# Patient Record
Sex: FEMALE | Race: BLACK OR AFRICAN AMERICAN | NOT HISPANIC OR LATINO | Employment: UNEMPLOYED | URBAN - METROPOLITAN AREA
[De-identification: names, ages, dates, MRNs, and addresses within clinical notes are randomized per-mention and may not be internally consistent; named-entity substitution may affect disease eponyms.]

---

## 2018-07-05 LAB
HPV16+18+H RISK 12 DNA CVX-IMP: POSITIVE
PAP RECOMMENDATION EXT: ABNORMAL
PAP SMEAR: ABNORMAL

## 2022-09-27 ENCOUNTER — OFFICE VISIT (OUTPATIENT)
Dept: FAMILY MEDICINE | Facility: CLINIC | Age: 37
End: 2022-09-27
Payer: MEDICAID

## 2022-09-27 VITALS
TEMPERATURE: 98 F | HEART RATE: 84 BPM | DIASTOLIC BLOOD PRESSURE: 90 MMHG | BODY MASS INDEX: 20.58 KG/M2 | RESPIRATION RATE: 18 BRPM | SYSTOLIC BLOOD PRESSURE: 127 MMHG | OXYGEN SATURATION: 97 % | WEIGHT: 109 LBS | HEIGHT: 61 IN

## 2022-09-27 DIAGNOSIS — R82.90 ABNORMAL URINE FINDING: ICD-10-CM

## 2022-09-27 DIAGNOSIS — N30.01 ACUTE CYSTITIS WITH HEMATURIA: Primary | ICD-10-CM

## 2022-09-27 DIAGNOSIS — D64.9 ANEMIA, UNSPECIFIED TYPE: ICD-10-CM

## 2022-09-27 DIAGNOSIS — K50.118 CROHN'S DISEASE OF COLON WITH OTHER COMPLICATION: ICD-10-CM

## 2022-09-27 PROBLEM — K50.10 CROHN'S DISEASE OF COLON: Status: ACTIVE | Noted: 2022-09-27

## 2022-09-27 LAB
ALBUMIN SERPL-MCNC: 4.5 GM/DL (ref 3.5–5)
ALBUMIN/GLOB SERPL: 1 RATIO (ref 1.1–2)
ALP SERPL-CCNC: 83 UNIT/L (ref 40–150)
ALT SERPL-CCNC: 11 UNIT/L (ref 0–55)
AST SERPL-CCNC: 16 UNIT/L (ref 5–34)
BASOPHILS # BLD AUTO: 0.04 X10(3)/MCL (ref 0–0.2)
BASOPHILS NFR BLD AUTO: 0.6 %
BILIRUB UR QL STRIP: NEGATIVE
BILIRUBIN DIRECT+TOT PNL SERPL-MCNC: 0.4 MG/DL
BUN SERPL-MCNC: 7.2 MG/DL (ref 7–18.7)
CALCIUM SERPL-MCNC: 10 MG/DL (ref 8.4–10.2)
CHLORIDE SERPL-SCNC: 105 MMOL/L (ref 98–107)
CO2 SERPL-SCNC: 24 MMOL/L (ref 22–29)
CREAT SERPL-MCNC: 0.66 MG/DL (ref 0.55–1.02)
EOSINOPHIL # BLD AUTO: 0.14 X10(3)/MCL (ref 0–0.9)
EOSINOPHIL NFR BLD AUTO: 2.1 %
ERYTHROCYTE [DISTWIDTH] IN BLOOD BY AUTOMATED COUNT: 17 % (ref 11.5–17)
FERRITIN SERPL-MCNC: 21.65 NG/ML (ref 4.63–204)
FOLATE SERPL-MCNC: 10 NG/ML (ref 7–31.4)
GFR SERPLBLD CREATININE-BSD FMLA CKD-EPI: >60 MLS/MIN/1.73/M2
GLOBULIN SER-MCNC: 4.4 GM/DL (ref 2.4–3.5)
GLUCOSE SERPL-MCNC: 77 MG/DL (ref 74–100)
GLUCOSE UR QL STRIP: NEGATIVE
HCT VFR BLD AUTO: 43.7 % (ref 37–47)
HGB BLD-MCNC: 13.5 GM/DL (ref 12–16)
IMM GRANULOCYTES # BLD AUTO: 0.01 X10(3)/MCL (ref 0–0.04)
IMM GRANULOCYTES NFR BLD AUTO: 0.1 %
IRON SATN MFR SERPL: 16 % (ref 20–50)
IRON SERPL-MCNC: 48 UG/DL (ref 50–170)
KETONES UR QL STRIP: NEGATIVE
LEUKOCYTE ESTERASE UR QL STRIP: POSITIVE
LYMPHOCYTES # BLD AUTO: 2.36 X10(3)/MCL (ref 0.6–4.6)
LYMPHOCYTES NFR BLD AUTO: 34.7 %
MCH RBC QN AUTO: 27.4 PG (ref 27–31)
MCHC RBC AUTO-ENTMCNC: 30.9 MG/DL (ref 33–36)
MCV RBC AUTO: 88.6 FL (ref 80–94)
MONOCYTES # BLD AUTO: 0.37 X10(3)/MCL (ref 0.1–1.3)
MONOCYTES NFR BLD AUTO: 5.4 %
NEUTROPHILS # BLD AUTO: 3.9 X10(3)/MCL (ref 2.1–9.2)
NEUTROPHILS NFR BLD AUTO: 57.1 %
NRBC BLD AUTO-RTO: 0 %
PH, POC UA: 5.5
PLATELET # BLD AUTO: 449 X10(3)/MCL (ref 130–400)
PMV BLD AUTO: 9.6 FL (ref 7.4–10.4)
POC BLOOD, URINE: POSITIVE
POC NITRATES, URINE: POSITIVE
POTASSIUM SERPL-SCNC: 3.8 MMOL/L (ref 3.5–5.1)
PROT SERPL-MCNC: 8.9 GM/DL (ref 6.4–8.3)
PROT UR QL STRIP: NEGATIVE
RBC # BLD AUTO: 4.93 X10(6)/MCL (ref 4.2–5.4)
RET# (OHS): 0.05 (ref 0.02–0.08)
RETICULOCYTE COUNT AUTOMATED (OLG): 0.99 % (ref 1.1–2.1)
SODIUM SERPL-SCNC: 140 MMOL/L (ref 136–145)
SP GR UR STRIP: 1.02 (ref 1–1.03)
TIBC SERPL-MCNC: 251 UG/DL (ref 70–310)
TIBC SERPL-MCNC: 299 UG/DL (ref 250–450)
TRANSFERRIN SERPL-MCNC: 268 MG/DL (ref 180–382)
TSH SERPL-ACNC: 0.96 UIU/ML (ref 0.35–4.94)
UROBILINOGEN UR STRIP-ACNC: 0.2 (ref 0.1–1.1)
VIT B12 SERPL-MCNC: 516 PG/ML (ref 213–816)
WBC # SPEC AUTO: 6.8 X10(3)/MCL (ref 4.5–11.5)

## 2022-09-27 PROCEDURE — 1160F RVW MEDS BY RX/DR IN RCRD: CPT | Mod: CPTII,,, | Performed by: NURSE PRACTITIONER

## 2022-09-27 PROCEDURE — 3008F BODY MASS INDEX DOCD: CPT | Mod: CPTII,,, | Performed by: NURSE PRACTITIONER

## 2022-09-27 PROCEDURE — 82746 ASSAY OF FOLIC ACID SERUM: CPT | Performed by: NURSE PRACTITIONER

## 2022-09-27 PROCEDURE — 3008F PR BODY MASS INDEX (BMI) DOCUMENTED: ICD-10-PCS | Mod: CPTII,,, | Performed by: NURSE PRACTITIONER

## 2022-09-27 PROCEDURE — 3074F SYST BP LT 130 MM HG: CPT | Mod: CPTII,,, | Performed by: NURSE PRACTITIONER

## 2022-09-27 PROCEDURE — 82728 ASSAY OF FERRITIN: CPT | Performed by: NURSE PRACTITIONER

## 2022-09-27 PROCEDURE — 83540 ASSAY OF IRON: CPT | Performed by: NURSE PRACTITIONER

## 2022-09-27 PROCEDURE — 99214 OFFICE O/P EST MOD 30 MIN: CPT | Mod: 25,S$PBB,, | Performed by: NURSE PRACTITIONER

## 2022-09-27 PROCEDURE — 87077 CULTURE AEROBIC IDENTIFY: CPT | Performed by: NURSE PRACTITIONER

## 2022-09-27 PROCEDURE — 80053 COMPREHEN METABOLIC PANEL: CPT | Performed by: NURSE PRACTITIONER

## 2022-09-27 PROCEDURE — 36415 COLL VENOUS BLD VENIPUNCTURE: CPT | Performed by: NURSE PRACTITIONER

## 2022-09-27 PROCEDURE — 82607 VITAMIN B-12: CPT | Performed by: NURSE PRACTITIONER

## 2022-09-27 PROCEDURE — 1160F PR REVIEW ALL MEDS BY PRESCRIBER/CLIN PHARMACIST DOCUMENTED: ICD-10-PCS | Mod: CPTII,,, | Performed by: NURSE PRACTITIONER

## 2022-09-27 PROCEDURE — 85045 AUTOMATED RETICULOCYTE COUNT: CPT | Performed by: NURSE PRACTITIONER

## 2022-09-27 PROCEDURE — 1159F MED LIST DOCD IN RCRD: CPT | Mod: CPTII,,, | Performed by: NURSE PRACTITIONER

## 2022-09-27 PROCEDURE — 3080F PR MOST RECENT DIASTOLIC BLOOD PRESSURE >= 90 MM HG: ICD-10-PCS | Mod: CPTII,,, | Performed by: NURSE PRACTITIONER

## 2022-09-27 PROCEDURE — 1159F PR MEDICATION LIST DOCUMENTED IN MEDICAL RECORD: ICD-10-PCS | Mod: CPTII,,, | Performed by: NURSE PRACTITIONER

## 2022-09-27 PROCEDURE — 81003 URINALYSIS AUTO W/O SCOPE: CPT | Mod: PBBFAC | Performed by: NURSE PRACTITIONER

## 2022-09-27 PROCEDURE — 85025 COMPLETE CBC W/AUTO DIFF WBC: CPT | Performed by: NURSE PRACTITIONER

## 2022-09-27 PROCEDURE — 3074F PR MOST RECENT SYSTOLIC BLOOD PRESSURE < 130 MM HG: ICD-10-PCS | Mod: CPTII,,, | Performed by: NURSE PRACTITIONER

## 2022-09-27 PROCEDURE — 3080F DIAST BP >= 90 MM HG: CPT | Mod: CPTII,,, | Performed by: NURSE PRACTITIONER

## 2022-09-27 PROCEDURE — 99215 OFFICE O/P EST HI 40 MIN: CPT | Mod: PBBFAC | Performed by: NURSE PRACTITIONER

## 2022-09-27 PROCEDURE — 99214 PR OFFICE/OUTPT VISIT, EST, LEVL IV, 30-39 MIN: ICD-10-PCS | Mod: 25,S$PBB,, | Performed by: NURSE PRACTITIONER

## 2022-09-27 PROCEDURE — 84443 ASSAY THYROID STIM HORMONE: CPT | Performed by: NURSE PRACTITIONER

## 2022-09-27 RX ORDER — FERROUS SULFATE TAB 325 MG (65 MG ELEMENTAL FE) 325 (65 FE) MG
1 TAB ORAL 2 TIMES DAILY
COMMUNITY
Start: 2022-08-15 | End: 2022-09-27 | Stop reason: SDUPTHER

## 2022-09-27 RX ORDER — FOLIC ACID 1 MG/1
1000 TABLET ORAL DAILY
COMMUNITY
Start: 2022-08-15 | End: 2022-09-27 | Stop reason: SDUPTHER

## 2022-09-27 RX ORDER — PREDNISONE 20 MG/1
2.5 TABLET ORAL DAILY
COMMUNITY
Start: 2022-08-15 | End: 2023-04-05

## 2022-09-27 RX ORDER — FERROUS SULFATE TAB 325 MG (65 MG ELEMENTAL FE) 325 (65 FE) MG
325 TAB ORAL 2 TIMES DAILY
Qty: 180 TABLET | Refills: 3 | Status: SHIPPED | OUTPATIENT
Start: 2022-09-27

## 2022-09-27 RX ORDER — PANTOPRAZOLE SODIUM 40 MG/1
40 TABLET, DELAYED RELEASE ORAL DAILY
COMMUNITY
Start: 2022-08-15 | End: 2022-09-27 | Stop reason: SDUPTHER

## 2022-09-27 RX ORDER — PANTOPRAZOLE SODIUM 40 MG/1
40 TABLET, DELAYED RELEASE ORAL DAILY
Qty: 30 TABLET | Refills: 3 | Status: SHIPPED | OUTPATIENT
Start: 2022-09-27 | End: 2023-07-13

## 2022-09-27 RX ORDER — FOLIC ACID 1 MG/1
1000 TABLET ORAL DAILY
Qty: 90 TABLET | Refills: 2 | Status: SHIPPED | OUTPATIENT
Start: 2022-09-27

## 2022-09-27 RX ORDER — CHOLECALCIFEROL (VITAMIN D3) 25 MCG
1 TABLET,CHEWABLE ORAL EVERY MORNING
Qty: 30 LOZENGE | Refills: 6 | Status: SHIPPED | OUTPATIENT
Start: 2022-09-27

## 2022-09-27 NOTE — ASSESSMENT & PLAN NOTE
POSITIVE FOR BLOOD IN THE URINE, POSITIVE FOR NITRITE, SMALL LEUKOCYTE.  The culture initiated.  Awaiting test results, patient denies any urinary symptoms at this time will treat depending on test results.  Stay hydrated with fluids specially water.

## 2022-09-27 NOTE — ASSESSMENT & PLAN NOTE
Rx folic acid 1 mg p.o. once daily refilled.  Rx Ferosul 325 mg p.o. b.i.d. daily refilled.  Rx Protonix 40 mg p.o. once daily refilled.  Rx B12 1000 mcg sublingual p.o. once daily refilled.  Blood work iron and TIBC, reticulocytes, vitamin B12, TSH, folate, ferritin, CMP, CBC, occult blood fecal immunoassay, ALL PENDING.  Will notify of test results when they become available.  Return to clinic as discussed or sooner if needed.  Referral to Hematology and Oncology initiated.  Referral to gastroenterology initiated.

## 2022-09-27 NOTE — PROGRESS NOTES
Patient Name: Elaina Pierce   : 1985  MRN: [id number]     SUBJECTIVE:  Elaina Pierce is a 37 y.o. female here for Establish Care  .    37-year-old female presents to the clinic to get established with PCP.  Patient has history of iron deficiency anemia, Crohn's disease.  Patient was seen last at our [facility] as an inpatient with blood transfusion but end up leaving AMA or personal reasons.  She is currently awaiting a bone marrow test results that was initiated during her admission had [facility]. Patient denies any complaints at this time.      Patient requesting medication refills on her B12, folic acid, iron supplementation, and Protonix Protonix as well to get establish with Gastroenterology regarding Crohn's and Hematology regarding iron deficiency and blood loss.    Reading from her old records shows patient had multiple blood transfusions in the past where she receives the transfusions and then she leaves AMA.  History of Crohn's without follow ups or getting established with gastroenterology.  History of heavy cycles but today she state her menstrual cycles  are regular and they last between 4-5 days.    Blood work initiated, Blood work iron and TIBC, reticulocytes, vitamin B12, TSH, folate, ferritin, CMP, CBC, occult blood fecal immunoassay, pending test results.    Urine culture initiated due to positive blood in the urine, positive nitrite, small leukocyte.  Denies any urinary symptoms.    Patient denies chest pain, shortness of breath, dyspnea on exertion, palpitations, peripheral edema, abdominal pain, nausea, vomiting, diarrhea, constipation, fatigue, fever, chills, dysuria,  hematuria, melena, or hematochezia.    @11:10 2022 Augmenten 875 bid x 7 days for UTI / Ecoli > 100,000 colonies.       Visit on 08/10/2022 [facility]  37 years old female with past medical history of Crohn's disease, chronic anemia was admitted with a chief complaint of generalized weakness and her hemoglobin  "noted to be around 1.6 on admission. She received 4 units of PRBC and was initially admitted to the ICU and later downgraded to the floors. Gastroenterology evaluated as well as a hematology was consulted. Neurology advised for outpatient follow-up as there is no acute GI bleed noted. Was noted to have significant thrombocytopenia with the B12 and folic acid deficiency. B12 and folic acid were replaced however there is persistent dropping of platelets despite  transfusion. Patient underwent bone marrow biopsy and aspiration during the hospital stay. However on 8/14 patient decided leaving AMA. She was counseled multiple times regarding life-threatening nature of current situation and despite she decided to leave AMA. However provided her with the scripts folic acid and B12, iron.        ALLERGIES: Review of patient's allergies indicates:  No Known Allergies      ROS:  Review of Systems   All other systems reviewed and are negative.      OBJECTIVE:  Vital signs  Vitals:    09/27/22 0856   BP: (!) 127/90   Pulse: 84   Resp: 18   Temp: 98.3 °F (36.8 °C)   TempSrc: Oral   SpO2: 97%   Weight: 49.4 kg (109 lb)   Height: 5' 1" (1.549 m)      Body mass index is 20.6 kg/m².    PHYSICAL EXAM:   Physical Exam  Vitals and nursing note reviewed.   Constitutional:       General: She is not in acute distress.     Appearance: Normal appearance. She is not ill-appearing, toxic-appearing or diaphoretic.   HENT:      Head: Normocephalic and atraumatic.   Eyes:      Extraocular Movements: Extraocular movements intact.      Pupils: Pupils are equal, round, and reactive to light.   Cardiovascular:      Rate and Rhythm: Normal rate and regular rhythm.      Pulses: Normal pulses.      Heart sounds: Normal heart sounds. No murmur heard.  Pulmonary:      Effort: Pulmonary effort is normal.      Breath sounds: Normal breath sounds. No wheezing or rhonchi.   Abdominal:      Palpations: Abdomen is soft.   Musculoskeletal:         " General: Normal range of motion.      Cervical back: Normal range of motion and neck supple.   Neurological:      General: No focal deficit present.      Mental Status: She is alert and oriented to person, place, and time. Mental status is at baseline.   Psychiatric:         Mood and Affect: Mood normal.         Behavior: Behavior normal.         Thought Content: Thought content normal.         Judgment: Judgment normal.        ASSESSMENT/PLAN:  1. Anemia, unspecified type  Assessment & Plan:  Rx folic acid 1 mg p.o. once daily refilled.  Rx Ferosul 325 mg p.o. b.i.d. daily refilled.  Rx Protonix 40 mg p.o. once daily refilled.  Rx B12 1000 mcg sublingual p.o. once daily refilled.  Blood work iron and TIBC, reticulocytes, vitamin B12, TSH, folate, ferritin, CMP, CBC, occult blood fecal immunoassay, ALL PENDING.  Will notify of test results when they become available.  Return to clinic as discussed or sooner if needed.  Referral to Hematology and Oncology initiated.  Referral to gastroenterology initiated.    Orders:  -     Comprehensive Metabolic Panel  -     Vitamin B12  -     Folate  -     Ferritin  -     Iron and TIBC  -     CBC Auto Differential  -     Reticulocytes  -     TSH  -     POCT Urinalysis, Dipstick, Automated, W/O Scope  -     TSH  -     Reticulocytes  -     CBC Auto Differential  -     Iron and TIBC  -     Ferritin  -     Folate  -     Vitamin B12  -     Comprehensive Metabolic Panel  -     pantoprazole (PROTONIX) 40 MG tablet  -     folic acid (FOLVITE) 1 MG tablet  -     FEROSUL 325 mg (65 mg iron) Tab tablet  -     OCCULT BLOOD FECAL IMMUNOASSAY  -     OCCULT BLOOD FECAL IMMUNOASSAY  -     cyanocobalamin, vitamin B-12, 1,000 mcg Lozg  -     Ambulatory referral/consult to Hematology / Oncology    2. Crohn's disease of colon with other complication  Assessment & Plan:  Referral to gastroenterology initiated.  FIT test initiated.  MEDICATIONS REFILLED.    Orders:  -     Comprehensive Metabolic  Panel  -     Vitamin B12  -     Folate  -     Ferritin  -     Iron and TIBC  -     CBC Auto Differential  -     Reticulocytes  -     TSH  -     POCT Urinalysis, Dipstick, Automated, W/O Scope  -     TSH  -     Reticulocytes  -     CBC Auto Differential  -     Iron and TIBC  -     Ferritin  -     Folate  -     Vitamin B12  -     Comprehensive Metabolic Panel  -     pantoprazole (PROTONIX) 40 MG tablet  -     folic acid (FOLVITE) 1 MG tablet  -     FEROSUL 325 mg (65 mg iron) Tab tablet  -     OCCULT BLOOD FECAL IMMUNOASSAY  -     OCCULT BLOOD FECAL IMMUNOASSAY  -     cyanocobalamin, vitamin B-12, 1,000 mcg Lozg  -     Ambulatory referral/consult to Gastroenterology    3. Abnormal urine finding  Assessment & Plan:  POSITIVE FOR BLOOD IN THE URINE, POSITIVE FOR NITRITE, SMALL LEUKOCYTE.  The culture initiated.  Awaiting test results, patient denies any urinary symptoms at this time will treat depending on test results.  Stay hydrated with fluids specially water.    Orders:  -     Urine culture         RESULTS:  Recent Results (from the past 1008 hour(s))   TSH    Collection Time: 09/27/22 10:01 AM   Result Value Ref Range    Thyroid Stimulating Hormone 0.9642 0.3500 - 4.9400 uIU/mL   Reticulocytes    Collection Time: 09/27/22 10:01 AM   Result Value Ref Range    Retic Cnt Auto 0.99 (L) 1.1 - 2.1 %    RET# 0.0488 0.016 - 0.078   CBC with Differential    Collection Time: 09/27/22 10:01 AM   Result Value Ref Range    WBC 6.8 4.5 - 11.5 x10(3)/mcL    RBC 4.93 4.20 - 5.40 x10(6)/mcL    Hgb 13.5 12.0 - 16.0 gm/dL    Hct 43.7 37.0 - 47.0 %    MCV 88.6 80.0 - 94.0 fL    MCH 27.4 27.0 - 31.0 pg    MCHC 30.9 (L) 33.0 - 36.0 mg/dL    RDW 17.0 11.5 - 17.0 %    Platelet 449 (H) 130 - 400 x10(3)/mcL    MPV 9.6 7.4 - 10.4 fL    Neut % 57.1 %    Lymph % 34.7 %    Mono % 5.4 %    Eos % 2.1 %    Basophil % 0.6 %    Lymph # 2.36 0.6 - 4.6 x10(3)/mcL    Neut # 3.9 2.1 - 9.2 x10(3)/mcL    Mono # 0.37 0.1 - 1.3 x10(3)/mcL    Eos # 0.14 0 -  0.9 x10(3)/mcL    Baso # 0.04 0 - 0.2 x10(3)/mcL    IG# 0.01 0 - 0.04 x10(3)/mcL    IG% 0.1 %    NRBC% 0.0 %   Iron and TIBC    Collection Time: 09/27/22 10:01 AM   Result Value Ref Range    Iron Binding Capacity Unsaturated 251 70 - 310 ug/dL    Iron Level 48 (L) 50 - 170 ug/dL    Transferrin 268 180 - 382 mg/dL    Iron Binding Capacity Total 299 250 - 450 ug/dL    Iron Saturation 16 (L) 20 - 50 %   Ferritin    Collection Time: 09/27/22 10:01 AM   Result Value Ref Range    Ferritin Level 21.65 4.63 - 204.00 ng/mL   Folate    Collection Time: 09/27/22 10:01 AM   Result Value Ref Range    Folate Level 10.0 7.0 - 31.4 ng/mL   Vitamin B12    Collection Time: 09/27/22 10:01 AM   Result Value Ref Range    Vitamin B12 Level 516 213 - 816 pg/mL   Comprehensive Metabolic Panel    Collection Time: 09/27/22 10:01 AM   Result Value Ref Range    Sodium Level 140 136 - 145 mmol/L    Potassium Level 3.8 3.5 - 5.1 mmol/L    Chloride 105 98 - 107 mmol/L    Carbon Dioxide 24 22 - 29 mmol/L    Glucose Level 77 74 - 100 mg/dL    Blood Urea Nitrogen 7.2 7.0 - 18.7 mg/dL    Creatinine 0.66 0.55 - 1.02 mg/dL    Calcium Level Total 10.0 8.4 - 10.2 mg/dL    Protein Total 8.9 (H) 6.4 - 8.3 gm/dL    Albumin Level 4.5 3.5 - 5.0 gm/dL    Globulin 4.4 (H) 2.4 - 3.5 gm/dL    Albumin/Globulin Ratio 1.0 (L) 1.1 - 2.0 ratio    Bilirubin Total 0.4 <=1.5 mg/dL    Alkaline Phosphatase 83 40 - 150 unit/L    Alanine Aminotransferase 11 0 - 55 unit/L    Aspartate Aminotransferase 16 5 - 34 unit/L    eGFR >60 mls/min/1.73/m2   POCT Urinalysis, Dipstick, Automated, W/O Scope    Collection Time: 09/27/22 11:00 AM   Result Value Ref Range    POC Blood, Urine Positive (A) Negative    POC Bilirubin, Urine Negative Negative    POC Urobilinogen, Urine 0.2 0.1 - 1.1    POC Ketones, Urine Negative Negative    POC Protein, Urine Negative Negative    POC Nitrates, Urine Positive (A) Negative    POC Glucose, Urine Negative Negative    pH, UA 5.5     POC Specific  Gravity, Urine 1.020 1.003 - 1.029    POC Leukocytes, Urine Positive (A) Negative         Follow Up:  Follow up in about 3 months (around 12/27/2022).      Previous medical history/lab work/radiology reviewed and considered during medical management decisions.   Medication list reviewed and medication reconciliation performed.  Patient was provided  and care about his/her current diagnosis (es) and medications including risk/benefit and side effects/adverse events, over the counter medication uses/doses, home self-care and contact precautions,  and red flags and indications for when to seek immediate medical attention.   Patient was advised to continue compliance with current medication list and medical recommendations.  Patient dvised continued compliance with recommended eating habits/ diets for medical conditions and exercise 150 minutes/ week (if possible) for medical condition (s).  Educational handouts and instructions on selected disease management in AVS (After Visit Summary).    All of the patient's questions were answered to patient's satisfaction.   The patient was receptive, expressed verbal understanding and agreement the above plan.       This note was created with the assistance of a voice recognition software or phone dictation. There may be transcription errors as a result of using this technology however minimal. Effort has been made to assure accuracy of transcription but any obvious errors or omissions should be clarified with the author of the document

## 2022-09-29 LAB — BACTERIA UR CULT: ABNORMAL

## 2022-09-29 RX ORDER — AMOXICILLIN AND CLAVULANATE POTASSIUM 875; 125 MG/1; MG/1
1 TABLET, FILM COATED ORAL EVERY 12 HOURS
Qty: 14 TABLET | Refills: 0 | Status: SHIPPED | OUTPATIENT
Start: 2022-09-29 | End: 2022-10-06

## 2022-09-29 NOTE — PROGRESS NOTES
PLEASE CALL PATIENTS WITH RESULTS, urinalysis showed a bladder infection.  Rx Augmentin 875 b.i.d. p.o. daily for 7 days.  Stay hydrated with fluids.  [place] and instead off bathing.  Wipe from front to back.  Make sure to  urinate after having sex.  Return to clinic as needed

## 2022-09-30 ENCOUNTER — TELEPHONE (OUTPATIENT)
Dept: FAMILY MEDICINE | Facility: CLINIC | Age: 37
End: 2022-09-30
Payer: MEDICAID

## 2022-09-30 NOTE — TELEPHONE ENCOUNTER
----- Message from ANNA Alcala sent at 9/29/2022 11:13 AM CDT -----  PLEASE CALL PATIENTS WITH RESULTS, urinalysis showed a bladder infection.  Rx Augmentin 875 b.i.d. p.o. daily for 7 days.  Stay hydrated with fluids.  [place] and instead off bathing.  Wipe from front to back.  Make sure to  urinate after having sex.  Return to clinic as needed

## 2022-11-27 PROBLEM — E53.8 B12 DEFICIENCY: Status: ACTIVE | Noted: 2022-11-27

## 2022-11-27 PROBLEM — E53.8 FOLATE DEFICIENCY: Status: ACTIVE | Noted: 2022-11-27

## 2022-11-27 PROBLEM — E61.1 IRON DEFICIENCY: Status: ACTIVE | Noted: 2022-11-27

## 2022-11-27 PROBLEM — R89.8 ABNORMAL BONE MARROW EXAMINATION: Status: ACTIVE | Noted: 2022-11-27

## 2022-11-27 PROBLEM — D64.9 SEVERE ANEMIA: Status: ACTIVE | Noted: 2022-11-27

## 2023-01-07 PROBLEM — R19.5 OCCULT BLOOD POSITIVE STOOL: Status: ACTIVE | Noted: 2023-01-07

## 2023-01-07 PROBLEM — D50.9 IRON DEFICIENCY ANEMIA: Status: ACTIVE | Noted: 2023-01-07

## 2023-02-18 PROBLEM — Z92.89 HISTORY OF BLOOD TRANSFUSION: Status: ACTIVE | Noted: 2023-02-18

## 2023-04-05 ENCOUNTER — HOSPITAL ENCOUNTER (OUTPATIENT)
Dept: RADIOLOGY | Facility: HOSPITAL | Age: 38
Discharge: HOME | End: 2023-04-05
Attending: NURSE PRACTITIONER
Payer: MEDICAID

## 2023-04-05 ENCOUNTER — OFFICE VISIT (OUTPATIENT)
Dept: FAMILY MEDICINE | Facility: CLINIC | Age: 38
End: 2023-04-05
Payer: MEDICAID

## 2023-04-05 VITALS
BODY MASS INDEX: 21.52 KG/M2 | HEART RATE: 99 BPM | OXYGEN SATURATION: 98 % | WEIGHT: 114 LBS | TEMPERATURE: 98 F | DIASTOLIC BLOOD PRESSURE: 87 MMHG | SYSTOLIC BLOOD PRESSURE: 121 MMHG | HEIGHT: 61 IN | RESPIRATION RATE: 16 BRPM

## 2023-04-05 DIAGNOSIS — K50.118 CROHN'S DISEASE OF COLON WITH OTHER COMPLICATION: ICD-10-CM

## 2023-04-05 DIAGNOSIS — R10.30 LOWER ABDOMINAL PAIN: ICD-10-CM

## 2023-04-05 DIAGNOSIS — K50.118 CROHN'S DISEASE OF COLON WITH OTHER COMPLICATION: Primary | ICD-10-CM

## 2023-04-05 PROCEDURE — 99215 OFFICE O/P EST HI 40 MIN: CPT | Mod: PBBFAC | Performed by: NURSE PRACTITIONER

## 2023-04-05 PROCEDURE — 1159F PR MEDICATION LIST DOCUMENTED IN MEDICAL RECORD: ICD-10-PCS | Mod: CPTII,,, | Performed by: NURSE PRACTITIONER

## 2023-04-05 PROCEDURE — 3008F BODY MASS INDEX DOCD: CPT | Mod: CPTII,,, | Performed by: NURSE PRACTITIONER

## 2023-04-05 PROCEDURE — 3079F DIAST BP 80-89 MM HG: CPT | Mod: CPTII,,, | Performed by: NURSE PRACTITIONER

## 2023-04-05 PROCEDURE — 99214 PR OFFICE/OUTPT VISIT, EST, LEVL IV, 30-39 MIN: ICD-10-PCS | Mod: S$PBB,,, | Performed by: NURSE PRACTITIONER

## 2023-04-05 PROCEDURE — 3074F SYST BP LT 130 MM HG: CPT | Mod: CPTII,,, | Performed by: NURSE PRACTITIONER

## 2023-04-05 PROCEDURE — 1159F MED LIST DOCD IN RCRD: CPT | Mod: CPTII,,, | Performed by: NURSE PRACTITIONER

## 2023-04-05 PROCEDURE — 3008F PR BODY MASS INDEX (BMI) DOCUMENTED: ICD-10-PCS | Mod: CPTII,,, | Performed by: NURSE PRACTITIONER

## 2023-04-05 PROCEDURE — 74019 RADEX ABDOMEN 2 VIEWS: CPT | Mod: TC

## 2023-04-05 PROCEDURE — 3079F PR MOST RECENT DIASTOLIC BLOOD PRESSURE 80-89 MM HG: ICD-10-PCS | Mod: CPTII,,, | Performed by: NURSE PRACTITIONER

## 2023-04-05 PROCEDURE — 99214 OFFICE O/P EST MOD 30 MIN: CPT | Mod: S$PBB,,, | Performed by: NURSE PRACTITIONER

## 2023-04-05 PROCEDURE — 1160F RVW MEDS BY RX/DR IN RCRD: CPT | Mod: CPTII,,, | Performed by: NURSE PRACTITIONER

## 2023-04-05 PROCEDURE — 3074F PR MOST RECENT SYSTOLIC BLOOD PRESSURE < 130 MM HG: ICD-10-PCS | Mod: CPTII,,, | Performed by: NURSE PRACTITIONER

## 2023-04-05 PROCEDURE — 1160F PR REVIEW ALL MEDS BY PRESCRIBER/CLIN PHARMACIST DOCUMENTED: ICD-10-PCS | Mod: CPTII,,, | Performed by: NURSE PRACTITIONER

## 2023-04-05 RX ORDER — PREDNISONE 5 MG/1
5 TABLET ORAL DAILY
Qty: 7 TABLET | Refills: 0 | Status: SHIPPED | OUTPATIENT
Start: 2023-04-05 | End: 2023-04-12

## 2023-04-05 NOTE — PROGRESS NOTES
Patient Name: Elaina Pierce   : 1985  MRN: [id number]     SUBJECTIVE DATA:    CHIEF COMPLAINT:   Elaina Pierce is a 37 y.o. female who presents to clinic today with Follow-up (C/O abdominal pain constant for 2 wks. Hx of Crohn's)        HPI: 37-year-old female presents to the clinic to discuss Crohn's flare up.  Patient has one of her kids with her.    Crohn's:  Patient state she has been dealing with Crohn's since age of 17 and followed with Dr. Firooz Jalili.  Patient used to remember she is to be on prednisone of unknown and anti acids.  Patient state after region 20 years of age she has not followed up with any gastroenterology.  Today patient complain of lower abdominal pain off and on, and now is constant usually during a bowel movement.  She denies seeing any blood in the stool or when wiping.  Patient state usually she go to the bathroom maybe once or twice a week.  Patient denies any nausea vomiting or diarrhea although at times she will have diarrhea during her flare up.  Now the pain is constant.  Patient state usually when they prescribed her prednisone and it helps.  Instructed patient will refer her to Gastroenterology, phone number provided to follow-up on her appointment next week, discussed Crohn's diet, continue omeprazole once daily, Rx prednisone 5 mg p.o. once daily for 7 days, x-ray of abdomen initiated will notify patient of test results when they become available.  Instructed patient when symptoms worsens needs to call 911 and go to nearest emergency department as well notify the clinic.    Care gap:  Patient to return in July after her birthday for yearly wellness, cervical cancer screening will be completed during the visit, fasting blood work will be drawn, instructed patient to return to clinic sooner if needed.    Patient is aware she is having appointment tomorrow with Hematology/Oncology Clinic.    Lmp 2023 4 days. Heavy day two.    Patient denies chest pain,  "shortness of breath, dyspnea on exertion, palpitations, peripheral edema, abdominal pain, nausea, vomiting, diarrhea, constipation, fatigue, fever, chills, dysuria,  hematuria, melena, or hematochezia.   HPI      ALLERGIES: Review of patient's allergies indicates:  No Known Allergies      ROS:  Review of Systems   Gastrointestinal:  Positive for abdominal pain.   All other systems reviewed and are negative.      OBJECTIVE DATA:  Vital signs  Vitals:    04/05/23 1230   BP: 121/87   Pulse: 99   Resp: 16   Temp: 98 °F (36.7 °C)   SpO2: 98%   Weight: 51.7 kg (114 lb)   Height: 5' 1" (1.549 m)      Body mass index is 21.54 kg/m².    PHYSICAL EXAM:   Physical Exam  Vitals and nursing note reviewed.   Constitutional:       General: She is awake. She is not in acute distress.     Appearance: Normal appearance. She is well-developed, well-groomed and overweight. She is not ill-appearing, toxic-appearing or diaphoretic.   HENT:      Head: Normocephalic and atraumatic.      Right Ear: Hearing, tympanic membrane, ear canal and external ear normal.      Left Ear: Hearing, tympanic membrane, ear canal and external ear normal.      Nose: Nose normal.      Right Nostril: No epistaxis.      Left Nostril: No epistaxis.      Right Turbinates: Not swollen.      Left Turbinates: Not swollen.      Mouth/Throat:      Lips: Pink.      Mouth: Mucous membranes are moist.      Pharynx: Oropharynx is clear. Uvula midline.   Eyes:      General: Lids are normal. Gaze aligned appropriately.      Extraocular Movements: Extraocular movements intact.      Conjunctiva/sclera: Conjunctivae normal.      Pupils: Pupils are equal, round, and reactive to light.   Neck:      Trachea: Trachea and phonation normal.   Cardiovascular:      Rate and Rhythm: Normal rate and regular rhythm.      Pulses: Normal pulses.           Radial pulses are 2+ on the right side and 2+ on the left side.        Dorsalis pedis pulses are 2+ on the right side and 2+ on the left " side.        Posterior tibial pulses are 2+ on the right side and 2+ on the left side.      Heart sounds: Normal heart sounds. No murmur heard.  Pulmonary:      Effort: Pulmonary effort is normal.      Breath sounds: Normal breath sounds and air entry. No wheezing or rhonchi.   Abdominal:      General: Bowel sounds are normal. There is no distension.      Palpations: Abdomen is soft. There is no mass.      Tenderness: There is abdominal tenderness. There is no right CVA tenderness, left CVA tenderness, guarding or rebound. Negative signs include obturator sign.          Comments: Discomfort with palpation across lower abdomen.    Musculoskeletal:         General: Normal range of motion.      Cervical back: Normal range of motion and neck supple. No rigidity or tenderness.      Right lower leg: No edema.      Left lower leg: No edema.   Lymphadenopathy:      Cervical: No cervical adenopathy.   Skin:     General: Skin is warm and dry.      Capillary Refill: Capillary refill takes less than 2 seconds.   Neurological:      General: No focal deficit present.      Mental Status: She is alert and oriented to person, place, and time. Mental status is at baseline.      GCS: GCS eye subscore is 4. GCS verbal subscore is 5. GCS motor subscore is 6.   Psychiatric:         Attention and Perception: Attention and perception normal.         Mood and Affect: Mood and affect normal.         Speech: Speech normal.         Behavior: Behavior normal. Behavior is cooperative.         Thought Content: Thought content normal.         Cognition and Memory: Cognition and memory normal.         Judgment: Judgment normal.        ASSESSMENT/PLAN:  1. Crohn's disease of colon with other complication  Assessment & Plan:  instructed patient will refer her to Gastroenterology, phone number provided to follow-up on her appointment next week, discussed Crohn's diet, continue omeprazole once daily, Rx prednisone 5 mg p.o. once daily for 7 days, x-ray  of abdomen initiated will notify patient of test results when they become available.  Instructed patient when symptoms worsens needs to call 911 and go to nearest emergency department as well notify the clinic.    Orders:  -     Ambulatory referral/consult to Gastroenterology; Future; Expected date: 04/12/2023  -     X-Ray Abdomen Flat And Erect; Future; Expected date: 04/05/2023  -     predniSONE (DELTASONE) 5 MG tablet; Take 1 tablet (5 mg total) by mouth once daily. for 7 days  Dispense: 7 tablet; Refill: 0    2. Lower abdominal pain  -     X-Ray Abdomen Flat And Erect; Future; Expected date: 04/05/2023  -     predniSONE (DELTASONE) 5 MG tablet; Take 1 tablet (5 mg total) by mouth once daily. for 7 days  Dispense: 7 tablet; Refill: 0           RESULTS:  No results found for this or any previous visit (from the past 1008 hour(s)).      Follow Up:  Follow up in about 4 months (around 7/27/2023).      Previous medical history/lab work/radiology reviewed and considered during medical management decisions.   Medication list reviewed and medication reconciliation performed.  Patient was provided  and care about his/her current diagnosis (es) and medications including risk/benefit and side effects/adverse events, over the counter medication uses/doses, home self-care and contact precautions,  and red flags and indications for when to seek immediate medical attention.   Patient was advised to continue compliance with current medication list and medical recommendations.  Patient dvised continued compliance with recommended eating habits/ diets for medical conditions and exercise 150 minutes/ week (if possible) for medical condition (s).  Educational handouts and instructions on selected disease management in AVS (After Visit Summary).    All of the patient's questions were answered to patient's satisfaction.   The patient was receptive, expressed verbal understanding and agreement the above plan.         This note  was created with the assistance of a voice recognition software or phone dictation. There may be transcription errors as a result of using this technology however minimal. Effort has been made to assure accuracy of transcription but any obvious errors or omissions should be clarified with the author of the document

## 2023-04-05 NOTE — PROGRESS NOTES
Please notify patient regarding her x-rays results, no sign of constipation, gas was noted, may take over-the-counter Gas-X for discomfort.

## 2023-04-05 NOTE — ASSESSMENT & PLAN NOTE
instructed patient will refer her to Gastroenterology, phone number provided to follow-up on her appointment next week, discussed Crohn's diet, continue omeprazole once daily, Rx prednisone 5 mg p.o. once daily for 7 days, x-ray of abdomen initiated will notify patient of test results when they become available.  Instructed patient when symptoms worsens needs to call 911 and go to nearest emergency department as well notify the clinic.

## 2023-04-06 ENCOUNTER — OFFICE VISIT (OUTPATIENT)
Dept: HEMATOLOGY/ONCOLOGY | Facility: CLINIC | Age: 38
End: 2023-04-06
Attending: INTERNAL MEDICINE
Payer: MEDICAID

## 2023-04-06 ENCOUNTER — TELEPHONE (OUTPATIENT)
Dept: FAMILY MEDICINE | Facility: CLINIC | Age: 38
End: 2023-04-06
Payer: MEDICAID

## 2023-04-06 VITALS
RESPIRATION RATE: 20 BRPM | HEIGHT: 62 IN | WEIGHT: 114 LBS | OXYGEN SATURATION: 97 % | BODY MASS INDEX: 20.98 KG/M2 | SYSTOLIC BLOOD PRESSURE: 132 MMHG | DIASTOLIC BLOOD PRESSURE: 93 MMHG | TEMPERATURE: 98 F | HEART RATE: 93 BPM

## 2023-04-06 DIAGNOSIS — E53.8 B12 DEFICIENCY: ICD-10-CM

## 2023-04-06 DIAGNOSIS — K50.118 CROHN'S DISEASE OF COLON WITH OTHER COMPLICATION: Primary | ICD-10-CM

## 2023-04-06 DIAGNOSIS — E53.8 FOLATE DEFICIENCY: ICD-10-CM

## 2023-04-06 DIAGNOSIS — D64.9 SEVERE ANEMIA: Primary | ICD-10-CM

## 2023-04-06 DIAGNOSIS — R82.90 ABNORMAL URINE FINDING: Primary | ICD-10-CM

## 2023-04-06 DIAGNOSIS — E61.1 IRON DEFICIENCY: ICD-10-CM

## 2023-04-06 DIAGNOSIS — D50.9 IRON DEFICIENCY ANEMIA, UNSPECIFIED IRON DEFICIENCY ANEMIA TYPE: ICD-10-CM

## 2023-04-06 DIAGNOSIS — R89.8 ABNORMAL BONE MARROW EXAMINATION: ICD-10-CM

## 2023-04-06 DIAGNOSIS — Z92.89 HISTORY OF BLOOD TRANSFUSION: ICD-10-CM

## 2023-04-06 DIAGNOSIS — K50.118 CROHN'S DISEASE OF COLON WITH OTHER COMPLICATION: ICD-10-CM

## 2023-04-06 LAB
ALBUMIN SERPL-MCNC: 4.2 G/DL (ref 3.5–5)
ALBUMIN/GLOB SERPL: 1.1 RATIO (ref 1.1–2)
ALP SERPL-CCNC: 66 UNIT/L (ref 40–150)
ALT SERPL-CCNC: 7 UNIT/L (ref 0–55)
ANISOCYTOSIS BLD QL SMEAR: SLIGHT
AST SERPL-CCNC: 13 UNIT/L (ref 5–34)
BASOPHILS # BLD AUTO: 0.07 X10(3)/MCL (ref 0–0.2)
BASOPHILS NFR BLD AUTO: 1 %
BILIRUBIN DIRECT+TOT PNL SERPL-MCNC: 0.2 MG/DL
BUN SERPL-MCNC: 6.7 MG/DL (ref 7–18.7)
CALCIUM SERPL-MCNC: 9.3 MG/DL (ref 8.4–10.2)
CHLORIDE SERPL-SCNC: 108 MMOL/L (ref 98–107)
CO2 SERPL-SCNC: 20 MMOL/L (ref 22–29)
CREAT SERPL-MCNC: 0.71 MG/DL (ref 0.55–1.02)
EOSINOPHIL # BLD AUTO: 0.19 X10(3)/MCL (ref 0–0.9)
EOSINOPHIL NFR BLD AUTO: 2.8 %
ERYTHROCYTE [DISTWIDTH] IN BLOOD BY AUTOMATED COUNT: 23 % (ref 11.5–17)
FERRITIN SERPL-MCNC: 26.02 NG/ML (ref 4.63–204)
FOLATE SERPL-MCNC: 11.8 NG/ML (ref 7–31.4)
GFR SERPLBLD CREATININE-BSD FMLA CKD-EPI: >60 MLS/MIN/1.73/M2
GLOBULIN SER-MCNC: 3.9 GM/DL (ref 2.4–3.5)
GLUCOSE SERPL-MCNC: 93 MG/DL (ref 74–100)
HAPTOGLOB SERPL-MCNC: 118 MG/DL (ref 35–250)
HCT VFR BLD AUTO: 31.2 % (ref 37–47)
HGB BLD-MCNC: 9.1 G/DL (ref 12–16)
IGA SERPL-MCNC: 236 MG/DL (ref 65–421)
IGG SERPL-MCNC: 1313 MG/DL (ref 522–1631)
IGM SERPL-MCNC: 117 MG/DL (ref 33–293)
IMM GRANULOCYTES # BLD AUTO: 0.11 X10(3)/MCL (ref 0–0.04)
IMM GRANULOCYTES NFR BLD AUTO: 1.6 %
IRON SATN MFR SERPL: 8 % (ref 20–50)
IRON SERPL-MCNC: 22 UG/DL (ref 50–170)
LDH SERPL-CCNC: 154 U/L (ref 125–220)
LYMPHOCYTES # BLD AUTO: 2.09 X10(3)/MCL (ref 0.6–4.6)
LYMPHOCYTES NFR BLD AUTO: 30.4 %
MCH RBC QN AUTO: 23.6 PG (ref 27–31)
MCHC RBC AUTO-ENTMCNC: 29.2 G/DL (ref 33–36)
MCV RBC AUTO: 81 FL (ref 80–94)
MONOCYTES # BLD AUTO: 0.52 X10(3)/MCL (ref 0.1–1.3)
MONOCYTES NFR BLD AUTO: 7.6 %
NEUTROPHILS # BLD AUTO: 3.89 X10(3)/MCL (ref 2.1–9.2)
NEUTROPHILS NFR BLD AUTO: 56.6 %
NRBC BLD AUTO-RTO: 0 %
PLATELET # BLD AUTO: 212 X10(3)/MCL (ref 130–400)
PLATELET # BLD EST: ADEQUATE 10*3/UL
PLATELETS.RETICULATED NFR BLD AUTO: 1.3 % (ref 0.9–11.2)
PMV BLD AUTO: ABNORMAL FL
POTASSIUM SERPL-SCNC: 3.3 MMOL/L (ref 3.5–5.1)
PROT SERPL-MCNC: 8.1 GM/DL (ref 6.4–8.3)
RBC # BLD AUTO: 3.85 X10(6)/MCL (ref 4.2–5.4)
RET# (OHS): 0.12 (ref 0.02–0.08)
RETICULOCYTE COUNT AUTOMATED (OLG): 3.21 % (ref 1.1–2.1)
SODIUM SERPL-SCNC: 136 MMOL/L (ref 136–145)
TIBC SERPL-MCNC: 258 UG/DL (ref 70–310)
TIBC SERPL-MCNC: 280 UG/DL (ref 250–450)
TRANSFERRIN SERPL-MCNC: 257 MG/DL (ref 180–382)
VIT B12 SERPL-MCNC: 433 PG/ML (ref 213–816)
WBC # SPEC AUTO: 6.9 X10(3)/MCL (ref 4.5–11.5)

## 2023-04-06 PROCEDURE — 3008F PR BODY MASS INDEX (BMI) DOCUMENTED: ICD-10-PCS | Mod: CPTII,,, | Performed by: INTERNAL MEDICINE

## 2023-04-06 PROCEDURE — 1160F RVW MEDS BY RX/DR IN RCRD: CPT | Mod: CPTII,,, | Performed by: INTERNAL MEDICINE

## 2023-04-06 PROCEDURE — 3075F SYST BP GE 130 - 139MM HG: CPT | Mod: CPTII,,, | Performed by: INTERNAL MEDICINE

## 2023-04-06 PROCEDURE — 36415 COLL VENOUS BLD VENIPUNCTURE: CPT | Performed by: INTERNAL MEDICINE

## 2023-04-06 PROCEDURE — 80053 COMPREHEN METABOLIC PANEL: CPT | Performed by: INTERNAL MEDICINE

## 2023-04-06 PROCEDURE — 84165 PROTEIN E-PHORESIS SERUM: CPT | Performed by: INTERNAL MEDICINE

## 2023-04-06 PROCEDURE — 85025 COMPLETE CBC W/AUTO DIFF WBC: CPT | Performed by: INTERNAL MEDICINE

## 2023-04-06 PROCEDURE — 3080F PR MOST RECENT DIASTOLIC BLOOD PRESSURE >= 90 MM HG: ICD-10-PCS | Mod: CPTII,,, | Performed by: INTERNAL MEDICINE

## 2023-04-06 PROCEDURE — 82784 ASSAY IGA/IGD/IGG/IGM EACH: CPT | Performed by: INTERNAL MEDICINE

## 2023-04-06 PROCEDURE — 82746 ASSAY OF FOLIC ACID SERUM: CPT | Performed by: INTERNAL MEDICINE

## 2023-04-06 PROCEDURE — 99214 OFFICE O/P EST MOD 30 MIN: CPT | Mod: PBBFAC | Performed by: INTERNAL MEDICINE

## 2023-04-06 PROCEDURE — 86334 IMMUNOFIX E-PHORESIS SERUM: CPT | Performed by: INTERNAL MEDICINE

## 2023-04-06 PROCEDURE — 99205 PR OFFICE/OUTPT VISIT, NEW, LEVL V, 60-74 MIN: ICD-10-PCS | Mod: S$PBB,,, | Performed by: INTERNAL MEDICINE

## 2023-04-06 PROCEDURE — 3080F DIAST BP >= 90 MM HG: CPT | Mod: CPTII,,, | Performed by: INTERNAL MEDICINE

## 2023-04-06 PROCEDURE — 83521 IG LIGHT CHAINS FREE EACH: CPT | Mod: 90 | Performed by: INTERNAL MEDICINE

## 2023-04-06 PROCEDURE — 3075F PR MOST RECENT SYSTOLIC BLOOD PRESS GE 130-139MM HG: ICD-10-PCS | Mod: CPTII,,, | Performed by: INTERNAL MEDICINE

## 2023-04-06 PROCEDURE — 83615 LACTATE (LD) (LDH) ENZYME: CPT | Performed by: INTERNAL MEDICINE

## 2023-04-06 PROCEDURE — 99205 OFFICE O/P NEW HI 60 MIN: CPT | Mod: S$PBB,,, | Performed by: INTERNAL MEDICINE

## 2023-04-06 PROCEDURE — 82607 VITAMIN B-12: CPT | Performed by: INTERNAL MEDICINE

## 2023-04-06 PROCEDURE — 82728 ASSAY OF FERRITIN: CPT | Performed by: INTERNAL MEDICINE

## 2023-04-06 PROCEDURE — 83550 IRON BINDING TEST: CPT | Performed by: INTERNAL MEDICINE

## 2023-04-06 PROCEDURE — 83010 ASSAY OF HAPTOGLOBIN QUANT: CPT | Performed by: INTERNAL MEDICINE

## 2023-04-06 PROCEDURE — 3008F BODY MASS INDEX DOCD: CPT | Mod: CPTII,,, | Performed by: INTERNAL MEDICINE

## 2023-04-06 PROCEDURE — 1159F PR MEDICATION LIST DOCUMENTED IN MEDICAL RECORD: ICD-10-PCS | Mod: CPTII,,, | Performed by: INTERNAL MEDICINE

## 2023-04-06 PROCEDURE — 1160F PR REVIEW ALL MEDS BY PRESCRIBER/CLIN PHARMACIST DOCUMENTED: ICD-10-PCS | Mod: CPTII,,, | Performed by: INTERNAL MEDICINE

## 2023-04-06 PROCEDURE — 1159F MED LIST DOCD IN RCRD: CPT | Mod: CPTII,,, | Performed by: INTERNAL MEDICINE

## 2023-04-06 PROCEDURE — 85045 AUTOMATED RETICULOCYTE COUNT: CPT | Performed by: INTERNAL MEDICINE

## 2023-04-06 PROCEDURE — 86340 INTRINSIC FACTOR ANTIBODY: CPT | Mod: 90 | Performed by: INTERNAL MEDICINE

## 2023-04-06 RX ORDER — SULFAMETHOXAZOLE AND TRIMETHOPRIM 800; 160 MG/1; MG/1
1 TABLET ORAL EVERY 12 HOURS
COMMUNITY
Start: 2022-11-26 | End: 2023-07-13

## 2023-04-06 RX ORDER — POTASSIUM CHLORIDE 20 MEQ/1
20 TABLET, EXTENDED RELEASE ORAL DAILY
Qty: 14 TABLET | Refills: 0 | Status: SHIPPED | OUTPATIENT
Start: 2023-04-06 | End: 2023-07-13

## 2023-04-06 NOTE — PROGRESS NOTES
History:  Past Medical History:   Diagnosis Date    Anemia     Crohn disease    Past medical history:  Crohn's disease, diagnosed when she was 15 years old; diagnosed at [facility]; presenting complaint was severe bleeding; received close to 50 packed RBC transfusions; after she is turned 20, she could not get established with a GI.  Symptoms have been more or less under control spontaneously.  B12 deficiency.  Iron-deficiency.  Folic acid deficiency.  Social history:  .  Lives in [place], [place].  Has 3 children.  Does not work.  No history of tobacco, alcohol, or illicit drug abuse.    Family history:  Negative for malignancy or blood dyscrasias.    Health maintenance:  Primary care provider at [facility].  She is waiting to get established with a GI for ongoing surveillance/management of Crohn's disease.  Past Surgical History:   Procedure Laterality Date     SECTION      TONSILLECTOMY        Social History     Socioeconomic History    Marital status: Other   Tobacco Use    Smoking status: Never    Smokeless tobacco: Never   Substance and Sexual Activity    Alcohol use: Yes    Drug use: Not Currently    Sexual activity: Not Currently      Family History   Problem Relation Age of Onset    Hypertension Mother     Kidney disease Maternal Grandmother         Reason for Follow-up:  -chronic anemia, S/P multiple blood transfusions over several years  -vitamin B12 deficiency  -folic acid deficiency   -iron-deficiency  -Abnormal bone marrow biopsy, red cell aplasia, pancytopenia  -stool occult blood positive    History of Present Illness:   No chief complaint on file.        Oncologic/Hematologic History:  Oncology History    No history exists.   37-year-old lady, referred from [facility], with anemia.      Investigations/clinical events reviewed:     Hemoglobin: 13.5 (2022); 5.4 (2022); 6.7 (2020); 4.3 (2020)   MCV normal  (S/P PRBC x2 units 2020, for hemoglobin 4.3)      12/27/2020: Serum iron 12, low.  TIBC normal.  Ferritin not done.  Transferrin saturation 3%.     09/27/2022: Serum iron 48, low.  TIBC normal.  Ferritin 21.65.  Transferrin saturation 16%, low.  B12 level 516.  Folate normal.     B12 level:  516, normal (09/27/2022); 287, borderline (12/27/2020)     Admitted [facility] 08/10/2022-08/14/2022:  -profound anemia, symptomatic; pancytopenia; hepatomegaly; history of Crohn's disease and multiple transfusions  -on admission, hemoglobin 1.7, hematocrit 7.0, MCV 72, platelets 57 K, occult blood +; B12 level 201; ferritin 27.5; folate 6.4; started on intravenous folic acid and intramuscular B12; patient experiencing heavy menstrual cycles; platelets dropped to 13 K; WBC 4.3 K, normal; neutrophil count 2800 mm3; peripheral blood smear without dysplasia or blasts; low LDH; low retic count; no significant schistocytes; no evidence of hemolysis; after 1 unit of platelets on 08/13/2022, platelet count improved but subsequently dropped to 13,000 mm3; no bleeding except for menorrhagia; as therapeutic trial, prednisone was started  -parvovirus B19 PCR negative; parvovirus B19 IgG +; parvovirus B19 IgM negative; plasma zinc level normal, 56 (reference Range:   mcg/dL); hepatitis-B surface antigen negative; hepatitis-B surface antibody negative; hepatitis B core antibody negative; hepatitis-C antibody negative; hepatitis-B virus PCR quantitative negative (hepatitis-B surface antigen + on 08/11/2022 but negative on 08/12/2022)  -history of Crohn's disease and chronic anemia; admitted with generalized weakness, hemoglobin 1.6, received PRBC x4 units, initially admitted to ICU; no acute GI bleed; significant thrombocytopenia; B12 and folic acid deficiency; platelets kept dropping despite replacement of B12 and folic acid and despite transfusion; underwent bone marrow aspiration and core biopsy 08/12/2022; on 08/14/2022, she left AMA; CT scan of A/P with  IV contrast showed hepatomegaly  -08/12/2022: Bone marrow aspiration and core biopsy ([facility]):  PERIPHERAL SMEAR REVIEW, BONE MARROW ASPIRATION, BONE MARROW BIOPSY, AND   FLOW CYTOMETRIC EVALUATION AND CYTOGENETICS:   1.  ACUTE RED CELL APLASIA.   2.  RESULTING SEVERE ANEMIA.   3.  REACTIVE APPEARING NEUTROPHILIC SERIES CELLS PRESENT WITH INCREASED   NUMBER OF          MYELOBLASTS (6.4%) PRESENT.   4.  THROMBOCYTOPENIA.   5.  DECREASED NUMBERS OF MEGAKARYOCYTES PRESENT.   6.  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   7.  IRON 2+ OF 6.   8.  AWAIT CYTOGENETIC AND MOLECULAR EVALUATION FOR FINAL DIAGNOSIS.   9. FISH analysis AML standard: Normal  (The marrow reveals a markedly abnormal   marrow with reactive appearing changes in the myeloid series with a   shift to the left to include 6.4% myeloblasts.  However, maturation of   segmented neutrophils does occur even though there is a significant   monocyte bulge.  This is not morphologically felt to be acute leukemia   but await cytogenetics and moleuclar studies for final diagnosis.     Cytogenetics and molecular studies for AML have been requested and will   be reported at a later time.  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  There are clusters of immature erythroid   elements seen and there is perhaps an attempt at re-population.  These   are very noticeable in both aspirate clot section and the biopsy but   best seen in the biopsy.  Therefore, this is felt to be rather an acute   insult on this individual which has affected predominantly the erythroid   elements but megakaryocytes are also decreased in number and myeloid   cells are markedly reactive in nature.  There are numerous causes for   acute red cell aplasia though Parvovirus B19 is certainly included in the   differential diagnosis)     04/06/2023:  Pleasant, healthy-appearing young lady who presents for initial  hematology consultation.  In no acute discomfort.  Crohn's disease, diagnosed when she was 15 years old; diagnosed at [facility]; presenting complaint was severe bleeding; received close to 50 packed RBC transfusions; after she is turned 20, she could not get established with a GI.  Symptoms have been more or less under control spontaneously.  B12 deficiency.  Iron-deficiency.  Folic acid deficiency.  For last several years, symptoms of Crohn's disease has been stable.  For last couple of weeks, occasional crampy pains.  Couple of loose bowel movements twice a week.  No blood in stool.  No fevers, chills, weakness, or fatigue.  Diagnosed with iron, B12, and folic acid deficiency during hospitalization at [facility], in August 2022, when she was hospitalized with severe symptomatic anemia, hemoglobin 1.7, platelets 57 K, B12 level 201, ferritin 27.5, and folic acid level 6.4.  On bone marrow biopsy, there was suspicion of acute red cell aplasia, 6.4% myeloblasts, etc..    Denies recurrent fevers, chills, drenching night sweats, anorexia, unintentional weight loss, chest pain, cough, dyspnea, dizziness, abdominal pain, etc..  ECOG 0.       Interval History:  [No matching plan found]   [No matching plan found]       Medications:  Current Outpatient Medications on File Prior to Visit   Medication Sig Dispense Refill    cyanocobalamin, vitamin B-12, 1,000 mcg Lozg Place 1 tablet under the tongue every morning. 30 lozenge 6    FEROSUL 325 mg (65 mg iron) Tab tablet Take 1 tablet (325 mg total) by mouth 2 (two) times daily. 180 tablet 3    folic acid (FOLVITE) 1 MG tablet Take 1 tablet (1,000 mcg total) by mouth once daily. 90 tablet 2    pantoprazole (PROTONIX) 40 MG tablet Take 1 tablet (40 mg total) by mouth once daily. 30 tablet 3    predniSONE (DELTASONE) 5 MG tablet Take 1 tablet (5 mg total) by mouth once daily. for 7 days 7 tablet 0     No current facility-administered medications on file prior to  visit.       Review of Systems:   All systems reviewed and found to be negative except for the symptoms detailed above    Physical Examination:   VITAL SIGNS: There were no vitals filed for this visit.  GENERAL:  In no apparent distress.    HEAD:  No signs of head trauma.  EYES:  Pupils are equal.  Extraocular motions intact.    EARS:  Hearing grossly intact.  MOUTH:  Oropharynx is normal.   NECK:  No adenopathy, no JVD.     CHEST:  Chest with clear breath sounds bilaterally.  No wheezes, rales, rhonchi.    CARDIAC:  Regular rate and rhythm.  S1 and S2, without murmurs, gallops, rubs.  VASCULAR:  No Edema.  Peripheral pulses normal and equal in all extremities.  ABDOMEN:  Soft, without detectable tenderness.  No sign of distention.  No   rebound or guarding, and no masses palpated.   Bowel Sounds normal.  MUSCULOSKELETAL:  Good range of motion of all major joints. Extremities without clubbing, cyanosis or edema.    NEUROLOGIC EXAM:  Alert and oriented x 3.  No focal sensory or strength deficits.   Speech normal.  Follows commands.  PSYCHIATRIC:  Mood normal.    No results for input(s): CBC in the last 72 hours.   No results for input(s): CMP in the last 72 hours.     Assessment:  Problem List Items Addressed This Visit          Oncology    Severe anemia - Primary    Iron deficiency    History of blood transfusion       Endocrine    B12 deficiency    Folate deficiency       Other    Abnormal bone marrow examination     Chronic anemia:  #Anemic for several years  #S/P multiple blood transfusions over the years   #Hemoglobin: 13.5 (09/27/2022); 5.4 (08/11/2022); 6.7 (12/28/2020); 4.3 (12/27/2020)   MCV normal     -[facility]; severe, symptomatic anemia, hemoglobin 1.7, hematocrit 7.0, MCV 72, platelets 57 K, B12 level 201, ferritin 27.5, folate 6.4; treated with intravenous folic acid and intramuscular B12; platelets dropped to 13 K; WBC, differential normal; no hemolysis; no  dysplasia or blasts on peripheral blood smear; transfused; empirically, thrombocytopenia treated with prednisone; parvovirus B19 PCR and serology negative but IgG +; hepatitis-B surface antigen + on 1 occasion, negative or another occasion; hepatitis-B surface antibody negative; hepatitis-B core antibody negative; hepatitis-C antibody negative; transfuse with PRBC x4 units; no acute GI bleeding; CT A/P showed hepatomegaly; bone marrow biopsy report see below  -08/12/2022: Bone marrow biopsy ([facility]) (hemoglobin 1.7, hematocrit 7.0, MCV 72, platelets 57 K; WBC, differential normal):  1.  ACUTE RED CELL APLASIA.   2.  RESULTING SEVERE ANEMIA.   3.  REACTIVE APPEARING NEUTROPHILIC SERIES CELLS PRESENT WITH INCREASED   NUMBER OF          MYELOBLASTS (6.4%) PRESENT.   4.  THROMBOCYTOPENIA.   5.  DECREASED NUMBERS OF MEGAKARYOCYTES PRESENT.   6.  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   7.  IRON 2+ OF 6.   8.  FISH analysis AML standard: Normal  (Bone marrow biopsy revealed a markedly abnormal   marrow with reactive appearing changes in the myeloid series with a   shift to the left to include 6.4% myeloblasts.  However, maturation of   segmented neutrophils does occur even though there is a significant   monocyte bulge.  This is not morphologically felt to be acute leukemia   but await cytogenetics and moleuclar studies for final diagnosis.     Cytogenetics and molecular studies for AML have been requested and will   be reported at a later time.  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  There are clusters of immature erythroid   elements seen and there is perhaps an attempt at re-population.  These   are very noticeable in both aspirate clot section and the biopsy but   best seen in the biopsy.  Therefore, this is felt to be rather an acute   insult on this individual which has affected predominantly the erythroid   elements but  megakaryocytes are also decreased in number and myeloid   cells are markedly reactive in nature.  There are numerous causes for   acute red cell aplasia though virus of B17 is certainly included in the   differential diagnosis)       Vitamin B12 deficiency:  -B12 level 287, borderline, on 12/27/2020.    -B12 level 201.  Diagnosed when admitted to [facility] 08/2022, with severe anemia (see above);   -B12 level normal, 516, on 09/27/2022  -B12 deficiency can be due to terminal ileum involvement with Crohn's disease  -ever since 08/2022, has been taking 1 tablet of vitamin B12 daily       Folic acid deficiency:   -folate 6.4, diagnosed when admitted to [facility] 08/2022 with severe anemia (see above)  -ever since 08/2022, has been taking folic acid tablet daily       Iron deficiency:  -12/27/2020: Serum iron 12, low.  TIBC normal.  Ferritin not done.  Transferrin saturation 3%  -diagnosed when admitted to [facility] 08/10/2022 with severe anemia (see above)  -09/27/2022: Serum iron 48, low.  TIBC normal.  Ferritin 21.65.  Transferrin saturation 16%, low.  -ever since 08/2022, has been taking 2 iron tablets daily    History of Crohn's disease:   -Diagnosed when she was 15.  Presented with severe GI bleeding.  Says that she received a total of 50 packed RBC transfusions.  After she is turned 20, she could not find another GI to follow-up with.  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  She also history of Crohn's disease.     She is trying to get established with GI at [facility], for ongoing surveillance of Crohn's disease which, at least as of 04/06/2023, is pretty much under control spontaneously.    Follow-up visit with me in 3 weeks.     Above discussed with the patient.  All questions answered.    She understands and agrees with this plan, and was appreciative.          Follow-up:  No follow-ups on file.    Answers submitted by the patient for this visit:  Review of Systems Questionnaire (Submitted on 4/3/2023)  appetite change : No  unexpected weight change: No  mouth sores: No  visual disturbance: No  cough: No  shortness of breath: No  chest pain: No  abdominal pain: Yes  diarrhea: No  frequency: No  back pain: Yes  rash: No  headaches: Yes  adenopathy: No

## 2023-04-06 NOTE — PROGRESS NOTES
Potassium 3.3, low.      Check magnesium level.    Start potassium chloride 20 mEq p.o. q.day x2 weeks; no refills   In 2 weeks, recheck CMP and magnesium level.      Rest of abnormal labs, to be addressed upon follow-up visit.

## 2023-04-06 NOTE — Clinical Note
Orders for today:   Check CBC, CMP, retic count, serum iron, TIBC, ferritin, B12 level, intrinsic factor antibody, folic acid level, LDH, haptoglobin, SPEP, EAN   Bone marrow aspiration and core biopsy to follow-up on abnormal bone marrow exam 08/12/2022 at [facility]  Refer to GI for evaluation of iron-deficiency anemia and stool occult blood pos in August 2022  Follow-up visit with me in 3 weeks.

## 2023-04-06 NOTE — TELEPHONE ENCOUNTER
----- Message from ANNA Alcala sent at 4/5/2023  3:18 PM CDT -----  Please notify patient regarding her x-rays results, no sign of constipation, gas was noted, may take over-the-counter Gas-X for discomfort.

## 2023-04-06 NOTE — TELEPHONE ENCOUNTER
Call pt and advised of below:      MD SHEREE Conklin Staff  Cc: Aparna Tavares LPN; Mahnaz Stearns MA  Potassium 3.3, low.       Check magnesium level.     Start potassium chloride 20 mEq p.o. q.day x2 weeks; no refills   In 2 weeks, recheck CMP and magnesium level.       Rest of abnormal labs, to be addressed upon follow-up visit.

## 2023-04-06 NOTE — PROGRESS NOTES
History:  Past Medical History:   Diagnosis Date    Anemia     Crohn disease        Past Surgical History:   Procedure Laterality Date     SECTION      TONSILLECTOMY        Social History     Socioeconomic History    Marital status: Other   Tobacco Use    Smoking status: Never    Smokeless tobacco: Never   Substance and Sexual Activity    Alcohol use: Yes    Drug use: Not Currently    Sexual activity: Not Currently      Family History   Problem Relation Age of Onset    Hypertension Mother     Kidney disease Maternal Grandmother         Reason for Follow-up:  -chronic anemia, S/P multiple blood transfusions over several years  -vitamin B12 deficiency  -folic acid deficiency   -iron-deficiency  -Abnormal bone marrow biopsy, red cell aplasia, pancytopenia  -stool occult blood positive        History of Present Illness:   No chief complaint on file.        Oncologic/Hematologic History:  Oncology History    No history exists.   37-year-old lady, referred from [facility], with anemia.      Investigations/clinical events reviewed:     Hemoglobin: 13.5 (2022); 5.4 (2022); 6.7 (2020); 4.3 (2020)   MCV normal  (S/P PRBC x2 units 2020, for hemoglobin 4.3)     2020: Serum iron 12, low.  TIBC normal.  Ferritin not done.  Transferrin saturation 3%.     2022: Serum iron 48, low.  TIBC normal.  Ferritin 21.65.  Transferrin saturation 16%, low.  B12 level 516.  Folate normal.     B12 level:  516, normal (2022); 287, borderline (2020)     Admitted [facility] 08/10/2022-2022:  -profound anemia, symptomatic; pancytopenia; hepatomegaly; history of Crohn's disease and multiple transfusions  -on admission, hemoglobin 1.7, hematocrit 7.0, MCV 72, platelets 57 K, occult blood +; B12 level 201; ferritin 27.5; folate 6.4; started on intravenous folic acid and intramuscular B12; patient experiencing heavy menstrual cycles; platelets dropped  to 13 K; WBC 4.3 K, normal; neutrophil count 2800 mm3; peripheral blood smear without dysplasia or blasts; low LDH; low retic count; no significant schistocytes; no evidence of hemolysis; after 1 unit of platelets on 08/13/2022, platelet count improved but subsequently dropped to 13,000 mm3; no bleeding except for menorrhagia; as therapeutic trial, prednisone was started  -parvovirus B19 PCR negative; parvovirus B19 IgG +; parvovirus B19 IgM negative; plasma zinc level normal, 56 (reference Range:   mcg/dL); hepatitis-B surface antigen negative; hepatitis-B surface antibody negative; hepatitis B core antibody negative; hepatitis-C antibody negative; hepatitis-B virus PCR quantitative negative (hepatitis-B surface antigen + on 08/11/2022 but negative on 08/12/2022)  -history of Crohn's disease and chronic anemia; admitted with generalized weakness, hemoglobin 1.6, received PRBC x4 units, initially admitted to ICU; no acute GI bleed; significant thrombocytopenia; B12 and folic acid deficiency; platelets kept dropping despite replacement of B12 and folic acid and despite transfusion; underwent bone marrow aspiration and core biopsy 08/12/2022; on 08/14/2022, she left AMA; CT scan of A/P with IV contrast showed hepatomegaly  -08/12/2022: Bone marrow aspiration and core biopsy ([facility]):  PERIPHERAL SMEAR REVIEW, BONE MARROW ASPIRATION, BONE MARROW BIOPSY, AND   FLOW CYTOMETRIC EVALUATION AND CYTOGENETICS:   1.  ACUTE RED CELL APLASIA.   2.  RESULTING SEVERE ANEMIA.   3.  REACTIVE APPEARING NEUTROPHILIC SERIES CELLS PRESENT WITH INCREASED   NUMBER OF          MYELOBLASTS (6.4%) PRESENT.   4.  THROMBOCYTOPENIA.   5.  DECREASED NUMBERS OF MEGAKARYOCYTES PRESENT.   6.  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   7.  IRON 2+ OF 6.   8.  AWAIT CYTOGENETIC AND MOLECULAR EVALUATION FOR FINAL DIAGNOSIS.   9. FISH analysis AML standard: Normal  (The marrow reveals a markedly  abnormal   marrow with reactive appearing changes in the myeloid series with a   shift to the left to include 6.4% myeloblasts.  However, maturation of   segmented neutrophils does occur even though there is a significant   monocyte bulge.  This is not morphologically felt to be acute leukemia   but await cytogenetics and moleuclar studies for final diagnosis.     Cytogenetics and molecular studies for AML have been requested and will   be reported at a later time.  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  There are clusters of immature erythroid   elements seen and there is perhaps an attempt at re-population.  These   are very noticeable in both aspirate clot section and the biopsy but   best seen in the biopsy.  Therefore, this is felt to be rather an acute   insult on this individual which has affected predominantly the erythroid   elements but megakaryocytes are also decreased in number and myeloid   cells are markedly reactive in nature.  There are numerous causes for   acute red cell aplasia though Parvovirus B19 is certainly included in the   differential diagnosis)     Interval History:  [No matching plan found]   [No matching plan found]       Medications:  Current Outpatient Medications on File Prior to Visit   Medication Sig Dispense Refill    cyanocobalamin, vitamin B-12, 1,000 mcg Lozg Place 1 tablet under the tongue every morning. 30 lozenge 6    FEROSUL 325 mg (65 mg iron) Tab tablet Take 1 tablet (325 mg total) by mouth 2 (two) times daily. 180 tablet 3    folic acid (FOLVITE) 1 MG tablet Take 1 tablet (1,000 mcg total) by mouth once daily. 90 tablet 2    pantoprazole (PROTONIX) 40 MG tablet Take 1 tablet (40 mg total) by mouth once daily. 30 tablet 3    predniSONE (DELTASONE) 5 MG tablet Take 1 tablet (5 mg total) by mouth once daily. for 7 days 7 tablet 0     No current facility-administered medications on file prior to visit.       Review of Systems:   All  systems reviewed and found to be negative except for the symptoms detailed above    Physical Examination:   VITAL SIGNS: There were no vitals filed for this visit.  GENERAL:  In no apparent distress.    HEAD:  No signs of head trauma.  EYES:  Pupils are equal.  Extraocular motions intact.    EARS:  Hearing grossly intact.  MOUTH:  Oropharynx is normal.   NECK:  No adenopathy, no JVD.     CHEST:  Chest with clear breath sounds bilaterally.  No wheezes, rales, rhonchi.    CARDIAC:  Regular rate and rhythm.  S1 and S2, without murmurs, gallops, rubs.  VASCULAR:  No Edema.  Peripheral pulses normal and equal in all extremities.  ABDOMEN:  Soft, without detectable tenderness.  No sign of distention.  No   rebound or guarding, and no masses palpated.   Bowel Sounds normal.  MUSCULOSKELETAL:  Good range of motion of all major joints. Extremities without clubbing, cyanosis or edema.    NEUROLOGIC EXAM:  Alert and oriented x 3.  No focal sensory or strength deficits.   Speech normal.  Follows commands.  PSYCHIATRIC:  Mood normal.    No results for input(s): CBC in the last 72 hours.   No results for input(s): CMP in the last 72 hours.     Assessment:  Problem List Items Addressed This Visit          Oncology    Severe anemia - Primary    Iron deficiency    History of blood transfusion       Endocrine    B12 deficiency    Folate deficiency       Other    Abnormal bone marrow examination     Chronic anemia:  #Anemic for several years  #S/P multiple blood transfusions over the years   #Hemoglobin: 13.5 (09/27/2022); 5.4 (08/11/2022); 6.7 (12/28/2020); 4.3 (12/27/2020)   MCV normal     -[facility] 08/2022; severe, symptomatic anemia, hemoglobin 1.7, hematocrit 7.0, MCV 72, platelets 57 K, B12 level 201, ferritin 27.5, folate 6.4; treated with intravenous folic acid and intramuscular B12; platelets dropped to 13 K; WBC, differential normal; no hemolysis; no dysplasia or blasts on peripheral  blood smear; transfused; empirically, thrombocytopenia treated with prednisone; parvovirus B19 PCR and serology negative but IgG +; hepatitis-B surface antigen + on 1 occasion, negative or another occasion; hepatitis-B surface antibody negative; hepatitis-B core antibody negative; hepatitis-C antibody negative; transfuse with PRBC x4 units; no acute GI bleeding; CT A/P showed hepatomegaly; bone marrow biopsy report see below  -08/12/2022: Bone marrow biopsy ([facility]) (hemoglobin 1.7, hematocrit 7.0, MCV 72, platelets 57 K; WBC, differential normal):  1.  ACUTE RED CELL APLASIA.   2.  RESULTING SEVERE ANEMIA.   3.  REACTIVE APPEARING NEUTROPHILIC SERIES CELLS PRESENT WITH INCREASED   NUMBER OF          MYELOBLASTS (6.4%) PRESENT.   4.  THROMBOCYTOPENIA.   5.  DECREASED NUMBERS OF MEGAKARYOCYTES PRESENT.   6.  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   7.  IRON 2+ OF 6.   8.  FISH analysis AML standard: Normal  (Bone marrow biopsy revealed a markedly abnormal   marrow with reactive appearing changes in the myeloid series with a   shift to the left to include 6.4% myeloblasts.  However, maturation of   segmented neutrophils does occur even though there is a significant   monocyte bulge.  This is not morphologically felt to be acute leukemia   but await cytogenetics and moleuclar studies for final diagnosis.     Cytogenetics and molecular studies for AML have been requested and will   be reported at a later time.  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  There are clusters of immature erythroid   elements seen and there is perhaps an attempt at re-population.  These   are very noticeable in both aspirate clot section and the biopsy but   best seen in the biopsy.  Therefore, this is felt to be rather an acute   insult on this individual which has affected predominantly the erythroid   elements but megakaryocytes are also decreased in  number and myeloid   cells are markedly reactive in nature.  There are numerous causes for   acute red cell aplasia though virus of B17 is certainly included in the   differential diagnosis)       Vitamin B12 deficiency:  -B12 level 287, borderline, on 12/27/2020.    -B12 level 201.  Diagnosed when admitted to [facility] 08/2022, with severe anemia (see above);   -B12 level normal, 516, on 09/27/2022  -B12 deficiency can be due to terminal ileum involvement with Crohn's disease       Folic acid deficiency:   -folate 6.4, diagnosed when admitted to [facility] 08/2022 with severe anemia (see above)       Iron deficiency:  -12/27/2020: Serum iron 12, low.  TIBC normal.  Ferritin not done.  Transferrin saturation 3%  -diagnosed when admitted to [facility] 08/10/2022 with severe anemia (see above)  -09/27/2022: Serum iron 48, low.  TIBC normal.  Ferritin 21.65.  Transferrin saturation 16%, low.        Plan:  Check CBC, CMP, retic count, serum iron, TIBC, ferritin, B12 level, intrinsic factor antibody, folate level, LDH, haptoglobin, SPEP, EAN     Repeat bone marrow aspiration and core biopsy (bone marrow examination on 08/12/2022 showed acute red cell aplasia resulting in severe anemia, 6.4% myeloblasts apart from reactive neutrophilic series cells, thrombocytopenia, decreased megakaryocytes, markedly hypoplastic bone marrow, predominantly myeloid in nature, and AML FISH analysis negative)     When admitted to [facility] with severe anemia in August 2022, stool for occult blood was +   Will refer to GI for endoscopic evaluation of GI tract.  She also history of Crohn's disease.     In the meantime, continue B12 and folic acid supplementation     Above discussed with the patient.  All questions answered.    She understands and agrees with this plan.                Follow-up:  No follow-ups on file.    Answers submitted by the  patient for this visit:  Review of Systems Questionnaire (Submitted on 4/3/2023)  appetite change : No  unexpected weight change: No  mouth sores: No  visual disturbance: No  cough: No  shortness of breath: No  chest pain: No  abdominal pain: Yes  diarrhea: No  frequency: No  back pain: Yes  rash: No  headaches: Yes  adenopathy: No

## 2023-04-07 LAB
KAPPA LC FREE SER-MCNC: 3.09 MG/DL (ref 0.33–1.94)
KAPPA LC FREE/LAMBDA FREE SER: 1.78 {RATIO} (ref 0.26–1.65)
LAMBDA LC FREE SERPL-MCNC: 1.74 MG/DL (ref 0.57–2.63)

## 2023-04-08 LAB — IF BLOCK AB SER QL RIA: NEGATIVE

## 2023-04-10 LAB
ALBUMIN % SPEP (OHS): 50.54
ALBUMIN SERPL-MCNC: 3.8 G/DL (ref 3.5–5)
ALBUMIN/GLOB SERPL: 1 RATIO (ref 1.1–2)
ALPHA 1 GLOB (OHS): 0.32 GM/DL
ALPHA 1 GLOB% (OHS): 4.26
ALPHA 2 GLOB % (OHS): 10.96
ALPHA 2 GLOB (OHS): 0.83 GM/DL
BETA GLOB (OHS): 1.07 GM/DL
BETA GLOB% (OHS): 14.1
GAMMA GLOBULIN % (OHS): 20.14
GAMMA GLOBULIN (OHS): 1.53 GM/DL
GLOBULIN SER-MCNC: 3.8 GM/DL (ref 2.4–3.5)
M SPIKE % (OHS): ABNORMAL
M SPIKE (OHS): ABNORMAL
PATH REV: NORMAL
PROT SERPL-MCNC: 7.6 GM/DL (ref 6.4–8.3)

## 2023-04-11 ENCOUNTER — TELEPHONE (OUTPATIENT)
Dept: FAMILY MEDICINE | Facility: CLINIC | Age: 38
End: 2023-04-11
Payer: MEDICAID

## 2023-04-12 ENCOUNTER — PATIENT OUTREACH (OUTPATIENT)
Dept: ADMINISTRATIVE | Facility: HOSPITAL | Age: 38
End: 2023-04-12
Payer: MEDICAID

## 2023-04-21 ENCOUNTER — CLINICAL SUPPORT (OUTPATIENT)
Dept: HEMATOLOGY/ONCOLOGY | Facility: CLINIC | Age: 38
End: 2023-04-21
Payer: MEDICAID

## 2023-04-21 DIAGNOSIS — E61.1 IRON DEFICIENCY: Primary | ICD-10-CM

## 2023-04-21 DIAGNOSIS — E61.1 IRON DEFICIENCY: ICD-10-CM

## 2023-04-21 DIAGNOSIS — R82.90 ABNORMAL URINE FINDING: ICD-10-CM

## 2023-04-21 DIAGNOSIS — E53.8 B12 DEFICIENCY: ICD-10-CM

## 2023-04-21 DIAGNOSIS — K50.118 CROHN'S DISEASE OF COLON WITH OTHER COMPLICATION: ICD-10-CM

## 2023-04-21 DIAGNOSIS — D50.9 IRON DEFICIENCY ANEMIA, UNSPECIFIED IRON DEFICIENCY ANEMIA TYPE: ICD-10-CM

## 2023-04-21 LAB
BASOPHILS # BLD AUTO: 0.06 X10(3)/MCL (ref 0–0.2)
BASOPHILS NFR BLD AUTO: 0.9 %
EOSINOPHIL # BLD AUTO: 0.15 X10(3)/MCL (ref 0–0.9)
EOSINOPHIL NFR BLD AUTO: 2.3 %
ERYTHROCYTE [DISTWIDTH] IN BLOOD BY AUTOMATED COUNT: 20 % (ref 11.5–17)
HCT VFR BLD AUTO: 31.7 % (ref 37–47)
HGB BLD-MCNC: 8.8 G/DL (ref 12–16)
IMM GRANULOCYTES # BLD AUTO: 0.02 X10(3)/MCL (ref 0–0.04)
IMM GRANULOCYTES NFR BLD AUTO: 0.3 %
LYMPHOCYTES # BLD AUTO: 1.69 X10(3)/MCL (ref 0.6–4.6)
LYMPHOCYTES NFR BLD AUTO: 26.1 %
MAGNESIUM SERPL-MCNC: 1.8 MG/DL (ref 1.6–2.6)
MCH RBC QN AUTO: 22.8 PG (ref 27–31)
MCHC RBC AUTO-ENTMCNC: 27.8 G/DL (ref 33–36)
MCV RBC AUTO: 82.1 FL (ref 80–94)
MONOCYTES # BLD AUTO: 0.63 X10(3)/MCL (ref 0.1–1.3)
MONOCYTES NFR BLD AUTO: 9.7 %
NEUTROPHILS # BLD AUTO: 3.93 X10(3)/MCL (ref 2.1–9.2)
NEUTROPHILS NFR BLD AUTO: 60.7 %
NRBC BLD AUTO-RTO: 0 %
PLATELET # BLD AUTO: 556 X10(3)/MCL (ref 130–400)
PMV BLD AUTO: 11.6 FL (ref 7.4–10.4)
RBC # BLD AUTO: 3.86 X10(6)/MCL (ref 4.2–5.4)
WBC # SPEC AUTO: 6.5 X10(3)/MCL (ref 4.5–11.5)

## 2023-04-21 PROCEDURE — 36415 COLL VENOUS BLD VENIPUNCTURE: CPT

## 2023-04-21 PROCEDURE — 85025 COMPLETE CBC W/AUTO DIFF WBC: CPT

## 2023-04-21 PROCEDURE — 83735 ASSAY OF MAGNESIUM: CPT

## 2023-05-04 ENCOUNTER — OFFICE VISIT (OUTPATIENT)
Dept: HEMATOLOGY/ONCOLOGY | Facility: CLINIC | Age: 38
End: 2023-05-04
Attending: INTERNAL MEDICINE
Payer: MEDICAID

## 2023-05-04 VITALS
RESPIRATION RATE: 20 BRPM | BODY MASS INDEX: 21.53 KG/M2 | SYSTOLIC BLOOD PRESSURE: 117 MMHG | WEIGHT: 117 LBS | DIASTOLIC BLOOD PRESSURE: 79 MMHG | TEMPERATURE: 99 F | HEART RATE: 92 BPM | OXYGEN SATURATION: 98 % | HEIGHT: 62 IN

## 2023-05-04 DIAGNOSIS — D50.9 IRON DEFICIENCY ANEMIA, UNSPECIFIED IRON DEFICIENCY ANEMIA TYPE: Primary | ICD-10-CM

## 2023-05-04 DIAGNOSIS — R82.90 ABNORMAL URINE FINDING: Primary | ICD-10-CM

## 2023-05-04 DIAGNOSIS — E53.8 B12 DEFICIENCY: ICD-10-CM

## 2023-05-04 DIAGNOSIS — D69.6 THROMBOCYTOPENIA: ICD-10-CM

## 2023-05-04 DIAGNOSIS — E61.1 IRON DEFICIENCY: ICD-10-CM

## 2023-05-04 DIAGNOSIS — R89.8 ABNORMAL BONE MARROW EXAMINATION: ICD-10-CM

## 2023-05-04 DIAGNOSIS — D47.2 MGUS (MONOCLONAL GAMMOPATHY OF UNKNOWN SIGNIFICANCE): ICD-10-CM

## 2023-05-04 DIAGNOSIS — D50.9 IRON DEFICIENCY ANEMIA, UNSPECIFIED IRON DEFICIENCY ANEMIA TYPE: ICD-10-CM

## 2023-05-04 DIAGNOSIS — R19.5 OCCULT BLOOD POSITIVE STOOL: ICD-10-CM

## 2023-05-04 DIAGNOSIS — Z92.89 HISTORY OF BLOOD TRANSFUSION: ICD-10-CM

## 2023-05-04 LAB
ALBUMIN SERPL-MCNC: 4.1 G/DL (ref 3.5–5)
ALBUMIN/GLOB SERPL: 1.1 RATIO (ref 1.1–2)
ALP SERPL-CCNC: 79 UNIT/L (ref 40–150)
ALT SERPL-CCNC: 9 UNIT/L (ref 0–55)
ANISOCYTOSIS BLD QL SMEAR: ABNORMAL
AST SERPL-CCNC: 15 UNIT/L (ref 5–34)
BASOPHILS # BLD AUTO: 0.05 X10(3)/MCL
BASOPHILS NFR BLD AUTO: 0.8 %
BILIRUBIN DIRECT+TOT PNL SERPL-MCNC: 0.2 MG/DL
BUN SERPL-MCNC: 7.5 MG/DL (ref 7–18.7)
CALCIUM SERPL-MCNC: 9.2 MG/DL (ref 8.4–10.2)
CHLORIDE SERPL-SCNC: 107 MMOL/L (ref 98–107)
CO2 SERPL-SCNC: 22 MMOL/L (ref 22–29)
CREAT SERPL-MCNC: 0.67 MG/DL (ref 0.55–1.02)
ELLIPTOCYTOSIS (OHS): ABNORMAL
EOSINOPHIL # BLD AUTO: 0.11 X10(3)/MCL (ref 0–0.9)
EOSINOPHIL NFR BLD AUTO: 1.7 %
ERYTHROCYTE [DISTWIDTH] IN BLOOD BY AUTOMATED COUNT: 18.7 % (ref 11.5–17)
FERRITIN SERPL-MCNC: 16.27 NG/ML (ref 4.63–204)
GFR SERPLBLD CREATININE-BSD FMLA CKD-EPI: >60 MLS/MIN/1.73/M2
GLOBULIN SER-MCNC: 3.7 GM/DL (ref 2.4–3.5)
GLUCOSE SERPL-MCNC: 93 MG/DL (ref 74–100)
HCT VFR BLD AUTO: 30.5 % (ref 37–47)
HGB BLD-MCNC: 8.7 G/DL (ref 12–16)
HYPOCHROMIA BLD QL SMEAR: ABNORMAL
IMM GRANULOCYTES # BLD AUTO: 0.02 X10(3)/MCL (ref 0–0.04)
IMM GRANULOCYTES NFR BLD AUTO: 0.3 %
IRON SATN MFR SERPL: 8 % (ref 20–50)
IRON SERPL-MCNC: 24 UG/DL (ref 50–170)
LYMPHOCYTES # BLD AUTO: 2.29 X10(3)/MCL (ref 0.6–4.6)
LYMPHOCYTES NFR BLD AUTO: 35.3 %
MCH RBC QN AUTO: 22.7 PG (ref 27–31)
MCHC RBC AUTO-ENTMCNC: 28.5 G/DL (ref 33–36)
MCV RBC AUTO: 79.6 FL (ref 80–94)
MICROCYTES BLD QL SMEAR: ABNORMAL
MONOCYTES # BLD AUTO: 0.61 X10(3)/MCL (ref 0.1–1.3)
MONOCYTES NFR BLD AUTO: 9.4 %
NEUTROPHILS # BLD AUTO: 3.4 X10(3)/MCL (ref 2.1–9.2)
NEUTROPHILS NFR BLD AUTO: 52.5 %
NRBC BLD AUTO-RTO: 0 %
PLATELET # BLD AUTO: 77 X10(3)/MCL (ref 130–400)
PLATELET # BLD EST: ABNORMAL 10*3/UL
PLATELETS.RETICULATED NFR BLD AUTO: 2.2 % (ref 0.9–11.2)
PMV BLD AUTO: ABNORMAL FL
POIKILOCYTOSIS BLD QL SMEAR: ABNORMAL
POTASSIUM SERPL-SCNC: 3.8 MMOL/L (ref 3.5–5.1)
PROT SERPL-MCNC: 7.8 GM/DL (ref 6.4–8.3)
RBC # BLD AUTO: 3.83 X10(6)/MCL (ref 4.2–5.4)
RBC MORPH BLD: ABNORMAL
SODIUM SERPL-SCNC: 137 MMOL/L (ref 136–145)
TEAR DROP CELL (OLG): ABNORMAL
TIBC SERPL-MCNC: 279 UG/DL (ref 70–310)
TIBC SERPL-MCNC: 303 UG/DL (ref 250–450)
TRANSFERRIN SERPL-MCNC: 268 MG/DL (ref 180–382)
WBC # SPEC AUTO: 6.48 X10(3)/MCL (ref 4.5–11.5)

## 2023-05-04 PROCEDURE — 80053 COMPREHEN METABOLIC PANEL: CPT | Performed by: INTERNAL MEDICINE

## 2023-05-04 PROCEDURE — 3008F BODY MASS INDEX DOCD: CPT | Mod: CPTII,,, | Performed by: INTERNAL MEDICINE

## 2023-05-04 PROCEDURE — 36415 COLL VENOUS BLD VENIPUNCTURE: CPT | Performed by: INTERNAL MEDICINE

## 2023-05-04 PROCEDURE — 3008F PR BODY MASS INDEX (BMI) DOCUMENTED: ICD-10-PCS | Mod: CPTII,,, | Performed by: INTERNAL MEDICINE

## 2023-05-04 PROCEDURE — 3074F PR MOST RECENT SYSTOLIC BLOOD PRESSURE < 130 MM HG: ICD-10-PCS | Mod: CPTII,,, | Performed by: INTERNAL MEDICINE

## 2023-05-04 PROCEDURE — 1160F RVW MEDS BY RX/DR IN RCRD: CPT | Mod: CPTII,,, | Performed by: INTERNAL MEDICINE

## 2023-05-04 PROCEDURE — 3074F SYST BP LT 130 MM HG: CPT | Mod: CPTII,,, | Performed by: INTERNAL MEDICINE

## 2023-05-04 PROCEDURE — 82728 ASSAY OF FERRITIN: CPT | Performed by: INTERNAL MEDICINE

## 2023-05-04 PROCEDURE — 3078F PR MOST RECENT DIASTOLIC BLOOD PRESSURE < 80 MM HG: ICD-10-PCS | Mod: CPTII,,, | Performed by: INTERNAL MEDICINE

## 2023-05-04 PROCEDURE — 83550 IRON BINDING TEST: CPT | Performed by: INTERNAL MEDICINE

## 2023-05-04 PROCEDURE — 3078F DIAST BP <80 MM HG: CPT | Mod: CPTII,,, | Performed by: INTERNAL MEDICINE

## 2023-05-04 PROCEDURE — 85025 COMPLETE CBC W/AUTO DIFF WBC: CPT | Performed by: INTERNAL MEDICINE

## 2023-05-04 PROCEDURE — 1160F PR REVIEW ALL MEDS BY PRESCRIBER/CLIN PHARMACIST DOCUMENTED: ICD-10-PCS | Mod: CPTII,,, | Performed by: INTERNAL MEDICINE

## 2023-05-04 PROCEDURE — 1159F PR MEDICATION LIST DOCUMENTED IN MEDICAL RECORD: ICD-10-PCS | Mod: CPTII,,, | Performed by: INTERNAL MEDICINE

## 2023-05-04 PROCEDURE — 99214 OFFICE O/P EST MOD 30 MIN: CPT | Mod: S$PBB,,, | Performed by: INTERNAL MEDICINE

## 2023-05-04 PROCEDURE — 1159F MED LIST DOCD IN RCRD: CPT | Mod: CPTII,,, | Performed by: INTERNAL MEDICINE

## 2023-05-04 PROCEDURE — 99214 PR OFFICE/OUTPT VISIT, EST, LEVL IV, 30-39 MIN: ICD-10-PCS | Mod: S$PBB,,, | Performed by: INTERNAL MEDICINE

## 2023-05-04 PROCEDURE — 99214 OFFICE O/P EST MOD 30 MIN: CPT | Mod: PBBFAC | Performed by: INTERNAL MEDICINE

## 2023-05-04 RX ORDER — HEPARIN 100 UNIT/ML
500 SYRINGE INTRAVENOUS
Status: CANCELLED | OUTPATIENT
Start: 2023-05-11

## 2023-05-04 RX ORDER — SODIUM CHLORIDE 0.9 % (FLUSH) 0.9 %
10 SYRINGE (ML) INJECTION
Status: CANCELLED | OUTPATIENT
Start: 2023-05-11

## 2023-05-04 RX ORDER — SODIUM CHLORIDE 0.9 % (FLUSH) 0.9 %
10 SYRINGE (ML) INJECTION
Status: CANCELLED | OUTPATIENT
Start: 2023-06-14

## 2023-05-04 RX ORDER — HEPARIN 100 UNIT/ML
500 SYRINGE INTRAVENOUS
Status: CANCELLED | OUTPATIENT
Start: 2023-06-14

## 2023-05-04 NOTE — Clinical Note
Orders for today:   Continue B12 1000 mcg daily Proceed with Feraheme x2  In 6 weeks, recheck CBC, serum iron, TIBC, ferritin Refer to GI for endoscopy/colonoscopy for evaluation of iron-deficiency anemia Bone marrow biopsy is pending  At this time, check 24 hour urine for total protein, UPEP, urine EAN, and urine FLC assay  Bone survey  Follow-up in 3 weeks.

## 2023-05-04 NOTE — PROGRESS NOTES
History:  Past Medical History:   Diagnosis Date    Anemia     Crohn disease    Past medical history:  Crohn's disease, diagnosed when she was 15 years old; diagnosed at [facility]; presenting complaint was severe bleeding; received close to 50 packed RBC transfusions; after she is turned 20, she could not get established with a GI.  Symptoms have been more or less under control spontaneously.  B12 deficiency.  Iron-deficiency.  Folic acid deficiency.  Social history:  .  Lives in [place], [place].  Has 3 children.  Does not work.  No history of tobacco, alcohol, or illicit drug abuse.    Family history:  Negative for malignancy or blood dyscrasias.    Health maintenance:  Primary care provider at [facility].  She is waiting to get established with a GI for ongoing surveillance/management of Crohn's disease.  Past Surgical History:   Procedure Laterality Date     SECTION      TONSILLECTOMY        Social History     Socioeconomic History    Marital status: Other   Tobacco Use    Smoking status: Never    Smokeless tobacco: Never   Substance and Sexual Activity    Alcohol use: Yes    Drug use: Not Currently    Sexual activity: Not Currently      Family History   Problem Relation Age of Onset    Hypertension Mother     Kidney disease Maternal Grandmother         Reason for Follow-up:  -chronic anemia, S/P multiple blood transfusions over several years  -vitamin B12 deficiency  -folic acid deficiency   -iron-deficiency  -kappa light chain MGUS  -Abnormal bone marrow biopsy, red cell aplasia, pancytopenia  -stool occult blood positive    History of Present Illness:   Skin Cancer        Oncologic/Hematologic History:  Oncology History    No history exists.   37-year-old lady, referred from [facility], with anemia.      Investigations/clinical events reviewed:     Hemoglobin: 13.5 (2022); 5.4 (2022); 6.7 (2020); 4.3 (2020)   MCV normal  (S/P PRBC x2 units 2020, for hemoglobin  4.3)     12/27/2020: Serum iron 12, low.  TIBC normal.  Ferritin not done.  Transferrin saturation 3%.     09/27/2022: Serum iron 48, low.  TIBC normal.  Ferritin 21.65.  Transferrin saturation 16%, low.  B12 level 516.  Folate normal.     B12 level:  516, normal (09/27/2022); 287, borderline (12/27/2020)     Admitted [facility] 08/10/2022-08/14/2022:  -profound anemia, symptomatic; pancytopenia; hepatomegaly; history of Crohn's disease and multiple transfusions  -on admission, hemoglobin 1.7, hematocrit 7.0, MCV 72, platelets 57 K, occult blood +; B12 level 201; ferritin 27.5; folate 6.4; started on intravenous folic acid and intramuscular B12; patient experiencing heavy menstrual cycles; platelets dropped to 13 K; WBC 4.3 K, normal; neutrophil count 2800 mm3; peripheral blood smear without dysplasia or blasts; low LDH; low retic count; no significant schistocytes; no evidence of hemolysis; after 1 unit of platelets on 08/13/2022, platelet count improved but subsequently dropped to 13,000 mm3; no bleeding except for menorrhagia; as therapeutic trial, prednisone was started  -parvovirus B19 PCR negative; parvovirus B19 IgG +; parvovirus B19 IgM negative; plasma zinc level normal, 56 (reference Range:   mcg/dL); hepatitis-B surface antigen negative; hepatitis-B surface antibody negative; hepatitis B core antibody negative; hepatitis-C antibody negative; hepatitis-B virus PCR quantitative negative (hepatitis-B surface antigen + on 08/11/2022 but negative on 08/12/2022)  -history of Crohn's disease and chronic anemia; admitted with generalized weakness, hemoglobin 1.6, received PRBC x4 units, initially admitted to ICU; no acute GI bleed; significant thrombocytopenia; B12 and folic acid deficiency; platelets kept dropping despite replacement of B12 and folic acid and despite transfusion; underwent bone marrow aspiration and core biopsy 08/12/2022; on 08/14/2022, she left AMA; CT scan of A/P  with IV contrast showed hepatomegaly  -08/12/2022: Bone marrow aspiration and core biopsy ([facility]):  PERIPHERAL SMEAR REVIEW, BONE MARROW ASPIRATION, BONE MARROW BIOPSY, AND   FLOW CYTOMETRIC EVALUATION AND CYTOGENETICS:   1.  ACUTE RED CELL APLASIA.   2.  RESULTING SEVERE ANEMIA.   3.  REACTIVE APPEARING NEUTROPHILIC SERIES CELLS PRESENT WITH INCREASED   NUMBER OF          MYELOBLASTS (6.4%) PRESENT.   4.  THROMBOCYTOPENIA.   5.  DECREASED NUMBERS OF MEGAKARYOCYTES PRESENT.   6.  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   7.  IRON 2+ OF 6.   8.  AWAIT CYTOGENETIC AND MOLECULAR EVALUATION FOR FINAL DIAGNOSIS.   9. FISH analysis AML standard: Normal  (The marrow reveals a markedly abnormal   marrow with reactive appearing changes in the myeloid series with a   shift to the left to include 6.4% myeloblasts.  However, maturation of   segmented neutrophils does occur even though there is a significant   monocyte bulge.  This is not morphologically felt to be acute leukemia   but await cytogenetics and moleuclar studies for final diagnosis.     Cytogenetics and molecular studies for AML have been requested and will   be reported at a later time.  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  There are clusters of immature erythroid   elements seen and there is perhaps an attempt at re-population.  These   are very noticeable in both aspirate clot section and the biopsy but   best seen in the biopsy.  Therefore, this is felt to be rather an acute   insult on this individual which has affected predominantly the erythroid   elements but megakaryocytes are also decreased in number and myeloid   cells are markedly reactive in nature.  There are numerous causes for   acute red cell aplasia though Parvovirus B19 is certainly included in the   differential diagnosis)     04/06/2023:  Pleasant, healthy-appearing young lady who presents for initial  hematology consultation.  In no acute discomfort.  Crohn's disease, diagnosed when she was 15 years old; diagnosed at [facility]; presenting complaint was severe bleeding; received close to 50 packed RBC transfusions; after she is turned 20, she could not get established with a GI.  Symptoms have been more or less under control spontaneously.  B12 deficiency.  Iron-deficiency.  Folic acid deficiency.  For last several years, symptoms of Crohn's disease has been stable.  For last couple of weeks, occasional crampy pains.  Couple of loose bowel movements twice a week.  No blood in stool.  No fevers, chills, weakness, or fatigue.  Diagnosed with iron, B12, and folic acid deficiency during hospitalization at [facility], in August 2022, when she was hospitalized with severe symptomatic anemia, hemoglobin 1.7, platelets 57 K, B12 level 201, ferritin 27.5, and folic acid level 6.4.  On bone marrow biopsy, there was suspicion of acute red cell aplasia, 6.4% myeloblasts, etc..    Denies recurrent fevers, chills, drenching night sweats, anorexia, unintentional weight loss, chest pain, cough, dyspnea, dizziness, abdominal pain, etc..  ECOG 0.       Interval History:  [No matching plan found]   [No matching plan found]     05/04/2023:   -04/06/2023:  Hemoglobin 9.1.  MCV normal.  Rest of CBC unremarkable.  Serum iron 22, low.  Transferrin saturation 8%, low.  Ferritin 26.02, low.  B12 433, normal.  Folate 11.8, normal.  Retic count 3.21%.  CMP unremarkable.  LDH normal.  IgG, IgM, IgA normal.  Kappa/lambda ratio 1.78, elevated.  Haptoglobin normal.  Intrinsic factor antibody negative.  No M protein on SPEP and EAN.  -04/21/2023:  Hemoglobin 8.8.  MCV normal.  Platelets 556 K.  Rest of CBC unremarkable.  -bone marrow biopsy scheduled for 05/09/2023  -05/04/2023:  Hemoglobin 8.7.  MCV 79.6.  Platelets 77 K. ferritin 16.27.  CMP unremarkable.  Presents for a follow-up visit.  Denies any symptoms.  No weakness or  fatigue.  No GI bleeding.  No menorrhagia.  No bleeding in any form.  Still taking iron pills and B12 pills.  Absorbing B12 well.  Bone marrow biopsy is pending.  No bone pains, fevers, chills, anorexia, or unintentional weight loss.  Apparently, scheduled to visit with SALLY Sifuentes [place], on 06/01/2023 for consultation.      Medications:  Current Outpatient Medications on File Prior to Visit   Medication Sig Dispense Refill    cyanocobalamin, vitamin B-12, 1,000 mcg Lozg Place 1 tablet under the tongue every morning. 30 lozenge 6    FEROSUL 325 mg (65 mg iron) Tab tablet Take 1 tablet (325 mg total) by mouth 2 (two) times daily. 180 tablet 3    folic acid (FOLVITE) 1 MG tablet Take 1 tablet (1,000 mcg total) by mouth once daily. 90 tablet 2    pantoprazole (PROTONIX) 40 MG tablet Take 1 tablet (40 mg total) by mouth once daily. 30 tablet 3    potassium chloride SA (K-DUR,KLOR-CON) 20 MEQ tablet Take 1 tablet (20 mEq total) by mouth once daily. 14 tablet 0    sulfamethoxazole-trimethoprim 800-160mg (BACTRIM DS) 800-160 mg Tab Take 1 tablet by mouth every 12 (twelve) hours.       No current facility-administered medications on file prior to visit.       Review of Systems:   All systems reviewed and found to be negative except for the symptoms detailed above    Physical Examination:   VITAL SIGNS:   Vitals:    05/04/23 1508   BP: 117/79   Pulse: 92   Resp: 20   Temp: 98.7 °F (37.1 °C)     GENERAL:  In no apparent distress.    HEAD:  No signs of head trauma.  EYES:  Pupils are equal.  Extraocular motions intact.    EARS:  Hearing grossly intact.  MOUTH:  Oropharynx is normal.   NECK:  No adenopathy, no JVD.     CHEST:  Chest with clear breath sounds bilaterally.  No wheezes, rales, rhonchi.    CARDIAC:  Regular rate and rhythm.  S1 and S2, without murmurs, gallops, rubs.  VASCULAR:  No Edema.  Peripheral pulses normal and equal in all extremities.  ABDOMEN:  Soft, without detectable tenderness.  No sign of  distention.  No   rebound or guarding, and no masses palpated.   Bowel Sounds normal.  MUSCULOSKELETAL:  Good range of motion of all major joints. Extremities without clubbing, cyanosis or edema.    NEUROLOGIC EXAM:  Alert and oriented x 3.  No focal sensory or strength deficits.   Speech normal.  Follows commands.  PSYCHIATRIC:  Mood normal.    No results for input(s): CBC in the last 72 hours.   No results for input(s): CMP in the last 72 hours.     Assessment:  Problem List Items Addressed This Visit          Renal/    Abnormal urine finding - Primary       Oncology    Iron deficiency    Iron deficiency anemia    History of blood transfusion    MGUS (monoclonal gammopathy of unknown significance)       Endocrine    B12 deficiency       GI    Occult blood positive stool       Other    Abnormal bone marrow examination    Relevant Orders    CBC Auto Differential (Completed)    Comprehensive Metabolic Panel (Completed)    Ferritin (Completed)    Iron and TIBC (Completed)    Blood Smear Microscopic Exam (Completed)       Chronic anemia:  #Anemic for several years  #S/P multiple blood transfusions over the years   #Hemoglobin: 13.5 (09/27/2022); 5.4 (08/11/2022); 6.7 (12/28/2020); 4.3 (12/27/2020)   MCV normal     -Admitted to [facility] 08/2022; severe, symptomatic anemia, hemoglobin 1.7, hematocrit 7.0, MCV 72, platelets 57 K, B12 level 201, ferritin 27.5, folate 6.4; treated with intravenous folic acid and intramuscular B12; platelets dropped to 13 K; WBC, differential normal; no hemolysis; no dysplasia or blasts on peripheral blood smear; transfused; empirically, thrombocytopenia treated with prednisone; parvovirus B19 PCR and serology negative but IgG +; hepatitis-B surface antigen + on 1 occasion, negative or another occasion; hepatitis-B surface antibody negative; hepatitis-B core antibody negative; hepatitis-C antibody negative; transfuse with PRBC x4 units; no acute GI bleeding; CT  A/P showed hepatomegaly; bone marrow biopsy report see below  -08/12/2022: Bone marrow biopsy ([facility]) (hemoglobin 1.7, hematocrit 7.0, MCV 72, platelets 57 K; WBC, differential normal):  1.  ACUTE RED CELL APLASIA.   2.  RESULTING SEVERE ANEMIA.   3.  REACTIVE APPEARING NEUTROPHILIC SERIES CELLS PRESENT WITH INCREASED   NUMBER OF          MYELOBLASTS (6.4%) PRESENT.   4.  THROMBOCYTOPENIA.   5.  DECREASED NUMBERS OF MEGAKARYOCYTES PRESENT.   6.  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   7.  IRON 2+ OF 6.   8.  FISH analysis AML standard: Normal  (Bone marrow biopsy revealed a markedly abnormal   marrow with reactive appearing changes in the myeloid series with a   shift to the left to include 6.4% myeloblasts.  However, maturation of   segmented neutrophils does occur even though there is a significant   monocyte bulge.  This is not morphologically felt to be acute leukemia   but await cytogenetics and moleuclar studies for final diagnosis.     Cytogenetics and molecular studies for AML have been requested and will   be reported at a later time.  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  There are clusters of immature erythroid   elements seen and there is perhaps an attempt at re-population.  These   are very noticeable in both aspirate clot section and the biopsy but   best seen in the biopsy.  Therefore, this is felt to be rather an acute   insult on this individual which has affected predominantly the erythroid   elements but megakaryocytes are also decreased in number and myeloid   cells are markedly reactive in nature.  There are numerous causes for   acute red cell aplasia though virus of B17 is certainly included in the   differential diagnosis)  -04/06/2023: Hemoglobin 9.1.  MCV normal.  Transferrin saturation 8%.  Ferritin 26.02.  B12, folate normal.  No hemolysis.  No M protein.  Kappa/lambda ratio 1.78,  elevated.  -04/21/2023: Hemoglobin 8.8.  MCV normal.  Platelets 556 K  -05/04/2023:  Hemoglobin 8.7.  MCV 79.6.  Platelets 77 K. ferritin 16.27.  CMP unremarkable.       Vitamin B12 deficiency:  -B12 level 287, borderline, on 12/27/2020.    -B12 level 201.  Diagnosed when admitted to [facility] 08/2022, with severe anemia (see above);   -B12 level normal, 516, on 09/27/2022  -B12 deficiency can be due to terminal ileum involvement with Crohn's disease  -ever since 08/2022, has been taking 1 tablet of vitamin B12 daily  -04/06/2023: Hemoglobin 9.1.  MCV normal.  Transferrin saturation 8%.  Ferritin 26.02.  B12, folate normal.  No hemolysis.  No M protein.  Kappa/lambda ratio 1.78, elevated.  -04/06/2023:  Intrinsic factor antibody negative  -04/21/2023: Hemoglobin 8.8.  MCV normal.  Platelets 556 K  (Absorbing vitamin B12 via oral route, satisfactory really)       Folic acid deficiency:   -folate 6.4, diagnosed when admitted to [facility] 08/2022 with severe anemia (see above)  -ever since 08/2022, has been taking folic acid tablet daily  -04/06/2023: Hemoglobin 9.1.  MCV normal.  Transferrin saturation 8%.  Ferritin 26.02.  B12, folate normal.  No hemolysis.  No M protein.  Kappa/lambda ratio 1.78, elevated.  -04/21/2023: Hemoglobin 8.8.  MCV normal.  Platelets 556 K  (Folic acid level has normalized as of 04/06/2023)       Iron deficiency:  -12/27/2020: Serum iron 12, low.  TIBC normal.  Ferritin not done.  Transferrin saturation 3%  -diagnosed when admitted to [facility] 08/10/2022 with severe anemia (see above)  -09/27/2022: Serum iron 48, low.  TIBC normal.  Ferritin 21.65.  Transferrin saturation 16%, low.  -ever since 08/2022, has been taking 2 iron tablets daily  -04/06/2023: Hemoglobin 9.1.  MCV normal.  Transferrin saturation 8%.  Ferritin 26.02.  B12, folate normal.  No hemolysis.  No M protein.  Kappa/lambda ratio 1.78,  elevated.  -04/21/2023: Hemoglobin 8.8.  MCV normal.  Platelets 556 K  -05/04/2023:  Hemoglobin 8.7.  MCV 79.6.  Platelets 77 K. ferritin 16.27.  CMP unremarkable.  (Has failed oral iron therapy; continues to have iron-deficiency anemia as of 04/06/2023, 04/21/2023, 05/04/2023)      New diagnosis of kappa light chain MGUS:  -04/06/2023: Hemoglobin 9.1.  MCV normal.  Transferrin saturation 8%.  Ferritin 26.02.  B12, folate normal.  No hemolysis.  No M protein.  Kappa/lambda ratio 1.78, elevated.      History of Crohn's disease:   -Diagnosed when she was 15.  Presented with severe GI bleeding.  Says that she received a total of 50 packed RBC transfusions.  After she is turned 20, she could not find another GI to follow-up with.  -04/06/2023: As of 04/06/2023, symptoms are pretty much under control spontaneously; for last couple of weeks, some crampy abdominal pains; no GI bleeding; couple of loose bowels a week; more or less, asymptomatic        Plan:  -04/06/2023: Hemoglobin 9.1.  MCV normal.  Transferrin saturation 8%.  Ferritin 26.02.  B12, folate normal.  No hemolysis.  No M protein.  Kappa/lambda ratio 1.78, elevated.  -04/21/2023: Hemoglobin 8.8.  MCV normal.  Platelets 556 K  Since 08/2022, has been taking B12 pill daily, folic acid pill daily, and 2 iron tablets daily  >>>  Absorbing vitamin B12 well via oral route; continue  Remains iron deficient.  Remains anemic.  Has failed oral iron therapy (has been taking 2 iron tablets daily since 08/2022); has failed  Proceed with parenteral iron therapy with Feraheme x2; assess response in 6 weeks, with repeat CBC, serum iron, TIBC, ferritin  When admitted to [facility] with severe anemia in August 2022, stool for occult blood was pos; have referred her to GI for endoscopic evaluation of GI tract.  She also history of Crohn's disease (says that she is scheduled to visit with Dr. Cope, SALLY, [place], on 06/01/2023)     Repeat bone marrow  aspiration and core biopsy (bone marrow examination on 08/12/2022 showed acute red cell aplasia resulting in severe anemia, 6.4% myeloblasts apart from reactive neutrophilic series cells, thrombocytopenia, decreased megakaryocytes, markedly hypoplastic bone marrow, predominantly myeloid in nature, and AML FISH analysis negative)  >>>  Bone marrow biopsy is pending    New diagnosis of kappa light chain MGUS  -04/06/2023: Hemoglobin 9.1.  MCV normal.  Transferrin saturation 8%.  Ferritin 26.02.  B12, folate normal.  No hemolysis.  No M protein.  Kappa/lambda ratio 1.78, elevated.  >>>  At this time, check 24 hour urine for total protein, UPEP, urine EAN, urine FLC assay   Skeletal survey (bone survey)     History of Crohn's disease which, at least as of 04/06/2023, is pretty much under control spontaneously.    Follow-up in 3 weeks, with labs.    Above discussed with the patient.  All questions answered.    Discussed labs and scans and gave her copies of relevant reports.  She understands and agrees with this plan, and was appreciative.          Follow-up:  No follow-ups on file.

## 2023-05-05 DIAGNOSIS — K50.118 CROHN'S DISEASE OF COLON WITH OTHER COMPLICATION: ICD-10-CM

## 2023-05-05 DIAGNOSIS — R82.90 ABNORMAL URINE FINDING: Primary | ICD-10-CM

## 2023-05-05 DIAGNOSIS — D69.6 THROMBOCYTOPENIA: ICD-10-CM

## 2023-05-05 DIAGNOSIS — R19.5 OCCULT BLOOD POSITIVE STOOL: ICD-10-CM

## 2023-05-05 DIAGNOSIS — D50.9 IRON DEFICIENCY ANEMIA, UNSPECIFIED IRON DEFICIENCY ANEMIA TYPE: ICD-10-CM

## 2023-05-05 DIAGNOSIS — D47.2 MGUS (MONOCLONAL GAMMOPATHY OF UNKNOWN SIGNIFICANCE): ICD-10-CM

## 2023-05-05 DIAGNOSIS — E53.8 B12 DEFICIENCY: ICD-10-CM

## 2023-05-05 DIAGNOSIS — E53.8 FOLATE DEFICIENCY: ICD-10-CM

## 2023-05-05 DIAGNOSIS — R89.8 ABNORMAL BONE MARROW EXAMINATION: ICD-10-CM

## 2023-05-09 ENCOUNTER — HOSPITAL ENCOUNTER (OUTPATIENT)
Facility: HOSPITAL | Age: 38
Discharge: HOME | End: 2023-05-09
Attending: PATHOLOGY | Admitting: PATHOLOGY
Payer: MEDICAID

## 2023-05-09 DIAGNOSIS — R18.8 OTHER ASCITES: ICD-10-CM

## 2023-05-09 LAB
BASOPHILS # BLD AUTO: 0.07 X10(3)/MCL
BASOPHILS NFR BLD AUTO: 1.2 %
EOSINOPHIL # BLD AUTO: 0.17 X10(3)/MCL (ref 0–0.9)
EOSINOPHIL NFR BLD AUTO: 2.9 %
ERYTHROCYTE [DISTWIDTH] IN BLOOD BY AUTOMATED COUNT: 20.4 % (ref 11.5–17)
HCT VFR BLD AUTO: 33.4 % (ref 37–47)
HGB BLD-MCNC: 9.4 G/DL (ref 12–16)
IMM GRANULOCYTES # BLD AUTO: 0.03 X10(3)/MCL (ref 0–0.04)
IMM GRANULOCYTES NFR BLD AUTO: 0.5 %
LYMPHOCYTES # BLD AUTO: 2 X10(3)/MCL (ref 0.6–4.6)
LYMPHOCYTES NFR BLD AUTO: 34.4 %
MCH RBC QN AUTO: 22.3 PG (ref 27–31)
MCHC RBC AUTO-ENTMCNC: 28.1 G/DL (ref 33–36)
MCV RBC AUTO: 79.1 FL (ref 80–94)
MONOCYTES # BLD AUTO: 0.68 X10(3)/MCL (ref 0.1–1.3)
MONOCYTES NFR BLD AUTO: 11.7 %
NEUTROPHILS # BLD AUTO: 2.87 X10(3)/MCL (ref 2.1–9.2)
NEUTROPHILS NFR BLD AUTO: 49.3 %
NRBC BLD AUTO-RTO: 0.3 %
PLATELET # BLD AUTO: 327 X10(3)/MCL (ref 130–400)
PMV BLD AUTO: 9.6 FL (ref 7.4–10.4)
RBC # BLD AUTO: 4.22 X10(6)/MCL (ref 4.2–5.4)
WBC # SPEC AUTO: 5.82 X10(3)/MCL (ref 4.5–11.5)

## 2023-05-09 PROCEDURE — 38222 DX BONE MARROW BX & ASPIR: CPT | Performed by: PATHOLOGY

## 2023-05-09 PROCEDURE — 25000003 PHARM REV CODE 250: Performed by: PATHOLOGY

## 2023-05-09 PROCEDURE — 85060 BLOOD SMEAR INTERPRETATION: CPT | Performed by: PATHOLOGY

## 2023-05-09 PROCEDURE — 88264 CHROMOSOME ANALYSIS 20-25: CPT

## 2023-05-09 PROCEDURE — 85097 BONE MARROW INTERPRETATION: CPT | Mod: TC | Performed by: PATHOLOGY

## 2023-05-09 PROCEDURE — 63600175 PHARM REV CODE 636 W HCPCS: Performed by: PATHOLOGY

## 2023-05-09 PROCEDURE — 88185 FLOWCYTOMETRY/TC ADD-ON: CPT

## 2023-05-09 PROCEDURE — 81450 HL NEO GSAP 5-50DNA/DNA&RNA: CPT | Performed by: PATHOLOGY

## 2023-05-09 PROCEDURE — 85610 PROTHROMBIN TIME: CPT | Performed by: PATHOLOGY

## 2023-05-09 PROCEDURE — 85025 COMPLETE CBC W/AUTO DIFF WBC: CPT | Performed by: PATHOLOGY

## 2023-05-09 PROCEDURE — 88184 FLOWCYTOMETRY/ TC 1 MARKER: CPT | Performed by: PATHOLOGY

## 2023-05-09 PROCEDURE — 88237 TISSUE CULTURE BONE MARROW: CPT | Performed by: PATHOLOGY

## 2023-05-09 RX ORDER — MIDAZOLAM HYDROCHLORIDE 1 MG/ML
INJECTION INTRAMUSCULAR; INTRAVENOUS
Status: DISCONTINUED | OUTPATIENT
Start: 2023-05-09 | End: 2023-05-09 | Stop reason: HOSPADM

## 2023-05-09 RX ORDER — LIDOCAINE HYDROCHLORIDE 20 MG/ML
INJECTION, SOLUTION EPIDURAL; INFILTRATION; INTRACAUDAL; PERINEURAL
Status: DISCONTINUED | OUTPATIENT
Start: 2023-05-09 | End: 2023-05-09 | Stop reason: HOSPADM

## 2023-05-09 NOTE — PROCEDURES
"Elaina Pierce is a 37 y.o. female patient.    Temp: 97.9 °F (36.6 °C) (05/09/23 1109)  Pulse: 85 (05/09/23 1109)  Resp: 18 (05/09/23 1109)  BP: (!) 128/94 (05/09/23 1109)  SpO2: 100 % (05/09/23 1109)  Weight: 52.1 kg (114 lb 14.4 oz) (05/09/23 1109)  Height: 5' 1" (154.9 cm) (05/09/23 1109)       Bone marrow    Date/Time: 5/9/2023 12:16 PM  Performed by: Gayatri Howard MD  Authorized by: Gayatri Howard MD     Consent Done?: Yes (Verbal)   Immediately prior to procedure a time out was called to verify the correct patient, procedure, equipment, support staff and site/side marked as required.   Patient was prepped and draped in the usual sterile fashion.    I was present for the entire procedure.    Position: left lateral  Anesthesia: local infiltration  Local anesthetic: lidocaine 2% without epinephrine  Aspiration?: Yes   Biopsy?: Yes    Specimen source: left posterior iliac crest  Estimated blood loss (cc):1  Patient tolerated the procedure well with no immediate complications.  Post-operative instructions were provided for the patient.  Patient was discharged and will follow up if any complications occur.     Nursing staff at bedside and monitored patient during procedure. Versed 4 mg IV administered. 10 ml of lidocaine 2% administered to OP site (left posterior iliac crest).    5/9/2023    "

## 2023-05-09 NOTE — PLAN OF CARE
Unable to void for pregnancy test. Currently on cycle and states she had a tubal ligation. Dr. Howard notified and discussed with patient. Decision to proceed without confirmation of neg pregnancy test

## 2023-05-09 NOTE — PROGRESS NOTES
Pre-operative note  Consulted for bone marrow aspiration and biopsy by Gretchen.  Indication:  Anemia  Patient chart reviewed.  Patient seen this am and no complaints, accompanied by her .    No changes to ordering provider's previous assessment.  PE: Alert and oriented.  Vitals stable.    Pre-procedure labs reviewed.    Informed consent obtained from patient.    Alternative treatment options, risks, and complications discussed with patient.    Patient voices understanding and wishes to proceed with procedure.  Discussed discharge instructions and transportation with patient and her .     Plan:  Bone marrow biopsy procedure to be performed on 5W.

## 2023-05-09 NOTE — DISCHARGE SUMMARY
[facility]  Discharge Note  Short Stay    Procedure(s) (LRB):  ASPIRATION, BONE MARROW (N/A)      OUTCOME: Patient tolerated treatment/procedure well without complication and is now ready for discharge.    DISPOSITION: Home or Self Care    FINAL DIAGNOSIS:  Anemia    FOLLOWUP: In clinic (Oncology)    DISCHARGE INSTRUCTIONS:  See discharge instruction sheet.  Return to ER if signs of infection.     Clinical Reference Documents Added to Patient Instructions         Document    BONE MARROW ASPIRATION OR BIOPSY (ENGLISH)            TIME SPENT ON DISCHARGE: 5 minutes

## 2023-05-10 LAB — HEMATOLOGIST REVIEW: NORMAL

## 2023-05-11 VITALS
HEART RATE: 75 BPM | TEMPERATURE: 98 F | WEIGHT: 114.88 LBS | HEIGHT: 61 IN | OXYGEN SATURATION: 100 % | SYSTOLIC BLOOD PRESSURE: 122 MMHG | BODY MASS INDEX: 21.69 KG/M2 | DIASTOLIC BLOOD PRESSURE: 86 MMHG | RESPIRATION RATE: 20 BRPM

## 2023-05-22 LAB — BEAKER SEE SCANNED REPORT: NORMAL

## 2023-06-05 LAB
ESTRIOL SERPL-MCNC: NORMAL NG/ML
ESTROGEN SERPL-MCNC: NORMAL PG/ML
INSULIN SERPL-ACNC: NORMAL U[IU]/ML
LAB AP BM FLOWCYTOMETRY RESULT: NORMAL
LAB AP BM PERIPHERAL SMEAR: NORMAL
LAB AP CLINICAL INFORMATION: NORMAL
LAB AP GROSS DESCRIPTION: NORMAL
T3RU NFR SERPL: NORMAL %

## 2023-06-13 ENCOUNTER — INFUSION (OUTPATIENT)
Dept: INFUSION THERAPY | Facility: HOSPITAL | Age: 38
End: 2023-06-13
Attending: INTERNAL MEDICINE
Payer: MEDICAID

## 2023-06-13 VITALS
TEMPERATURE: 98 F | RESPIRATION RATE: 16 BRPM | HEART RATE: 82 BPM | SYSTOLIC BLOOD PRESSURE: 115 MMHG | BODY MASS INDEX: 21.52 KG/M2 | DIASTOLIC BLOOD PRESSURE: 80 MMHG | WEIGHT: 114 LBS | HEIGHT: 61 IN | OXYGEN SATURATION: 100 %

## 2023-06-13 DIAGNOSIS — D50.9 IRON DEFICIENCY ANEMIA, UNSPECIFIED IRON DEFICIENCY ANEMIA TYPE: Primary | ICD-10-CM

## 2023-06-13 PROCEDURE — 25000003 PHARM REV CODE 250: Performed by: INTERNAL MEDICINE

## 2023-06-13 PROCEDURE — 96365 THER/PROPH/DIAG IV INF INIT: CPT

## 2023-06-13 PROCEDURE — 63600175 PHARM REV CODE 636 W HCPCS: Mod: JZ,JG | Performed by: INTERNAL MEDICINE

## 2023-06-13 RX ORDER — HEPARIN 100 UNIT/ML
500 SYRINGE INTRAVENOUS
Status: DISCONTINUED | OUTPATIENT
Start: 2023-06-13 | End: 2023-06-13 | Stop reason: HOSPADM

## 2023-06-13 RX ORDER — SODIUM CHLORIDE 0.9 % (FLUSH) 0.9 %
10 SYRINGE (ML) INJECTION
Status: DISCONTINUED | OUTPATIENT
Start: 2023-06-13 | End: 2023-06-13 | Stop reason: HOSPADM

## 2023-06-13 RX ADMIN — FERUMOXYTOL 510 MG: 510 INJECTION INTRAVENOUS at 12:06

## 2023-06-13 NOTE — NURSING
Tolerated infusion well; iv cath removed; no redness or swelling or bleeding to site; discharge paperwork given.

## 2023-06-13 NOTE — NURSING
Patient presents to clinic for Feraheme. Iv cath started and medication infusing; sara well so far; made comfortable; call bell in reach;

## 2023-06-20 ENCOUNTER — INFUSION (OUTPATIENT)
Dept: INFUSION THERAPY | Facility: HOSPITAL | Age: 38
End: 2023-06-20
Attending: INTERNAL MEDICINE
Payer: MEDICAID

## 2023-06-20 VITALS
RESPIRATION RATE: 18 BRPM | HEIGHT: 61 IN | DIASTOLIC BLOOD PRESSURE: 87 MMHG | OXYGEN SATURATION: 99 % | BODY MASS INDEX: 21.64 KG/M2 | SYSTOLIC BLOOD PRESSURE: 123 MMHG | WEIGHT: 114.63 LBS | HEART RATE: 89 BPM | TEMPERATURE: 99 F

## 2023-06-20 DIAGNOSIS — D50.9 IRON DEFICIENCY ANEMIA, UNSPECIFIED IRON DEFICIENCY ANEMIA TYPE: Primary | ICD-10-CM

## 2023-06-20 PROCEDURE — 25000003 PHARM REV CODE 250: Performed by: INTERNAL MEDICINE

## 2023-06-20 PROCEDURE — 63600175 PHARM REV CODE 636 W HCPCS: Mod: JZ,JG | Performed by: INTERNAL MEDICINE

## 2023-06-20 PROCEDURE — 96365 THER/PROPH/DIAG IV INF INIT: CPT

## 2023-06-20 RX ORDER — HEPARIN 100 UNIT/ML
500 SYRINGE INTRAVENOUS
Status: DISCONTINUED | OUTPATIENT
Start: 2023-06-20 | End: 2023-06-20 | Stop reason: HOSPADM

## 2023-06-20 RX ORDER — SODIUM CHLORIDE 0.9 % (FLUSH) 0.9 %
10 SYRINGE (ML) INJECTION
Status: DISCONTINUED | OUTPATIENT
Start: 2023-06-20 | End: 2023-06-20 | Stop reason: HOSPADM

## 2023-06-20 RX ADMIN — SODIUM CHLORIDE: 9 INJECTION, SOLUTION INTRAVENOUS at 12:06

## 2023-06-20 RX ADMIN — FERUMOXYTOL 510 MG: 510 INJECTION INTRAVENOUS at 01:06

## 2023-06-23 LAB
BEAKER SEE SCANNED REPORT: NORMAL
BEAKER SEE SCANNED REPORT: NORMAL

## 2023-07-03 DIAGNOSIS — D50.9 IRON DEFICIENCY ANEMIA, UNSPECIFIED IRON DEFICIENCY ANEMIA TYPE: Primary | ICD-10-CM

## 2023-07-10 ENCOUNTER — PATIENT MESSAGE (OUTPATIENT)
Dept: ADMINISTRATIVE | Facility: HOSPITAL | Age: 38
End: 2023-07-10
Payer: MEDICAID

## 2023-07-13 ENCOUNTER — PATIENT OUTREACH (OUTPATIENT)
Dept: ADMINISTRATIVE | Facility: HOSPITAL | Age: 38
End: 2023-07-13
Payer: MEDICAID

## 2023-07-13 ENCOUNTER — OFFICE VISIT (OUTPATIENT)
Dept: FAMILY MEDICINE | Facility: CLINIC | Age: 38
End: 2023-07-13
Payer: MEDICAID

## 2023-07-13 VITALS
OXYGEN SATURATION: 99 % | SYSTOLIC BLOOD PRESSURE: 120 MMHG | RESPIRATION RATE: 18 BRPM | HEART RATE: 88 BPM | WEIGHT: 115 LBS | TEMPERATURE: 98 F | BODY MASS INDEX: 22.58 KG/M2 | HEIGHT: 60 IN | DIASTOLIC BLOOD PRESSURE: 88 MMHG

## 2023-07-13 DIAGNOSIS — Z00.00 WELLNESS EXAMINATION: Primary | ICD-10-CM

## 2023-07-13 LAB
APPEARANCE UR: CLEAR
BACTERIA #/AREA URNS AUTO: ABNORMAL /HPF
BILIRUB UR QL STRIP.AUTO: NEGATIVE
CHOLEST SERPL-MCNC: 200 MG/DL
CHOLEST/HDLC SERPL: 4 {RATIO} (ref 0–5)
COLOR UR: ABNORMAL
GLUCOSE UR QL STRIP.AUTO: NORMAL
HCV AB SERPL QL IA: NONREACTIVE
HDLC SERPL-MCNC: 54 MG/DL (ref 35–60)
HIV 1+2 AB+HIV1 P24 AG SERPL QL IA: NONREACTIVE
HYALINE CASTS #/AREA URNS LPF: ABNORMAL /LPF
KETONES UR QL STRIP.AUTO: NEGATIVE
LDLC SERPL CALC-MCNC: 111 MG/DL (ref 50–140)
LEUKOCYTE ESTERASE UR QL STRIP.AUTO: 25
MUCOUS THREADS URNS QL MICRO: ABNORMAL /LPF
NITRITE UR QL STRIP.AUTO: NEGATIVE
PH UR STRIP.AUTO: 6 [PH]
PROT UR QL STRIP.AUTO: ABNORMAL
RBC #/AREA URNS AUTO: ABNORMAL /HPF
RBC UR QL AUTO: ABNORMAL
SP GR UR STRIP.AUTO: 1.03
SQUAMOUS #/AREA URNS LPF: ABNORMAL /HPF
TRIGL SERPL-MCNC: 177 MG/DL (ref 37–140)
UROBILINOGEN UR STRIP-ACNC: NORMAL
VLDLC SERPL CALC-MCNC: 35 MG/DL
WBC #/AREA URNS AUTO: ABNORMAL /HPF

## 2023-07-13 PROCEDURE — 87624 HPV HI-RISK TYP POOLED RSLT: CPT | Performed by: NURSE PRACTITIONER

## 2023-07-13 PROCEDURE — 36415 COLL VENOUS BLD VENIPUNCTURE: CPT | Performed by: NURSE PRACTITIONER

## 2023-07-13 PROCEDURE — 87389 HIV-1 AG W/HIV-1&-2 AB AG IA: CPT | Performed by: NURSE PRACTITIONER

## 2023-07-13 PROCEDURE — 86803 HEPATITIS C AB TEST: CPT | Performed by: NURSE PRACTITIONER

## 2023-07-13 PROCEDURE — 81001 URINALYSIS AUTO W/SCOPE: CPT | Performed by: NURSE PRACTITIONER

## 2023-07-13 PROCEDURE — 3008F BODY MASS INDEX DOCD: CPT | Mod: CPTII,,, | Performed by: NURSE PRACTITIONER

## 2023-07-13 PROCEDURE — 80061 LIPID PANEL: CPT | Performed by: NURSE PRACTITIONER

## 2023-07-13 PROCEDURE — 1159F PR MEDICATION LIST DOCUMENTED IN MEDICAL RECORD: ICD-10-PCS | Mod: CPTII,,, | Performed by: NURSE PRACTITIONER

## 2023-07-13 PROCEDURE — 1160F RVW MEDS BY RX/DR IN RCRD: CPT | Mod: CPTII,,, | Performed by: NURSE PRACTITIONER

## 2023-07-13 PROCEDURE — 90715 TDAP VACCINE 7 YRS/> IM: CPT | Mod: PBBFAC

## 2023-07-13 PROCEDURE — 99395 PREV VISIT EST AGE 18-39: CPT | Mod: S$PBB,,, | Performed by: NURSE PRACTITIONER

## 2023-07-13 PROCEDURE — 1159F MED LIST DOCD IN RCRD: CPT | Mod: CPTII,,, | Performed by: NURSE PRACTITIONER

## 2023-07-13 PROCEDURE — 3079F PR MOST RECENT DIASTOLIC BLOOD PRESSURE 80-89 MM HG: ICD-10-PCS | Mod: CPTII,,, | Performed by: NURSE PRACTITIONER

## 2023-07-13 PROCEDURE — 3008F PR BODY MASS INDEX (BMI) DOCUMENTED: ICD-10-PCS | Mod: CPTII,,, | Performed by: NURSE PRACTITIONER

## 2023-07-13 PROCEDURE — 99215 OFFICE O/P EST HI 40 MIN: CPT | Mod: PBBFAC | Performed by: NURSE PRACTITIONER

## 2023-07-13 PROCEDURE — 1160F PR REVIEW ALL MEDS BY PRESCRIBER/CLIN PHARMACIST DOCUMENTED: ICD-10-PCS | Mod: CPTII,,, | Performed by: NURSE PRACTITIONER

## 2023-07-13 PROCEDURE — 99395 PR PREVENTIVE VISIT,EST,18-39: ICD-10-PCS | Mod: S$PBB,,, | Performed by: NURSE PRACTITIONER

## 2023-07-13 PROCEDURE — 3074F SYST BP LT 130 MM HG: CPT | Mod: CPTII,,, | Performed by: NURSE PRACTITIONER

## 2023-07-13 PROCEDURE — 90471 IMMUNIZATION ADMIN: CPT | Mod: PBBFAC

## 2023-07-13 PROCEDURE — 3079F DIAST BP 80-89 MM HG: CPT | Mod: CPTII,,, | Performed by: NURSE PRACTITIONER

## 2023-07-13 PROCEDURE — 3074F PR MOST RECENT SYSTOLIC BLOOD PRESSURE < 130 MM HG: ICD-10-PCS | Mod: CPTII,,, | Performed by: NURSE PRACTITIONER

## 2023-07-13 RX ADMIN — TETANUS TOXOID, REDUCED DIPHTHERIA TOXOID AND ACELLULAR PERTUSSIS VACCINE, ADSORBED 0.5 ML: 5; 2.5; 8; 8; 2.5 SUSPENSION INTRAMUSCULAR at 02:07

## 2023-07-13 NOTE — PROGRESS NOTES
Patient Name: Elaina Pierce   : 1985  MRN: [id number]     SUBJECTIVE DATA:    CHIEF COMPLAINT:   Elaina Pierce is a 37 y.o. female who presents to clinic today with Annual Exam        HPI:  37-year-old female presents to the clinic to complete her yearly wellness physical exam with fasting blood work and Pap smear.    Social Determinants of Health     Tobacco Use: Low Risk     Smoking Tobacco Use: Never    Smokeless Tobacco Use: Never    Passive Exposure: Not on file   Alcohol Use: Not At Risk    Frequency of Alcohol Consumption: Monthly or less    Average Number of Drinks: 1 or 2    Frequency of Binge Drinking: Never   Financial Resource Strain: Low Risk     Difficulty of Paying Living Expenses: Not hard at all   Food Insecurity: No Food Insecurity    Worried About Running Out of Food in the Last Year: Never true    Ran Out of Food in the Last Year: Never true   Transportation Needs: No Transportation Needs    Lack of Transportation (Medical): No    Lack of Transportation (Non-Medical): No   Physical Activity: Insufficiently Active    Days of Exercise per Week: 3 days    Minutes of Exercise per Session: 20 min   Stress: Stress Concern Present    Feeling of Stress : To some extent   Social Connections: Socially Integrated    Frequency of Communication with Friends and Family: Twice a week    Frequency of Social Gatherings with Friends and Family: Twice a week    Attends [employer] Services: 1 to 4 times per year    Active Member of Clubs or Organizations: No    Attends Club or Organization Meetings: 1 to 4 times per year    Marital Status:    Housing Stability: Low Risk     Unable to Pay for Housing in the Last Year: No    Number of Places Lived in the Last Year: 1    Unstable Housing in the Last Year: No   Depression: Low Risk     Last PHQ-4: Flowsheet Data: 0    Last menstrual cycle: 2023 regular.4- 5 days. Spotting at end for a day or two. Just for the last two months.  Gun safety:   Guns are secured at home.  Car safety:  Seat belt used all the time.  Water:  Stay hydrated with fluids, drink 6-8 cups of water daily.  Tobacco use: no.  Alcohol:  Drink in moderation.  Exercise: exercise 30 minutes a day up to 5 days a week.  Stay active.   Nutrition:  Follow low-cholesterol, low-fat, low-salt diet.  Eat fresh fruits and vegetables.  Keep in mind portion size.  Increase fiber intake to 30 g per day may supplement with OTC Metamucil gummies.  Dental:  Patient does not follow-up with a dentist yearly.  Ophthalmology:  Patient does not follow-up with ophthalmologist yearly.  Cervical cancer screening exam referral initiated.  Immunizations: Tdap.  Care gaps:  Patient agreed to complete HIV and hep C screening.  Not interested in COVID nor pneumonia vaccine.    Fasting blood work drawn today will notify of test results when they become available.      Patient denies chest pain, shortness of breath, dyspnea on exertion, palpitations, peripheral edema, abdominal pain, nausea, vomiting, diarrhea, constipation, fatigue, fever, chills, dysuria,  hematuria, melena, or hematochezia.   HPI      ALLERGIES: Review of patient's allergies indicates:  No Known Allergies      ROS:  Review of Systems   All other systems reviewed and are negative.      OBJECTIVE DATA:  Vital signs  Vitals:    07/13/23 1312   BP: 120/88   Pulse: 88   Resp: 18   Temp: 98 °F (36.7 °C)   TempSrc: Oral   SpO2: 99%   Weight: 52.2 kg (115 lb)   Height: 5' (1.524 m)      Body mass index is 22.46 kg/m².    PHYSICAL EXAM:   Physical Exam  Vitals and nursing note reviewed. Exam conducted with a chaperone present.   Constitutional:       General: She is awake. She is not in acute distress.     Appearance: Normal appearance. She is well-developed, well-groomed and normal weight. She is not ill-appearing, toxic-appearing or diaphoretic.   HENT:      Head: Normocephalic and atraumatic.      Right Ear: Tympanic membrane, ear canal and external ear  normal. There is no impacted cerumen.      Left Ear: Tympanic membrane, ear canal and external ear normal. There is no impacted cerumen.      Nose: Nose normal. No congestion or rhinorrhea.      Right Nostril: No epistaxis.      Left Nostril: No epistaxis.      Right Turbinates: Not swollen.      Left Turbinates: Not swollen.      Right Sinus: No maxillary sinus tenderness or frontal sinus tenderness.      Left Sinus: No maxillary sinus tenderness or frontal sinus tenderness.      Mouth/Throat:      Lips: Pink.      Mouth: Mucous membranes are moist.      Dentition: Dental caries present.      Tongue: No lesions. Tongue does not deviate from midline.      Palate: No mass and lesions.      Pharynx: Oropharynx is clear. Uvula midline. No posterior oropharyngeal erythema.      Comments: Dentist list provided to patient to schedule and make an appointment.  Eyes:      General: Lids are normal. Gaze aligned appropriately. No scleral icterus.     Extraocular Movements: Extraocular movements intact.      Conjunctiva/sclera: Conjunctivae normal.      Pupils: Pupils are equal, round, and reactive to light.      Visual Fields: Right eye visual fields normal and left eye visual fields normal.   Neck:      Thyroid: No thyroid mass, thyromegaly or thyroid tenderness.      Trachea: Trachea and phonation normal.   Cardiovascular:      Rate and Rhythm: Normal rate and regular rhythm.      Pulses: Normal pulses.           Carotid pulses are 2+ on the right side and 2+ on the left side.       Radial pulses are 2+ on the right side and 2+ on the left side.        Femoral pulses are 2+ on the right side and 2+ on the left side.       Dorsalis pedis pulses are 2+ on the right side and 2+ on the left side.        Posterior tibial pulses are 2+ on the right side and 2+ on the left side.      Heart sounds: Normal heart sounds. No murmur heard.  Pulmonary:      Effort: Pulmonary effort is normal.      Breath sounds: Normal breath sounds and  air entry. No wheezing or rhonchi.   Abdominal:      General: Abdomen is flat. Bowel sounds are normal. There is no distension.      Palpations: Abdomen is soft. There is no mass.      Tenderness: There is no abdominal tenderness. There is no right CVA tenderness, left CVA tenderness, guarding or rebound.      Hernia: No hernia is present. There is no hernia in the left inguinal area or right inguinal area.   Genitourinary:     General: Normal vulva.      Exam position: Knee-chest position.      Pubic Area: No rash or pubic lice.       Labia:         Right: No rash, tenderness, lesion or injury.         Left: No rash, tenderness, lesion or injury.       Vagina: Normal. No signs of injury and foreign body. No vaginal discharge, erythema, tenderness, bleeding, lesions or prolapsed vaginal walls.      Cervix: Normal.      Uterus: Not tender.       Rectum: Normal. No external hemorrhoid.      Comments: Os closed.  Musculoskeletal:         General: Normal range of motion.      Cervical back: Full passive range of motion without pain, normal range of motion and neck supple. No rigidity or tenderness.      Right lower leg: No edema.      Left lower leg: No edema.   Lymphadenopathy:      Cervical: No cervical adenopathy.      Right cervical: No superficial cervical adenopathy.     Left cervical: No superficial cervical adenopathy.      Lower Body: No right inguinal adenopathy. No left inguinal adenopathy.   Skin:     General: Skin is warm and dry.      Capillary Refill: Capillary refill takes less than 2 seconds.      Findings: No rash.   Neurological:      General: No focal deficit present.      Mental Status: She is alert and oriented to person, place, and time. Mental status is at baseline.      GCS: GCS eye subscore is 4. GCS verbal subscore is 5. GCS motor subscore is 6.      Cranial Nerves: Cranial nerves 2-12 are intact.      Sensory: Sensation is intact.      Motor: Motor function is intact.      Coordination:  Coordination is intact.      Gait: Gait is intact. Gait normal.      Deep Tendon Reflexes:      Reflex Scores:       Tricep reflexes are 2+ on the right side and 2+ on the left side.       Bicep reflexes are 2+ on the right side and 2+ on the left side.       Patellar reflexes are 2+ on the right side and 2+ on the left side.  Psychiatric:         Attention and Perception: Attention and perception normal.         Mood and Affect: Mood and affect normal.         Speech: Speech normal.         Behavior: Behavior normal. Behavior is cooperative.         Thought Content: Thought content normal.         Cognition and Memory: Cognition and memory normal.         Judgment: Judgment normal.        ASSESSMENT/PLAN:  1. Wellness examination  -     Urinalysis, Reflex to Urine Culture  -     Hepatitis C Antibody  -     HIV 1/2 Ag/Ab (4th Gen)  -     Lipid Panel  -     Tdap (BOOSTRIX) vaccine injection 0.5 mL  -     Ambulatory referral/consult to Ophthalmology; Future; Expected date: 07/20/2023  -     Liquid-Based Pap Smear, Screening Screening           RESULTS:  No results found for this or any previous visit (from the past 1008 hour(s)).      Follow Up:  Follow up in about 6 months (around 1/13/2024).      Previous medical history/lab work/radiology reviewed and considered during medical management decisions.   Medication list reviewed and medication reconciliation performed.  Patient was provided  and care about his/her current diagnosis (es) and medications including risk/benefit and side effects/adverse events, over the counter medication uses/doses, home self-care and contact precautions,  and red flags and indications for when to seek immediate medical attention.   Patient was advised to continue compliance with current medication list and medical recommendations.  Patient dvised continued compliance with recommended eating habits/ diets for medical conditions and exercise 150 minutes/ week (if possible) for  medical condition (s).  Educational handouts and instructions on selected disease management in AVS (After Visit Summary).    All of the patient's questions were answered to patient's satisfaction.   The patient was receptive, expressed verbal understanding and agreement the above plan.     This note was created with the assistance of a voice recognition software or phone dictation. There may be transcription errors as a result of using this technology however minimal. Effort has been made to assure accuracy of transcription but any obvious errors or omissions should be clarified with the author of the document

## 2023-07-13 NOTE — PROGRESS NOTES
Population Health Bulk Outreach message sent to pt via portal for cervical cancer screening. Pt   Pt has appt today, 7/13/2023 with pcp for pap smear. Cervical cancer screening UTD.    Population Health Chart Review & Patient Outreach Details:     Reason for Outreach Encounter:     []  Non-Compliant Report   []  Payor Report ([employer], PHN, BCBS, MSSP, MCIP, UHC, etc.)   []  Pre-Visit Chart Review     Updates Requested / Reviewed:     [x]  Care Everywhere    [x]     [x]  External Sources ([facility], [facility], [facility], etc.)   [x]  Care Team Updated    Patient Outreach Method:    []  Telephone Outreach Completed   [] Successful   [] Left Voicemail   [] Unable to Contact (wrong number, no voicemail)  [x]  [facility] Portal Outreach Sent  []  Letter Outreach Mailed  []  Fax Sent for External Records  []  External Records Upload    Health Maintenance Topics Addressed and Outreach Outcomes / Actions Taken:        []      Breast Cancer Screening []  Mammo Scheduled      []  External Records Requested     []  Added Reminder to Complete to Upcoming Primary Care Appt Notes     []  Patient Declined     []  Patient Will Call Back to Schedule     []  Patient Will Schedule with External Provider / Order Routed if Applicable             [x]       Cervical Cancer Screening [x]  Pap Scheduled /Completed 7/13/2023 per pcp     []  External Records Requested     []  Added Reminder to Complete to Upcoming Primary Care Appt Notes     []  Patient Declined     []  Patient Will Call Back to Schedule     []  Patient Will Schedule with External Provider               []          Colorectal Cancer Screening []  Colonoscopy Case Request or Referral Placed     []  External Records Requested     []  Added Reminder to Complete to Upcoming Primary Care Appt Notes     []  Patient Declined     []  Patient Will Call Back to Schedule     []  Patient Will Schedule with External Provider     []  Fit Kit Mailed (add the SmartPhrase under additional  notes)     []  Reminded Patient to Complete Home Test             []      Diabetic Eye Exam []  Eye Camera Scheduled or Optometry Referral Placed     []  External Records Requested     []  Added Reminder to Complete to Upcoming Primary Care Appt Notes     []  Patient Declined     []  Patient Will Call Back to Schedule     []  Patient Will Schedule with External Provider             []      Blood Pressure Control []  Primary Care Follow Up Visit Scheduled     []  Remote Blood Pressure Reading Captured     []  Added Reminder to Complete to Upcoming Primary Care Appt Notes     []  Patient Declined     []  Patient Will Call Back / Patient Will Send Portal Message with Reading     []  Patient Will Call Back to Schedule Provider Visit             []       HbA1c & Other Labs []  Lab Appt Scheduled for Due Labs     []  Primary Care Follow Up Visit Scheduled      []  Reminded Patient to Complete Home Test     []  Added Reminder to Complete to Upcoming Primary Care Appt Notes     []  Patient Declined     []  Patient Will Call Back to Schedule     []  Patient Will Schedule with External Provider / Order Routed if Applicable           []    Schedule Primary Care Appt []  Primary Care Appt Scheduled     []  Patient Declined     []  Patient Will Call Back to Schedule     []  Pt Established with External Provider & Updated Care Team             []      Medication Adherence []  Primary Care Appointment Scheduled     []  Added Reminder to Upcoming Primary Care Appt Notes     []  Patient Reminded to  Prescription     []  Patient Declined, Provider Notified if Needed     []  Sent Provider Message to Review and/or Add Exclusion to Problem List             []      Osteoporosis Screening []  DXA Appointment Scheduled     []  External Records Requested     []  Added Reminder to Complete to Upcoming Primary Care Appt Notes     []  Patient Declined     []  Patient Will Call Back to Schedule     []  Patient Will Schedule with External  Provider / Order Routed if Applicable     Additional Care Coordinator Notes:         Further Action Needed If Patient Returns Outreach:

## 2023-07-14 ENCOUNTER — TELEPHONE (OUTPATIENT)
Dept: FAMILY MEDICINE | Facility: CLINIC | Age: 38
End: 2023-07-14
Payer: MEDICAID

## 2023-07-14 NOTE — TELEPHONE ENCOUNTER
----- Message from ANNA Alcala sent at 7/14/2023 11:06 AM CDT -----  Please notify patient regarding her lab test results.  Cholesterol controlled, continue to follow low-fat, low-salt diet, keep fiber intake at 30 grams per day if possible, stay physically active stay hydrated with water.  Urinalysis no-show of infection.  HIV and hep C nonreactive no sign of infection.  Pap smear pending will notify of test results when they become available.  Take care and have a great weekend.

## 2023-07-14 NOTE — PROGRESS NOTES
Please notify patient regarding her lab test results.  Cholesterol controlled, continue to follow low-fat, low-salt diet, keep fiber intake at 30 grams per day if possible, stay physically active stay hydrated with water.  Urinalysis no-show of infection.  HIV and hep C nonreactive no sign of infection.  Pap smear pending will notify of test results when they become available.  Take care and have a great weekend.

## 2023-07-17 LAB — PSYCHE PATHOLOGY RESULT: NORMAL

## 2023-07-17 NOTE — PROGRESS NOTES
Please notify patient regarding her Pap smear test results.  NEGATIVE FOR INTRAEPITHELIAL LESION OR MALIGNANCY. NIL   Test                                Result                               High Risk HPV                        Negative

## 2023-07-18 ENCOUNTER — TELEPHONE (OUTPATIENT)
Dept: FAMILY MEDICINE | Facility: CLINIC | Age: 38
End: 2023-07-18
Payer: MEDICAID

## 2023-07-18 NOTE — TELEPHONE ENCOUNTER
----- Message from ANNA Alcala sent at 7/17/2023  3:26 PM CDT -----  Please notify patient regarding her Pap smear test results.  NEGATIVE FOR INTRAEPITHELIAL LESION OR MALIGNANCY. NIL   Test                                Result                               High Risk HPV                        Negative

## 2023-08-11 DIAGNOSIS — R82.90 ABNORMAL URINE FINDING: Primary | ICD-10-CM

## 2023-08-11 DIAGNOSIS — D64.9 SEVERE ANEMIA: ICD-10-CM

## 2023-08-11 DIAGNOSIS — E61.1 IRON DEFICIENCY: ICD-10-CM

## 2023-08-11 DIAGNOSIS — D69.6 THROMBOCYTOPENIA: ICD-10-CM

## 2023-08-11 DIAGNOSIS — D69.6 THROMBOCYTOPENIA: Primary | ICD-10-CM

## 2023-08-11 DIAGNOSIS — D64.9 ANEMIA, UNSPECIFIED TYPE: ICD-10-CM

## 2023-08-12 NOTE — PROGRESS NOTES
History:  Past Medical History:   Diagnosis Date    Anemia     Crohn disease    Past medical history:  Crohn's disease, diagnosed when she was 15 years old; diagnosed at [facility]; presenting complaint was severe bleeding; received close to 50 packed RBC transfusions; after she is turned 20, she could not get established with a GI.  Symptoms have been more or less under control spontaneously.  B12 deficiency.  Iron-deficiency.  Folic acid deficiency.  Social history:  .  Lives in [place], [place].  Has 3 children.  Does not work.  No history of tobacco, alcohol, or illicit drug abuse.    Family history:  Negative for malignancy or blood dyscrasias.    Health maintenance:  Primary care provider at [facility].  She is waiting to get established with a GI for ongoing surveillance/management of Crohn's disease.  Past Surgical History:   Procedure Laterality Date    BONE MARROW ASPIRATION N/A 2023    Procedure: ASPIRATION, BONE MARROW;  Surgeon: Gayatri Howard MD;  Location: [facility] ENDOSCOPY;  Service: General;  Laterality: N/A;     SECTION      TONSILLECTOMY      TUBAL LIGATION  2019      Social History     Socioeconomic History    Marital status: Other    Highest education level: 12th grade   Tobacco Use    Smoking status: Never    Smokeless tobacco: Never   Substance and Sexual Activity    Alcohol use: Yes     Alcohol/week: 1.0 standard drink of alcohol     Types: 1 Glasses of wine per week    Drug use: Never    Sexual activity: Yes     Partners: Male     Birth control/protection: Other-see comments     Comment: Tubed tied     Social Determinants of Health     Financial Resource Strain: Low Risk  (2023)    Overall Financial Resource Strain (CARDIA)     Difficulty of Paying Living Expenses: Not hard at all   Food Insecurity: No Food Insecurity (2023)    Hunger Vital Sign     Worried About Running Out of Food in the Last Year: Never true     Ran Out of Food in the Last Year: Never true    Transportation Needs: No Transportation Needs (7/13/2023)    PRAPARE - Transportation     Lack of Transportation (Medical): No     Lack of Transportation (Non-Medical): No   Physical Activity: Insufficiently Active (7/13/2023)    Exercise Vital Sign     Days of Exercise per Week: 3 days     Minutes of Exercise per Session: 20 min   Stress: Stress Concern Present (7/13/2023)    [employer] - Occupational Stress Questionnaire     Feeling of Stress : To some extent   Social Connections: Socially Integrated (7/13/2023)    Social Connection and Isolation Panel [NHANES]     Frequency of Communication with Friends and Family: Twice a week     Frequency of Social Gatherings with Friends and Family: Twice a week     Attends Buddhist Services: 1 to 4 times per year     Active Member of Clubs or Organizations: No     Attends Club or Organization Meetings: 1 to 4 times per year     Marital Status:    Housing Stability: Low Risk  (7/13/2023)    Housing Stability Vital Sign     Unable to Pay for Housing in the Last Year: No     Number of Places Lived in the Last Year: 1     Unstable Housing in the Last Year: No      Family History   Problem Relation Age of Onset    Hypertension Mother     Diabetes Mother     Drug abuse Mother     Kidney disease Maternal Grandmother     Diabetes Maternal Grandmother     Stroke Paternal Grandmother         Reason for Follow-up:  -chronic anemia, S/P multiple blood transfusions over several years  -vitamin B12 deficiency  -folic acid deficiency   -iron-deficiency  -kappa light chain MGUS  -Abnormal bone marrow biopsy, red cell aplasia, pancytopenia  -stool occult blood positive    History of Present Illness:   Anemia        Oncologic/Hematologic History:  Oncology History    No history exists.   37-year-old lady, referred from [facility], with anemia.      Investigations/clinical events reviewed:     Hemoglobin: 13.5 (09/27/2022); 5.4 (08/11/2022);  6.7 (12/28/2020); 4.3 (12/27/2020)   MCV normal  (S/P PRBC x2 units 12/27/2020, for hemoglobin 4.3)     12/27/2020: Serum iron 12, low.  TIBC normal.  Ferritin not done.  Transferrin saturation 3%.     09/27/2022: Serum iron 48, low.  TIBC normal.  Ferritin 21.65.  Transferrin saturation 16%, low.  B12 level 516.  Folate normal.     B12 level:  516, normal (09/27/2022); 287, borderline (12/27/2020)     Admitted [facility] 08/10/2022-08/14/2022:  -profound anemia, symptomatic; pancytopenia; hepatomegaly; history of Crohn's disease and multiple transfusions  -on admission, hemoglobin 1.7, hematocrit 7.0, MCV 72, platelets 57 K, occult blood +; B12 level 201; ferritin 27.5; folate 6.4; started on intravenous folic acid and intramuscular B12; patient experiencing heavy menstrual cycles; platelets dropped to 13 K; WBC 4.3 K, normal; neutrophil count 2800 mm3; peripheral blood smear without dysplasia or blasts; low LDH; low retic count; no significant schistocytes; no evidence of hemolysis; after 1 unit of platelets on 08/13/2022, platelet count improved but subsequently dropped to 13,000 mm3; no bleeding except for menorrhagia; as therapeutic trial, prednisone was started  -parvovirus B19 PCR negative; parvovirus B19 IgG +; parvovirus B19 IgM negative; plasma zinc level normal, 56 (reference Range:   mcg/dL); hepatitis-B surface antigen negative; hepatitis-B surface antibody negative; hepatitis B core antibody negative; hepatitis-C antibody negative; hepatitis-B virus PCR quantitative negative (hepatitis-B surface antigen + on 08/11/2022 but negative on 08/12/2022)  -history of Crohn's disease and chronic anemia; admitted with generalized weakness, hemoglobin 1.6, received PRBC x4 units, initially admitted to ICU; no acute GI bleed; significant thrombocytopenia; B12 and folic acid deficiency; platelets kept dropping despite replacement of B12 and folic acid and despite transfusion; underwent  bone marrow aspiration and core biopsy 08/12/2022; on 08/14/2022, she left AMA; CT scan of A/P with IV contrast showed hepatomegaly  -08/12/2022: Bone marrow aspiration and core biopsy ([facility]):  PERIPHERAL SMEAR REVIEW, BONE MARROW ASPIRATION, BONE MARROW BIOPSY, AND   FLOW CYTOMETRIC EVALUATION AND CYTOGENETICS:   1.  ACUTE RED CELL APLASIA.   2.  RESULTING SEVERE ANEMIA.   3.  REACTIVE APPEARING NEUTROPHILIC SERIES CELLS PRESENT WITH INCREASED   NUMBER OF          MYELOBLASTS (6.4%) PRESENT.   4.  THROMBOCYTOPENIA.   5.  DECREASED NUMBERS OF MEGAKARYOCYTES PRESENT.   6.  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   7.  IRON 2+ OF 6.   8.  AWAIT CYTOGENETIC AND MOLECULAR EVALUATION FOR FINAL DIAGNOSIS.   9. FISH analysis AML standard: Normal  (The marrow reveals a markedly abnormal   marrow with reactive appearing changes in the myeloid series with a   shift to the left to include 6.4% myeloblasts.  However, maturation of   segmented neutrophils does occur even though there is a significant   monocyte bulge.  This is not morphologically felt to be acute leukemia   but await cytogenetics and moleuclar studies for final diagnosis.     Cytogenetics and molecular studies for AML have been requested and will   be reported at a later time.  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  There are clusters of immature erythroid   elements seen and there is perhaps an attempt at re-population.  These   are very noticeable in both aspirate clot section and the biopsy but   best seen in the biopsy.  Therefore, this is felt to be rather an acute   insult on this individual which has affected predominantly the erythroid   elements but megakaryocytes are also decreased in number and myeloid   cells are markedly reactive in nature.  There are numerous causes for   acute red cell aplasia though Parvovirus B19 is certainly included in the   differential  diagnosis)     04/06/2023:  Pleasant, healthy-appearing young lady who presents for initial hematology consultation.  In no acute discomfort.  Crohn's disease, diagnosed when she was 15 years old; diagnosed at [facility]; presenting complaint was severe bleeding; received close to 50 packed RBC transfusions; after she is turned 20, she could not get established with a GI.  Symptoms have been more or less under control spontaneously.  B12 deficiency.  Iron-deficiency.  Folic acid deficiency.  For last several years, symptoms of Crohn's disease has been stable.  For last couple of weeks, occasional crampy pains.  Couple of loose bowel movements twice a week.  No blood in stool.  No fevers, chills, weakness, or fatigue.  Diagnosed with iron, B12, and folic acid deficiency during hospitalization at [facility], in August 2022, when she was hospitalized with severe symptomatic anemia, hemoglobin 1.7, platelets 57 K, B12 level 201, ferritin 27.5, and folic acid level 6.4.  On bone marrow biopsy, there was suspicion of acute red cell aplasia, 6.4% myeloblasts, etc..    Denies recurrent fevers, chills, drenching night sweats, anorexia, unintentional weight loss, chest pain, cough, dyspnea, dizziness, abdominal pain, etc..  ECOG 0.       Interval History:  [No matching plan found]   [No matching plan found]     08/14/2023:   -05/09/2023:  Bone marrow aspiration and core biopsy: Hypercellular bone marrow with trilineage hematopoiesis and moderately increased megakaryocytes with mild dysplastic features.  No red cell aplasia is identified.  No significant increase in blasts are identified.  Pending NGS testing and other ancillary tests.  Bone marrow cellularity 70-80%.  Markedly decreased iron stores.  Erythroid precursors are normal in # and show maturation.  No significant dysplasia.  No increase in reticulin fibrosis.  On flow cytometry, 3% myeloid blasts with partial aberrant CD7 expression.  Partial  aberrant expression of CD56 on granulocytes and monocytes.  Normal female karyotype.  No clinically significant variants on NGS testing.  -S/P Feraheme 510 mg IV x2 (06/13/2023, 06/20/2023)  -07/13/2023:  Thin prep cervical Pap smear:  NILM; high-risk HPV negative  -08/14/2023:  Hemoglobin 8.8 (no improvement with Feraheme; was 9.4 on 05/09/2023); MCV 89.0; WBC and platelets normal; ANC 1.3, down; serum iron 15, low; TIBC 262, transferrin saturation 6%) remains low; was 8% on 05/04/2023).  CMP reviewed; ferritin level i 12.10  Presents for a follow-up visit.  Overall, feels not too bad.  After iron infusion, felt better to some extent.  Some generalized weakness, fatigue, headache for 2 weeks without focal neurological symptoms, nausea.  Appetite is great.  The source of iron loss remains unclear.  She has not had rectal bleeding in a while.  She does have underlying Crohn's disease.  No abdominal pain.  No unintentional weight loss.  Her cycles last 5 days every month; every other month, she experiences blood clots with menstrual cycles.  Says that menstrual lost does not appear to be heavy as far as she can tell.      Medications:  Current Outpatient Medications on File Prior to Visit   Medication Sig Dispense Refill    cyanocobalamin, vitamin B-12, 1,000 mcg Lozg Place 1 tablet under the tongue every morning. 30 lozenge 6    FEROSUL 325 mg (65 mg iron) Tab tablet Take 1 tablet (325 mg total) by mouth 2 (two) times daily. 180 tablet 3    folic acid (FOLVITE) 1 MG tablet Take 1 tablet (1,000 mcg total) by mouth once daily. 90 tablet 2     No current facility-administered medications on file prior to visit.       Review of Systems:   All systems reviewed and found to be negative except for the symptoms detailed above    Physical Examination:   VITAL SIGNS:   Vitals:    08/14/23 0814   BP: 118/82   Pulse: 82   Resp: 20   Temp: 98 °F (36.7 °C)       GENERAL:  In no apparent distress.    HEAD:  No signs of head  "trauma.  EYES:  Pupils are equal.  Extraocular motions intact.    EARS:  Hearing grossly intact.  MOUTH:  Oropharynx is normal.   NECK:  No adenopathy, no JVD.     CHEST:  Chest with clear breath sounds bilaterally.  No wheezes, rales, rhonchi.    CARDIAC:  Regular rate and rhythm.  S1 and S2, without murmurs, gallops, rubs.  VASCULAR:  No Edema.  Peripheral pulses normal and equal in all extremities.  ABDOMEN:  Soft, without detectable tenderness.  No sign of distention.  No   rebound or guarding, and no masses palpated.   Bowel Sounds normal.  MUSCULOSKELETAL:  Good range of motion of all major joints. Extremities without clubbing, cyanosis or edema.    NEUROLOGIC EXAM:  Alert and oriented x 3.  No focal sensory or strength deficits.   Speech normal.  Follows commands.  PSYCHIATRIC:  Mood normal.    No results for input(s): "CBC" in the last 72 hours.   No results for input(s): "CMP" in the last 72 hours.     Assessment:  Problem List Items Addressed This Visit          Oncology    Abnormal bone marrow examination    Iron deficiency anemia    History of blood transfusion    MGUS (monoclonal gammopathy of unknown significance)       Endocrine    B12 deficiency - Primary    Folate deficiency       GI    Crohn's disease of colon    Occult blood positive stool     Chronic anemia:  #Anemic for several years  #S/P multiple blood transfusions over the years   #Hemoglobin: 13.5 (09/27/2022); 5.4 (08/11/2022); 6.7 (12/28/2020); 4.3 (12/27/2020)   MCV normal     -Admitted to [facility] 08/2022; severe, symptomatic anemia, hemoglobin 1.7, hematocrit 7.0, MCV 72, platelets 57 K, B12 level 201, ferritin 27.5, folate 6.4; treated with intravenous folic acid and intramuscular B12; platelets dropped to 13 K; WBC, differential normal; no hemolysis; no dysplasia or blasts on peripheral blood smear; transfused; empirically, thrombocytopenia treated with prednisone; parvovirus B19 PCR and serology negative but " IgG +; hepatitis-B surface antigen + on 1 occasion, negative or another occasion; hepatitis-B surface antibody negative; hepatitis-B core antibody negative; hepatitis-C antibody negative; transfuse with PRBC x4 units; no acute GI bleeding; CT A/P showed hepatomegaly; bone marrow biopsy report see below  -08/12/2022: Bone marrow biopsy ([facility]) (hemoglobin 1.7, hematocrit 7.0, MCV 72, platelets 57 K; WBC, differential normal):  1.  ACUTE RED CELL APLASIA.   2.  RESULTING SEVERE ANEMIA.   3.  REACTIVE APPEARING NEUTROPHILIC SERIES CELLS PRESENT WITH INCREASED   NUMBER OF          MYELOBLASTS (6.4%) PRESENT.   4.  THROMBOCYTOPENIA.   5.  DECREASED NUMBERS OF MEGAKARYOCYTES PRESENT.   6.  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   7.  IRON 2+ OF 6.   8.  FISH analysis AML standard: Normal  (Bone marrow biopsy revealed a markedly abnormal   marrow with reactive appearing changes in the myeloid series with a   shift to the left to include 6.4% myeloblasts.  However, maturation of   segmented neutrophils does occur even though there is a significant   monocyte bulge.  This is not morphologically felt to be acute leukemia   but await cytogenetics and moleuclar studies for final diagnosis.     Cytogenetics and molecular studies for AML have been requested and will   be reported at a later time.  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  There are clusters of immature erythroid   elements seen and there is perhaps an attempt at re-population.  These   are very noticeable in both aspirate clot section and the biopsy but   best seen in the biopsy.  Therefore, this is felt to be rather an acute   insult on this individual which has affected predominantly the erythroid   elements but megakaryocytes are also decreased in number and myeloid   cells are markedly reactive in nature.  There are numerous causes for   acute red cell aplasia though virus  of B17 is certainly included in the   differential diagnosis)  -04/06/2023: Hemoglobin 9.1.  MCV normal.  Transferrin saturation 8%.  Ferritin 26.02.  B12, folate normal.  No hemolysis.  No M protein.  Kappa/lambda ratio 1.78, elevated.  -04/21/2023: Hemoglobin 8.8.  MCV normal.  Platelets 556 K  -bone marrow exam 05/09/2023: Hypercellular, 70-80%; markedly decreased iron stores; otherwise, essentially unremarkable  -08/14/2023:  Hemoglobin 8.8 (no improvement with Feraheme; was 9.4 on 05/09/2023); MCV 89.0; WBC and platelets normal; ANC 1.3, down; serum iron 15, low; TIBC 262, transferrin saturation 6%) remains low; was 8% on 05/04/2023).  CMP reviewed; ferritin level i 12.10  -08/14/2023: Her cycles last 5 days every month; every other month, she experiences blood clots with menstrual cycles.  Says that menstrual lost does not appear to be heavy as far as she can tell.       Vitamin B12 deficiency:  -B12 level 287, borderline, on 12/27/2020.    -B12 level 201.  Diagnosed when admitted to [facility] 08/2022, with severe anemia (see above);   -B12 level normal, 516, on 09/27/2022  -B12 deficiency can be due to terminal ileum involvement with Crohn's disease  -ever since 08/2022, has been taking 1 tablet of vitamin B12 daily  -04/06/2023: Hemoglobin 9.1.  MCV normal.  Transferrin saturation 8%.  Ferritin 26.02.  B12, folate normal.  No hemolysis.  No M protein.  Kappa/lambda ratio 1.78, elevated.  -04/06/2023:  Intrinsic factor antibody negative  -04/21/2023: Hemoglobin 8.8.  MCV normal.  Platelets 556 K  (Absorbing vitamin B12 via oral route, satisfactory really)       Folic acid deficiency:   -folate 6.4, diagnosed when admitted to [facility] 08/2022 with severe anemia (see above)  -ever since 08/2022, has been taking folic acid tablet daily  -04/06/2023: Hemoglobin 9.1.  MCV normal.  Transferrin saturation 8%.  Ferritin 26.02.  B12, folate normal.  No hemolysis.  No  M protein.  Kappa/lambda ratio 1.78, elevated.  -04/21/2023: Hemoglobin 8.8.  MCV normal.  Platelets 556 K  (Folic acid level has normalized as of 04/06/2023)       Iron deficiency:  -12/27/2020: Serum iron 12, low.  TIBC normal.  Ferritin not done.  Transferrin saturation 3%  -diagnosed when admitted to [facility] 08/10/2022 with severe anemia (see above)  -09/27/2022: Serum iron 48, low.  TIBC normal.  Ferritin 21.65.  Transferrin saturation 16%, low.  -ever since 08/2022, has been taking 2 iron tablets daily  -04/06/2023: Hemoglobin 9.1.  MCV normal.  Transferrin saturation 8%.  Ferritin 26.02.  B12, folate normal.  No hemolysis.  No M protein.  Kappa/lambda ratio 1.78, elevated.  -04/21/2023: Hemoglobin 8.8.  MCV normal.  Platelets 556 K  -05/04/2023:  Hemoglobin 8.7.  MCV 79.6.  Platelets 77 K. ferritin 16.27.  CMP unremarkable.  (Has failed oral iron therapy; continues to have iron-deficiency anemia as of 04/06/2023, 04/21/2023, 05/04/2023)  -S/P Feraheme 510 mg IV x2 (06/13/2023, 06/20/2023)  -08/14/2023:  Hemoglobin 8.8 (no improvement with Feraheme; was 9.4 on 05/09/2023); MCV 89.0; WBC and platelets normal; ANC 1.3, down; serum iron 15, low; TIBC 262, transferrin saturation 6%) remains low; was 8% on 05/04/2023).  CMP reviewed; ferritin level i 12.10  -08/14/2023: Her cycles last 5 days every month; every other month, she experiences blood clots with menstrual cycles.  Says that menstrual lost does not appear to be heavy as far as she can tell.  (Absolutely no response to Feraheme x2, administered in June 2023; the source of iron loss remains unclear; questionable history of menorrhagia)      New diagnosis of kappa light chain MGUS:  -04/06/2023: Hemoglobin 9.1.  MCV normal.  Transferrin saturation 8%.  Ferritin 26.02.  B12, folate normal.  No hemolysis.  No M protein.  Kappa/lambda ratio 1.78, elevated.      History of Crohn's disease:   -Diagnosed when she was 15.  Presented with  severe GI bleeding.  Says that she received a total of 50 packed RBC transfusions.  After she is turned 20, she could not find another GI to follow-up with.  -04/06/2023: As of 04/06/2023, symptoms are pretty much under control spontaneously; for last couple of weeks, some crampy abdominal pains; no GI bleeding; couple of loose bowels a week; more or less, asymptomatic        Plan:  Repeat Feraheme 510 mg IV x2, administered a week apart   Six weeks later, assess response with repeat CBC, serum iron, TIBC, ferritin  Continue vitamin B12 1000 mcg daily  Follow-up with Dr. Alin Cope, GI, for evaluation of iron-deficiency anemia  24 hour urine for total protein, UPEP, urine ENA, and urine FLC assay  Skeletal survey (bone survey)  Follow-up visit with me in 6 weeks with repeat labs.  ---------------------------      -04/06/2023: Hemoglobin 9.1.  MCV normal.  Transferrin saturation 8%.  Ferritin 26.02.  B12, folate normal.  No hemolysis.  No M protein.  Kappa/lambda ratio 1.78, elevated.  -04/21/2023: Hemoglobin 8.8.  MCV normal.  Platelets 556 K  Since 08/2022, has been taking B12 pill daily, folic acid pill daily, and 2 iron tablets daily  -S/P Feraheme 510 mg IV x2 (06/13/2023, 06/20/2023)  -08/14/2023:  Hemoglobin 8.8 (no improvement with Feraheme; was 9.4 on 05/09/2023); MCV 89.0; WBC and platelets normal; ANC 1.3, down; serum iron 15, low; TIBC 262, transferrin saturation 6%) remains low; was 8% on 05/04/2023).  CMP reviewed; ferritin level is pending  -08/14/2023: Her cycles last 5 days every month; every other month, she experiences blood clots with menstrual cycles.  Says that menstrual lost does not appear to be heavy as far as she can tell  (Absolutely no response to Feraheme x2, administered June 2022; questionable history of menorrhagia; the source of iron loss remains unclear)  >>>  Absorbing vitamin B12 well via oral route; continue  Failed oral iron therapy  Now, S/P Feraheme x2 in 06/2023; with  absolutely no response (remains iron deficient and anemic)  Questionable history of menorrhagia  Has not had rectal bleeding in a while  Repeat Feraheme 510 mg IV x2; assess response with repeat CBC, serum iron, TIBC, and ferritin 6 weeks later  She also has history of Crohn's disease; follow-up with Dr. Cope, GI, [place], for evaluation of iron-deficiency anemia (when admitted [facility] with severe anemia in 2022, stool was positive for occult blood as well)  2023: She tells me that she is awaiting GI appointment either at [facility] all with Dr. Cope, whichever appointment comes earlier.  She needs follow-up evaluation of GI tract with EGD, colonoscopy, and capsule endoscopy, to elucidate the source of blood loss.     Bone marrow aspiration and core biopsy (bone marrow examination on 2022 showed acute red cell aplasia resulting in severe anemia, 6.4% myeloblasts apart from reactive neutrophilic series cells, thrombocytopenia, decreased megakaryocytes, markedly hypoplastic bone marrow, predominantly myeloid in nature, and AML FISH analysis negative)  -bone marrow exam 2020: Hypercellular, 70-80%; markedly decreased iron stores; otherwise, essentially unremarkable  >>>  Therefore, bone marrow is not an issue    New diagnosis of kappa light chain MGUS  -2023: Hemoglobin 9.1.  MCV normal.  Transferrin saturation 8%.  Ferritin 26.02.  B12, folate normal.  No hemolysis.  No M protein.  Kappa/lambda ratio 1.78, elevated.  >>>  Pendin hour urine for total protein, UPEP, urine EAN, urine FLC assay   Skeletal survey (bone survey)     History of Crohn's disease which, at least as of 2023, is pretty much under control spontaneously.    Follow-up in 6 weeks, repeat CBC, serum iron, TIBC, and ferritin.    Above discussed with the patient.  All questions answered.    Discussed labs and scans and gave her copies of relevant reports.  She understands and agrees  with this plan, and was appreciative.          Follow-up:  No follow-ups on file.    Answers submitted by the patient for this visit:  Review of Systems Questionnaire (Submitted on 7/6/2023)  appetite change : No  unexpected weight change: No  mouth sores: No  visual disturbance: No  cough: No  shortness of breath: No  chest pain: No  abdominal pain: No  diarrhea: No  frequency: No  back pain: No  rash: No  headaches: Yes  adenopathy: No  nervous/ anxious: No

## 2023-08-14 ENCOUNTER — OFFICE VISIT (OUTPATIENT)
Dept: HEMATOLOGY/ONCOLOGY | Facility: CLINIC | Age: 38
End: 2023-08-14
Attending: INTERNAL MEDICINE
Payer: MEDICAID

## 2023-08-14 VITALS
RESPIRATION RATE: 20 BRPM | HEIGHT: 60 IN | DIASTOLIC BLOOD PRESSURE: 82 MMHG | TEMPERATURE: 98 F | SYSTOLIC BLOOD PRESSURE: 118 MMHG | OXYGEN SATURATION: 99 % | HEART RATE: 82 BPM | WEIGHT: 116.81 LBS | BODY MASS INDEX: 22.93 KG/M2

## 2023-08-14 DIAGNOSIS — E53.8 B12 DEFICIENCY: Primary | ICD-10-CM

## 2023-08-14 DIAGNOSIS — E61.1 IRON DEFICIENCY: ICD-10-CM

## 2023-08-14 DIAGNOSIS — Z92.89 HISTORY OF BLOOD TRANSFUSION: ICD-10-CM

## 2023-08-14 DIAGNOSIS — R19.5 OCCULT BLOOD POSITIVE STOOL: ICD-10-CM

## 2023-08-14 DIAGNOSIS — D50.9 IRON DEFICIENCY ANEMIA, UNSPECIFIED IRON DEFICIENCY ANEMIA TYPE: ICD-10-CM

## 2023-08-14 DIAGNOSIS — K50.118 CROHN'S DISEASE OF COLON WITH OTHER COMPLICATION: ICD-10-CM

## 2023-08-14 DIAGNOSIS — D69.6 THROMBOCYTOPENIA: Primary | ICD-10-CM

## 2023-08-14 DIAGNOSIS — D47.2 MGUS (MONOCLONAL GAMMOPATHY OF UNKNOWN SIGNIFICANCE): ICD-10-CM

## 2023-08-14 DIAGNOSIS — R89.8 ABNORMAL BONE MARROW EXAMINATION: ICD-10-CM

## 2023-08-14 DIAGNOSIS — D64.9 ANEMIA, UNSPECIFIED TYPE: ICD-10-CM

## 2023-08-14 DIAGNOSIS — D64.9 SEVERE ANEMIA: ICD-10-CM

## 2023-08-14 DIAGNOSIS — E53.8 FOLATE DEFICIENCY: ICD-10-CM

## 2023-08-14 PROCEDURE — 3074F PR MOST RECENT SYSTOLIC BLOOD PRESSURE < 130 MM HG: ICD-10-PCS | Mod: CPTII,,, | Performed by: INTERNAL MEDICINE

## 2023-08-14 PROCEDURE — 3008F PR BODY MASS INDEX (BMI) DOCUMENTED: ICD-10-PCS | Mod: CPTII,,, | Performed by: INTERNAL MEDICINE

## 2023-08-14 PROCEDURE — 99214 OFFICE O/P EST MOD 30 MIN: CPT | Mod: S$PBB,,, | Performed by: INTERNAL MEDICINE

## 2023-08-14 PROCEDURE — 1160F PR REVIEW ALL MEDS BY PRESCRIBER/CLIN PHARMACIST DOCUMENTED: ICD-10-PCS | Mod: CPTII,,, | Performed by: INTERNAL MEDICINE

## 2023-08-14 PROCEDURE — 99213 OFFICE O/P EST LOW 20 MIN: CPT | Mod: PBBFAC | Performed by: INTERNAL MEDICINE

## 2023-08-14 PROCEDURE — 3074F SYST BP LT 130 MM HG: CPT | Mod: CPTII,,, | Performed by: INTERNAL MEDICINE

## 2023-08-14 PROCEDURE — 1160F RVW MEDS BY RX/DR IN RCRD: CPT | Mod: CPTII,,, | Performed by: INTERNAL MEDICINE

## 2023-08-14 PROCEDURE — 3008F BODY MASS INDEX DOCD: CPT | Mod: CPTII,,, | Performed by: INTERNAL MEDICINE

## 2023-08-14 PROCEDURE — 1159F MED LIST DOCD IN RCRD: CPT | Mod: CPTII,,, | Performed by: INTERNAL MEDICINE

## 2023-08-14 PROCEDURE — 99214 PR OFFICE/OUTPT VISIT, EST, LEVL IV, 30-39 MIN: ICD-10-PCS | Mod: S$PBB,,, | Performed by: INTERNAL MEDICINE

## 2023-08-14 PROCEDURE — 3079F DIAST BP 80-89 MM HG: CPT | Mod: CPTII,,, | Performed by: INTERNAL MEDICINE

## 2023-08-14 PROCEDURE — 1159F PR MEDICATION LIST DOCUMENTED IN MEDICAL RECORD: ICD-10-PCS | Mod: CPTII,,, | Performed by: INTERNAL MEDICINE

## 2023-08-14 PROCEDURE — 3079F PR MOST RECENT DIASTOLIC BLOOD PRESSURE 80-89 MM HG: ICD-10-PCS | Mod: CPTII,,, | Performed by: INTERNAL MEDICINE

## 2023-08-14 RX ORDER — HEPARIN 100 UNIT/ML
500 SYRINGE INTRAVENOUS
Status: CANCELLED | OUTPATIENT
Start: 2023-08-16

## 2023-08-14 RX ORDER — SODIUM CHLORIDE 0.9 % (FLUSH) 0.9 %
10 SYRINGE (ML) INJECTION
Status: CANCELLED | OUTPATIENT
Start: 2023-08-16

## 2023-08-14 RX ORDER — SODIUM CHLORIDE 0.9 % (FLUSH) 0.9 %
10 SYRINGE (ML) INJECTION
Status: CANCELLED | OUTPATIENT
Start: 2023-08-23

## 2023-08-14 RX ORDER — HEPARIN 100 UNIT/ML
500 SYRINGE INTRAVENOUS
Status: CANCELLED | OUTPATIENT
Start: 2023-08-23

## 2023-08-14 NOTE — Clinical Note
Repeat Feraheme 510 mg IV x2, administered a week apart  Six weeks later, assess response with repeat CBC, serum iron, TIBC, ferritin Continue vitamin B12 1000 mcg daily Follow-up with Dr. Alin Cope, GI, for evaluation of iron-deficiency anemia 24 hour urine for total protein, UPEP, urine EAN, and urine FLC assay Skeletal survey (bone survey) Follow-up visit with me in 6 weeks with repeat labs.

## 2023-08-16 LAB
INR PPP: 1.1
PROTHROMBIN TIME: 13.6 SECONDS (ref 11.4–14)

## 2023-08-17 ENCOUNTER — TELEPHONE (OUTPATIENT)
Dept: HEMATOLOGY/ONCOLOGY | Facility: CLINIC | Age: 38
End: 2023-08-17
Payer: MEDICAID

## 2023-08-17 NOTE — NURSING
Follow up phone contact with patient regarding GI appt. Patient states that she was going to follow up with Dr. Tian in [place] but has decided to wait and see Dr. Blackburn at [facility] due to patient's understanding that Dr. Blackburn is a good doctor for managing Crohn's disease. Her appt is scheduled for 11/21/23 at 8:00 am.

## 2023-08-21 ENCOUNTER — INFUSION (OUTPATIENT)
Dept: INFUSION THERAPY | Facility: HOSPITAL | Age: 38
End: 2023-08-21
Attending: INTERNAL MEDICINE
Payer: MEDICAID

## 2023-08-21 VITALS
HEIGHT: 61 IN | RESPIRATION RATE: 20 BRPM | HEART RATE: 96 BPM | SYSTOLIC BLOOD PRESSURE: 129 MMHG | DIASTOLIC BLOOD PRESSURE: 81 MMHG | WEIGHT: 116.63 LBS | TEMPERATURE: 98 F | BODY MASS INDEX: 22.02 KG/M2 | OXYGEN SATURATION: 96 %

## 2023-08-21 DIAGNOSIS — D50.9 IRON DEFICIENCY ANEMIA, UNSPECIFIED IRON DEFICIENCY ANEMIA TYPE: Primary | ICD-10-CM

## 2023-08-21 PROCEDURE — 63600175 PHARM REV CODE 636 W HCPCS: Mod: JZ,JG | Performed by: INTERNAL MEDICINE

## 2023-08-21 PROCEDURE — 96365 THER/PROPH/DIAG IV INF INIT: CPT

## 2023-08-21 PROCEDURE — 25000003 PHARM REV CODE 250: Performed by: INTERNAL MEDICINE

## 2023-08-21 RX ORDER — HEPARIN 100 UNIT/ML
500 SYRINGE INTRAVENOUS
Status: DISCONTINUED | OUTPATIENT
Start: 2023-08-21 | End: 2023-08-21 | Stop reason: HOSPADM

## 2023-08-21 RX ORDER — SODIUM CHLORIDE 0.9 % (FLUSH) 0.9 %
10 SYRINGE (ML) INJECTION
Status: DISCONTINUED | OUTPATIENT
Start: 2023-08-21 | End: 2023-08-21 | Stop reason: HOSPADM

## 2023-08-21 RX ADMIN — FERUMOXYTOL 510 MG: 510 INJECTION INTRAVENOUS at 11:08

## 2023-08-28 ENCOUNTER — INFUSION (OUTPATIENT)
Dept: INFUSION THERAPY | Facility: HOSPITAL | Age: 38
End: 2023-08-28
Attending: INTERNAL MEDICINE
Payer: MEDICAID

## 2023-08-28 VITALS
OXYGEN SATURATION: 100 % | RESPIRATION RATE: 20 BRPM | WEIGHT: 118.63 LBS | SYSTOLIC BLOOD PRESSURE: 126 MMHG | BODY MASS INDEX: 22.41 KG/M2 | DIASTOLIC BLOOD PRESSURE: 86 MMHG | HEART RATE: 78 BPM | TEMPERATURE: 98 F

## 2023-08-28 DIAGNOSIS — D50.9 IRON DEFICIENCY ANEMIA, UNSPECIFIED IRON DEFICIENCY ANEMIA TYPE: Primary | ICD-10-CM

## 2023-08-28 PROCEDURE — 96365 THER/PROPH/DIAG IV INF INIT: CPT

## 2023-08-28 PROCEDURE — 25000003 PHARM REV CODE 250: Performed by: INTERNAL MEDICINE

## 2023-08-28 PROCEDURE — 63600175 PHARM REV CODE 636 W HCPCS: Mod: JZ,JG | Performed by: INTERNAL MEDICINE

## 2023-08-28 PROCEDURE — A4216 STERILE WATER/SALINE, 10 ML: HCPCS | Performed by: INTERNAL MEDICINE

## 2023-08-28 RX ORDER — HEPARIN 100 UNIT/ML
500 SYRINGE INTRAVENOUS
Status: DISCONTINUED | OUTPATIENT
Start: 2023-08-28 | End: 2023-08-28 | Stop reason: HOSPADM

## 2023-08-28 RX ORDER — SODIUM CHLORIDE 0.9 % (FLUSH) 0.9 %
10 SYRINGE (ML) INJECTION
Status: DISCONTINUED | OUTPATIENT
Start: 2023-08-28 | End: 2023-08-28 | Stop reason: HOSPADM

## 2023-08-28 RX ADMIN — Medication 10 ML: at 01:08

## 2023-08-28 RX ADMIN — FERUMOXYTOL 510 MG: 510 INJECTION INTRAVENOUS at 01:08

## 2023-10-16 PROBLEM — Z00.00 WELLNESS EXAMINATION: Status: RESOLVED | Noted: 2023-07-13 | Resolved: 2023-10-16

## 2023-11-21 ENCOUNTER — OFFICE VISIT (OUTPATIENT)
Dept: GASTROENTEROLOGY | Facility: CLINIC | Age: 38
End: 2023-11-21
Attending: INTERNAL MEDICINE
Payer: MEDICAID

## 2023-11-21 VITALS
OXYGEN SATURATION: 100 % | WEIGHT: 121 LBS | HEIGHT: 61 IN | SYSTOLIC BLOOD PRESSURE: 132 MMHG | TEMPERATURE: 98 F | BODY MASS INDEX: 22.84 KG/M2 | RESPIRATION RATE: 16 BRPM | DIASTOLIC BLOOD PRESSURE: 90 MMHG | HEART RATE: 84 BPM

## 2023-11-21 DIAGNOSIS — D50.8 OTHER IRON DEFICIENCY ANEMIA: Primary | ICD-10-CM

## 2023-11-21 DIAGNOSIS — K59.04 CHRONIC IDIOPATHIC CONSTIPATION: ICD-10-CM

## 2023-11-21 DIAGNOSIS — Z87.19 HISTORY OF CROHN'S DISEASE: ICD-10-CM

## 2023-11-21 PROBLEM — K59.00 CONSTIPATION: Status: ACTIVE | Noted: 2023-11-21

## 2023-11-21 PROCEDURE — 99214 OFFICE O/P EST MOD 30 MIN: CPT | Mod: PBBFAC

## 2023-11-21 RX ORDER — POLYETHYLENE GLYCOL 3350, SODIUM SULFATE, SODIUM CHLORIDE, POTASSIUM CHLORIDE, SODIUM ASCORBATE, AND ASCORBIC ACID 7.5-2.691G
2000 KIT ORAL SEE ADMIN INSTRUCTIONS
Qty: 1 KIT | Refills: 0 | Status: ON HOLD | OUTPATIENT
Start: 2023-11-21 | End: 2023-12-06 | Stop reason: HOSPADM

## 2023-11-21 NOTE — PROGRESS NOTES
Gastroenterology and Hepatology          New Patient Note         TODAY'S VISIT DATE:  11/21/2023    Reason for Consult:    Chief Complaint   Patient presents with    Abdominal Pain     H/O Crohn's diagnosed at age 15.  Was placed on Imuran and prednisone for treatment.  She has not seen on been on any medication since the age of 20.  Last flare was in August.  Stools today are 1-2 per week with very bad stomach cramps.       PCP: Mio Payne      Referring MD:   Dr. Ajith Godinez    History of Present Illness:    Elaina is a 38 year old AAF with iron deficiency anemia, reported history of Crohn's disease here to establish care.     She was diagnosed by Dr. Loyola around the age of 15.  She recalls being hospitalized for an extended period of time for severe anemia and rectal bleeding.  She recalls rectal bleeding was intermittent, associated with bright red blood without diarrhea.  She was then seen by Dr. Rivera for a period time followed by Dr. Andrade at [facility] around 2015.  Her last colonoscopy with Dr. Jalili was around the age of 17 (20 years ago).  Prior notes in the chart mention small bowel disease and previously treated with prednisone, pentasa, azathioprine, and 3-5 doses of Remicade which was stopped due to developing hives.  She has been off therapy for over 20 years.  Her rectal bleeding has since resolved.  She does have documentation of severe anemia for over 20 years requiring multiple blood  transfusions.  She has been on and off iron over the years.  She was off iron prior to her hospitalization at [facility] in 2022 when she presented with severe anemia Hgb 1.7 g/dL and pancytopenia. Labs during that admission showed B12: 201, folate 6.4, ferritin 27.5.  She was treated with B12, folic acid and given empiric prednisone in case immune mediated.  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      Her primary complaint today is constipation.   She has at best 2 stools a week.  Stools are formed, not always hard but she gets severe lower abdominal pain prior to a bowel movement and during a bowel movement that ressolves after defecation.  She does not always feel completely evacuated.  She denies any rectal bleeding, diarrhea, weight loss.      She has had 3 healthy pregnancies.  She did require blood transfusions due to anemia during her pregnancies.     She denies any NSAID use.  She denies any FH of IBD.  She does not smoke, drink alcohol or use recreational drugs.         Pertinent Endoscopy/Imaging:  None on record to review    Prior Pertinent Surgeries:   None    Complications:   None        Review of Systems   Constitutional:  Negative for appetite change and unexpected weight change.   HENT:  Negative for trouble swallowing.    Respiratory:  Negative for chest tightness.    Cardiovascular: Negative.    Gastrointestinal:  Positive for abdominal pain and constipation. Negative for change in bowel habit.   Musculoskeletal: Negative.    Neurological: Negative.    Psychiatric/Behavioral: Negative.     All other systems reviewed and are negative.      Medical/Surgical History:   Past Medical History:   Diagnosis Date    Anemia      Past Surgical History:   Procedure Laterality Date    BONE MARROW ASPIRATION N/A 2023    Procedure: ASPIRATION, BONE MARROW;  Surgeon: Gayatri Howard MD;  Location: [facility] ENDOSCOPY;  Service: General;  Laterality: N/A;     SECTION      COLONOSCOPY      Luzmaria had several of these procedures    TONSILLECTOMY      TUBAL LIGATION  2019    UPPER GASTROINTESTINAL ENDOSCOPY      I've had several of these         Family History:   Family History   Problem Relation Age of Onset    Hypertension Mother     Diabetes Mother     Drug abuse Mother     Kidney disease Maternal Grandmother     Diabetes Maternal Grandmother     Stroke Paternal Grandmother         Social History:   Social History     Socioeconomic  History    Marital status: Other    Highest education level: 12th grade   Tobacco Use    Smoking status: Never    Smokeless tobacco: Never   Substance and Sexual Activity    Alcohol use: Yes     Alcohol/week: 1.0 standard drink of alcohol     Types: 1 Glasses of wine per week    Drug use: Never    Sexual activity: Yes     Partners: Male     Birth control/protection: Other-see comments     Comment: Tubes tied     Social Determinants of Health     Financial Resource Strain: Low Risk  (7/13/2023)    Overall Financial Resource Strain (CARDIA)     Difficulty of Paying Living Expenses: Not hard at all   Food Insecurity: No Food Insecurity (7/13/2023)    Hunger Vital Sign     Worried About Running Out of Food in the Last Year: Never true     Ran Out of Food in the Last Year: Never true   Transportation Needs: No Transportation Needs (7/13/2023)    PRAPARE - Transportation     Lack of Transportation (Medical): No     Lack of Transportation (Non-Medical): No   Physical Activity: Insufficiently Active (7/13/2023)    Exercise Vital Sign     Days of Exercise per Week: 3 days     Minutes of Exercise per Session: 20 min   Stress: Stress Concern Present (7/13/2023)    [place] [employer] - Occupational Stress Questionnaire     Feeling of Stress : To some extent   Social Connections: Socially Integrated (7/13/2023)    Social Connection and Isolation Panel [NHANES]     Frequency of Communication with Friends and Family: Twice a week     Frequency of Social Gatherings with Friends and Family: Twice a week     Attends Confucianism Services: 1 to 4 times per year     Active Member of Clubs or Organizations: No     Attends Club or Organization Meetings: 1 to 4 times per year     Marital Status:    Housing Stability: Low Risk  (7/13/2023)    Housing Stability Vital Sign     Unable to Pay for Housing in the Last Year: No     Number of Places Lived in the Last Year: 1     Unstable Housing in the Last Year: No     "    Review of patient's allergies indicates:  No Known Allergies    Current Medications:   Outpatient Medications Marked as Taking for the 11/21/23 encounter (Office Visit) with RESIDENT, [facility]   Medication Sig Dispense Refill    cyanocobalamin, vitamin B-12, 1,000 mcg Lozg Place 1 tablet under the tongue every morning. 30 lozenge 6    FEROSUL 325 mg (65 mg iron) Tab tablet Take 1 tablet (325 mg total) by mouth 2 (two) times daily. 180 tablet 3    folic acid (FOLVITE) 1 MG tablet Take 1 tablet (1,000 mcg total) by mouth once daily. 90 tablet 2        Vital Signs:  BP (!) 132/90 (BP Location: Left arm, Patient Position: Sitting, BP Method: Medium (Automatic))   Pulse 84   Temp 98 °F (36.7 °C) (Oral)   Resp 16   Ht 5' 1" (1.549 m)   Wt 54.9 kg (121 lb)   LMP 11/17/2023   SpO2 100%   BMI 22.86 kg/m²      Physical Exam  Constitutional:       Appearance: Normal appearance.   HENT:      Head: Normocephalic and atraumatic.      Mouth/Throat:      Mouth: Mucous membranes are moist.   Eyes:      Conjunctiva/sclera: Conjunctivae normal.   Cardiovascular:      Rate and Rhythm: Normal rate and regular rhythm.   Pulmonary:      Effort: Pulmonary effort is normal.      Breath sounds: Normal breath sounds.   Abdominal:      General: Bowel sounds are normal.      Palpations: Abdomen is soft.   Musculoskeletal:         General: Normal range of motion.      Cervical back: Normal range of motion.   Skin:     General: Skin is warm.   Neurological:      General: No focal deficit present.      Mental Status: She is alert and oriented to person, place, and time. Mental status is at baseline.   Psychiatric:         Mood and Affect: Mood normal.         Behavior: Behavior normal.         Labs: Reviewed  Hgb   Date Value Ref Range Status   11/22/2023 10.8 (L) 12.0 - 16.0 g/dL Final     Hemoglobin   Date Value Ref Range Status   08/11/2022 5.4 (LL) 12.0 - 16.0 g/dL Final     Albumin Level   Date Value Ref Range Status "   11/22/2023 4.2 3.5 - 5.0 g/dL Final     Iron   Date Value Ref Range Status   08/10/2022 107 25 - 156 ug/dL Final     Iron Level   Date Value Ref Range Status   11/22/2023 23 (L) 50 - 170 ug/dL Final     Ferritin   Date Value Ref Range Status   08/10/2022 27.5 5.0 - 204.0 NG/ML Final     Ferritin Level   Date Value Ref Range Status   11/22/2023 9.21 4.63 - 204.00 ng/mL Final     Folate   Date Value Ref Range Status   08/11/2022 6.4 (L) 7.3 - 19.9 ng/ml Final     Folate Level   Date Value Ref Range Status   04/06/2023 11.8 7.0 - 31.4 ng/mL Final     Vitamin B12 Level   Date Value Ref Range Status   04/06/2023 433 213 - 816 pg/mL Final     Vit D 25 OH   Date Value Ref Range Status   12/28/2020 5.1 (L) 30.0 - 80.0 ng/mL Final     C-Reactive Protein   Date Value Ref Range Status   11/22/2023 <1.00 <5.00 mg/L Final       HBsAg: negative  Fecal calprotectin:       Assessment/Plan:      Problem List Items Addressed This Visit          Endocrine    Folate deficiency - Primary       GI    Constipation     Recommend soluble fiber supplement daily  Recommend magnesium oxide 400 mg BID  If no improvement, will discuss prescription options         Relevant Orders    Calprotectin, Stool    Hepatitis A antibody, IgG (Completed)    Varicella Zoster Antibody, IgG (Completed)    C-reactive protein (Completed)    Case Request Endoscopy: EGD (ESOPHAGOGASTRODUODENOSCOPY), COLONOSCOPY (Completed)    Indeterminate colitis     Diagnosed in the setting of anemia and rectal bleeding per patient report  Unclear how diagnosis was made and what involvement of the intestinal system was noted  Had treatment in the past with prednisone, pentasa, azathioprine and 3-5 doses of Remicade which was stopped due to infusion reaction (hives)  Off therapy for over 20 years and no significant GI symptoms other than constipation   Severe anemia in 2022 in the setting of iron, B12, and folate deficiency.  No overt GI bleeding at that time  Will plan fecal  calprotectin, CRP, EGD and colonoscopy to evaluate diagnosis              HEALTH MAINTENANCE:     Vaccinations:    Influenza (inactive):  recommended annually   PCV 20 (Prevnar): recommended  Tetanus (TdaP): July 2023, recommended every 10 years  HPV (males and females ages 11-44 yo): N/A  Meningococcal: UTD   Hepatitis B: not immune  Hepatitis A:  check HAV IgG   MMR (live vaccine): UTD          Chickenpox status/Varicella (live vaccine):  will check immunity     Shingrix: recommended   COVID-19: vaccinated      Colorectal cancer risk:  Risk factors: >  8 years of disease, > 1/3 colon involved    - Distribution of colonic disease: < 1/3 of colon     - Year of symptom onset: 2000    - Colonoscopy determined after endoscopic remission    Ophthalmologic exam recommended yearly  Dermatologic exam recommended yearly due to risk of skin cancer with IBD/meds    Bone health:   Calcium 4984-7616 mg daily and vitamin D 800 IU daily   Risk factors for osteopenia/osteoporosis: none   Vitamin D: recheck    Ergocalciferol 50,000 IU weekly x 12 weeks if low     DEXA scan: Recommend baseline    Smoking status: nonsmoker    Follow up: after endoscopy

## 2023-11-21 NOTE — PROGRESS NOTES
[facility] Gastroenterology  OUTPATIENT OFFICE VISIT NOTE    SUBJECTIVE:      Chief Complaint:   HPI: Elaina Pierce is a 38 y.o. yo female w/ PMH of anemia, reported history of Crohn's disease, who presents for  establishing care with [facility] .    Patient reports that she was diagnosed with Crohn's at the age of 15 by Dr. Loyola.  She recounts that age 15 she started having bright red bloody stools.  She had dispensed several months at a time hospitalized due to GI blood loss.  Patient was also seen by Dr. Rivera.  At this time we are unable to obtain records from Dr. Rivera's office.  Per chart review patient was treated with prednisone, pentasa, azathioprine, and 3-5 doses of Remicade.  After the age 20 due to loss of insurance patient was lost to follow up.  Patient has not been on therapy since the age of 20.  She also report a remission in bright red stools.  Patient does have a longstanding history of anemia requiring multiple transfusions approximately 50 transfusions per the patient.  Most recently patient was admitted to our [facility] in August 2022 after bouts of fatigue and it was found the hemoglobin to be 1.7.  There was concern for pancytopenia of unknown etiology and patient was referred to Hematology.  Currently the patient is followed by Dr. Mckeon at [facility].  Etiology of anemia still unclear with considerations to MGUS and red cell hyperplasia being given by Hematology.  Concerning patient's GI symptoms she reports having stools 2 times a week.  She reports crampy pain when she feels she needs to defecate and continued pain while bearing down.  The pain subsides 3-4 minutes after the patient has a evacuate her bowels.  Patient reports the pain is extreme during these episodes.  She also reports never feeling as she is completely evacuate her bowels.  Patient does admit to having a low-fiber diet.  She does not consume fruits and vegetables.  The stools were initially heart  "informed and softened up a she continues to stool.  Patient denies any black tarry stools, blood in toilet bowl, or blood on toilet paper.  Patient is a has a history of NSAID use, tobacco use, excessive alcohol use.  She denies any recent episodes of syncope, palpitations, diarrhea, nausea, or vomiting.      Past Medical History:   has a past medical history of Anemia, Crohn disease, and Crohn's disease ().     Past Surgical History:   has a past surgical history that includes Tonsillectomy;  section; Bone marrow aspiration (N/A, 2023); Tubal ligation (2019); Colonoscopy (); and Upper gastrointestinal endoscopy ().     Family History:  family history includes Diabetes in her maternal grandmother and mother; Drug abuse in her mother; Hypertension in her mother; Kidney disease in her maternal grandmother; Stroke in her paternal grandmother.     Social History:   reports that she has never smoked. She has never used smokeless tobacco. She reports current alcohol use of about 1.0 standard drink of alcohol per week. She reports that she does not use drugs.     Allergies:  has No Known Allergies.     Home Medications:  Current Outpatient Medications   Medication Instructions    cyanocobalamin, vitamin B-12, 1,000 mcg Lozg 1 tablet, Sublingual, Every morning    FeroSuL 325 mg, Oral, 2 times daily    folic acid (FOLVITE) 1,000 mcg, Oral, Daily        ROS:  Negative for all symptoms other than those listed in HPI         OBJECTIVE:     Vital signs:   BP (!) 132/90 (BP Location: Left arm, Patient Position: Sitting, BP Method: Medium (Automatic))   Pulse 84   Temp 98 °F (36.7 °C) (Oral)   Resp 16   Ht 5' 1" (1.549 m)   Wt 54.9 kg (121 lb)   LMP 2023   SpO2 100%   BMI 22.86 kg/m²      Physical Examination:  Physical Exam  Constitutional:       Appearance: Normal appearance.   Cardiovascular:      Rate and Rhythm: Normal rate and regular rhythm.      Pulses: Normal pulses.      " Heart sounds: Normal heart sounds. No murmur heard.     No gallop.   Pulmonary:      Effort: Pulmonary effort is normal. No respiratory distress.      Breath sounds: Normal breath sounds. No stridor. No wheezing or rhonchi.   Abdominal:      General: Abdomen is flat. Bowel sounds are normal. There is no distension.      Palpations: Abdomen is soft. There is no mass.      Tenderness: There is no guarding or rebound.      Comments: Mild right upper quadrant tenderness with deep palpation without guarding or rigidity   Musculoskeletal:         General: No swelling, deformity or signs of injury.   Skin:     General: Skin is warm and dry.      Findings: No bruising or lesion.   Neurological:      General: No focal deficit present.           Labs:  BMP:   Lab Results   Component Value Date    CHLORIDE 108 (H) 08/14/2023    CO2 24 08/14/2023    BUN 7.6 08/14/2023    CREATININE 0.61 08/14/2023    GLUCOSE 88 08/14/2023    CALCIUM 9.2 08/14/2023     CBC:   Lab Results   Component Value Date    WBC 3.30 (L) 08/14/2023    HGB 8.8 (L) 08/14/2023    HCT 30.6 (L) 08/14/2023    MCV 89.0 08/14/2023    RDW 19.0 (H) 08/14/2023     LFTs:   Lab Results   Component Value Date    LABPROT 7.5 08/14/2023    ALBUMIN 4.3 08/14/2023    AST 13 08/14/2023    ALT 9 08/14/2023    ALKPHOS 68 08/14/2023     FLP:   Lab Results   Component Value Date    CHOL 200 07/13/2023    HDL 54 07/13/2023    .00 07/13/2023    TRIG 177 (H) 07/13/2023    TOTALCHOLEST 4 07/13/2023     DM:   Lab Results   Component Value Date    CREATININE 0.61 08/14/2023    CREATRANDUR 150.4 (H) 12/27/2020    PROTEINURINE 293.0 12/27/2020     Thyroid:   Lab Results   Component Value Date    TSH 0.9642 09/27/2022     Anemia:   Lab Results   Component Value Date    IRON 15 (L) 08/14/2023    TIBC 262 08/14/2023    FERRITIN 12.10 08/14/2023    XUIOPYAA56 433 04/06/2023    FOLATE 11.8 04/06/2023               ASSESSMENT & PLAN:      History of Crohn's disease  -per chart review  "patient's last colonoscopy seems to be in 2015.  Results of which are unknown.   -patient's diagnosis of Crohn's at this time is unclear given patient has had no flare-ups while not being in treatment for the last 15 years  -an EGD and colonoscopy will be scheduled  -fecal calprotectin, CRP, hep a IgG and VZV IgG will be ordered      Constipation  -patient reporting severe abdominal pain while constipated which is relieved with bowel movements.  -patient educated on importance of diet high in fiber as well as increased water intake.  -take soluble fiber supplement daily like psyllium  -take magnesium oxide 400 mg twice a day for constipation  -patient educated on several other over-the-counter laxative options for constipation as well i.e miralax, milk of magnesium, bisacodyl for "rescue" for severe constipation  -if no relief is found  we will consider prescription medication for treatment.                Health Maintenance  Vaccinations  Immunization History   Administered Date(s) Administered    Tdap 09/29/2012, 07/13/2023              Return to clinic in 6 month(s).  Please complete all laboratory workup prior to appointment    Roshan Kwok M.D  [facility] PGY-1              "

## 2023-11-21 NOTE — PATIENT INSTRUCTIONS
"Take soluble fiber supplement daily likely psyllium  Take magnesium oxide 400 mg twice a day for constipation  Can use miralax, milk of magnesium, bisacodyl for "rescue" for severe constipation  If not helpful, then will need to consider prescription medications for treatment  "

## 2023-11-22 ENCOUNTER — LAB VISIT (OUTPATIENT)
Dept: HEMATOLOGY/ONCOLOGY | Facility: CLINIC | Age: 38
End: 2023-11-22
Payer: MEDICAID

## 2023-11-22 ENCOUNTER — OFFICE VISIT (OUTPATIENT)
Dept: HEMATOLOGY/ONCOLOGY | Facility: CLINIC | Age: 38
End: 2023-11-22
Attending: INTERNAL MEDICINE
Payer: MEDICAID

## 2023-11-22 VITALS
BODY MASS INDEX: 22.69 KG/M2 | DIASTOLIC BLOOD PRESSURE: 80 MMHG | RESPIRATION RATE: 18 BRPM | WEIGHT: 120.19 LBS | OXYGEN SATURATION: 100 % | TEMPERATURE: 99 F | HEART RATE: 95 BPM | HEIGHT: 61 IN | SYSTOLIC BLOOD PRESSURE: 116 MMHG

## 2023-11-22 DIAGNOSIS — D64.9 ANEMIA, UNSPECIFIED TYPE: ICD-10-CM

## 2023-11-22 DIAGNOSIS — E53.8 FOLATE DEFICIENCY: ICD-10-CM

## 2023-11-22 DIAGNOSIS — R82.90 ABNORMAL URINE FINDING: ICD-10-CM

## 2023-11-22 DIAGNOSIS — E53.8 B12 DEFICIENCY: ICD-10-CM

## 2023-11-22 DIAGNOSIS — K59.04 CHRONIC IDIOPATHIC CONSTIPATION: ICD-10-CM

## 2023-11-22 DIAGNOSIS — D50.9 IRON DEFICIENCY ANEMIA, UNSPECIFIED IRON DEFICIENCY ANEMIA TYPE: ICD-10-CM

## 2023-11-22 DIAGNOSIS — D47.2 MGUS (MONOCLONAL GAMMOPATHY OF UNKNOWN SIGNIFICANCE): ICD-10-CM

## 2023-11-22 DIAGNOSIS — D47.2 MGUS (MONOCLONAL GAMMOPATHY OF UNKNOWN SIGNIFICANCE): Primary | ICD-10-CM

## 2023-11-22 DIAGNOSIS — E61.1 IRON DEFICIENCY: ICD-10-CM

## 2023-11-22 DIAGNOSIS — D50.8 OTHER IRON DEFICIENCY ANEMIA: ICD-10-CM

## 2023-11-22 DIAGNOSIS — R19.5 OCCULT BLOOD POSITIVE STOOL: ICD-10-CM

## 2023-11-22 DIAGNOSIS — Z92.89 HISTORY OF BLOOD TRANSFUSION: ICD-10-CM

## 2023-11-22 DIAGNOSIS — R89.8 ABNORMAL BONE MARROW EXAMINATION: Primary | ICD-10-CM

## 2023-11-22 DIAGNOSIS — D64.9 SEVERE ANEMIA: ICD-10-CM

## 2023-11-22 DIAGNOSIS — D50.9 IRON DEFICIENCY ANEMIA, UNSPECIFIED IRON DEFICIENCY ANEMIA TYPE: Primary | ICD-10-CM

## 2023-11-22 DIAGNOSIS — D69.6 THROMBOCYTOPENIA: ICD-10-CM

## 2023-11-22 LAB
ALBUMIN SERPL-MCNC: 4.4 G/DL (ref 3.5–5)
ALBUMIN/GLOB SERPL: 1.2 RATIO (ref 1.1–2)
ALP SERPL-CCNC: 75 UNIT/L (ref 40–150)
ALT SERPL-CCNC: 10 UNIT/L (ref 0–55)
AST SERPL-CCNC: 18 UNIT/L (ref 5–34)
BASOPHILS # BLD AUTO: 0.05 X10(3)/MCL
BASOPHILS NFR BLD AUTO: 0.8 %
BILIRUB SERPL-MCNC: 0.3 MG/DL
BUN SERPL-MCNC: 7 MG/DL (ref 7–18.7)
CALCIUM SERPL-MCNC: 9.3 MG/DL (ref 8.4–10.2)
CHLORIDE SERPL-SCNC: 106 MMOL/L (ref 98–107)
CO2 SERPL-SCNC: 26 MMOL/L (ref 22–29)
CREAT SERPL-MCNC: 0.68 MG/DL (ref 0.55–1.02)
CRP SERPL-MCNC: <1 MG/L
EOSINOPHIL # BLD AUTO: 0.16 X10(3)/MCL (ref 0–0.9)
EOSINOPHIL NFR BLD AUTO: 2.6 %
ERYTHROCYTE [DISTWIDTH] IN BLOOD BY AUTOMATED COUNT: 15.1 % (ref 11.5–17)
FERRITIN SERPL-MCNC: 9.21 NG/ML (ref 4.63–204)
GFR SERPLBLD CREATININE-BSD FMLA CKD-EPI: >60 MLS/MIN/1.73/M2
GLOBULIN SER-MCNC: 3.7 GM/DL (ref 2.4–3.5)
GLUCOSE SERPL-MCNC: 85 MG/DL (ref 74–100)
HAV AB SER QL IA: NONREACTIVE
HCT VFR BLD AUTO: 33.9 % (ref 37–47)
HGB BLD-MCNC: 10.8 G/DL (ref 12–16)
IMM GRANULOCYTES # BLD AUTO: 0.01 X10(3)/MCL (ref 0–0.04)
IMM GRANULOCYTES NFR BLD AUTO: 0.2 %
IRON SATN MFR SERPL: 7 % (ref 20–50)
IRON SERPL-MCNC: 23 UG/DL (ref 50–170)
LYMPHOCYTES # BLD AUTO: 2.54 X10(3)/MCL (ref 0.6–4.6)
LYMPHOCYTES NFR BLD AUTO: 41 %
MCH RBC QN AUTO: 27.7 PG (ref 27–31)
MCHC RBC AUTO-ENTMCNC: 31.9 G/DL (ref 33–36)
MCV RBC AUTO: 86.9 FL (ref 80–94)
MONOCYTES # BLD AUTO: 0.5 X10(3)/MCL (ref 0.1–1.3)
MONOCYTES NFR BLD AUTO: 8.1 %
NEUTROPHILS # BLD AUTO: 2.93 X10(3)/MCL (ref 2.1–9.2)
NEUTROPHILS NFR BLD AUTO: 47.3 %
NRBC BLD AUTO-RTO: 0 %
PLATELET # BLD AUTO: 366 X10(3)/MCL (ref 130–400)
PMV BLD AUTO: 9.8 FL (ref 7.4–10.4)
POTASSIUM SERPL-SCNC: 3.5 MMOL/L (ref 3.5–5.1)
PROT SERPL-MCNC: 8.1 GM/DL (ref 6.4–8.3)
RBC # BLD AUTO: 3.9 X10(6)/MCL (ref 4.2–5.4)
SODIUM SERPL-SCNC: 140 MMOL/L (ref 136–145)
TIBC SERPL-MCNC: 293 UG/DL (ref 70–310)
TIBC SERPL-MCNC: 316 UG/DL (ref 250–450)
TRANSFERRIN SERPL-MCNC: 262 MG/DL (ref 180–382)
WBC # SPEC AUTO: 6.19 X10(3)/MCL (ref 4.5–11.5)

## 2023-11-22 PROCEDURE — 80053 COMPREHEN METABOLIC PANEL: CPT

## 2023-11-22 PROCEDURE — 85025 COMPLETE CBC W/AUTO DIFF WBC: CPT

## 2023-11-22 PROCEDURE — 3008F PR BODY MASS INDEX (BMI) DOCUMENTED: ICD-10-PCS | Mod: CPTII,,, | Performed by: INTERNAL MEDICINE

## 2023-11-22 PROCEDURE — 3079F PR MOST RECENT DIASTOLIC BLOOD PRESSURE 80-89 MM HG: ICD-10-PCS | Mod: CPTII,,, | Performed by: INTERNAL MEDICINE

## 2023-11-22 PROCEDURE — 1160F PR REVIEW ALL MEDS BY PRESCRIBER/CLIN PHARMACIST DOCUMENTED: ICD-10-PCS | Mod: CPTII,,, | Performed by: INTERNAL MEDICINE

## 2023-11-22 PROCEDURE — 36415 COLL VENOUS BLD VENIPUNCTURE: CPT

## 2023-11-22 PROCEDURE — 3074F SYST BP LT 130 MM HG: CPT | Mod: CPTII,,, | Performed by: INTERNAL MEDICINE

## 2023-11-22 PROCEDURE — 1159F PR MEDICATION LIST DOCUMENTED IN MEDICAL RECORD: ICD-10-PCS | Mod: CPTII,,, | Performed by: INTERNAL MEDICINE

## 2023-11-22 PROCEDURE — 83540 ASSAY OF IRON: CPT

## 2023-11-22 PROCEDURE — 99214 OFFICE O/P EST MOD 30 MIN: CPT | Mod: PBBFAC | Performed by: INTERNAL MEDICINE

## 2023-11-22 PROCEDURE — 84165 PROTEIN E-PHORESIS SERUM: CPT

## 2023-11-22 PROCEDURE — 1160F RVW MEDS BY RX/DR IN RCRD: CPT | Mod: CPTII,,, | Performed by: INTERNAL MEDICINE

## 2023-11-22 PROCEDURE — 86334 IMMUNOFIX E-PHORESIS SERUM: CPT

## 2023-11-22 PROCEDURE — 3079F DIAST BP 80-89 MM HG: CPT | Mod: CPTII,,, | Performed by: INTERNAL MEDICINE

## 2023-11-22 PROCEDURE — 86787 VARICELLA-ZOSTER ANTIBODY: CPT

## 2023-11-22 PROCEDURE — 3074F PR MOST RECENT SYSTOLIC BLOOD PRESSURE < 130 MM HG: ICD-10-PCS | Mod: CPTII,,, | Performed by: INTERNAL MEDICINE

## 2023-11-22 PROCEDURE — 1159F MED LIST DOCD IN RCRD: CPT | Mod: CPTII,,, | Performed by: INTERNAL MEDICINE

## 2023-11-22 PROCEDURE — 3008F BODY MASS INDEX DOCD: CPT | Mod: CPTII,,, | Performed by: INTERNAL MEDICINE

## 2023-11-22 PROCEDURE — 86140 C-REACTIVE PROTEIN: CPT

## 2023-11-22 PROCEDURE — 82728 ASSAY OF FERRITIN: CPT

## 2023-11-22 PROCEDURE — 83521 IG LIGHT CHAINS FREE EACH: CPT

## 2023-11-22 PROCEDURE — 82784 ASSAY IGA/IGD/IGG/IGM EACH: CPT

## 2023-11-22 PROCEDURE — 86708 HEPATITIS A ANTIBODY: CPT

## 2023-11-22 PROCEDURE — 99214 PR OFFICE/OUTPT VISIT, EST, LEVL IV, 30-39 MIN: ICD-10-PCS | Mod: S$PBB,,, | Performed by: INTERNAL MEDICINE

## 2023-11-22 PROCEDURE — 99214 OFFICE O/P EST MOD 30 MIN: CPT | Mod: S$PBB,,, | Performed by: INTERNAL MEDICINE

## 2023-11-22 NOTE — Clinical Note
Pending: Serum iron, TIBC, ferritin Continue vitamin B12 1000 mcg p.o. q.day Refer to GI for EGD/colonoscopy/capsule endoscopy for evaluation of iron-deficiency anemia Today, check SPEP, EAN, FLC assay Need skeletal survey 24 hour urine for total protein, UPEP, urine EAN, urine FLC assay Follow-up in 1 month with labs.

## 2023-11-22 NOTE — PROGRESS NOTES
Labs reviewed.    Ferritin 9.21, low, remains iron deficient  Hemoglobin 10.8, partially improved from 8.8 with Feraheme x2 (was 8.8 on 0840 2023).  Serum iron 23, low.  Transferrin saturation 7%, low.    Plan:  Proceed with Feraheme x2 once again, administered a week apart   In 6 weeks, repeat CBC, serum iron, TIBC, and ferritin, then follow-up visit me.

## 2023-11-22 NOTE — PROGRESS NOTES
History:  Past Medical History:   Diagnosis Date    History of Crohn's disease     unclear how diagnosis was made    Iron deficiency anemia, unspecified    Past medical history:  Crohn's disease, diagnosed when she was 15 years old; diagnosed at [facility]; presenting complaint was severe bleeding; received close to 50 packed RBC transfusions; after she is turned 20, she could not get established with a GI.  Symptoms have been more or less under control spontaneously.  B12 deficiency.  Iron-deficiency.  Folic acid deficiency.  Social history:  .  Lives in [place], [place].  Has 3 children.  Does not work.  No history of tobacco, alcohol, or illicit drug abuse.    Family history:  Negative for malignancy or blood dyscrasias.    Health maintenance:  Primary care provider at [facility].  She is waiting to get established with a GI for ongoing surveillance/management of Crohn's disease.  Past Surgical History:   Procedure Laterality Date    BONE MARROW ASPIRATION N/A 2023    Procedure: ASPIRATION, BONE MARROW;  Surgeon: Gayatri Howard MD;  Location: [facility] ENDOSCOPY;  Service: General;  Laterality: N/A;     SECTION      COLONOSCOPY      Luzmaria had several of these procedures    TONSILLECTOMY      TUBAL LIGATION  2019    UPPER GASTROINTESTINAL ENDOSCOPY      I've had several of these      Social History     Socioeconomic History    Marital status: Other    Highest education level: 12th grade   Tobacco Use    Smoking status: Never    Smokeless tobacco: Never   Substance and Sexual Activity    Alcohol use: Not Currently     Alcohol/week: 1.0 standard drink of alcohol     Types: 1 Glasses of wine per week    Drug use: Never    Sexual activity: Yes     Partners: Male     Birth control/protection: Other-see comments     Comment: Tubes tied     Social Determinants of Health     Financial Resource Strain: Low Risk  (2023)    Overall Financial Resource Strain (CARDIA)     Difficulty of Paying Living  Expenses: Not hard at all   Food Insecurity: No Food Insecurity (7/13/2023)    Hunger Vital Sign     Worried About Running Out of Food in the Last Year: Never true     Ran Out of Food in the Last Year: Never true   Transportation Needs: No Transportation Needs (7/13/2023)    PRAPARE - Transportation     Lack of Transportation (Medical): No     Lack of Transportation (Non-Medical): No   Physical Activity: Insufficiently Active (7/13/2023)    Exercise Vital Sign     Days of Exercise per Week: 3 days     Minutes of Exercise per Session: 20 min   Stress: Stress Concern Present (7/13/2023)    [employer] [place] [employer] - Occupational Stress Questionnaire     Feeling of Stress : To some extent   Social Connections: Socially Integrated (7/13/2023)    Social Connection and Isolation Panel [NHANES]     Frequency of Communication with Friends and Family: Twice a week     Frequency of Social Gatherings with Friends and Family: Twice a week     Attends Judaism Services: 1 to 4 times per year     Active Member of Clubs or Organizations: No     Attends Club or Organization Meetings: 1 to 4 times per year     Marital Status:    Housing Stability: Low Risk  (7/13/2023)    Housing Stability Vital Sign     Unable to Pay for Housing in the Last Year: No     Number of Places Lived in the Last Year: 1     Unstable Housing in the Last Year: No      Family History   Problem Relation Age of Onset    Hypertension Mother     Diabetes Mother     Drug abuse Mother     Kidney disease Maternal Grandmother     Diabetes Maternal Grandmother     Stroke Paternal Grandmother         Reason for Follow-up:  -chronic anemia, S/P multiple blood transfusions over several years  -vitamin B12 deficiency  -folic acid deficiency   -iron-deficiency  -kappa light chain MGUS  -Abnormal bone marrow biopsy, red cell aplasia, pancytopenia  -stool occult blood positive    History of Present Illness:   No chief complaint on file.         Oncologic/Hematologic History:  Oncology History    No history exists.   37-year-old lady, referred from [facility], with anemia.      Investigations/clinical events reviewed:     Hemoglobin: 13.5 (09/27/2022); 5.4 (08/11/2022); 6.7 (12/28/2020); 4.3 (12/27/2020)   MCV normal  (S/P PRBC x2 units 12/27/2020, for hemoglobin 4.3)     12/27/2020: Serum iron 12, low.  TIBC normal.  Ferritin not done.  Transferrin saturation 3%.     09/27/2022: Serum iron 48, low.  TIBC normal.  Ferritin 21.65.  Transferrin saturation 16%, low.  B12 level 516.  Folate normal.     B12 level:  516, normal (09/27/2022); 287, borderline (12/27/2020)     Admitted [facility] 08/10/2022-08/14/2022:  -profound anemia, symptomatic; pancytopenia; hepatomegaly; history of Crohn's disease and multiple transfusions  -on admission, hemoglobin 1.7, hematocrit 7.0, MCV 72, platelets 57 K, occult blood +; B12 level 201; ferritin 27.5; folate 6.4; started on intravenous folic acid and intramuscular B12; patient experiencing heavy menstrual cycles; platelets dropped to 13 K; WBC 4.3 K, normal; neutrophil count 2800 mm3; peripheral blood smear without dysplasia or blasts; low LDH; low retic count; no significant schistocytes; no evidence of hemolysis; after 1 unit of platelets on 08/13/2022, platelet count improved but subsequently dropped to 13,000 mm3; no bleeding except for menorrhagia; as therapeutic trial, prednisone was started  -parvovirus B19 PCR negative; parvovirus B19 IgG +; parvovirus B19 IgM negative; plasma zinc level normal, 56 (reference Range:   mcg/dL); hepatitis-B surface antigen negative; hepatitis-B surface antibody negative; hepatitis B core antibody negative; hepatitis-C antibody negative; hepatitis-B virus PCR quantitative negative (hepatitis-B surface antigen + on 08/11/2022 but negative on 08/12/2022)  -history of Crohn's disease and chronic anemia; admitted with generalized weakness,  hemoglobin 1.6, received PRBC x4 units, initially admitted to ICU; no acute GI bleed; significant thrombocytopenia; B12 and folic acid deficiency; platelets kept dropping despite replacement of B12 and folic acid and despite transfusion; underwent bone marrow aspiration and core biopsy 08/12/2022; on 08/14/2022, she left AMA; CT scan of A/P with IV contrast showed hepatomegaly  -08/12/2022: Bone marrow aspiration and core biopsy ([facility]):  PERIPHERAL SMEAR REVIEW, BONE MARROW ASPIRATION, BONE MARROW BIOPSY, AND   FLOW CYTOMETRIC EVALUATION AND CYTOGENETICS:   1.  ACUTE RED CELL APLASIA.   2.  RESULTING SEVERE ANEMIA.   3.  REACTIVE APPEARING NEUTROPHILIC SERIES CELLS PRESENT WITH INCREASED   NUMBER OF          MYELOBLASTS (6.4%) PRESENT.   4.  THROMBOCYTOPENIA.   5.  DECREASED NUMBERS OF MEGAKARYOCYTES PRESENT.   6.  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   7.  IRON 2+ OF 6.   8.  AWAIT CYTOGENETIC AND MOLECULAR EVALUATION FOR FINAL DIAGNOSIS.   9. FISH analysis AML standard: Normal  (The marrow reveals a markedly abnormal   marrow with reactive appearing changes in the myeloid series with a   shift to the left to include 6.4% myeloblasts.  However, maturation of   segmented neutrophils does occur even though there is a significant   monocyte bulge.  This is not morphologically felt to be acute leukemia   but await cytogenetics and moleuclar studies for final diagnosis.     Cytogenetics and molecular studies for AML have been requested and will   be reported at a later time.  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  There are clusters of immature erythroid   elements seen and there is perhaps an attempt at re-population.  These   are very noticeable in both aspirate clot section and the biopsy but   best seen in the biopsy.  Therefore, this is felt to be rather an acute   insult on this individual which has affected predominantly  the erythroid   elements but megakaryocytes are also decreased in number and myeloid   cells are markedly reactive in nature.  There are numerous causes for   acute red cell aplasia though Parvovirus B19 is certainly included in the   differential diagnosis)     04/06/2023:  Pleasant, healthy-appearing young lady who presents for initial hematology consultation.  In no acute discomfort.  Crohn's disease, diagnosed when she was 15 years old; diagnosed at [facility]; presenting complaint was severe bleeding; received close to 50 packed RBC transfusions; after she is turned 20, she could not get established with a GI.  Symptoms have been more or less under control spontaneously.  B12 deficiency.  Iron-deficiency.  Folic acid deficiency.  For last several years, symptoms of Crohn's disease has been stable.  For last couple of weeks, occasional crampy pains.  Couple of loose bowel movements twice a week.  No blood in stool.  No fevers, chills, weakness, or fatigue.  Diagnosed with iron, B12, and folic acid deficiency during hospitalization at [facility], in August 2022, when she was hospitalized with severe symptomatic anemia, hemoglobin 1.7, platelets 57 K, B12 level 201, ferritin 27.5, and folic acid level 6.4.  On bone marrow biopsy, there was suspicion of acute red cell aplasia, 6.4% myeloblasts, etc..    Denies recurrent fevers, chills, drenching night sweats, anorexia, unintentional weight loss, chest pain, cough, dyspnea, dizziness, abdominal pain, etc..  ECOG 0.       Interval History:  [No matching plan found]   [No matching plan found]     11/22/2023:   -S/P Feraheme x2, 510 mg IV each, on 08/21/2023 and 08/22/2023  -11/22/2023:  Hemoglobin 10.8, improved from 8.8 on 08/14/2023; MCV normal; today's ferritin level is pending; however, transferrin saturation remains low, 7%; CMP is unremarkable  Presents for follow-up visit.  Doing well.  No weakness or fatigue.  Does not feel any difference  after iron infusions.  Apparently, because of chronicity of anemia, she had no significant symptoms to start with.  Occasional headaches, once or twice a week.  No associated neurological symptoms, nausea, vomiting.  Great appetite.  No rectal bleeding.  Menstrual cycles are not heavy.  Says that EGD and colonoscopy are due in December.      Medications:  Current Outpatient Medications on File Prior to Visit   Medication Sig Dispense Refill    cyanocobalamin, vitamin B-12, 1,000 mcg Lozg Place 1 tablet under the tongue every morning. 30 lozenge 6    FEROSUL 325 mg (65 mg iron) Tab tablet Take 1 tablet (325 mg total) by mouth 2 (two) times daily. 180 tablet 3    folic acid (FOLVITE) 1 MG tablet Take 1 tablet (1,000 mcg total) by mouth once daily. 90 tablet 2    polyethylene glycol (MOVIPREP) 100-7.5-2.691 gram solution Take 2,000 mLs by mouth As instructed (take as directed per instructions provided by clinic). Take as directed prior to colonoscopy 1 kit 0     No current facility-administered medications on file prior to visit.       Review of Systems:   All systems reviewed and found to be negative except for the symptoms detailed above    Physical Examination:   VITAL SIGNS:   Vitals:    11/22/23 1459   BP: 116/80   Pulse: 95   Resp: 18   Temp: 98.5 °F (36.9 °C)       GENERAL:  In no apparent distress.    HEAD:  No signs of head trauma.  EYES:  Pupils are equal.  Extraocular motions intact.    EARS:  Hearing grossly intact.  MOUTH:  Oropharynx is normal.   NECK:  No adenopathy, no JVD.     CHEST:  Chest with clear breath sounds bilaterally.  No wheezes, rales, rhonchi.    CARDIAC:  Regular rate and rhythm.  S1 and S2, without murmurs, gallops, rubs.  VASCULAR:  No Edema.  Peripheral pulses normal and equal in all extremities.  ABDOMEN:  Soft, without detectable tenderness.  No sign of distention.  No   rebound or guarding, and no masses palpated.   Bowel Sounds normal.  MUSCULOSKELETAL:  Good range of motion of all  "major joints. Extremities without clubbing, cyanosis or edema.    NEUROLOGIC EXAM:  Alert and oriented x 3.  No focal sensory or strength deficits.   Speech normal.  Follows commands.  PSYCHIATRIC:  Mood normal.    No results for input(s): "CBC" in the last 72 hours.   No results for input(s): "CMP" in the last 72 hours.     Assessment:  Problem List Items Addressed This Visit          Hematology    Thrombocytopenia - Primary       Oncology    Iron deficiency    Abnormal bone marrow examination    Iron deficiency anemia    History of blood transfusion    MGUS (monoclonal gammopathy of unknown significance)       Endocrine    B12 deficiency    Folate deficiency       GI    Occult blood positive stool     Chronic anemia:  #Anemic for several years  #S/P multiple blood transfusions over the years   #Hemoglobin: 13.5 (09/27/2022); 5.4 (08/11/2022); 6.7 (12/28/2020); 4.3 (12/27/2020)   MCV normal     -Admitted to [facility] 08/2022; severe, symptomatic anemia, hemoglobin 1.7, hematocrit 7.0, MCV 72, platelets 57 K, B12 level 201, ferritin 27.5, folate 6.4; treated with intravenous folic acid and intramuscular B12; platelets dropped to 13 K; WBC, differential normal; no hemolysis; no dysplasia or blasts on peripheral blood smear; transfused; empirically, thrombocytopenia treated with prednisone; parvovirus B19 PCR and serology negative but IgG +; hepatitis-B surface antigen + on 1 occasion, negative or another occasion; hepatitis-B surface antibody negative; hepatitis-B core antibody negative; hepatitis-C antibody negative; transfuse with PRBC x4 units; no acute GI bleeding; CT A/P showed hepatomegaly; bone marrow biopsy report see below  -08/12/2022: Bone marrow biopsy ([facility]) (hemoglobin 1.7, hematocrit 7.0, MCV 72, platelets 57 K; WBC, differential normal):  1.  ACUTE RED CELL APLASIA.   2.  RESULTING SEVERE ANEMIA.   3.  REACTIVE APPEARING NEUTROPHILIC SERIES CELLS " PRESENT WITH INCREASED   NUMBER OF          MYELOBLASTS (6.4%) PRESENT.   4.  THROMBOCYTOPENIA.   5.  DECREASED NUMBERS OF MEGAKARYOCYTES PRESENT.   6.  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   7.  IRON 2+ OF 6.   8.  FISH analysis AML standard: Normal  (Bone marrow biopsy revealed a markedly abnormal   marrow with reactive appearing changes in the myeloid series with a   shift to the left to include 6.4% myeloblasts.  However, maturation of   segmented neutrophils does occur even though there is a significant   monocyte bulge.  This is not morphologically felt to be acute leukemia   but await cytogenetics and moleuclar studies for final diagnosis.     Cytogenetics and molecular studies for AML have been requested and will   be reported at a later time.  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  There are clusters of immature erythroid   elements seen and there is perhaps an attempt at re-population.  These   are very noticeable in both aspirate clot section and the biopsy but   best seen in the biopsy.  Therefore, this is felt to be rather an acute   insult on this individual which has affected predominantly the erythroid   elements but megakaryocytes are also decreased in number and myeloid   cells are markedly reactive in nature.  There are numerous causes for   acute red cell aplasia though virus of B17 is certainly included in the   differential diagnosis)  -04/06/2023: Hemoglobin 9.1.  MCV normal.  Transferrin saturation 8%.  Ferritin 26.02.  B12, folate normal.  No hemolysis.  No M protein.  Kappa/lambda ratio 1.78, elevated.  -04/21/2023: Hemoglobin 8.8.  MCV normal.  Platelets 556 K  -bone marrow exam 05/09/2023: Hypercellular, 70-80%; markedly decreased iron stores; otherwise, essentially unremarkable  -08/14/2023:  Hemoglobin 8.8 (no improvement with Feraheme; was 9.4 on 05/09/2023); MCV 89.0; WBC and platelets normal; ANC 1.3, down; serum iron 15,  low; TIBC 262, transferrin saturation 6%) remains low; was 8% on 05/04/2023).  CMP reviewed; ferritin level i 12.10  -08/14/2023: Her cycles last 5 days every month; every other month, she experiences blood clots with menstrual cycles.  Says that menstrual lost does not appear to be heavy as far as she can tell.       Vitamin B12 deficiency:  -B12 level 287, borderline, on 12/27/2020.    -B12 level 201.  Diagnosed when admitted to [facility] 08/2022, with severe anemia (see above);   -B12 level normal, 516, on 09/27/2022  -B12 deficiency can be due to terminal ileum involvement with Crohn's disease  -ever since 08/2022, has been taking 1 tablet of vitamin B12 daily  -04/06/2023: Hemoglobin 9.1.  MCV normal.  Transferrin saturation 8%.  Ferritin 26.02.  B12, folate normal.  No hemolysis.  No M protein.  Kappa/lambda ratio 1.78, elevated.  -04/06/2023:  Intrinsic factor antibody negative  -04/21/2023: Hemoglobin 8.8.  MCV normal.  Platelets 556 K  (Absorbing vitamin B12 via oral route, satisfactory really)       Folic acid deficiency:   -folate 6.4, diagnosed when admitted to [facility] 08/2022 with severe anemia (see above)  -ever since 08/2022, has been taking folic acid tablet daily  -04/06/2023: Hemoglobin 9.1.  MCV normal.  Transferrin saturation 8%.  Ferritin 26.02.  B12, folate normal.  No hemolysis.  No M protein.  Kappa/lambda ratio 1.78, elevated.  -04/21/2023: Hemoglobin 8.8.  MCV normal.  Platelets 556 K  (Folic acid level has normalized as of 04/06/2023)       Iron deficiency:  -12/27/2020: Serum iron 12, low.  TIBC normal.  Ferritin not done.  Transferrin saturation 3%  -diagnosed when admitted to [facility] 08/10/2022 with severe anemia (see above)  -09/27/2022: Serum iron 48, low.  TIBC normal.  Ferritin 21.65.  Transferrin saturation 16%, low.  -ever since 08/2022, has been taking 2 iron tablets daily  -04/06/2023: Hemoglobin 9.1.   MCV normal.  Transferrin saturation 8%.  Ferritin 26.02.  B12, folate normal.  No hemolysis.  No M protein.  Kappa/lambda ratio 1.78, elevated.  -04/21/2023: Hemoglobin 8.8.  MCV normal.  Platelets 556 K  -05/04/2023:  Hemoglobin 8.7.  MCV 79.6.  Platelets 77 K. ferritin 16.27.  CMP unremarkable.  (Has failed oral iron therapy; continues to have iron-deficiency anemia as of 04/06/2023, 04/21/2023, 05/04/2023)  -S/P Feraheme 510 mg IV x2 (06/13/2023, 06/20/2023)  -08/14/2023:  Hemoglobin 8.8 (no improvement with Feraheme; was 9.4 on 05/09/2023); MCV 89.0; WBC and platelets normal; ANC 1.3, down; serum iron 15, low; TIBC 262, transferrin saturation 6%) remains low; was 8% on 05/04/2023).  CMP reviewed; ferritin level i 12.10  -08/14/2023: Her cycles last 5 days every month; every other month, she experiences blood clots with menstrual cycles.  Says that menstrual lost does not appear to be heavy as far as she can tell.  (Absolutely no response to Feraheme x2, administered in June 2023; the source of iron loss remains unclear; questionable history of menorrhagia)  -S/P Feraheme x2, 510 mg IV each, on 08/21/2023 and 08/22/2023  -11/22/2023:  Hemoglobin 10.8, improved from 8.8 on 08/14/2023; MCV normal; today's ferritin level is pending; however, transferrin saturation remains low, 7%; CMP is unremarkable      New diagnosis of kappa light chain MGUS:  -04/06/2023: Hemoglobin 9.1.  MCV normal.  Transferrin saturation 8%.  Ferritin 26.02.  B12, folate normal.  No hemolysis.  No M protein.  Kappa/lambda ratio 1.78, elevated.      History of Crohn's disease:   -Diagnosed when she was 15.  Presented with severe GI bleeding.  Says that she received a total of 50 packed RBC transfusions.  After she is turned 20, she could not find another GI to follow-up with.  -04/06/2023: As of 04/06/2023, symptoms are pretty much under control spontaneously; for last couple of weeks, some crampy abdominal pains; no GI bleeding; couple of  loose bowels a week; more or less, asymptomatic        Plan:  Pending: Serum iron, TIBC, ferritin  Continue vitamin B12 1000 mcg p.o. q.day  Refer to GI for EGD/colonoscopy/capsule endoscopy for evaluation of iron-deficiency anemia  Today, check SPEP, EAN, FLC assay  Need skeletal survey  24 hour urine for total protein, UPEP, urine EAN, urine FLC assay  Follow-up in 1 month with labs.    Labs reviewed.    Ferritin 9.21, low, remains iron deficient  Hemoglobin 10.8, partially improved from 8.8 with Feraheme x2 (was 8.8 on 0840 2023).  Serum iron 23, low.  Transferrin saturation 7%, low.    Plan:  Proceed with Feraheme x2 once again, administered a week apart   In 6 weeks, repeat CBC, serum iron, TIBC, and ferritin, then follow-up visit me.  ---------------------------      -04/06/2023: Hemoglobin 9.1.  MCV normal.  Transferrin saturation 8%.  Ferritin 26.02.  B12, folate normal.  No hemolysis.  No M protein.  Kappa/lambda ratio 1.78, elevated.  -04/21/2023: Hemoglobin 8.8.  MCV normal.  Platelets 556 K  Since 08/2022, has been taking B12 pill daily, folic acid pill daily, and 2 iron tablets daily  -S/P Feraheme 510 mg IV x2 (06/13/2023, 06/20/2023)  -08/14/2023:  Hemoglobin 8.8 (no improvement with Feraheme; was 9.4 on 05/09/2023); MCV 89.0; WBC and platelets normal; ANC 1.3, down; serum iron 15, low; TIBC 262, transferrin saturation 6%) remains low; was 8% on 05/04/2023).  CMP reviewed; ferritin level is pending  -08/14/2023: Her cycles last 5 days every month; every other month, she experiences blood clots with menstrual cycles.  Says that menstrual lost does not appear to be heavy as far as she can tell  (Absolutely no response to Feraheme x2, administered June 2022; questionable history of menorrhagia; the source of iron loss remains unclear)  -S/P Feraheme x2, 510 mg IV each, on 08/21/2023 and 08/22/2023  -11/22/2023:  Hemoglobin 10.8, improved from 8.8 on 08/14/2023; MCV normal; today's ferritin level is  pending; however, transferrin saturation remains low, 7%; CMP is unremarkable  >>>  -2023: Hemoglobin has improved with Feraheme; iron studies are pending  Absorbing vitamin B12 well via oral route; continue  Failed oral iron therapy  Questionable history of menorrhagia  Has not had rectal bleeding in a while  She also has history of Crohn's disease; follow-up with Dr. Cope, GI, [place], for evaluation of iron-deficiency anemia (when admitted [facility] with severe anemia in 2022, stool was positive for occult blood as well)  2023: She tells me that she is awaiting GI appointment either at [facility] all with Dr. Cope, whichever appointment comes earlier.  She needs follow-up evaluation of GI tract with EGD, colonoscopy, and capsule endoscopy, to elucidate the source of blood loss.     Bone marrow aspiration and core biopsy (bone marrow examination on 2022 showed acute red cell aplasia resulting in severe anemia, 6.4% myeloblasts apart from reactive neutrophilic series cells, thrombocytopenia, decreased megakaryocytes, markedly hypoplastic bone marrow, predominantly myeloid in nature, and AML FISH analysis negative)  -bone marrow exam 2020: Hypercellular, 70-80%; markedly decreased iron stores; otherwise, essentially unremarkable  >>>  Therefore, bone marrow is not an issue    New diagnosis of kappa light chain MGUS  -2023: Hemoglobin 9.1.  MCV normal.  Transferrin saturation 8%.  Ferritin 26.02.  B12, folate normal.  No hemolysis.  No M protein.  Kappa/lambda ratio 1.78, elevated.  >>>  [place]in hour urine for total protein, UPEP, urine EAN, urine FLC assay   Skeletal survey (bone survey)  Repeat SPEP, EAN, FLC assay in 6 months (now)     History of Crohn's disease which, at least as of 2023, is pretty much under control spontaneously.    Follow-up in 1 month, with repeat CBC, serum iron, TIBC, ferritin, SPEP, EAN, FLC assay.    Above  discussed with the patient.  All questions answered.    Discussed labs and scans and gave her copies of relevant reports.  She understands and agrees with this plan, and was appreciative.          Follow-up:  No follow-ups on file.    Answers submitted by the patient for this visit:

## 2023-11-24 LAB
IGA SERPL-MCNC: 247 MG/DL (ref 65–421)
IGG SERPL-MCNC: 1449 MG/DL (ref 522–1631)
IGM SERPL-MCNC: 153 MG/DL (ref 33–293)

## 2023-11-25 LAB
VZV IGG SER IA-ACNC: 6.1
VZV IGG SER QL IA: POSITIVE

## 2023-11-27 LAB
ALBUMIN % SPEP (OHS): 52.21
ALBUMIN SERPL-MCNC: 4.2 G/DL (ref 3.5–5)
ALBUMIN/GLOB SERPL: 1.1 RATIO (ref 1.1–2)
ALPHA 1 GLOB (OHS): 0.22 GM/DL
ALPHA 1 GLOB% (OHS): 2.74
ALPHA 2 GLOB % (OHS): 10.48
ALPHA 2 GLOB (OHS): 0.85 GM/DL
BETA GLOB (OHS): 1.12 GM/DL
BETA GLOB% (OHS): 13.81
GAMMA GLOBULIN % (OHS): 20.77
GAMMA GLOBULIN (OHS): 1.68 GM/DL
GLOBULIN SER-MCNC: 3.9 GM/DL (ref 2.4–3.5)
KAPPA LC FREE SER-MCNC: 2.24 MG/DL (ref 0.33–1.94)
KAPPA LC FREE/LAMBDA FREE SER: 2.09 {RATIO} (ref 0.26–1.65)
LAMBDA LC FREE SERPL-MCNC: 1.07 MG/DL (ref 0.57–2.63)
M SPIKE % (OHS): ABNORMAL
M SPIKE (OHS): ABNORMAL
PATH REV: NORMAL
PROT SERPL-MCNC: 8.1 GM/DL (ref 6.4–8.3)

## 2023-11-29 ENCOUNTER — HOSPITAL ENCOUNTER (OUTPATIENT)
Dept: RADIOLOGY | Facility: HOSPITAL | Age: 38
Discharge: HOME | End: 2023-11-29
Attending: INTERNAL MEDICINE
Payer: MEDICAID

## 2023-11-29 DIAGNOSIS — E53.8 B12 DEFICIENCY: ICD-10-CM

## 2023-11-29 DIAGNOSIS — D69.6 THROMBOCYTOPENIA: ICD-10-CM

## 2023-11-29 DIAGNOSIS — D50.9 IRON DEFICIENCY ANEMIA, UNSPECIFIED IRON DEFICIENCY ANEMIA TYPE: ICD-10-CM

## 2023-11-29 DIAGNOSIS — R19.5 OCCULT BLOOD POSITIVE STOOL: ICD-10-CM

## 2023-11-29 DIAGNOSIS — R82.90 ABNORMAL URINE FINDING: ICD-10-CM

## 2023-11-29 DIAGNOSIS — K50.118 CROHN'S DISEASE OF COLON WITH OTHER COMPLICATION: ICD-10-CM

## 2023-11-29 DIAGNOSIS — E53.8 FOLATE DEFICIENCY: ICD-10-CM

## 2023-11-29 DIAGNOSIS — R89.8 ABNORMAL BONE MARROW EXAMINATION: ICD-10-CM

## 2023-11-29 DIAGNOSIS — D47.2 MGUS (MONOCLONAL GAMMOPATHY OF UNKNOWN SIGNIFICANCE): ICD-10-CM

## 2023-11-29 PROCEDURE — 77075 RADEX OSSEOUS SURVEY COMPL: CPT | Mod: TC

## 2023-12-04 ENCOUNTER — PATIENT MESSAGE (OUTPATIENT)
Dept: ADMINISTRATIVE | Facility: OTHER | Age: 38
End: 2023-12-04
Payer: MEDICAID

## 2023-12-05 ENCOUNTER — ANESTHESIA EVENT (OUTPATIENT)
Dept: ENDOSCOPY | Facility: HOSPITAL | Age: 38
End: 2023-12-05
Payer: MEDICAID

## 2023-12-05 ENCOUNTER — PATIENT MESSAGE (OUTPATIENT)
Dept: ADMINISTRATIVE | Facility: OTHER | Age: 38
End: 2023-12-05
Payer: MEDICAID

## 2023-12-05 NOTE — ANESTHESIA PREPROCEDURE EVALUATION
"                                                                                                             2023  Elaina Pierce is a 38 y.o., female. For EGD      UPT status NEG DOS; History of Crohns, JAE, Constipation     Vitals:    23 0959   BP: (!) 115/95   BP Location: Left arm   Pulse: 97   Resp: 20   Temp: 36.8 °C (98.2 °F)   TempSrc: Oral   SpO2: 100%   Weight: 53.1 kg (117 lb)   Height: 5' 1" (1.549 m)         Active Ambulatory Problems     Diagnosis Date Noted    Profound anemia 08/10/2022    Abnormal urine finding 2022    Severe anemia 2022    Iron deficiency 2022    B12 deficiency 2022    Folate deficiency 2022    Abnormal bone marrow examination 2022    Iron deficiency anemia 2023    Occult blood positive stool 2023    History of blood transfusion 2023    Lower abdominal pain 2023    MGUS (monoclonal gammopathy of unknown significance) 2023    Constipation 2023     Resolved Ambulatory Problems     Diagnosis Date Noted    Thrombocytopenia 2023    Wellness examination 2023     Past Medical History:   Diagnosis Date    History of Crohn's disease     Iron deficiency anemia, unspecified        Past Surgical History:   Procedure Laterality Date    BONE MARROW ASPIRATION N/A 2023    Procedure: ASPIRATION, BONE MARROW;  Surgeon: Gayatri Howard MD;  Location: [facility] ENDOSCOPY;  Service: General;  Laterality: N/A;     SECTION      COLONOSCOPY      Luzmaria had several of these procedures    TONSILLECTOMY      TUBAL LIGATION  2019    UPPER GASTROINTESTINAL ENDOSCOPY      I've had several of these             No current facility-administered medications on file prior to encounter.     Current Outpatient Medications on File Prior to Encounter   Medication Sig Dispense Refill    cyanocobalamin, vitamin B-12, 1,000 mcg Lozg Place 1 tablet under the tongue every morning. 30 lozenge 6    " FEROSUL 325 mg (65 mg iron) Tab tablet Take 1 tablet (325 mg total) by mouth 2 (two) times daily. 180 tablet 3    folic acid (FOLVITE) 1 MG tablet Take 1 tablet (1,000 mcg total) by mouth once daily. 90 tablet 2    polyethylene glycol (MOVIPREP) 100-7.5-2.691 gram solution Take 2,000 mLs by mouth As instructed (take as directed per instructions provided by clinic). Take as directed prior to colonoscopy 1 kit 0           Pre-op Assessment    I have reviewed the Patient Summary Reports.     I have reviewed the Nursing Notes. I have reviewed the NPO Status.   I have reviewed the Medications.     Review of Systems  Anesthesia Hx:  No problems with previous Anesthesia   History of prior surgery of interest to airway management or planning:          Denies Family Hx of Anesthesia complications.    Denies Personal Hx of Anesthesia complications.                    Hematology/Oncology:  Hematology Normal   Oncology Normal                                   EENT/Dental:  EENT/Dental Normal           Cardiovascular:  Cardiovascular Normal                                            Pulmonary:  Pulmonary Normal                       Renal/:  Renal/ Normal                 Hepatic/GI:  Hepatic/GI Normal                 Musculoskeletal:  Musculoskeletal Normal                Neurological:  Neurology Normal                                      Endocrine:  Endocrine Normal            Dermatological:  Skin Normal    Psych:  Psychiatric Normal                    Physical Exam  General: Cooperative, Well nourished, Alert and Oriented    Airway:  Mallampati: II / III  Mouth Opening: Normal  TM Distance: Normal  Tongue: Normal  Neck ROM: Normal ROM    Dental:  Intact        Anesthesia Plan  Type of Anesthesia, risks & benefits discussed:    Anesthesia Type: Gen Natural Airway  Intra-op Monitoring Plan: Standard ASA Monitors  Post Op Pain Control Plan: IV/PO Opioids PRN  Induction:  IV  Airway Plan: Direct  Informed Consent: Informed  consent signed with the Patient representative and all parties understand the risks and agree with anesthesia plan.  All questions answered. Patient consented to blood products? No  ASA Score: 2  Day of Surgery Review of History & Physical: H&P Update referred to the surgeon/provider.    Ready For Surgery From Anesthesia Perspective.     .

## 2023-12-06 ENCOUNTER — ANESTHESIA (OUTPATIENT)
Dept: ENDOSCOPY | Facility: HOSPITAL | Age: 38
End: 2023-12-06
Payer: MEDICAID

## 2023-12-06 ENCOUNTER — HOSPITAL ENCOUNTER (OUTPATIENT)
Facility: HOSPITAL | Age: 38
Discharge: HOME | End: 2023-12-06
Attending: INTERNAL MEDICINE | Admitting: INTERNAL MEDICINE
Payer: MEDICAID

## 2023-12-06 DIAGNOSIS — D50.8 OTHER IRON DEFICIENCY ANEMIA: ICD-10-CM

## 2023-12-06 DIAGNOSIS — E61.1 IRON DEFICIENCY: ICD-10-CM

## 2023-12-06 DIAGNOSIS — Z87.19 HISTORY OF CROHN'S DISEASE: Primary | ICD-10-CM

## 2023-12-06 DIAGNOSIS — K59.04 CHRONIC IDIOPATHIC CONSTIPATION: ICD-10-CM

## 2023-12-06 PROBLEM — R10.30 LOWER ABDOMINAL PAIN: Status: RESOLVED | Noted: 2023-04-05 | Resolved: 2023-12-06

## 2023-12-06 LAB
B-HCG UR QL: NEGATIVE
CTP QC/QA: YES

## 2023-12-06 PROCEDURE — 37000008 HC ANESTHESIA 1ST 15 MINUTES: Performed by: INTERNAL MEDICINE

## 2023-12-06 PROCEDURE — 43239 EGD BIOPSY SINGLE/MULTIPLE: CPT | Performed by: INTERNAL MEDICINE

## 2023-12-06 PROCEDURE — 37000009 HC ANESTHESIA EA ADD 15 MINS: Performed by: INTERNAL MEDICINE

## 2023-12-06 PROCEDURE — 88305 TISSUE EXAM BY PATHOLOGIST: CPT | Mod: TC | Performed by: INTERNAL MEDICINE

## 2023-12-06 PROCEDURE — 45380 COLONOSCOPY AND BIOPSY: CPT | Performed by: INTERNAL MEDICINE

## 2023-12-06 PROCEDURE — 63600175 PHARM REV CODE 636 W HCPCS: Performed by: NURSE ANESTHETIST, CERTIFIED REGISTERED

## 2023-12-06 PROCEDURE — 81025 URINE PREGNANCY TEST: CPT | Performed by: SPECIALIST

## 2023-12-06 PROCEDURE — 27201423 OPTIME MED/SURG SUP & DEVICES STERILE SUPPLY: Performed by: INTERNAL MEDICINE

## 2023-12-06 PROCEDURE — D9220A PRA ANESTHESIA: ICD-10-PCS | Mod: ,,, | Performed by: NURSE ANESTHETIST, CERTIFIED REGISTERED

## 2023-12-06 PROCEDURE — D9220A PRA ANESTHESIA: Mod: ,,, | Performed by: NURSE ANESTHETIST, CERTIFIED REGISTERED

## 2023-12-06 PROCEDURE — 88312 SPECIAL STAINS GROUP 1: CPT | Mod: TC | Performed by: INTERNAL MEDICINE

## 2023-12-06 RX ORDER — PROPOFOL 10 MG/ML
INJECTION, EMULSION INTRAVENOUS
Status: DISCONTINUED | OUTPATIENT
Start: 2023-12-06 | End: 2023-12-06

## 2023-12-06 RX ORDER — SODIUM CHLORIDE, SODIUM LACTATE, POTASSIUM CHLORIDE, CALCIUM CHLORIDE 600; 310; 30; 20 MG/100ML; MG/100ML; MG/100ML; MG/100ML
INJECTION, SOLUTION INTRAVENOUS CONTINUOUS
Status: DISCONTINUED | OUTPATIENT
Start: 2023-12-06 | End: 2023-12-06 | Stop reason: HOSPADM

## 2023-12-06 RX ORDER — PANTOPRAZOLE SODIUM 40 MG/1
40 TABLET, DELAYED RELEASE ORAL DAILY
Qty: 30 TABLET | Refills: 2 | Status: SHIPPED | OUTPATIENT
Start: 2023-12-06 | End: 2024-12-05

## 2023-12-06 RX ORDER — DIPHENHYDRAMINE HYDROCHLORIDE 50 MG/ML
INJECTION INTRAMUSCULAR; INTRAVENOUS
Status: DISCONTINUED | OUTPATIENT
Start: 2023-12-06 | End: 2023-12-06

## 2023-12-06 RX ADMIN — PROPOFOL 200 MG: 10 INJECTION, EMULSION INTRAVENOUS at 11:12

## 2023-12-06 RX ADMIN — DIPHENHYDRAMINE HYDROCHLORIDE 50 MG: 50 INJECTION INTRAMUSCULAR; INTRAVENOUS at 10:12

## 2023-12-06 RX ADMIN — PROPOFOL 200 MG: 10 INJECTION, EMULSION INTRAVENOUS at 10:12

## 2023-12-06 NOTE — PLAN OF CARE
Patient spoke with Dr. Holliday. Given sprite and water and patient tolerating well. Assisted up to dress. Appointment made for M2A in January and instructions given to patient with discharge packet.

## 2023-12-06 NOTE — H&P
EGD and Colonoscopy History and Physical    Patient Name: Elaina Pierce  MRN: [id number]  : 1985  Date of Procedure:  2023  Referring Physician: Bisi Holliday MD  Primary Physician: Mio Payne, FNP  Procedure Physician: Bisi Holliday MD, MPH     Procedure - EGD and Colonoscopy  ASA - per anesthesia  Mallampati - per anesthesia  History of Anesthesia problems - no  Family history Anesthesia problems -  no   Plan of anesthesia - General    Diagnosis: constipation, Crohn's disease  Chief Complaint: Same as above    HPI: Patient is an 38 y.o. female is here for the above.       Elaina is a 38 year old AAF with iron deficiency anemia, reported history of Crohn's disease here for EGD and colonoscopy    She was diagnosed by Dr. Loyola around the age of 15.  She recalls being hospitalized for an extended period of time for severe anemia and rectal bleeding.  She recalls rectal bleeding was intermittent, associated with bright red blood without diarrhea.  She was then seen by Dr. Rivera for a period time followed by Dr. Andrade at [facility] around .  Her last colonoscopy with Dr. Jalili was around the age of 17 (20 years ago).  Prior notes in the chart mention small bowel disease and previously treated with prednisone, pentasa, azathioprine, and 3-5 doses of Remicade which was stopped due to developing hives.  She has been off therapy for over 20 years.  Her rectal bleeding has since resolved.  She does have documentation of severe anemia for over 20 years requiring multiple blood  transfusions.  She has been on and off iron over the years.  She was off iron prior to her hospitalization at [facility] in  when she presented with severe anemia Hgb 1.7 g/dL and pancytopenia. Labs during that admission showed B12: 201, folate 6.4, ferritin 27.5.  She was treated with B12, folic acid and given empiric prednisone in case immune mediated.  Bone marrow biopsy was completed which was  remarkable for acute red cell aplasia.      Her primary complaint today is constipation.  She has at best 2 stools a week.  Stools are formed, not always hard but she gets severe lower abdominal pain prior to a bowel movement and during a bowel movement that ressolves after defecation.  She does not always feel completely evacuated.  She denies any rectal bleeding, diarrhea, weight loss.      She has had 3 healthy pregnancies.  She did require blood transfusions due to anemia during her pregnancies.     She denies any NSAID use.  She denies any FH of IBD.  She does not smoke, drink alcohol or use recreational drugs.     ROS:  Constitutional: No fevers, chills, No weight loss  CV: No chest pain  Pulm: No cough, No shortness of breath  GI: see HPI    Medical History:   Past Medical History:   Diagnosis Date    History of Crohn's disease     unclear how diagnosis was made    Iron deficiency anemia, unspecified          Surgical History:   Past Surgical History:   Procedure Laterality Date    BONE MARROW ASPIRATION N/A 2023    Procedure: ASPIRATION, BONE MARROW;  Surgeon: Gayatri Howard MD;  Location: [facility] ENDOSCOPY;  Service: General;  Laterality: N/A;     SECTION      COLONOSCOPY      Luzmaria had several of these procedures    TONSILLECTOMY      TUBAL LIGATION  2019    UPPER GASTROINTESTINAL ENDOSCOPY      I've had several of these       Family History:   Family History   Problem Relation Age of Onset    Hypertension Mother     Diabetes Mother     Drug abuse Mother     Kidney disease Maternal Grandmother     Diabetes Maternal Grandmother     Stroke Paternal Grandmother    .    Social History:   Social History     Socioeconomic History    Marital status: Other    Highest education level: 12th grade   Tobacco Use    Smoking status: Never    Smokeless tobacco: Never   Substance and Sexual Activity    Alcohol use: Not Currently     Alcohol/week: 1.0 standard drink of alcohol     Types: 1 Glasses  of wine per week    Drug use: Never    Sexual activity: Yes     Partners: Male     Birth control/protection: Other-see comments     Comment: Tubes tied     Social Determinants of Health     Financial Resource Strain: Low Risk  (7/13/2023)    Overall Financial Resource Strain (CARDIA)     Difficulty of Paying Living Expenses: Not hard at all   Food Insecurity: No Food Insecurity (7/13/2023)    Hunger Vital Sign     Worried About Running Out of Food in the Last Year: Never true     Ran Out of Food in the Last Year: Never true   Transportation Needs: No Transportation Needs (7/13/2023)    PRAPARE - Transportation     Lack of Transportation (Medical): No     Lack of Transportation (Non-Medical): No   Physical Activity: Insufficiently Active (7/13/2023)    Exercise Vital Sign     Days of Exercise per Week: 3 days     Minutes of Exercise per Session: 20 min   Stress: Stress Concern Present (7/13/2023)    Citizen of [place] of Occupational Health - Occupational Stress Questionnaire     Feeling of Stress : To some extent   Social Connections: Socially Integrated (7/13/2023)    Social Connection and Isolation Panel [NHANES]     Frequency of Communication with Friends and Family: Twice a week     Frequency of Social Gatherings with Friends and Family: Twice a week     Attends Scientology Services: 1 to 4 times per year     Active Member of Clubs or Organizations: No     Attends Club or Organization Meetings: 1 to 4 times per year     Marital Status:    Housing Stability: Low Risk  (7/13/2023)    Housing Stability Vital Sign     Unable to Pay for Housing in the Last Year: No     Number of Places Lived in the Last Year: 1     Unstable Housing in the Last Year: No       Review of patient's allergies indicates:  No Known Allergies    Medications:   Medications Prior to Admission   Medication Sig Dispense Refill Last Dose    cyanocobalamin, vitamin B-12, 1,000 mcg Lozg Place 1 tablet under the tongue every morning. 30 lozenge  "6 12/5/2023    FEROSUL 325 mg (65 mg iron) Tab tablet Take 1 tablet (325 mg total) by mouth 2 (two) times daily. 180 tablet 3 12/5/2023    folic acid (FOLVITE) 1 MG tablet Take 1 tablet (1,000 mcg total) by mouth once daily. 90 tablet 2 12/5/2023    polyethylene glycol (MOVIPREP) 100-7.5-2.691 gram solution Take 2,000 mLs by mouth As instructed (take as directed per instructions provided by clinic). Take as directed prior to colonoscopy 1 kit 0 12/6/2023         Physical Exam:    Vital Signs:   Vitals:    12/06/23 0959   BP: (!) 115/95   Pulse: 97   Resp: 20   Temp: 98.2 °F (36.8 °C)     BP (!) 115/95 (BP Location: Left arm)   Pulse 97   Temp 98.2 °F (36.8 °C) (Oral)   Resp 20   Ht 5' 1" (1.549 m)   Wt 53.1 kg (117 lb)   LMP 11/17/2023   SpO2 100%   BMI 22.11 kg/m²     General:          Well appearing in no acute distress  Lungs: Clear to auscultation bilaterally, respirations unlabored  Heart: Regular rate and rhythm, S1 and S2 normal, no obvious murmurs  Abdomen:         Soft, non-tender, bowel sounds normal, no masses, no organomegaly        Labs:  Lab Results   Component Value Date    WBC 6.19 11/22/2023    HGB 10.8 (L) 11/22/2023    HCT 33.9 (L) 11/22/2023    MCV 86.9 11/22/2023     11/22/2023     Lab Results   Component Value Date    INR 1.1 05/09/2023     Lab Results   Component Value Date     11/22/2023    K 3.5 11/22/2023    CO2 26 11/22/2023    BUN 7.0 11/22/2023    CREATININE 0.68 11/22/2023    LABPROT 8.1 11/22/2023    ALBUMIN 4.2 11/22/2023    BILITOT 0.3 11/22/2023    ALKPHOS 75 11/22/2023    ALT 10 11/22/2023    AST 18 11/22/2023       Assessment and Plan:     History reviewed, vital signs satisfactory, cardiopulmonary status satisfactory.  I have explained the sedation options, risks, benefits, and alternatives of this endoscopic procedure to the patient including but not limited to bleeding, inflammation, infection, perforation, and death.  All questions were answered and the " patient consented to proceed with procedure as planned.   The patient is deemed an appropriate candidate for the sedation as planned.      Bisi Holliday MD, MPH   of Clinical Medicine  Gastroenterology and Hepatology  [facility]    12/6/2023  10:31 AM

## 2023-12-06 NOTE — ASSESSMENT & PLAN NOTE
Recommend soluble fiber supplement daily  Recommend magnesium oxide 400 mg BID  If no improvement, will discuss prescription options

## 2023-12-06 NOTE — ANESTHESIA POSTPROCEDURE EVALUATION
Anesthesia Post Evaluation    Patient: Elaina Pierce    Procedure(s) Performed: Procedure(s) (LRB):  EGD, WITH CLOSED BIOPSY (N/A)  COLONOSCOPY, WITH 1 OR MORE BIOPSIES (N/A)    Final Anesthesia Type: general      Patient location during evaluation: GI PACU  Patient participation: Yes- Able to Participate  Level of consciousness: awake and alert  Post-procedure vital signs: reviewed and stable  Pain management: adequate  Airway patency: patent    PONV status at discharge: No PONV  Anesthetic complications: no      Cardiovascular status: blood pressure returned to baseline  Respiratory status: unassisted  Hydration status: euvolemic  Follow-up needed               Vitals Value Taken Time   /95 12/06/23 0959   Temp 36.8 °C (98.2 °F) 12/06/23 0959   Pulse 97 12/06/23 0959   Resp 20 12/06/23 0959   SpO2 100 % 12/06/23 0959         No case tracking events are documented in the log.      Pain/Joel Score: No data recorded

## 2023-12-06 NOTE — PROVATION PATIENT INSTRUCTIONS
Discharge Summary/Instructions after an Endoscopic Procedure  Patient Name: Elaina Pierce  Patient MRN: [id number]  Patient YOB: 1985 Wednesday, December 6, 2023  Bisi Holliday MD  Dear patient,  As a result of recent federal legislation (The Federal Cures Act), you may   receive lab or pathology results from your procedure in your [facility]   account before your physician is able to contact you. Your physician or   their representative will relay the results to you with their   recommendations at their soonest availability.  Thank you,  RESTRICTIONS:  During your procedure today, you received medications for sedation.  These   medications may affect your judgment, balance and coordination.  Therefore,   for 24 hours, you have the following restrictions:   - DO NOT drive a car, operate machinery, make legal/financial decisions,   sign important papers or drink alcohol.    ACTIVITY:  Today: no heavy lifting, straining or running due to procedural   sedation/anesthesia.  The following day: return to full activity including work.  DIET:  Eat and drink normally unless instructed otherwise.     TREATMENT FOR COMMON SIDE EFFECTS:  - Mild abdominal pain, nausea, belching, bloating or excessive gas:  rest,   eat lightly and use a heating pad.  - Sore Throat: treat with throat lozenges and/or gargle with warm salt   water.  - Because air was used during the procedure, expelling large amounts of air   from your rectum or belching is normal.  - If a bowel prep was taken, you may not have a bowel movement for 1-3 days.    This is normal.  SYMPTOMS TO WATCH FOR AND REPORT TO YOUR PHYSICIAN:  1. Abdominal pain or bloating, other than gas cramps.  2. Chest pain.  3. Back pain.  4. Signs of infection such as: chills or fever occurring within 24 hours   after the procedure.  5. Rectal bleeding, which would show as bright red, maroon, or black stools.   (A tablespoon of blood from the rectum is not serious,  especially if   hemorrhoids are present.)  6. Vomiting.  7. Weakness or dizziness.  GO DIRECTLY TO THE NEAREST EMERGENCY ROOM IF YOU HAVE ANY OF THE FOLLOWING:      Difficulty breathing              Chills and/or fever over 101 F   Persistent vomiting and/or vomiting blood   Severe abdominal pain   Severe chest pain   Black, tarry stools   Bleeding- more than one tablespoon   Any other symptom or condition that you feel may need urgent attention  Your doctor recommends these additional instructions:  If any biopsies were taken, your doctors clinic will contact you in 1 to 2   weeks with any results.  - Patient has a contact number available for emergencies.  The signs and   symptoms of potential delayed complications were discussed with the   patient.  Return to normal activities tomorrow.  Written discharge   instructions were provided to the patient.   - Discharge patient to home.   - Resume previous diet.   - Continue present medications.   - Await pathology results.   - Use Protonix (pantoprazole) 40 mg PO daily.   - Recommend an upper endoscopic ultrasound (UEUS).  For questions, problems or results please call your physician - Bisi Holliday MD at Work:  [phone], Work:  [phone].  [facility] , EMERGENCY ROOM PHONE NUMBER: [phone]  IF A COMPLICATION OR EMERGENCY SITUATION ARISES AND YOU ARE UNABLE TO REACH   YOUR PHYSICIAN - GO DIRECTLY TO THE EMERGENCY ROOM.  Bisi Holliday MD  12/6/2023 12:06:56 PM  This report has been verified and signed electronically.  Dear patient,  As a result of recent federal legislation (The Federal Cures Act), you may   receive lab or pathology results from your procedure in your [facility]   account before your physician is able to contact you. Your physician or   their representative will relay the results to you with their   recommendations at their soonest availability.  Thank you,  PROVATION

## 2023-12-06 NOTE — PROVATION PATIENT INSTRUCTIONS
Discharge Summary/Instructions after an Endoscopic Procedure  Patient Name: Elaina Pierce  Patient MRN: [id number]  Patient YOB: 1985 Wednesday, December 6, 2023  Bisi Holliday MD  Dear patient,  As a result of recent federal legislation (The Federal Cures Act), you may   receive lab or pathology results from your procedure in your [facility]   account before your physician is able to contact you. Your physician or   their representative will relay the results to you with their   recommendations at their soonest availability.  Thank you,  RESTRICTIONS:  During your procedure today, you received medications for sedation.  These   medications may affect your judgment, balance and coordination.  Therefore,   for 24 hours, you have the following restrictions:   - DO NOT drive a car, operate machinery, make legal/financial decisions,   sign important papers or drink alcohol.    ACTIVITY:  Today: no heavy lifting, straining or running due to procedural   sedation/anesthesia.  The following day: return to full activity including work.  DIET:  Eat and drink normally unless instructed otherwise.     TREATMENT FOR COMMON SIDE EFFECTS:  - Mild abdominal pain, nausea, belching, bloating or excessive gas:  rest,   eat lightly and use a heating pad.  - Sore Throat: treat with throat lozenges and/or gargle with warm salt   water.  - Because air was used during the procedure, expelling large amounts of air   from your rectum or belching is normal.  - If a bowel prep was taken, you may not have a bowel movement for 1-3 days.    This is normal.  SYMPTOMS TO WATCH FOR AND REPORT TO YOUR PHYSICIAN:  1. Abdominal pain or bloating, other than gas cramps.  2. Chest pain.  3. Back pain.  4. Signs of infection such as: chills or fever occurring within 24 hours   after the procedure.  5. Rectal bleeding, which would show as bright red, maroon, or black stools.   (A tablespoon of blood from the rectum is not serious,  especially if   hemorrhoids are present.)  6. Vomiting.  7. Weakness or dizziness.  GO DIRECTLY TO THE NEAREST EMERGENCY ROOM IF YOU HAVE ANY OF THE FOLLOWING:      Difficulty breathing              Chills and/or fever over 101 F   Persistent vomiting and/or vomiting blood   Severe abdominal pain   Severe chest pain   Black, tarry stools   Bleeding- more than one tablespoon   Any other symptom or condition that you feel may need urgent attention  Your doctor recommends these additional instructions:  If any biopsies were taken, your doctors clinic will contact you in 1 to 2   weeks with any results.  - Patient has a contact number available for emergencies.  The signs and   symptoms of potential delayed complications were discussed with the   patient.  Return to normal activities tomorrow.  Written discharge   instructions were provided to the patient.   - Discharge patient to home.   - Continue present medications.   - Await pathology results.   - To visualize the small bowel, perform video capsule endoscopy at   appointment to be scheduled.   - Resume previous diet.   - No recommendation at this time regarding repeat colonoscopy.  For questions, problems or results please call your physician - Bisi Holliday MD at Work:  [phone], Work:  [phone].  [facility] , EMERGENCY ROOM PHONE NUMBER: [phone]  IF A COMPLICATION OR EMERGENCY SITUATION ARISES AND YOU ARE UNABLE TO REACH   YOUR PHYSICIAN - GO DIRECTLY TO THE EMERGENCY ROOM.  Bisi Holliday MD  12/6/2023 12:08:23 PM  This report has been verified and signed electronically.  Dear patient,  As a result of recent federal legislation (The Federal Cures Act), you may   receive lab or pathology results from your procedure in your [facility]   account before your physician is able to contact you. Your physician or   their representative will relay the results to you with their   recommendations at their soonest  availability.  Thank you,  PROVATION

## 2023-12-06 NOTE — ASSESSMENT & PLAN NOTE
Diagnosed in the setting of anemia and rectal bleeding per patient report  Unclear how diagnosis was made and what involvement of the intestinal system was noted  Had treatment in the past with prednisone, pentasa, azathioprine and 3-5 doses of Remicade which was stopped due to infusion reaction (hives)  Off therapy for over 20 years and no significant GI symptoms other than constipation   Severe anemia in 2022 in the setting of iron, B12, and folate deficiency.  No overt GI bleeding at that time  Will plan fecal calprotectin, CRP, EGD and colonoscopy to evaluate diagnosis

## 2023-12-06 NOTE — TRANSFER OF CARE
"Anesthesia Transfer of Care Note    Patient: Elaina Pierce    Procedure(s) Performed: Procedure(s) (LRB):  EGD, WITH CLOSED BIOPSY (N/A)  COLONOSCOPY, WITH 1 OR MORE BIOPSIES (N/A)    Patient location: GI    Anesthesia Type: general    Transport from OR: Transported from OR on room air with adequate spontaneous ventilation    Post pain: adequate analgesia    Post assessment: no apparent anesthetic complications    Post vital signs: stable    Level of consciousness: awake    Nausea/Vomiting: no nausea/vomiting    Complications: none    Transfer of care protocol was followed      Last vitals: Visit Vitals  BP (!) 115/95 (BP Location: Left arm)   Pulse 97   Temp 36.8 °C (98.2 °F) (Oral)   Resp 20   Ht 5' 1" (1.549 m)   Wt 53.1 kg (117 lb)   LMP 11/17/2023   SpO2 100%   BMI 22.11 kg/m²     "

## 2023-12-06 NOTE — PLAN OF CARE
Patient awake and talking. No acute distress. Hold on offering anything to drink until MD speaks with patient.  at bedside.

## 2023-12-07 VITALS
WEIGHT: 117 LBS | DIASTOLIC BLOOD PRESSURE: 91 MMHG | BODY MASS INDEX: 22.09 KG/M2 | TEMPERATURE: 98 F | HEIGHT: 61 IN | OXYGEN SATURATION: 100 % | SYSTOLIC BLOOD PRESSURE: 130 MMHG | RESPIRATION RATE: 20 BRPM | HEART RATE: 92 BPM

## 2023-12-08 ENCOUNTER — PATIENT MESSAGE (OUTPATIENT)
Dept: ADMINISTRATIVE | Facility: OTHER | Age: 38
End: 2023-12-08
Payer: MEDICAID

## 2023-12-08 ENCOUNTER — INFUSION (OUTPATIENT)
Dept: INFUSION THERAPY | Facility: HOSPITAL | Age: 38
End: 2023-12-08
Attending: INTERNAL MEDICINE
Payer: MEDICAID

## 2023-12-08 VITALS
HEART RATE: 85 BPM | RESPIRATION RATE: 20 BRPM | BODY MASS INDEX: 22.58 KG/M2 | DIASTOLIC BLOOD PRESSURE: 73 MMHG | WEIGHT: 119.5 LBS | SYSTOLIC BLOOD PRESSURE: 128 MMHG | TEMPERATURE: 99 F

## 2023-12-08 DIAGNOSIS — D50.9 IRON DEFICIENCY ANEMIA, UNSPECIFIED IRON DEFICIENCY ANEMIA TYPE: Primary | ICD-10-CM

## 2023-12-08 LAB
DHEA SERPL-MCNC: NORMAL
ESTROGEN SERPL-MCNC: NORMAL PG/ML
INSULIN SERPL-ACNC: NORMAL U[IU]/ML
LAB AP CLINICAL INFORMATION: NORMAL
LAB AP GROSS DESCRIPTION: NORMAL
LAB AP REPORT FOOTNOTES: NORMAL
T3RU NFR SERPL: NORMAL %

## 2023-12-08 PROCEDURE — 25000003 PHARM REV CODE 250: Performed by: INTERNAL MEDICINE

## 2023-12-08 PROCEDURE — 96374 THER/PROPH/DIAG INJ IV PUSH: CPT

## 2023-12-08 PROCEDURE — 63600175 PHARM REV CODE 636 W HCPCS: Mod: JZ,JG | Performed by: INTERNAL MEDICINE

## 2023-12-08 RX ORDER — HEPARIN 100 UNIT/ML
500 SYRINGE INTRAVENOUS
Status: CANCELLED | OUTPATIENT
Start: 2023-12-09

## 2023-12-08 RX ORDER — HEPARIN 100 UNIT/ML
500 SYRINGE INTRAVENOUS
Status: DISCONTINUED | OUTPATIENT
Start: 2023-12-08 | End: 2023-12-08 | Stop reason: HOSPADM

## 2023-12-08 RX ORDER — SODIUM CHLORIDE 0.9 % (FLUSH) 0.9 %
10 SYRINGE (ML) INJECTION
Status: CANCELLED | OUTPATIENT
Start: 2023-12-09

## 2023-12-08 RX ORDER — SODIUM CHLORIDE 0.9 % (FLUSH) 0.9 %
10 SYRINGE (ML) INJECTION
Status: DISCONTINUED | OUTPATIENT
Start: 2023-12-08 | End: 2023-12-08 | Stop reason: HOSPADM

## 2023-12-08 RX ADMIN — FERUMOXYTOL 510 MG: 510 INJECTION INTRAVENOUS at 11:12

## 2023-12-15 ENCOUNTER — INFUSION (OUTPATIENT)
Dept: INFUSION THERAPY | Facility: HOSPITAL | Age: 38
End: 2023-12-15
Attending: INTERNAL MEDICINE
Payer: MEDICAID

## 2023-12-15 VITALS
BODY MASS INDEX: 22.45 KG/M2 | DIASTOLIC BLOOD PRESSURE: 94 MMHG | WEIGHT: 118.81 LBS | SYSTOLIC BLOOD PRESSURE: 127 MMHG | OXYGEN SATURATION: 97 % | HEART RATE: 88 BPM | TEMPERATURE: 98 F | RESPIRATION RATE: 20 BRPM

## 2023-12-15 DIAGNOSIS — D50.9 IRON DEFICIENCY ANEMIA, UNSPECIFIED IRON DEFICIENCY ANEMIA TYPE: Primary | ICD-10-CM

## 2023-12-15 PROCEDURE — 96365 THER/PROPH/DIAG IV INF INIT: CPT

## 2023-12-15 PROCEDURE — 25000003 PHARM REV CODE 250: Performed by: INTERNAL MEDICINE

## 2023-12-15 PROCEDURE — A4216 STERILE WATER/SALINE, 10 ML: HCPCS | Performed by: INTERNAL MEDICINE

## 2023-12-15 PROCEDURE — 63600175 PHARM REV CODE 636 W HCPCS: Mod: JZ,JG | Performed by: INTERNAL MEDICINE

## 2023-12-15 RX ORDER — HEPARIN 100 UNIT/ML
500 SYRINGE INTRAVENOUS
Status: DISCONTINUED | OUTPATIENT
Start: 2023-12-15 | End: 2023-12-15 | Stop reason: HOSPADM

## 2023-12-15 RX ORDER — SODIUM CHLORIDE 0.9 % (FLUSH) 0.9 %
10 SYRINGE (ML) INJECTION
Status: DISCONTINUED | OUTPATIENT
Start: 2023-12-15 | End: 2023-12-15 | Stop reason: HOSPADM

## 2023-12-15 RX ADMIN — FERUMOXYTOL 510 MG: 510 INJECTION INTRAVENOUS at 10:12

## 2023-12-15 RX ADMIN — Medication 10 ML: at 10:12

## 2024-01-30 PROBLEM — R80.3 FREE MONOCLONAL LIGHT CHAIN: Status: ACTIVE | Noted: 2024-01-30

## 2024-03-09 PROBLEM — R19.5 POSITIVE OCCULT STOOL BLOOD TEST: Status: ACTIVE | Noted: 2024-03-09

## 2024-05-28 ENCOUNTER — OFFICE VISIT (OUTPATIENT)
Dept: GASTROENTEROLOGY | Facility: CLINIC | Age: 39
End: 2024-05-28
Payer: MEDICAID

## 2024-05-28 VITALS
WEIGHT: 116.88 LBS | HEIGHT: 61 IN | DIASTOLIC BLOOD PRESSURE: 77 MMHG | RESPIRATION RATE: 16 BRPM | OXYGEN SATURATION: 98 % | TEMPERATURE: 98 F | SYSTOLIC BLOOD PRESSURE: 120 MMHG | BODY MASS INDEX: 22.07 KG/M2 | HEART RATE: 81 BPM

## 2024-05-28 DIAGNOSIS — K59.04 CHRONIC IDIOPATHIC CONSTIPATION: Primary | ICD-10-CM

## 2024-05-28 DIAGNOSIS — K52.3 INDETERMINATE COLITIS: ICD-10-CM

## 2024-05-28 DIAGNOSIS — D50.0 IRON DEFICIENCY ANEMIA DUE TO CHRONIC BLOOD LOSS: ICD-10-CM

## 2024-05-28 PROBLEM — D64.9 PROFOUND ANEMIA: Status: RESOLVED | Noted: 2022-08-10 | Resolved: 2024-05-28

## 2024-05-28 LAB
25(OH)D3+25(OH)D2 SERPL-MCNC: 6 NG/ML (ref 30–80)
ALBUMIN SERPL-MCNC: 4.2 G/DL (ref 3.5–5)
ALBUMIN/GLOB SERPL: 1.1 RATIO (ref 1.1–2)
ALP SERPL-CCNC: 68 UNIT/L (ref 40–150)
ALT SERPL-CCNC: 10 UNIT/L (ref 0–55)
ANION GAP SERPL CALC-SCNC: 8 MEQ/L
AST SERPL-CCNC: 15 UNIT/L (ref 5–34)
BASOPHILS # BLD AUTO: 0.06 X10(3)/MCL
BASOPHILS NFR BLD AUTO: 0.8 %
BILIRUB SERPL-MCNC: 0.2 MG/DL
BUN SERPL-MCNC: 8.8 MG/DL (ref 7–18.7)
CALCIUM SERPL-MCNC: 9.2 MG/DL (ref 8.4–10.2)
CHLORIDE SERPL-SCNC: 106 MMOL/L (ref 98–107)
CO2 SERPL-SCNC: 23 MMOL/L (ref 22–29)
CREAT SERPL-MCNC: 0.69 MG/DL (ref 0.55–1.02)
CREAT/UREA NIT SERPL: 13
CRP SERPL-MCNC: 1.4 MG/L
EOSINOPHIL # BLD AUTO: 0.2 X10(3)/MCL (ref 0–0.9)
EOSINOPHIL NFR BLD AUTO: 2.8 %
ERYTHROCYTE [DISTWIDTH] IN BLOOD BY AUTOMATED COUNT: 18.2 % (ref 11.5–17)
FERRITIN SERPL-MCNC: 6.04 NG/ML (ref 4.63–204)
FOLATE SERPL-MCNC: 4.1 NG/ML (ref 7–31.4)
GFR SERPLBLD CREATININE-BSD FMLA CKD-EPI: >60 ML/MIN/1.73/M2
GLOBULIN SER-MCNC: 3.9 GM/DL (ref 2.4–3.5)
GLUCOSE SERPL-MCNC: 99 MG/DL (ref 74–100)
HCT VFR BLD AUTO: 27.2 % (ref 37–47)
HGB BLD-MCNC: 8.2 G/DL (ref 12–16)
IMM GRANULOCYTES # BLD AUTO: 0.04 X10(3)/MCL (ref 0–0.04)
IMM GRANULOCYTES NFR BLD AUTO: 0.6 %
IRON SATN MFR SERPL: 5 % (ref 20–50)
IRON SERPL-MCNC: 15 UG/DL (ref 50–170)
LYMPHOCYTES # BLD AUTO: 2.17 X10(3)/MCL (ref 0.6–4.6)
LYMPHOCYTES NFR BLD AUTO: 30 %
MCH RBC QN AUTO: 24.4 PG (ref 27–31)
MCHC RBC AUTO-ENTMCNC: 30.1 G/DL (ref 33–36)
MCV RBC AUTO: 81 FL (ref 80–94)
MONOCYTES # BLD AUTO: 0.63 X10(3)/MCL (ref 0.1–1.3)
MONOCYTES NFR BLD AUTO: 8.7 %
NEUTROPHILS # BLD AUTO: 4.13 X10(3)/MCL (ref 2.1–9.2)
NEUTROPHILS NFR BLD AUTO: 57.1 %
NRBC BLD AUTO-RTO: 0 %
PLATELET # BLD AUTO: 111 X10(3)/MCL (ref 130–400)
PLATELETS.RETICULATED NFR BLD AUTO: 4.5 % (ref 0.9–11.2)
PMV BLD AUTO: ABNORMAL FL
POTASSIUM SERPL-SCNC: 3.6 MMOL/L (ref 3.5–5.1)
PROT SERPL-MCNC: 8.1 GM/DL (ref 6.4–8.3)
RBC # BLD AUTO: 3.36 X10(6)/MCL (ref 4.2–5.4)
SODIUM SERPL-SCNC: 137 MMOL/L (ref 136–145)
TIBC SERPL-MCNC: 291 UG/DL (ref 70–310)
TIBC SERPL-MCNC: 306 UG/DL (ref 250–450)
TRANSFERRIN SERPL-MCNC: 282 MG/DL (ref 180–382)
VIT B12 SERPL-MCNC: 313 PG/ML (ref 213–816)
WBC # SPEC AUTO: 7.23 X10(3)/MCL (ref 4.5–11.5)

## 2024-05-28 PROCEDURE — 86140 C-REACTIVE PROTEIN: CPT | Performed by: INTERNAL MEDICINE

## 2024-05-28 PROCEDURE — 82306 VITAMIN D 25 HYDROXY: CPT | Performed by: INTERNAL MEDICINE

## 2024-05-28 PROCEDURE — 85025 COMPLETE CBC W/AUTO DIFF WBC: CPT | Performed by: INTERNAL MEDICINE

## 2024-05-28 PROCEDURE — 36415 COLL VENOUS BLD VENIPUNCTURE: CPT | Performed by: INTERNAL MEDICINE

## 2024-05-28 PROCEDURE — 83540 ASSAY OF IRON: CPT | Performed by: INTERNAL MEDICINE

## 2024-05-28 PROCEDURE — 82746 ASSAY OF FOLIC ACID SERUM: CPT | Performed by: INTERNAL MEDICINE

## 2024-05-28 PROCEDURE — 82607 VITAMIN B-12: CPT | Performed by: INTERNAL MEDICINE

## 2024-05-28 PROCEDURE — 90471 IMMUNIZATION ADMIN: CPT | Mod: PBBFAC

## 2024-05-28 PROCEDURE — 82728 ASSAY OF FERRITIN: CPT | Performed by: INTERNAL MEDICINE

## 2024-05-28 PROCEDURE — 80053 COMPREHEN METABOLIC PANEL: CPT | Performed by: INTERNAL MEDICINE

## 2024-05-28 PROCEDURE — 99214 OFFICE O/P EST MOD 30 MIN: CPT | Mod: PBBFAC | Performed by: INTERNAL MEDICINE

## 2024-05-28 PROCEDURE — 84630 ASSAY OF ZINC: CPT | Performed by: INTERNAL MEDICINE

## 2024-05-28 PROCEDURE — 90677 PCV20 VACCINE IM: CPT | Mod: PBBFAC

## 2024-05-28 RX ORDER — LUBIPROSTONE 24 UG/1
24 CAPSULE ORAL 2 TIMES DAILY
Qty: 60 CAPSULE | Refills: 11 | Status: SHIPPED | OUTPATIENT
Start: 2024-05-28 | End: 2025-05-28

## 2024-05-28 RX ADMIN — PNEUMOCOCCAL 20-VALENT CONJUGATE VACCINE 0.5 ML
2.2; 2.2; 2.2; 2.2; 2.2; 2.2; 2.2; 2.2; 2.2; 2.2; 2.2; 2.2; 2.2; 2.2; 2.2; 2.2; 4.4; 2.2; 2.2; 2.2 INJECTION, SUSPENSION INTRAMUSCULAR at 03:05

## 2024-05-28 NOTE — PROGRESS NOTES
Inflammatory Bowel Disease        Established Patient Note         TODAY'S VISIT DATE:  5/28/2024  The patient's last visit with me was on Visit date not found.     PCP: Mio Payne      Referring MD:   No ref. provider found    History of Present Illness:    Elaina is a 38 year old AAF with iron deficiency anemia, indeterminate colitis favor Crohn's disease here for follow-up.      She was diagnosed by Dr. Loyola around the age of 15.  She recalls being hospitalized for an extended period of time for severe anemia and rectal bleeding.  She recalls rectal bleeding was intermittent, associated with bright red blood without diarrhea.  She was then seen by Dr. Rivera for a period time followed by Dr. Andrade at [facility] around 2015.  Her last colonoscopy with Dr. Jalili was around the age of 17 (20 years ago).  Prior notes in the chart mention small bowel disease and previously treated with prednisone, pentasa, azathioprine, and 3-5 doses of Remicade which was stopped due to developing hives.  She has been off therapy for over 20 years.  Her rectal bleeding has since resolved.  She does have documentation of severe anemia for over 20 years requiring multiple blood  transfusions.  She has been on and off iron over the years.  She was off iron prior to her hospitalization at [facility] in 2022 when she presented with severe anemia Hgb 1.7 g/dL and pancytopenia. Labs during that admission showed B12: 201, folate 6.4, ferritin 27.5.  She was treated with B12, folic acid and given empiric prednisone in case immune mediated.  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       At her initial evaluation with me in November 2023 she reported chronic constipation, 2 bowel movements weekly with associated abdominal pain.    CRP <1.0    December 6, 2023: EGD: 4 mm submucosal nodule at 35 cm. HP negative erosive gastritis. Normal duodenum.     December 6, 2023:  Colonoscopy: Three scattered erosions in the TI. Four small patchy areas of mild inflammation in the rectum, sigmoid, and hepatic flexure.  Scarring and altered vascularity in the rectum to the descending colon.  Internal and external hemorrhoids. Path: TI: mild chronic inflammation. Focal active colitis in right and left colon.  Mild chronic in rectum.     Today she reports continued issues with constipation.  She has 2 bowel movements a week with associated cramping abdominal pain prior to and after a bowel movement with significant straining and takes an hour to have a bowel movement.  She has incomplete evacuation.  She has tried MoM, dulcolax prn.   She denies any rectal bleeding, diarrhea, weight loss.  She denies any urgency or tenesmus.         She has had 3 healthy pregnancies.  She did require blood transfusions due to anemia during her pregnancies.      She denies any NSAID use.  She denies any FH of IBD.  She does not smoke, drink alcohol or use recreational drugs.         IBD History:   IBD disease: Indeterminate colitis, favor Crohn's disease   Disease location: colon (mainly left?, TI?)   Disease behavior: non penetrating and non stricturing      Current IBD Therapy:  none    Therapeutic Drug Monitoring Labs:  none    Prior IBD Therapies:  Prednisone  Pentasa  Azathioprine  Remicade      Pertinent Endoscopy/Imaging:    December 6, 2023: EGD: 4 mm submucosal nodule at 35 cm. HP negative erosive gastritis. Normal duodenum.     December 6, 2023: Colonoscopy: Three scattered erosions in the TI. Four small patchy areas of mild inflammation in the rectum, sigmoid, and hepatic flexure.  Scarring and altered vascularity in the rectum to the descending colon.  Internal and external hemorrhoids. Path: TI: mild chronic inflammation. Focal active colitis in right and left colon.  Mild chronic in rectum.     Prior Pertinent Surgeries:   none    Complications:   none    Extraintestinal  Manifestations:  None    Review of Systems   Constitutional:  Negative for appetite change and unexpected weight change.   HENT:  Negative for trouble swallowing.    Respiratory:  Negative for chest tightness.    Cardiovascular: Negative.    Gastrointestinal:  Positive for abdominal pain and constipation. Negative for change in bowel habit.   Musculoskeletal: Negative.    Neurological: Negative.    Psychiatric/Behavioral: Negative.     All other systems reviewed and are negative.         Medical/Surgical History:   Past Medical History:   Diagnosis Date    History of Crohn's disease     unclear how diagnosis was made    Iron deficiency anemia, unspecified      Past Surgical History:   Procedure Laterality Date    BONE MARROW ASPIRATION N/A 2023    Procedure: ASPIRATION, BONE MARROW;  Surgeon: Gayatri Howard MD;  Location: [facility] ENDOSCOPY;  Service: General;  Laterality: N/A;     SECTION      COLONOSCOPY      Luzmaria had several of these procedures    COLONOSCOPY, WITH 1 OR MORE BIOPSIES N/A 2023    Procedure: COLONOSCOPY, WITH 1 OR MORE BIOPSIES;  Surgeon: Bisi Holliday MD;  Location: [facility] ENDOSCOPY;  Service: Gastroenterology;  Laterality: N/A;    EGD, WITH CLOSED BIOPSY N/A 2023    Procedure: EGD, WITH CLOSED BIOPSY;  Surgeon: Bisi Holliday MD;  Location: [facility] ENDOSCOPY;  Service: Gastroenterology;  Laterality: N/A;    TONSILLECTOMY      TUBAL LIGATION  2019    UPPER GASTROINTESTINAL ENDOSCOPY      I've had several of these         Family History:   Family History   Problem Relation Name Age of Onset    Hypertension Mother Halie Saavedra     Diabetes Mother Halie Saavedra     Drug abuse Mother Halie Saavedra     Kidney disease Maternal Grandmother Halina Erika     Diabetes Maternal Grandmother Halina Branchmier     Stroke Paternal Grandmother [place]         Social History:   Social History     Socioeconomic History    Marital status: Other    Highest  education level: 12th grade   Tobacco Use    Smoking status: Never    Smokeless tobacco: Never   Substance and Sexual Activity    Alcohol use: Not Currently     Alcohol/week: 1.0 standard drink of alcohol     Types: 1 Glasses of wine per week    Drug use: Never    Sexual activity: Yes     Partners: Male     Birth control/protection: Other-see comments     Comment: Tubes tied     Social Determinants of Health     Financial Resource Strain: Low Risk  (7/13/2023)    Overall Financial Resource Strain (CARDIA)     Difficulty of Paying Living Expenses: Not hard at all   Food Insecurity: No Food Insecurity (7/13/2023)    Hunger Vital Sign     Worried About Running Out of Food in the Last Year: Never true     Ran Out of Food in the Last Year: Never true   Transportation Needs: No Transportation Needs (7/13/2023)    PRAPARE - Transportation     Lack of Transportation (Medical): No     Lack of Transportation (Non-Medical): No   Physical Activity: Insufficiently Active (7/13/2023)    Exercise Vital Sign     Days of Exercise per Week: 3 days     Minutes of Exercise per Session: 20 min   Stress: Stress Concern Present (7/13/2023)    [employer] [place] [employer] - Occupational Stress Questionnaire     Feeling of Stress : To some extent   Housing Stability: Low Risk  (7/13/2023)    Housing Stability Vital Sign     Unable to Pay for Housing in the Last Year: No     Number of Places Lived in the Last Year: 1     Unstable Housing in the Last Year: No        Review of patient's allergies indicates:  No Known Allergies    Current Medications:   Outpatient Medications Marked as Taking for the 5/28/24 encounter (Office Visit) with Bisi Holliday MD   Medication Sig Dispense Refill    cyanocobalamin, vitamin B-12, 1,000 mcg Lozg Place 1 tablet under the tongue every morning. 30 lozenge 6    FEROSUL 325 mg (65 mg iron) Tab tablet Take 1 tablet (325 mg total) by mouth 2 (two) times daily. 180 tablet 3    folic acid  "(FOLVITE) 1 MG tablet Take 1 tablet (1,000 mcg total) by mouth once daily. 90 tablet 2    pantoprazole (PROTONIX) 40 MG tablet Take 1 tablet (40 mg total) by mouth once daily. 30 tablet 2     Current Facility-Administered Medications for the 5/28/24 encounter (Office Visit) with Bisi Holliday MD   Medication Dose Route Frequency Provider Last Rate Last Admin    [COMPLETED] pneumoc 20-alan conj-dip cr(PF) (PREVNAR-20 (PF)) injection Syrg 0.5 mL  0.5 mL Intramuscular 1 time in Clinic/HOD    0.5 mL at 05/28/24 1532        Vital Signs:  /77 (BP Location: Left arm, Patient Position: Sitting, BP Method: Medium (Automatic))   Pulse 81   Temp 98.4 °F (36.9 °C) (Oral)   Resp 16   Ht 5' 1" (1.549 m)   Wt 53 kg (116 lb 13.5 oz)   LMP 05/02/2024   SpO2 98%   BMI 22.08 kg/m²      Physical Exam  Constitutional:       Appearance: Normal appearance.   HENT:      Head: Normocephalic and atraumatic.      Mouth/Throat:      Mouth: Mucous membranes are moist.   Eyes:      Conjunctiva/sclera: Conjunctivae normal.   Cardiovascular:      Rate and Rhythm: Normal rate and regular rhythm.   Pulmonary:      Effort: Pulmonary effort is normal.      Breath sounds: Normal breath sounds.   Abdominal:      General: Bowel sounds are normal.      Palpations: Abdomen is soft.   Musculoskeletal:         General: Normal range of motion.      Cervical back: Normal range of motion.   Skin:     General: Skin is warm.   Neurological:      General: No focal deficit present.      Mental Status: She is alert and oriented to person, place, and time. Mental status is at baseline.   Psychiatric:         Mood and Affect: Mood normal.         Behavior: Behavior normal.         Labs: Reviewed  Hgb   Date Value Ref Range Status   11/22/2023 10.8 (L) 12.0 - 16.0 g/dL Final     Hemoglobin   Date Value Ref Range Status   08/11/2022 5.4 (LL) 12.0 - 16.0 g/dL Final     Albumin   Date Value Ref Range Status   11/22/2023 4.2 3.5 - 5.0 g/dL Final "     Iron   Date Value Ref Range Status   08/10/2022 107 25 - 156 ug/dL Final     Iron Level   Date Value Ref Range Status   11/22/2023 23 (L) 50 - 170 ug/dL Final     Ferritin   Date Value Ref Range Status   08/10/2022 27.5 5.0 - 204.0 NG/ML Final     Ferritin Level   Date Value Ref Range Status   11/22/2023 9.21 4.63 - 204.00 ng/mL Final     Folate   Date Value Ref Range Status   08/11/2022 6.4 (L) 7.3 - 19.9 ng/ml Final     Folate Level   Date Value Ref Range Status   04/06/2023 11.8 7.0 - 31.4 ng/mL Final     Vitamin B12   Date Value Ref Range Status   04/06/2023 433 213 - 816 pg/mL Final     Vitamin D   Date Value Ref Range Status   12/28/2020 5.1 (L) 30.0 - 80.0 ng/mL Final     CRP   Date Value Ref Range Status   11/22/2023 <1.00 <5.00 mg/L Final     Fecal calprotectin:       Assessment/Plan:    Problem List Items Addressed This Visit          Oncology    Iron deficiency anemia     Severe anemia in 2022 in the setting of iron, B12, and folate deficiency.  No overt GI bleeding at that time    Nov 2023:   Hgb 10.8 g/dL  Ferritin: 27.5    Dec 2023: EGD and colonoscopy    S/p Feraheme x 2 infusions in December 2023    Recheck CBC, iron studies, and ferritin            GI    Constipation - Primary     Start Amitiza 24 mcg daily to BID         Relevant Medications    lubiprostone (AMITIZA) 24 MCG Cap    Indeterminate colitis     Diagnosed in the setting of anemia and rectal bleeding per patient report  Unclear how diagnosis was made and what involvement of the intestinal system was noted.  Based on recent colonoscopy - left colonic involvement possible up to hepatic flexure, possible TI  Reported Crohn's disease per patient - favor this given reported small bowel disease    Had treatment in the past with prednisone, pentasa, azathioprine and 3-5 doses of Remicade which was stopped due to infusion reaction (hives)  Off therapy for over 20 years and no significant GI symptoms other than constipation   Severe anemia in  2022 in the setting of iron, B12, and folate deficiency.  No overt GI bleeding at that time    December 6, 2023: Colonoscopy: Three scattered erosions in the TI. Four small patchy areas of mild inflammation in the rectum, sigmoid, and hepatic flexure.  Scarring and altered vascularity in the rectum to the descending colon.  Internal and external hemorrhoids    Labs and fecal calprotectin  If fecal calprotectin elevated, can consider mesalamine or budesonide to help treat areas seen on colonoscopy  Otherwise despite history, remains clinically asymptomatic with very mild endoscopic disease despite years off therapy.  Can continue to monitor disease closely off therapy.          Relevant Orders    CBC Auto Differential    Comprehensive Metabolic Panel    C-Reactive Protein    Calprotectin, Stool    Iron and TIBC    Ferritin    Folate    Vitamin B12    Vitamin D    Zinc           HEALTH MAINTENANCE:     Vaccinations:    Influenza (inactive):  recommended annually   PCV 20 (Prevnar): May 2024  Tetanus (TdaP): July 2023, recommended every 10 years  HPV (males and females ages 11-46 yo): N/A  Meningococcal:  No risk factors   Hepatitis B: not immune   Hepatitis A:  not immune   MMR (live vaccine): UTD          Chickenpox status/Varicella (live vaccine):  vaccinated, +Ab  Shingrix: recommended   COVID-19: vaccinated 2021    Colorectal cancer risk:  Risk factors: >  8 years of disease, > 1/3 colon involved    - Distribution of colonic disease: unclear    - Year of symptom onset: 2000    - Colonoscopy every 2-3 years after endoscopic remission    Ophthalmologic exam recommended yearly  Dermatologic exam recommended yearly due to risk of skin cancer with IBD/meds    Bone health:   Calcium 3813-8394 mg daily and vitamin D 800 IU daily   Risk factors for osteopenia/osteoporosis: none   Vitamin D: recheck   Ergocalciferol 50,000 IU weekly x 12 weeks if low     DEXA scan: Recommend baseline    Smoking status:  nonsmoker      Follow up: 6 months

## 2024-05-28 NOTE — ASSESSMENT & PLAN NOTE
Severe anemia in 2022 in the setting of iron, B12, and folate deficiency.  No overt GI bleeding at that time    Nov 2023:   Hgb 10.8 g/dL  Ferritin: 27.5    Dec 2023: EGD and colonoscopy    S/p Feraheme x 2 infusions in December 2023    Recheck CBC, iron studies, and ferritin

## 2024-05-28 NOTE — ASSESSMENT & PLAN NOTE
Diagnosed in the setting of anemia and rectal bleeding per patient report  Unclear how diagnosis was made and what involvement of the intestinal system was noted.  Based on recent colonoscopy - left colonic involvement possible up to hepatic flexure, possible TI  Reported Crohn's disease per patient - favor this given reported small bowel disease    Had treatment in the past with prednisone, pentasa, azathioprine and 3-5 doses of Remicade which was stopped due to infusion reaction (hives)  Off therapy for over 20 years and no significant GI symptoms other than constipation   Severe anemia in 2022 in the setting of iron, B12, and folate deficiency.  No overt GI bleeding at that time    December 6, 2023: Colonoscopy: Three scattered erosions in the TI. Four small patchy areas of mild inflammation in the rectum, sigmoid, and hepatic flexure.  Scarring and altered vascularity in the rectum to the descending colon.  Internal and external hemorrhoids    Labs and fecal calprotectin  If fecal calprotectin elevated, can consider mesalamine or budesonide to help treat areas seen on colonoscopy  Otherwise despite history, remains clinically asymptomatic with very mild endoscopic disease despite years off therapy.  Can continue to monitor disease closely off therapy.

## 2024-05-29 RX ORDER — ERGOCALCIFEROL 1.25 MG/1
50000 CAPSULE ORAL
Qty: 24 CAPSULE | Refills: 1 | Status: SHIPPED | OUTPATIENT
Start: 2024-05-31 | End: 2024-08-29

## 2024-05-30 LAB — AR ZINC, SERUM/PLASMA: 83.1 UG/DL

## 2024-05-31 DIAGNOSIS — D50.0 IRON DEFICIENCY ANEMIA DUE TO CHRONIC BLOOD LOSS: Primary | ICD-10-CM

## 2024-05-31 RX ORDER — SODIUM CHLORIDE 0.9 % (FLUSH) 0.9 %
10 SYRINGE (ML) INJECTION
OUTPATIENT
Start: 2024-05-31

## 2024-05-31 RX ORDER — HEPARIN 100 UNIT/ML
5 SYRINGE INTRAVENOUS
OUTPATIENT
Start: 2024-05-31

## 2024-05-31 RX ORDER — DIPHENHYDRAMINE HYDROCHLORIDE 50 MG/ML
50 INJECTION INTRAMUSCULAR; INTRAVENOUS ONCE AS NEEDED
OUTPATIENT
Start: 2024-05-31

## 2024-05-31 RX ORDER — EPINEPHRINE 0.3 MG/.3ML
0.3 INJECTION SUBCUTANEOUS ONCE AS NEEDED
OUTPATIENT
Start: 2024-05-31

## 2024-05-31 RX ORDER — SODIUM CHLORIDE 9 MG/ML
INJECTION, SOLUTION INTRAVENOUS CONTINUOUS
OUTPATIENT
Start: 2024-05-31

## 2024-06-02 ENCOUNTER — HOSPITAL ENCOUNTER (INPATIENT)
Facility: HOSPITAL | Age: 39
LOS: 2 days | Discharge: HOME | DRG: 872 | End: 2024-06-04
Attending: EMERGENCY MEDICINE | Admitting: STUDENT IN AN ORGANIZED HEALTH CARE EDUCATION/TRAINING PROGRAM
Payer: MEDICAID

## 2024-06-02 DIAGNOSIS — R07.9 CHEST PAIN: ICD-10-CM

## 2024-06-02 DIAGNOSIS — R11.0 NAUSEA: ICD-10-CM

## 2024-06-02 DIAGNOSIS — A41.50 SEPSIS DUE TO GRAM-NEGATIVE UTI: Primary | ICD-10-CM

## 2024-06-02 DIAGNOSIS — R00.0 TACHYCARDIA, UNSPECIFIED: ICD-10-CM

## 2024-06-02 DIAGNOSIS — D50.9 IRON DEFICIENCY ANEMIA, UNSPECIFIED IRON DEFICIENCY ANEMIA TYPE: ICD-10-CM

## 2024-06-02 DIAGNOSIS — R11.14 BILIOUS VOMITING WITH NAUSEA: ICD-10-CM

## 2024-06-02 DIAGNOSIS — N39.0 SEPSIS DUE TO GRAM-NEGATIVE UTI: Primary | ICD-10-CM

## 2024-06-02 LAB
ALBUMIN SERPL-MCNC: 4.1 G/DL (ref 3.5–5)
ALBUMIN/GLOB SERPL: 1 RATIO (ref 1.1–2)
ALP SERPL-CCNC: 69 UNIT/L (ref 40–150)
ALT SERPL-CCNC: 7 UNIT/L (ref 0–55)
ANION GAP SERPL CALC-SCNC: 13 MEQ/L
ANISOCYTOSIS BLD QL SMEAR: ABNORMAL
AST SERPL-CCNC: 18 UNIT/L (ref 5–34)
B-HCG UR QL: NEGATIVE
BACTERIA #/AREA URNS AUTO: ABNORMAL /HPF
BASOPHILS # BLD AUTO: 0.04 X10(3)/MCL
BASOPHILS NFR BLD AUTO: 0.3 %
BILIRUB SERPL-MCNC: 0.5 MG/DL
BILIRUB UR QL STRIP.AUTO: NEGATIVE
BUN SERPL-MCNC: 4.9 MG/DL (ref 7–18.7)
CALCIUM SERPL-MCNC: 9.6 MG/DL (ref 8.4–10.2)
CHLORIDE SERPL-SCNC: 105 MMOL/L (ref 98–107)
CLARITY UR: ABNORMAL
CO2 SERPL-SCNC: 17 MMOL/L (ref 22–29)
COLOR UR AUTO: ABNORMAL
CREAT SERPL-MCNC: 0.73 MG/DL (ref 0.55–1.02)
CREAT/UREA NIT SERPL: 7
EOSINOPHIL # BLD AUTO: 0.02 X10(3)/MCL (ref 0–0.9)
EOSINOPHIL NFR BLD AUTO: 0.1 %
ERYTHROCYTE [DISTWIDTH] IN BLOOD BY AUTOMATED COUNT: 18.6 % (ref 11.5–17)
FLUAV AG UPPER RESP QL IA.RAPID: NOT DETECTED
FLUBV AG UPPER RESP QL IA.RAPID: NOT DETECTED
GFR SERPLBLD CREATININE-BSD FMLA CKD-EPI: >60 ML/MIN/1.73/M2
GLOBULIN SER-MCNC: 4.1 GM/DL (ref 2.4–3.5)
GLUCOSE SERPL-MCNC: 110 MG/DL (ref 74–100)
GLUCOSE UR QL STRIP: NORMAL
HCT VFR BLD AUTO: 24 % (ref 37–47)
HGB BLD-MCNC: 7.1 G/DL (ref 12–16)
HGB UR QL STRIP: ABNORMAL
HOLD SPECIMEN: NORMAL
HYALINE CASTS #/AREA URNS LPF: ABNORMAL /LPF
HYPOCHROMIA BLD QL SMEAR: ABNORMAL
IMM GRANULOCYTES # BLD AUTO: 0.09 X10(3)/MCL (ref 0–0.04)
IMM GRANULOCYTES NFR BLD AUTO: 0.6 %
KETONES UR QL STRIP: NEGATIVE
LACTATE SERPL-SCNC: 0.7 MMOL/L (ref 0.5–2.2)
LACTATE SERPL-SCNC: 2.1 MMOL/L (ref 0.5–2.2)
LEUKOCYTE ESTERASE UR QL STRIP: 500
LIPASE SERPL-CCNC: 14 U/L
LYMPHOCYTES # BLD AUTO: 0.61 X10(3)/MCL (ref 0.6–4.6)
LYMPHOCYTES NFR BLD AUTO: 3.9 %
MAGNESIUM SERPL-MCNC: 1.5 MG/DL (ref 1.6–2.6)
MCH RBC QN AUTO: 24 PG (ref 27–31)
MCHC RBC AUTO-ENTMCNC: 29.6 G/DL (ref 33–36)
MCV RBC AUTO: 81.1 FL (ref 80–94)
MONOCYTES # BLD AUTO: 0.78 X10(3)/MCL (ref 0.1–1.3)
MONOCYTES NFR BLD AUTO: 5 %
MUCOUS THREADS URNS QL MICRO: ABNORMAL /LPF
NEUTROPHILS # BLD AUTO: 14.16 X10(3)/MCL (ref 2.1–9.2)
NEUTROPHILS NFR BLD AUTO: 90.1 %
NITRITE UR QL STRIP: ABNORMAL
NRBC BLD AUTO-RTO: 0.4 %
OVALOCYTES (OLG): ABNORMAL
PH UR STRIP: 5.5 [PH]
PLATELET # BLD AUTO: 714 X10(3)/MCL (ref 130–400)
PLATELET # BLD EST: ABNORMAL 10*3/UL
PMV BLD AUTO: 10.5 FL (ref 7.4–10.4)
POIKILOCYTOSIS BLD QL SMEAR: ABNORMAL
POLYCHROMASIA BLD QL SMEAR: ABNORMAL
POTASSIUM SERPL-SCNC: 3.2 MMOL/L (ref 3.5–5.1)
PROT SERPL-MCNC: 8.2 GM/DL (ref 6.4–8.3)
PROT UR QL STRIP: NEGATIVE
RBC # BLD AUTO: 2.96 X10(6)/MCL (ref 4.2–5.4)
RBC #/AREA URNS AUTO: ABNORMAL /HPF
RBC MORPH BLD: ABNORMAL
RSV A 5' UTR RNA NPH QL NAA+PROBE: NOT DETECTED
SARS-COV-2 RNA RESP QL NAA+PROBE: NOT DETECTED
SODIUM SERPL-SCNC: 135 MMOL/L (ref 136–145)
SP GR UR STRIP.AUTO: 1.02 (ref 1–1.03)
SQUAMOUS #/AREA URNS LPF: ABNORMAL /HPF
TEAR DROP CELL (OLG): SLIGHT
UROBILINOGEN UR STRIP-ACNC: NORMAL
WBC # SPEC AUTO: 15.7 X10(3)/MCL (ref 4.5–11.5)
WBC #/AREA URNS AUTO: >100 /HPF

## 2024-06-02 PROCEDURE — 81025 URINE PREGNANCY TEST: CPT | Performed by: STUDENT IN AN ORGANIZED HEALTH CARE EDUCATION/TRAINING PROGRAM

## 2024-06-02 PROCEDURE — 93005 ELECTROCARDIOGRAM TRACING: CPT

## 2024-06-02 PROCEDURE — 21400001 HC TELEMETRY ROOM

## 2024-06-02 PROCEDURE — 25000003 PHARM REV CODE 250

## 2024-06-02 PROCEDURE — 25500020 PHARM REV CODE 255: Performed by: EMERGENCY MEDICINE

## 2024-06-02 PROCEDURE — 0241U COVID/RSV/FLU A&B PCR: CPT | Performed by: NURSE PRACTITIONER

## 2024-06-02 PROCEDURE — 80053 COMPREHEN METABOLIC PANEL: CPT | Performed by: NURSE PRACTITIONER

## 2024-06-02 PROCEDURE — 36415 COLL VENOUS BLD VENIPUNCTURE: CPT | Performed by: NURSE PRACTITIONER

## 2024-06-02 PROCEDURE — 63600175 PHARM REV CODE 636 W HCPCS

## 2024-06-02 PROCEDURE — 83735 ASSAY OF MAGNESIUM: CPT | Performed by: NURSE PRACTITIONER

## 2024-06-02 PROCEDURE — 99285 EMERGENCY DEPT VISIT HI MDM: CPT | Mod: 25

## 2024-06-02 PROCEDURE — 63600175 PHARM REV CODE 636 W HCPCS: Performed by: EMERGENCY MEDICINE

## 2024-06-02 PROCEDURE — 96366 THER/PROPH/DIAG IV INF ADDON: CPT

## 2024-06-02 PROCEDURE — 87040 BLOOD CULTURE FOR BACTERIA: CPT | Performed by: NURSE PRACTITIONER

## 2024-06-02 PROCEDURE — 25000003 PHARM REV CODE 250: Performed by: NURSE PRACTITIONER

## 2024-06-02 PROCEDURE — 11000001 HC ACUTE MED/SURG PRIVATE ROOM

## 2024-06-02 PROCEDURE — 96365 THER/PROPH/DIAG IV INF INIT: CPT

## 2024-06-02 PROCEDURE — 87086 URINE CULTURE/COLONY COUNT: CPT | Performed by: NURSE PRACTITIONER

## 2024-06-02 PROCEDURE — 81001 URINALYSIS AUTO W/SCOPE: CPT | Performed by: NURSE PRACTITIONER

## 2024-06-02 PROCEDURE — 83605 ASSAY OF LACTIC ACID: CPT | Performed by: NURSE PRACTITIONER

## 2024-06-02 PROCEDURE — 96367 TX/PROPH/DG ADDL SEQ IV INF: CPT

## 2024-06-02 PROCEDURE — 63600175 PHARM REV CODE 636 W HCPCS: Performed by: NURSE PRACTITIONER

## 2024-06-02 PROCEDURE — 85025 COMPLETE CBC W/AUTO DIFF WBC: CPT | Performed by: NURSE PRACTITIONER

## 2024-06-02 PROCEDURE — 83690 ASSAY OF LIPASE: CPT | Performed by: NURSE PRACTITIONER

## 2024-06-02 RX ORDER — SODIUM CHLORIDE 0.9 % (FLUSH) 0.9 %
10 SYRINGE (ML) INJECTION EVERY 12 HOURS PRN
Status: DISCONTINUED | OUTPATIENT
Start: 2024-06-02 | End: 2024-06-04 | Stop reason: HOSPADM

## 2024-06-02 RX ORDER — ONDANSETRON HYDROCHLORIDE 2 MG/ML
4 INJECTION, SOLUTION INTRAVENOUS
Status: COMPLETED | OUTPATIENT
Start: 2024-06-02 | End: 2024-06-02

## 2024-06-02 RX ORDER — SODIUM CHLORIDE, SODIUM LACTATE, POTASSIUM CHLORIDE, CALCIUM CHLORIDE 600; 310; 30; 20 MG/100ML; MG/100ML; MG/100ML; MG/100ML
INJECTION, SOLUTION INTRAVENOUS CONTINUOUS
Status: DISCONTINUED | OUTPATIENT
Start: 2024-06-02 | End: 2024-06-04

## 2024-06-02 RX ORDER — GLUCAGON 1 MG
1 KIT INJECTION
Status: DISCONTINUED | OUTPATIENT
Start: 2024-06-02 | End: 2024-06-04 | Stop reason: HOSPADM

## 2024-06-02 RX ORDER — ONDANSETRON HYDROCHLORIDE 2 MG/ML
4 INJECTION, SOLUTION INTRAVENOUS EVERY 8 HOURS PRN
Status: DISCONTINUED | OUTPATIENT
Start: 2024-06-02 | End: 2024-06-04 | Stop reason: HOSPADM

## 2024-06-02 RX ORDER — IBUPROFEN 200 MG
16 TABLET ORAL
Status: DISCONTINUED | OUTPATIENT
Start: 2024-06-02 | End: 2024-06-04 | Stop reason: HOSPADM

## 2024-06-02 RX ORDER — KETOROLAC TROMETHAMINE 30 MG/ML
15 INJECTION, SOLUTION INTRAMUSCULAR; INTRAVENOUS
Status: COMPLETED | OUTPATIENT
Start: 2024-06-02 | End: 2024-06-02

## 2024-06-02 RX ORDER — FOLIC ACID 1 MG/1
1000 TABLET ORAL DAILY
Status: DISCONTINUED | OUTPATIENT
Start: 2024-06-03 | End: 2024-06-04 | Stop reason: HOSPADM

## 2024-06-02 RX ORDER — SUCRALFATE 1 G/10ML
1 SUSPENSION ORAL EVERY 6 HOURS
Status: DISCONTINUED | OUTPATIENT
Start: 2024-06-02 | End: 2024-06-04 | Stop reason: HOSPADM

## 2024-06-02 RX ORDER — PANTOPRAZOLE SODIUM 40 MG/1
40 TABLET, DELAYED RELEASE ORAL DAILY
Status: DISCONTINUED | OUTPATIENT
Start: 2024-06-03 | End: 2024-06-04 | Stop reason: HOSPADM

## 2024-06-02 RX ORDER — MORPHINE SULFATE 2 MG/ML
4 INJECTION, SOLUTION INTRAMUSCULAR; INTRAVENOUS
Status: COMPLETED | OUTPATIENT
Start: 2024-06-02 | End: 2024-06-02

## 2024-06-02 RX ORDER — HYDROCODONE BITARTRATE AND ACETAMINOPHEN 5; 325 MG/1; MG/1
1 TABLET ORAL
Status: COMPLETED | OUTPATIENT
Start: 2024-06-02 | End: 2024-06-02

## 2024-06-02 RX ORDER — IBUPROFEN 200 MG
24 TABLET ORAL
Status: DISCONTINUED | OUTPATIENT
Start: 2024-06-02 | End: 2024-06-04 | Stop reason: HOSPADM

## 2024-06-02 RX ORDER — NALOXONE HCL 0.4 MG/ML
0.02 VIAL (ML) INJECTION
Status: DISCONTINUED | OUTPATIENT
Start: 2024-06-02 | End: 2024-06-04 | Stop reason: HOSPADM

## 2024-06-02 RX ORDER — MAGNESIUM SULFATE HEPTAHYDRATE 40 MG/ML
2 INJECTION, SOLUTION INTRAVENOUS
Status: COMPLETED | OUTPATIENT
Start: 2024-06-02 | End: 2024-06-02

## 2024-06-02 RX ORDER — ACETAMINOPHEN 325 MG/1
650 TABLET ORAL EVERY 6 HOURS PRN
Status: DISCONTINUED | OUTPATIENT
Start: 2024-06-02 | End: 2024-06-04 | Stop reason: HOSPADM

## 2024-06-02 RX ORDER — ALUMINUM HYDROXIDE, MAGNESIUM HYDROXIDE, AND SIMETHICONE 1200; 120; 1200 MG/30ML; MG/30ML; MG/30ML
30 SUSPENSION ORAL
Status: DISCONTINUED | OUTPATIENT
Start: 2024-06-02 | End: 2024-06-04 | Stop reason: HOSPADM

## 2024-06-02 RX ORDER — ACETAMINOPHEN 500 MG
1000 TABLET ORAL
Status: COMPLETED | OUTPATIENT
Start: 2024-06-02 | End: 2024-06-02

## 2024-06-02 RX ORDER — LANOLIN ALCOHOL/MO/W.PET/CERES
1 CREAM (GRAM) TOPICAL 2 TIMES DAILY
Status: DISCONTINUED | OUTPATIENT
Start: 2024-06-02 | End: 2024-06-04 | Stop reason: HOSPADM

## 2024-06-02 RX ADMIN — ACETAMINOPHEN 1000 MG: 500 TABLET ORAL at 02:06

## 2024-06-02 RX ADMIN — IOHEXOL 100 ML: 350 INJECTION, SOLUTION INTRAVENOUS at 05:06

## 2024-06-02 RX ADMIN — CEFTRIAXONE SODIUM 1 G: 1 INJECTION, POWDER, FOR SOLUTION INTRAMUSCULAR; INTRAVENOUS at 05:06

## 2024-06-02 RX ADMIN — SODIUM CHLORIDE 1578 ML: 9 INJECTION, SOLUTION INTRAVENOUS at 03:06

## 2024-06-02 RX ADMIN — MORPHINE SULFATE 4 MG: 2 INJECTION, SOLUTION INTRAMUSCULAR; INTRAVENOUS at 07:06

## 2024-06-02 RX ADMIN — HYDROCODONE BITARTRATE AND ACETAMINOPHEN 1 TABLET: 5; 325 TABLET ORAL at 04:06

## 2024-06-02 RX ADMIN — MAGNESIUM SULFATE HEPTAHYDRATE 2 G: 40 INJECTION, SOLUTION INTRAVENOUS at 03:06

## 2024-06-02 RX ADMIN — VANCOMYCIN HYDROCHLORIDE 1250 MG: 1.25 INJECTION, POWDER, LYOPHILIZED, FOR SOLUTION INTRAVENOUS at 09:06

## 2024-06-02 RX ADMIN — ONDANSETRON 4 MG: 2 INJECTION INTRAMUSCULAR; INTRAVENOUS at 03:06

## 2024-06-02 RX ADMIN — PIPERACILLIN SODIUM AND TAZOBACTAM SODIUM 4.5 G: 4; .5 INJECTION, POWDER, LYOPHILIZED, FOR SOLUTION INTRAVENOUS at 11:06

## 2024-06-02 RX ADMIN — SODIUM CHLORIDE, POTASSIUM CHLORIDE, SODIUM LACTATE AND CALCIUM CHLORIDE: 600; 310; 30; 20 INJECTION, SOLUTION INTRAVENOUS at 09:06

## 2024-06-02 RX ADMIN — FERROUS SULFATE TAB 325 MG (65 MG ELEMENTAL FE) 1 EACH: 325 (65 FE) TAB at 09:06

## 2024-06-02 RX ADMIN — SUCRALFATE 1 G: 1 SUSPENSION ORAL at 06:06

## 2024-06-02 RX ADMIN — ONDANSETRON 4 MG: 2 INJECTION INTRAMUSCULAR; INTRAVENOUS at 08:06

## 2024-06-02 RX ADMIN — KETOROLAC TROMETHAMINE 15 MG: 30 INJECTION, SOLUTION INTRAMUSCULAR; INTRAVENOUS at 03:06

## 2024-06-02 NOTE — H&P
[facility] Medicine Wards History & Physical Note     Resident Team: [facility] Medicine List 2  Attending Physician: Bradley Parham MD  Resident: Sarah Dodd MD  Intern: Contreras Levin MD     Date of Admit: 2024    Chief Complaint     Nausea, Vomiting, and Fever (Nausea, vomiting, fever, body aches since this morning. No OTC meds. Febrile.)       Subjective:      History of Present Illness:  Elaina Pierce is a 38 y.o.  female with a history of Crohn's and iron deficiency anemia who presented on 2024  with c/o fevers onset the morning of  while waking up around 10am. Tmax at home was 104° F. Associated symptoms include headache, body aches, chills, nausea, vomiting, and fatigue.  Patient reports having had 2 episodes of vomiting while at home.  Patient denies seeing any blood in her vomit.  She denies any abdominal pain, or diarrhea.  States that she has good hygiene, wiping front to back.  Finish is on her symptoms resolve a Crohn's flare.  She denies taking over-the-counter meds at actually chose to come straight to the ED.    In the ED, her initial temperature was 103.2° F, pulse 132, /80, sat 100% on room air.  H&H are 7.1/24.0.  She states that these are good values for her.  White count 15.7, sodium 130, potassium 3.2 creatinine 0.73, magnesium point 1.50. Urine was remarkable for 2+ nitrites, >100 WBCs, many bacteria, and 500 leukocyte esterase. Urine and blood cultures were drawn. CXR was grossly normal. CT showed findings consistent w/pyelo.       Past Medical History:  Past Medical History:   Diagnosis Date    History of Crohn's disease     unclear how diagnosis was made    Iron deficiency anemia, unspecified        Past Surgical History:  Past Surgical History:   Procedure Laterality Date    BONE MARROW ASPIRATION N/A 2023    Procedure: ASPIRATION, BONE MARROW;  Surgeon: Gayatri Howard MD;  Location: [facility];  Service: General;  Laterality: N/A;     SECTION       COLONOSCOPY  1999    Luzmaria had several of these procedures    COLONOSCOPY, WITH 1 OR MORE BIOPSIES N/A 12/6/2023    Procedure: COLONOSCOPY, WITH 1 OR MORE BIOPSIES;  Surgeon: Bisi Holliday MD;  Location: [facility] ENDOSCOPY;  Service: Gastroenterology;  Laterality: N/A;    EGD, WITH CLOSED BIOPSY N/A 12/6/2023    Procedure: EGD, WITH CLOSED BIOPSY;  Surgeon: Bisi Holliday MD;  Location: [facility] ENDOSCOPY;  Service: Gastroenterology;  Laterality: N/A;    TONSILLECTOMY      TUBAL LIGATION  02/14/2019    UPPER GASTROINTESTINAL ENDOSCOPY  1999    I've had several of these       Family History:  Family History   Problem Relation Name Age of Onset    Hypertension Mother Halie Saavedra     Diabetes Mother Halie Saavedra     Drug abuse Mother Halie Saavedra     Kidney disease Maternal Grandmother Halina Steiner     Diabetes Maternal Grandmother Halina Steiner     Stroke Paternal Grandmother Jade [place]        Social History:  Social History     Tobacco Use    Smoking status: Never    Smokeless tobacco: Never   Substance Use Topics    Alcohol use: Not Currently     Alcohol/week: 1.0 standard drink of alcohol     Types: 1 Glasses of wine per week    Drug use: Never       Allergies:  Review of patient's allergies indicates:  No Known Allergies    Home Medications:  Prior to Admission medications    Medication Sig Start Date End Date Taking? Authorizing Provider   cyanocobalamin, vitamin B-12, 1,000 mcg Lozg Place 1 tablet under the tongue every morning. 9/27/22   Mio Payne FNP   ergocalciferol (ERGOCALCIFEROL) 50,000 unit Cap Take 1 capsule (50,000 Units total) by mouth every Monday and Friday. 5/31/24 8/29/24  Bisi Holliday MD   FEROSUL 325 mg (65 mg iron) Tab tablet Take 1 tablet (325 mg total) by mouth 2 (two) times daily. 9/27/22   Mio Payne FNP   folic acid (FOLVITE) 1 MG tablet Take 1 tablet (1,000 mcg total) by mouth once daily. 9/27/22   Mio Payne FNP   lubiprostone (AMITIZA)  "24 MCG Cap Take 1 capsule (24 mcg total) by mouth 2 (two) times daily. 24  Bisi Holliday MD   pantoprazole (PROTONIX) 40 MG tablet Take 1 tablet (40 mg total) by mouth once daily. 23  Bisi Holliday MD         Review of Systems:  Review of Systems   Constitutional:  Positive for chills, fever and malaise/fatigue.   Respiratory:  Positive for shortness of breath. Negative for cough and wheezing.    Cardiovascular:  Negative for chest pain and palpitations.   Gastrointestinal:  Positive for nausea and vomiting. Negative for abdominal pain and diarrhea.   Genitourinary:  Negative for dysuria.   Neurological:  Negative for dizziness, tingling, weakness and headaches.             Objective:   Last 24 Hour Vital Signs:  BP  Min: 104/71  Max: 115/85  Temp  Av.4 °F (38.6 °C)  Min: 99.7 °F (37.6 °C)  Max: 103.2 °F (39.6 °C)  Pulse  Av.5  Min: 108  Max: 132  Resp  Av.9  Min: 17  Max: 24  SpO2  Av.9 %  Min: 99 %  Max: 100 %  Height  Av' 1" (154.9 cm)  Min: 5' 1" (154.9 cm)  Max: 5' 1" (154.9 cm)  Weight  Av.6 kg (116 lb)  Min: 52.6 kg (116 lb)  Max: 52.6 kg (116 lb)  Body mass index is 21.92 kg/m².  No intake/output data recorded.    Physical Examination:  Physical Exam  Vitals and nursing note reviewed.   Constitutional:       General: She is not in acute distress.     Appearance: She is not ill-appearing.   Cardiovascular:      Rate and Rhythm: Regular rhythm. Tachycardia present.      Pulses: Normal pulses.      Heart sounds: No murmur heard.     No gallop.   Pulmonary:      Effort: No respiratory distress.      Breath sounds: Normal breath sounds. No wheezing or rales.   Abdominal:      General: There is no distension.      Palpations: Abdomen is soft.      Tenderness: There is no abdominal tenderness. There is no right CVA tenderness or left CVA tenderness.   Skin:     General: Skin is warm and dry.      Capillary Refill: Capillary refill takes less " "than 2 seconds.   Neurological:      Motor: No weakness.   Psychiatric:         Mood and Affect: Mood normal.         Behavior: Behavior normal.          Laboratory:  Most Recent Data:  CBC:   Lab Results   Component Value Date    WBC 15.70 (H) 06/02/2024    HGB 7.1 (L) 06/02/2024    HCT 24.0 (L) 06/02/2024     (H) 06/02/2024    MCV 81.1 06/02/2024    RDW 18.6 (H) 06/02/2024     WBC Differential:   Recent Labs   Lab 05/28/24  1520 06/02/24  1448   WBC 7.23 15.70*   HGB 8.2* 7.1*   HCT 27.2* 24.0*   * 714*   MCV 81.0 81.1     BMP:   Lab Results   Component Value Date     (L) 06/02/2024    K 3.2 (L) 06/02/2024     06/02/2024    CO2 17 (L) 06/02/2024    BUN 4.9 (L) 06/02/2024    CREATININE 0.73 06/02/2024    CALCIUM 9.6 06/02/2024    MG 1.50 (L) 06/02/2024     LFTs:   Lab Results   Component Value Date    ALBUMIN 4.1 06/02/2024    BILITOT 0.5 06/02/2024    AST 18 06/02/2024    ALKPHOS 69 06/02/2024    ALT 7 06/02/2024     Coags:   Lab Results   Component Value Date    INR 1.1 05/09/2023     FLP:   Lab Results   Component Value Date    CHOL 200 07/13/2023    HDL 54 07/13/2023    TRIG 177 (H) 07/13/2023     DM:   Lab Results   Component Value Date    CREATININE 0.73 06/02/2024     Thyroid:   Lab Results   Component Value Date    TSH 0.9642 09/27/2022      Anemia:   Lab Results   Component Value Date    IRON 15 (L) 05/28/2024    TIBC 306 05/28/2024    FERRITIN 6.04 05/28/2024       Lab Results   Component Value Date    LIRCSXPP48 313 05/28/2024       Lab Results   Component Value Date    FOLATE 4.1 (L) 05/28/2024        Cardiac: No results found for: "TROPONINI", "CKTOTAL", "CKMB", "BNP"  Urinalysis:   Lab Results   Component Value Date    LABURIN >/= 100,000 colonies/ml Escherichia coli (A) 09/27/2022    COLORU Light-Yellow 06/02/2024    NITRITE 2+ (A) 06/02/2024    KETONESU Trace (A) 12/27/2020    UROBILINOGEN Normal 06/02/2024    WBCUA >100 (A) 06/02/2024       Trended Lab Data:  Recent Labs " "  Lab 05/28/24  1520 06/02/24  1448   WBC 7.23 15.70*   HGB 8.2* 7.1*   HCT 27.2* 24.0*   * 714*   MCV 81.0 81.1   RDW 18.2* 18.6*    135*   K 3.6 3.2*    105   CO2 23 17*   BUN 8.8 4.9*   CREATININE 0.69 0.73   ALBUMIN 4.2 4.1   BILITOT 0.2 0.5   AST 15 18   ALKPHOS 68 69   ALT 10 7       Trended Cardiac Data:  No results for input(s): "TROPONINI", "CKTOTAL", "CKMB", "BNP" in the last 168 hours.    Microbiology Data:  Microbiology Results (last 7 days)       Procedure Component Value Units Date/Time    Urine culture [[id number]] Collected: 06/02/24 1425    Order Status: Sent Specimen: Urine Updated: 06/02/24 1514    Blood culture x two cultures. Draw prior to antibiotics. [[id number]] Collected: 06/02/24 1444    Order Status: Resulted Specimen: Blood from Arm, Right Updated: 06/02/24 1448    Blood culture x two cultures. Draw prior to antibiotics. [[id number]] Collected: 06/02/24 1444    Order Status: Resulted Specimen: Blood from Arm, Left Updated: 06/02/24 1448             Other Results:  EKG:   Results for orders placed or performed during the hospital encounter of 06/02/24   EKG 12-lead    Collection Time: 06/02/24  2:24 PM   Result Value Ref Range    QRS Duration 86 ms    [facility] QTC Calculation 437 ms    Narrative    Test Reason : R00.0,    Vent. Rate : 121 BPM     Atrial Rate : 121 BPM     P-R Int : 136 ms          QRS Dur : 086 ms      QT Int : 308 ms       P-R-T Axes : 068 064 261 degrees     QTc Int : 437 ms    Sinus tachycardia  Cannot rule out Anterior infarct ,age undetermined  T wave abnormality, consider inferolateral ischemia  Abnormal ECG  No previous ECGs available    Referred By: [email or web address]   SELF           Confirmed By:        Radiology:  Imaging Results              CT Abdomen Pelvis With IV Contrast NO Oral Contrast (Final result)  Result time 06/02/24 18:07:16      Final result by William Mayfield MD (06/02/24 18:07:16)                   Impression:      1. Right kidney wedge " configuration hypodensities could be related to pyelonephritis.  Attention to follow-up exams.    2. Details of other findings above.      Electronically signed by: William Mayfield  Date:    06/02/2024  Time:    18:07               Narrative:    EXAMINATION:  CT ABDOMEN PELVIS WITH IV CONTRAST    CLINICAL HISTORY:  UTI, recurrent/complicated (Female);    TECHNIQUE:  Multidetector axial images were obtained of the abdomen and pelvis following the administration of IV contrast. Oral contrast was not administered.    Dose length product of 326 mGycm. Automated exposure control was utilized to minimize radiation dose.    COMPARISON:  None available.    FINDINGS:  Included portion of the lungs are without suspicious nodularity, acute air space infiltrates or fluid within the pleural spaces.    Hepatic volume and attenuation is unremarkable. Gallbladder wall is not thickened and there is no intra luminal calcified calculus. No biliary dilation identified. Pancreatic unremarkable attenuation without acute peripancreatic phlegmons. Main pancreatic duct is not dilated. Spleen size is upper limits of normal measuring 12.4 cm without focal lesion.    The adrenal glands appear within normal limits.  There is left kidney lower pole 7 mm hypodense structure for which no specific follow-up imaging is recommended.  There are right kidney cortical wedgeconfiguration hypodensities on image 63, image 70 and image 78 series 5 which could be related to pyelonephritis.  Attention to follow-up exams.  No renal calculi or hydronephrosis or hydroureter. No perinephric fluid strandings or collections identified.    Stomach is partially decompressed.  There is mild fluid within loops of small bowel without significant abnormal dilatation and could be related to ileus.  Portion of the appendix is seen on image 40 series 601 and is nondilated.  Colonic fecal loading throughout without pericolonic acute strandings.  No free fluid.    Urinary  bladder appears within normal limits.  Endometrium is not prominent.  There is no pelvic free fluid.    No acute or otherwise osseous abnormality identified.                                       X-Ray Chest AP Portable (Final result)  Result time 06/02/24 15:04:04      Final result by Gage Torres MD (06/02/24 15:04:04)                   Narrative:    EXAMINATION  XR CHEST AP PORTABLE    CLINICAL HISTORY  Sepsis;    TECHNIQUE  A total of 1 frontal image(s) of the chest.    COMPARISON  13 July 2016    FINDINGS  Lines/tubes/devices: ECG leads overlie the imaged region.    The cardiomediastinal silhouette and central pulmonary vasculature are unremarkable for utilized technique.  The trachea is midline. There is no lobar consolidation, pleural effusion, or pneumothorax.    There is no acute osseous or extrathoracic abnormality.    IMPRESSION  No convincing acute radiographic abnormality.      Electronically signed by: Gage Torres  Date:    06/02/2024  Time:    15:04                                       Assessment & Plan:     Urosepsis  Right-Sided Pyelonephritis   - 3/4 SIRS criteria met in ED (, Tmax 103.2F, RR 20, WBC 15.70)  - Urine showing 2+ nitrites, many bacteria, >100 WBCs, and trace blood  - Urine cultures and Blood cultures x2 pending  - Lactic Acid 0.7 (initially 2.1)  - CT Abd shows wedge hypodensities in the right kidney, possibly related to pyelonephritis.   - Continue Rocephin 1g qD  - repeat CBC in AM    Hypokalemia  Hypomagnesemia   - Potassium 3.2; Mg 1.50  - 2g og Mg sulfate given in ED  - Monitor w/CMP in AM  - Potassium Bicarb ordered PRN to keep K over 3.5     Iron deficiency anemia  - H/H 7.1/24.0 (8.2/27.2 on 5/28/24)  - Resuming home regime  - ferous sulfate tablet; folic acid tablet     CODE STATUS: Full Code  Diet: Adult Regular   DVT Prophylaxis:  SCDs given hx of Crohn's and iron deficiency anemia  Anticoagulants   Medication Route Frequency         Contreras Levin MD  [employer]  Family Medicine HO-1

## 2024-06-02 NOTE — ED PROVIDER NOTES
Encounter Date: 2024       History     Chief Complaint   Patient presents with    Nausea    Vomiting    Fever     Nausea, vomiting, fever, body aches since this morning. No OTC meds. Febrile.     38-year-old patient with a history of Crohn disease, iron deficiency anemia, multiple prior transfusions since childhood, plasmapheresis, red blood cell aplasia, MGUS, B12 and folate deficiency, and pancytopenia.  She presents today with fever that started this morning.  She is severe body aches and stated overall she is just not feeling well.  She denies abdominal pain or blood in the stools at this time.  She stated she threw up twice this morning it was just water and bile.  She is currently still slightly nauseated.  She stated she was started on vitamin-D supplements yesterday by her primary care.    The history is provided by the patient. No  was used.     Review of patient's allergies indicates:  No Known Allergies  Past Medical History:   Diagnosis Date    History of Crohn's disease     unclear how diagnosis was made    Iron deficiency anemia, unspecified      Past Surgical History:   Procedure Laterality Date    BONE MARROW ASPIRATION N/A 2023    Procedure: ASPIRATION, BONE MARROW;  Surgeon: Gayatri Howard MD;  Location: [facility] ENDOSCOPY;  Service: General;  Laterality: N/A;     SECTION      COLONOSCOPY      Luzmaria had several of these procedures    COLONOSCOPY, WITH 1 OR MORE BIOPSIES N/A 2023    Procedure: COLONOSCOPY, WITH 1 OR MORE BIOPSIES;  Surgeon: Bisi Holliday MD;  Location: [facility] ENDOSCOPY;  Service: Gastroenterology;  Laterality: N/A;    EGD, WITH CLOSED BIOPSY N/A 2023    Procedure: EGD, WITH CLOSED BIOPSY;  Surgeon: Bisi Holliday MD;  Location: [facility] ENDOSCOPY;  Service: Gastroenterology;  Laterality: N/A;    TONSILLECTOMY      TUBAL LIGATION  2019    UPPER GASTROINTESTINAL ENDOSCOPY      I've had several of these     Family  History   Problem Relation Name Age of Onset    Hypertension Mother Halie Saavedra     Diabetes Mother Halie Saavedra     Drug abuse Mother Halie Saavedra     Kidney disease Maternal Grandmother Halina James     Diabetes Maternal Grandmother Halina James     Stroke Paternal Grandmother [place]      Social History     Tobacco Use    Smoking status: Never    Smokeless tobacco: Never   Substance Use Topics    Alcohol use: Not Currently     Alcohol/week: 1.0 standard drink of alcohol     Types: 1 Glasses of wine per week    Drug use: Never     Review of Systems   Constitutional:  Positive for chills, fatigue and fever.   HENT:  Negative for sore throat.    Respiratory:  Negative for shortness of breath.    Cardiovascular:  Negative for chest pain.   Gastrointestinal:  Positive for nausea and vomiting.   Genitourinary:  Negative for dysuria.   Musculoskeletal:  Negative for back pain.   Skin:  Negative for rash.   Neurological:  Negative for weakness.   Hematological:  Does not bruise/bleed easily.       Physical Exam     Initial Vitals [06/02/24 1412]   BP Pulse Resp Temp SpO2   113/80 (!) 132 (!) 24 (!) 103.2 °F (39.6 °C) 100 %      MAP       --         Physical Exam    Nursing note and vitals reviewed.  Constitutional: She appears well-developed and well-nourished.   HENT:   Head: Normocephalic and atraumatic.   Eyes: Conjunctivae and EOM are normal. Pupils are equal, round, and reactive to light.   Neck: Neck supple.   Normal range of motion.  Cardiovascular:  Normal rate and regular rhythm.           Pulmonary/Chest: Breath sounds normal. No respiratory distress.   Abdominal: Abdomen is soft. Bowel sounds are normal. She exhibits no distension. There is no abdominal tenderness.   Musculoskeletal:         General: No edema. Normal range of motion.      Cervical back: Normal range of motion and neck supple.     Neurological: She is alert and oriented to person, place, and time. She has normal strength.    Skin: Skin is warm and dry.   Psychiatric: Thought content normal.         ED Course   Critical Care    Date/Time: 6/2/2024 4:21 PM    Performed by: Danae Carl FNP  Authorized by: Phil Andrade DO  Direct patient critical care time: 10 minutes  Additional history critical care time: 10 minutes  Ordering / reviewing critical care time: 5 minutes  Documentation critical care time: 10 minutes  Consulting other physicians critical care time: 5 minutes  Total critical care time (exclusive of procedural time) : 40 minutes  Critical care time was exclusive of separately billable procedures and treating other patients.  Critical care was necessary to treat or prevent imminent or life-threatening deterioration of the following conditions: sepsis.  Critical care was time spent personally by me on the following activities: blood draw for specimens, development of treatment plan with patient or surrogate, discussions with consultants, discussions with primary provider, interpretation of cardiac output measurements, evaluation of patient's response to treatment, examination of patient, obtaining history from patient or surrogate, ordering and performing treatments and interventions, ordering and review of laboratory studies, ordering and review of radiographic studies, pulse oximetry, re-evaluation of patient's condition and review of old charts.  Subsequent provider of critical care: I assumed direction of critical care for this patient from another provider of my specialty.        Labs Reviewed   COMPREHENSIVE METABOLIC PANEL - Abnormal; Notable for the following components:       Result Value    Sodium 135 (*)     Potassium 3.2 (*)     CO2 17 (*)     Glucose 110 (*)     Blood Urea Nitrogen 4.9 (*)     Globulin 4.1 (*)     Albumin/Globulin Ratio 1.0 (*)     All other components within normal limits   URINALYSIS, REFLEX TO URINE CULTURE - Abnormal; Notable for the following components:    Appearance, UA Hazy (*)      Blood, UA Trace (*)     Nitrites, UA 2+ (*)     Leukocyte Esterase,  (*)     WBC, UA >100 (*)     Bacteria, UA Many (*)     Squamous Epithelial Cells, UA Trace (*)     Mucous, UA Trace (*)     All other components within normal limits   MAGNESIUM - Abnormal; Notable for the following components:    Magnesium Level 1.50 (*)     All other components within normal limits   CBC WITH DIFFERENTIAL - Abnormal; Notable for the following components:    WBC 15.70 (*)     RBC 2.96 (*)     Hgb 7.1 (*)     Hct 24.0 (*)     MCH 24.0 (*)     MCHC 29.6 (*)     RDW 18.6 (*)     Platelet 714 (*)     MPV 10.5 (*)     Neut # 14.16 (*)     IG# 0.09 (*)     All other components within normal limits   BLOOD SMEAR MICROSCOPIC EXAM ([facility]) - Abnormal; Notable for the following components:    RBC Morph Abnormal (*)     Anisocytosis 2+ (*)     Hypochromasia 2+ (*)     Ovalocytes 1+ (*)     Poikilocytosis 1+ (*)     Polychromasia 1+ (*)     Tear Drops Slight (*)     Platelets Increased (*)     All other components within normal limits   LACTIC ACID, PLASMA - Normal   LIPASE - Normal   COVID/RSV/FLU A&B PCR - Normal    Narrative:     The Xpert Xpress SARS-CoV-2/FLU/RSV plus is a rapid, multiplexed real-time PCR test intended for the simultaneous qualitative detection and differentiation of SARS-CoV-2, Influenza A, Influenza B, and respiratory syncytial virus (RSV) viral RNA in either nasopharyngeal swab or nasal swab specimens.         LACTIC ACID, PLASMA - Normal   PREGNANCY TEST, URINE RAPID - Normal   BLOOD CULTURE [facility]   BLOOD CULTURE [facility]   CULTURE, URINE   CBC W/ AUTO DIFFERENTIAL    Narrative:     The following orders were created for panel order CBC auto differential.  Procedure                               Abnormality         Status                     ---------                               -----------         ------                     CBC with Differential[[id number]]       Abnormal            Final result                  Please view results for these tests on the individual orders.   EXTRA TUBES    Narrative:     The following orders were created for panel order EXTRA TUBES.  Procedure                               Abnormality         Status                     ---------                               -----------         ------                     Light Blue Top Hold[[id number]]                             Final result               Red Top Hold[[id number]]                                    Final result               Light Green Top Hold[[id number]]                            Final result               Lavender Top Hold[[id number]]                               Final result               Gold Top Hold[[id number]]                                   Final result               Pink Top Hold[[id number]]                                   Final result                 Please view results for these tests on the individual orders.   LIGHT BLUE TOP HOLD   RED TOP HOLD   LIGHT GREEN TOP HOLD   LAVENDER TOP HOLD   GOLD TOP HOLD   PINK TOP HOLD     EKG Readings: (Independently Interpreted)   Initial Reading: No STEMI. Rhythm: Sinus Tachycardia. Heart Rate: 120. T Waves Flipped: AVR, V5, V2, V3, V4 and V6. Axis: Normal. Clinical Impression: Sinus Tachycardia     ECG Results              EKG 12-lead (In process)        Collection Time Result Time QRS Duration OHS QTC Calculation    06/02/24 14:24:47 06/02/24 14:39:10 86 437                     In process by Interface, Lab In [facility] (06/02/24 14:39:14)                   Narrative:    Test Reason : R00.0,    Vent. Rate : 121 BPM     Atrial Rate : 121 BPM     P-R Int : 136 ms          QRS Dur : 086 ms      QT Int : 308 ms       P-R-T Axes : 068 064 261 degrees     QTc Int : 437 ms    Sinus tachycardia  Cannot rule out Anterior infarct ,age undetermined  T wave abnormality, consider inferolateral ischemia  Abnormal ECG  No previous ECGs available    Referred By: [email or web address]   SELF            Confirmed By:                                   Imaging Results              CT Abdomen Pelvis With IV Contrast NO Oral Contrast (Final result)  Result time 06/02/24 18:07:16      Final result by William Mayfield MD (06/02/24 18:07:16)                   Impression:      1. Right kidney wedge configuration hypodensities could be related to pyelonephritis.  Attention to follow-up exams.    2. Details of other findings above.      Electronically signed by: William Mayfield  Date:    06/02/2024  Time:    18:07               Narrative:    EXAMINATION:  CT ABDOMEN PELVIS WITH IV CONTRAST    CLINICAL HISTORY:  UTI, recurrent/complicated (Female);    TECHNIQUE:  Multidetector axial images were obtained of the abdomen and pelvis following the administration of IV contrast. Oral contrast was not administered.    Dose length product of 326 mGycm. Automated exposure control was utilized to minimize radiation dose.    COMPARISON:  None available.    FINDINGS:  Included portion of the lungs are without suspicious nodularity, acute air space infiltrates or fluid within the pleural spaces.    Hepatic volume and attenuation is unremarkable. Gallbladder wall is not thickened and there is no intra luminal calcified calculus. No biliary dilation identified. Pancreatic unremarkable attenuation without acute peripancreatic phlegmons. Main pancreatic duct is not dilated. Spleen size is upper limits of normal measuring 12.4 cm without focal lesion.    The adrenal glands appear within normal limits.  There is left kidney lower pole 7 mm hypodense structure for which no specific follow-up imaging is recommended.  There are right kidney cortical wedgeconfiguration hypodensities on image 63, image 70 and image 78 series 5 which could be related to pyelonephritis.  Attention to follow-up exams.  No renal calculi or hydronephrosis or hydroureter. No perinephric fluid strandings or collections identified.    Stomach is partially decompressed.  There  is mild fluid within loops of small bowel without significant abnormal dilatation and could be related to ileus.  Portion of the appendix is seen on image 40 series 601 and is nondilated.  Colonic fecal loading throughout without pericolonic acute strandings.  No free fluid.    Urinary bladder appears within normal limits.  Endometrium is not prominent.  There is no pelvic free fluid.    No acute or otherwise osseous abnormality identified.                                       X-Ray Chest AP Portable (Final result)  Result time 06/02/24 15:04:04      Final result by Gage Torres MD (06/02/24 15:04:04)                   Narrative:    EXAMINATION  XR CHEST AP PORTABLE    CLINICAL HISTORY  Sepsis;    TECHNIQUE  A total of 1 frontal image(s) of the chest.    COMPARISON  13 July 2016    FINDINGS  Lines/tubes/devices: ECG leads overlie the imaged region.    The cardiomediastinal silhouette and central pulmonary vasculature are unremarkable for utilized technique.  The trachea is midline. There is no lobar consolidation, pleural effusion, or pneumothorax.    There is no acute osseous or extrathoracic abnormality.    IMPRESSION  No convincing acute radiographic abnormality.      Electronically signed by: Gage Torres  Date:    06/02/2024  Time:    15:04                                  X-Rays:   Independently Interpreted Readings:   Other Readings:  CXR no acute cardiopulmonary process    Medications   iohexoL (OMNIPAQUE 350) 350 mg iodine/mL injection (has no administration in time range)   ferrous sulfate tablet 1 each (1 each Oral Given 6/2/24 2134)   pantoprazole EC tablet 40 mg (has no administration in time range)   sucralfate 100 mg/mL suspension 1 g (1 g Oral Given 6/3/24 0619)   aluminum-magnesium hydroxide-simethicone 200-200-20 mg/5 mL suspension 30 mL (30 mLs Oral Not Given 6/2/24 2100)   folic acid tablet 1,000 mcg (has no administration in time range)   sodium chloride 0.9% flush 10 mL (has no  administration in time range)   naloxone 0.4 mg/mL injection 0.02 mg (has no administration in time range)   glucose chewable tablet 16 g (has no administration in time range)   glucose chewable tablet 24 g (has no administration in time range)   glucagon (human recombinant) injection 1 mg (has no administration in time range)   ondansetron injection 4 mg (4 mg Intravenous Given 6/2/24 2010)   dextrose 10% bolus 125 mL 125 mL (has no administration in time range)   dextrose 10% bolus 250 mL 250 mL (has no administration in time range)   potassium bicarbonate disintegrating tablet 25 mEq (25 mEq Oral Given 6/3/24 0618)   acetaminophen tablet 650 mg (650 mg Oral Given 6/3/24 0220)   vancomycin - pharmacy to dose (has no administration in time range)   piperacillin-tazobactam (ZOSYN) 4.5 g in dextrose 5 % in water (D5W) 100 mL IVPB (MB+) (0 g Intravenous Stopped 6/3/24 0321)   lactated ringers infusion ( Intravenous New Bag 6/2/24 2137)   vancomycin 750 mg in dextrose 5 % (D5W) 250 mL IVPB (has no administration in time range)   0.9%  NaCl infusion (for blood administration) (has no administration in time range)   oxyCODONE-acetaminophen 5-325 mg per tablet 1 tablet (1 tablet Oral Incomplete 6/3/24 0545)   acetaminophen tablet 1,000 mg (1,000 mg Oral Given 6/2/24 1428)   sodium chloride 0.9% bolus 1,578 mL 1,578 mL (0 mLs Intravenous Stopped 6/2/24 1629)   ondansetron injection 4 mg (4 mg Intravenous Given 6/2/24 1503)   ketorolac injection 15 mg (15 mg Intravenous Given 6/2/24 1509)   magnesium sulfate 2g in water 50mL IVPB (premix) (0 g Intravenous Stopped 6/2/24 1717)   cefTRIAXone (Rocephin) 1 g in dextrose 5 % in water (D5W) 100 mL IVPB (MB+) (0 g Intravenous Stopped 6/2/24 1747)   HYDROcodone-acetaminophen 5-325 mg per tablet 1 tablet (1 tablet Oral Given 6/2/24 1624)   iohexoL (OMNIPAQUE 350) injection 100 mL (100 mLs Intravenous Given 6/2/24 1747)   morphine injection 4 mg (4 mg Intravenous Given 6/2/24 1935)    vancomycin (VANCOCIN) 1,250 mg in dextrose 5 % (D5W) 250 mL IVPB (0 mg Intravenous Stopped 6/2/24 4911)   lactated ringers bolus 1,000 mL ( Intravenous Stopped 6/3/24 0624)   potassium bicarbonate disintegrating tablet 25 mEq (25 mEq Oral Given 6/3/24 0618)   ketorolac injection 30 mg (30 mg Intramuscular Given 6/3/24 0631)     Medical Decision Making  Patient with a history of red blood cell aplasia, MGUS, chronic anemia with B12 and folate deficiencies presented to the ED today for fever, fatigue, and generalized body aches.  After workup she has a Gram-negative UTI with urosepsis.  Her anemia has progressed since labs a week ago and also labs 6 months ago.  Her magnesium was replaced.  She was given fluid via sepsis protocol.  Tylenol improved 103 fever.  She was given Norco for pain.  Rocephin was given for her UTI.  Internal Medicine was consulted for inpatient admit for urosepsis and anemia workup.    Amount and/or Complexity of Data Reviewed  External Data Reviewed: labs, radiology, ECG and notes.  Labs: ordered. Decision-making details documented in ED Course.  Radiology: ordered and independent interpretation performed. Decision-making details documented in ED Course.     Details: Chest x-ray showed no acute cardiopulmonary process  ECG/medicine tests: ordered and independent interpretation performed. Decision-making details documented in ED Course.     Details: Sinus rhythm with a rate of 120, inverted T-wave abnormalities.    Risk  OTC drugs.  Prescription drug management.  Decision regarding hospitalization.      Additional MDM:   Differential Diagnosis:   Pyelonephritis, leukemia, atrial fibrillation, SVT, among others       APC / Resident Notes:   I have personally had face to face with the patient.  All charts, labs, and imaging studies were reviewed.  I agree with this PA/NPs findings, exam and plan. I approve the management plan and take responsibility of the patient management.         ED Course  as of 06/03/24 0640   Sun Jun 02, 2024   1508 CXR:no acute cardiopulmonary process [LS]   1508 Lactic 2.1 [LS]   1509 Temp 102.9 after tylenol [LS]   1511 Magnesium (!): 1.50 [LS]   1511 2 gram magnesium ordered   [LS]   1512 CO2(!): 17 [LS]   1512 Lipase: 14 [LS]   1515 Anion Gap: 13.0  Gap 13 [LS]   1515 NITRITE UA(!): 2+ [LS]   1516 Leukocyte Esterase, UA(!): 500 [LS]   1516 WBC, UA(!): >100 [LS]   1516 Bacteria, UA(!): Many [LS]   1516 Squamous Epithelial Cells, UA(!): Trace [LS]   1516 Mucous, UA(!): Trace [LS]   1516 Appearance, UA(!): Hazy [LS]   1600 Dr. Andrade ED provider was consulted and case was discussed with him for admission for urosepsis and anemia workup. [LS]   1612 IM consulted for urosepsis and anemia workup [LS]      ED Course User Index  [LS] Danae Carl, FNP                             Clinical Impression:  Final diagnoses:  [R00.0] Tachycardia, unspecified  [A41.50, N39.0] Sepsis due to gram-negative UTI (Primary)  [R11.0] Nausea  [R11.14] Bilious vomiting with nausea  [D50.9] Iron deficiency anemia, unspecified iron deficiency anemia type          ED Disposition Condition    Admit Stable                Phil Andrade DO  06/03/24 0641

## 2024-06-03 LAB
ABO + RH BLD: NORMAL
ABO + RH BLD: NORMAL
ALBUMIN SERPL-MCNC: 3.1 G/DL (ref 3.5–5)
ALBUMIN/GLOB SERPL: 0.9 RATIO (ref 1.1–2)
ALP SERPL-CCNC: 53 UNIT/L (ref 40–150)
ALT SERPL-CCNC: 9 UNIT/L (ref 0–55)
ANION GAP SERPL CALC-SCNC: 8 MEQ/L
AST SERPL-CCNC: 16 UNIT/L (ref 5–34)
BASOPHILS # BLD AUTO: 0.04 X10(3)/MCL
BASOPHILS # BLD AUTO: 0.04 X10(3)/MCL
BASOPHILS NFR BLD AUTO: 0.2 %
BASOPHILS NFR BLD AUTO: 0.2 %
BILIRUB SERPL-MCNC: 0.4 MG/DL
BLD PROD TYP BPU: NORMAL
BLD PROD TYP BPU: NORMAL
BLOOD UNIT EXPIRATION DATE: NORMAL
BLOOD UNIT EXPIRATION DATE: NORMAL
BLOOD UNIT TYPE CODE: 7300
BLOOD UNIT TYPE CODE: 7300
BUN SERPL-MCNC: 3.5 MG/DL (ref 7–18.7)
CALCIUM SERPL-MCNC: 8.2 MG/DL (ref 8.4–10.2)
CHLORIDE SERPL-SCNC: 107 MMOL/L (ref 98–107)
CO2 SERPL-SCNC: 21 MMOL/L (ref 22–29)
CREAT SERPL-MCNC: 0.7 MG/DL (ref 0.55–1.02)
CREAT/UREA NIT SERPL: 5
CROSSMATCH INTERPRETATION: NORMAL
CROSSMATCH INTERPRETATION: NORMAL
DISPENSE STATUS: NORMAL
DISPENSE STATUS: NORMAL
EOSINOPHIL # BLD AUTO: 0.04 X10(3)/MCL (ref 0–0.9)
EOSINOPHIL # BLD AUTO: 0.12 X10(3)/MCL (ref 0–0.9)
EOSINOPHIL NFR BLD AUTO: 0.2 %
EOSINOPHIL NFR BLD AUTO: 0.6 %
ERYTHROCYTE [DISTWIDTH] IN BLOOD BY AUTOMATED COUNT: 18.5 % (ref 11.5–17)
ERYTHROCYTE [DISTWIDTH] IN BLOOD BY AUTOMATED COUNT: 18.8 % (ref 11.5–17)
GFR SERPLBLD CREATININE-BSD FMLA CKD-EPI: >60 ML/MIN/1.73/M2
GLOBULIN SER-MCNC: 3.3 GM/DL (ref 2.4–3.5)
GLUCOSE SERPL-MCNC: 138 MG/DL (ref 74–100)
GROUP & RH: NORMAL
HCT VFR BLD AUTO: 19.3 % (ref 37–47)
HCT VFR BLD AUTO: 26.4 % (ref 37–47)
HGB BLD-MCNC: 5.8 G/DL (ref 12–16)
HGB BLD-MCNC: 8.5 G/DL (ref 12–16)
HOLD SPECIMEN: NORMAL
HOLD SPECIMEN: NORMAL
IMM GRANULOCYTES # BLD AUTO: 0.13 X10(3)/MCL (ref 0–0.04)
IMM GRANULOCYTES # BLD AUTO: 0.16 X10(3)/MCL (ref 0–0.04)
IMM GRANULOCYTES NFR BLD AUTO: 0.7 %
IMM GRANULOCYTES NFR BLD AUTO: 0.9 %
INDIRECT COOMBS: NORMAL
LYMPHOCYTES # BLD AUTO: 0.96 X10(3)/MCL (ref 0.6–4.6)
LYMPHOCYTES # BLD AUTO: 1.78 X10(3)/MCL (ref 0.6–4.6)
LYMPHOCYTES NFR BLD AUTO: 4.9 %
LYMPHOCYTES NFR BLD AUTO: 9.5 %
MAGNESIUM SERPL-MCNC: 2 MG/DL (ref 1.6–2.6)
MCH RBC QN AUTO: 24 PG (ref 27–31)
MCH RBC QN AUTO: 26.1 PG (ref 27–31)
MCHC RBC AUTO-ENTMCNC: 30.1 G/DL (ref 33–36)
MCHC RBC AUTO-ENTMCNC: 32.2 G/DL (ref 33–36)
MCV RBC AUTO: 79.8 FL (ref 80–94)
MCV RBC AUTO: 81 FL (ref 80–94)
MONOCYTES # BLD AUTO: 1.43 X10(3)/MCL (ref 0.1–1.3)
MONOCYTES # BLD AUTO: 1.54 X10(3)/MCL (ref 0.1–1.3)
MONOCYTES NFR BLD AUTO: 7.3 %
MONOCYTES NFR BLD AUTO: 8.2 %
NEUTROPHILS # BLD AUTO: 15.13 X10(3)/MCL (ref 2.1–9.2)
NEUTROPHILS # BLD AUTO: 17.04 X10(3)/MCL (ref 2.1–9.2)
NEUTROPHILS NFR BLD AUTO: 81 %
NEUTROPHILS NFR BLD AUTO: 86.3 %
NRBC BLD AUTO-RTO: 0.3 %
NRBC BLD AUTO-RTO: 0.3 %
OHS QRS DURATION: 86 MS
OHS QTC CALCULATION: 437 MS
PLATELET # BLD AUTO: 622 X10(3)/MCL (ref 130–400)
PLATELET # BLD AUTO: 627 X10(3)/MCL (ref 130–400)
PMV BLD AUTO: 10.2 FL (ref 7.4–10.4)
PMV BLD AUTO: 10.3 FL (ref 7.4–10.4)
POTASSIUM SERPL-SCNC: 3 MMOL/L (ref 3.5–5.1)
PROT SERPL-MCNC: 6.4 GM/DL (ref 6.4–8.3)
RBC # BLD AUTO: 2.42 X10(6)/MCL (ref 4.2–5.4)
RBC # BLD AUTO: 3.26 X10(6)/MCL (ref 4.2–5.4)
SODIUM SERPL-SCNC: 136 MMOL/L (ref 136–145)
SPECIMEN OUTDATE: NORMAL
UNIT NUMBER: NORMAL
UNIT NUMBER: NORMAL
WBC # SPEC AUTO: 18.69 X10(3)/MCL (ref 4.5–11.5)
WBC # SPEC AUTO: 19.72 X10(3)/MCL (ref 4.5–11.5)

## 2024-06-03 PROCEDURE — 63600175 PHARM REV CODE 636 W HCPCS

## 2024-06-03 PROCEDURE — 36415 COLL VENOUS BLD VENIPUNCTURE: CPT | Performed by: STUDENT IN AN ORGANIZED HEALTH CARE EDUCATION/TRAINING PROGRAM

## 2024-06-03 PROCEDURE — 85025 COMPLETE CBC W/AUTO DIFF WBC: CPT

## 2024-06-03 PROCEDURE — 25000003 PHARM REV CODE 250

## 2024-06-03 PROCEDURE — 36415 COLL VENOUS BLD VENIPUNCTURE: CPT

## 2024-06-03 PROCEDURE — 86850 RBC ANTIBODY SCREEN: CPT

## 2024-06-03 PROCEDURE — 30233N1 TRANSFUSION OF NONAUTOLOGOUS RED BLOOD CELLS INTO PERIPHERAL VEIN, PERCUTANEOUS APPROACH: ICD-10-PCS | Performed by: INTERNAL MEDICINE

## 2024-06-03 PROCEDURE — 21400001 HC TELEMETRY ROOM

## 2024-06-03 PROCEDURE — 80053 COMPREHEN METABOLIC PANEL: CPT

## 2024-06-03 PROCEDURE — 94761 N-INVAS EAR/PLS OXIMETRY MLT: CPT

## 2024-06-03 PROCEDURE — 83735 ASSAY OF MAGNESIUM: CPT | Performed by: STUDENT IN AN ORGANIZED HEALTH CARE EDUCATION/TRAINING PROGRAM

## 2024-06-03 PROCEDURE — 63600175 PHARM REV CODE 636 W HCPCS: Performed by: STUDENT IN AN ORGANIZED HEALTH CARE EDUCATION/TRAINING PROGRAM

## 2024-06-03 PROCEDURE — 25000003 PHARM REV CODE 250: Performed by: STUDENT IN AN ORGANIZED HEALTH CARE EDUCATION/TRAINING PROGRAM

## 2024-06-03 PROCEDURE — 86923 COMPATIBILITY TEST ELECTRIC: CPT | Mod: 91

## 2024-06-03 PROCEDURE — P9016 RBC LEUKOCYTES REDUCED: HCPCS

## 2024-06-03 PROCEDURE — 36430 TRANSFUSION BLD/BLD COMPNT: CPT

## 2024-06-03 RX ORDER — HYDROCODONE BITARTRATE AND ACETAMINOPHEN 500; 5 MG/1; MG/1
TABLET ORAL
Status: DISCONTINUED | OUTPATIENT
Start: 2024-06-03 | End: 2024-06-04 | Stop reason: HOSPADM

## 2024-06-03 RX ORDER — OXYCODONE AND ACETAMINOPHEN 5; 325 MG/1; MG/1
1 TABLET ORAL ONCE
Status: DISCONTINUED | OUTPATIENT
Start: 2024-06-03 | End: 2024-06-04 | Stop reason: HOSPADM

## 2024-06-03 RX ORDER — KETOROLAC TROMETHAMINE 30 MG/ML
30 INJECTION, SOLUTION INTRAMUSCULAR; INTRAVENOUS ONCE
Status: COMPLETED | OUTPATIENT
Start: 2024-06-03 | End: 2024-06-03

## 2024-06-03 RX ADMIN — SODIUM CHLORIDE, POTASSIUM CHLORIDE, SODIUM LACTATE AND CALCIUM CHLORIDE 1000 ML: 600; 310; 30; 20 INJECTION, SOLUTION INTRAVENOUS at 05:06

## 2024-06-03 RX ADMIN — SODIUM CHLORIDE, POTASSIUM CHLORIDE, SODIUM LACTATE AND CALCIUM CHLORIDE: 600; 310; 30; 20 INJECTION, SOLUTION INTRAVENOUS at 11:06

## 2024-06-03 RX ADMIN — PIPERACILLIN SODIUM AND TAZOBACTAM SODIUM 4.5 G: 4; .5 INJECTION, POWDER, LYOPHILIZED, FOR SOLUTION INTRAVENOUS at 11:06

## 2024-06-03 RX ADMIN — POTASSIUM BICARBONATE 25 MEQ: 977.5 TABLET, EFFERVESCENT ORAL at 06:06

## 2024-06-03 RX ADMIN — FOLIC ACID 1000 MCG: 1 TABLET ORAL at 08:06

## 2024-06-03 RX ADMIN — ACETAMINOPHEN 650 MG: 325 TABLET, FILM COATED ORAL at 03:06

## 2024-06-03 RX ADMIN — FERROUS SULFATE TAB 325 MG (65 MG ELEMENTAL FE) 1 EACH: 325 (65 FE) TAB at 08:06

## 2024-06-03 RX ADMIN — VANCOMYCIN HYDROCHLORIDE 750 MG: 750 INJECTION, POWDER, LYOPHILIZED, FOR SOLUTION INTRAVENOUS at 10:06

## 2024-06-03 RX ADMIN — PANTOPRAZOLE SODIUM 40 MG: 40 TABLET, DELAYED RELEASE ORAL at 08:06

## 2024-06-03 RX ADMIN — KETOROLAC TROMETHAMINE 30 MG: 30 INJECTION, SOLUTION INTRAMUSCULAR; INTRAVENOUS at 04:06

## 2024-06-03 RX ADMIN — SUCRALFATE 1 G: 1 SUSPENSION ORAL at 12:06

## 2024-06-03 RX ADMIN — ALUMINUM HYDROXIDE, MAGNESIUM HYDROXIDE, AND DIMETHICONE 30 ML: 200; 20; 200 SUSPENSION ORAL at 12:06

## 2024-06-03 RX ADMIN — SUCRALFATE 1 G: 1 SUSPENSION ORAL at 06:06

## 2024-06-03 RX ADMIN — ACETAMINOPHEN 650 MG: 325 TABLET, FILM COATED ORAL at 02:06

## 2024-06-03 RX ADMIN — ACETAMINOPHEN 650 MG: 325 TABLET, FILM COATED ORAL at 06:06

## 2024-06-03 RX ADMIN — KETOROLAC TROMETHAMINE 30 MG: 30 INJECTION, SOLUTION INTRAMUSCULAR; INTRAVENOUS at 06:06

## 2024-06-03 RX ADMIN — ALUMINUM HYDROXIDE, MAGNESIUM HYDROXIDE, AND DIMETHICONE 30 ML: 200; 20; 200 SUSPENSION ORAL at 08:06

## 2024-06-03 RX ADMIN — PIPERACILLIN SODIUM AND TAZOBACTAM SODIUM 4.5 G: 4; .5 INJECTION, POWDER, LYOPHILIZED, FOR SOLUTION INTRAVENOUS at 06:06

## 2024-06-03 NOTE — CARE UPDATE
ED nurse called. Patient with fever of 101.2. Tylenol 650 mg Q6H PRN ordered.      Ozzie Barcenas MD, MBS  [facility] Resident, [facility]

## 2024-06-03 NOTE — PROGRESS NOTES
[facility]   Progress Note     Resident Team: [facility] List 2  Attending Physician: Bradley Parham MD  Resident: Roni  Intern: Toi Quach   Hospital Length of Stay: 1 days    Subjective:      Brief HPI:  37 yo F with PMH of Crohn's disease and JAE (requiring multiple transfusions in past) presented 6/2/24 with c/o fevers/chills morning of presentation. Associated HA, myalgias, n/v and fatigue. Denies hematemesis, dark/bloody stools, vaginal bleeding. No abdominal pain or diarrhea.     In ED, febrile to 103.2F, tachycardic to 132, /80. H/H of 7.1/24. Leukocytosis 15.7. Hypokalemia and hypomagnesemia. UA with 2+ nitrites, >100 WBC, many bacteria and 500 LE. CT abd/pelvis consistent with R sided pyelonephritis. Admitted to Medicine for treatment of pyelonephritis.    Interval History: Tmax 102.3F overnight. This AM,  and BP 88/58. H/H downtrended to 5.8/19.3. Pt denies chest pain, palpitations, SOB, weakness, abdominal pain, n/v/d, dysuria. No acute complaints. Denies hematemesis, vaginal bleeding, hematuria, dark/bloody stools.    ROS negative unless stated above.       Objective:     Vital Signs (Most Recent):  Temp: 98.3 °F (36.8 °C) (06/03/24 1108)  Pulse: 103 (06/03/24 1108)  Resp: 17 (06/03/24 1108)  BP: 100/68 (06/03/24 1108)  SpO2: (!) 92 % (06/03/24 1108) Vital Signs (24h Range):  Temp:  [98.3 °F (36.8 °C)-103.2 °F (39.6 °C)] 98.3 °F (36.8 °C)  Pulse:  [103-134] 103  Resp:  [16-24] 17  SpO2:  [92 %-100 %] 92 %  BP: ()/(50-85) 100/68     Physical Exam  Vitals reviewed.   Constitutional:       General: She is not in acute distress.  HENT:      Head: Normocephalic and atraumatic.      Mouth/Throat:      Mouth: Mucous membranes are moist.   Eyes:      Extraocular Movements: Extraocular movements intact.   Cardiovascular:      Rate and Rhythm: Regular rhythm. Tachycardia present.      Pulses:           Radial pulses are 2+ on the right side and 2+ on the left side.       Heart sounds: No murmur heard.  Pulmonary:      Effort: Pulmonary effort is normal. No respiratory distress.      Breath sounds: Normal breath sounds. No decreased breath sounds, wheezing, rhonchi or rales.   Abdominal:      General: Abdomen is flat. Bowel sounds are normal. There is no distension.      Palpations: Abdomen is soft.      Tenderness: There is no abdominal tenderness.   Musculoskeletal:      Cervical back: Normal range of motion and neck supple.      Right lower leg: No edema.      Left lower leg: No edema.   Skin:     General: Skin is warm.      Capillary Refill: Capillary refill takes less than 2 seconds.   Neurological:      Mental Status: She is alert and oriented to person, place, and time.           Laboratory:  Most Recent Data:  CBC:   Recent Labs   Lab 06/02/24 1448 06/03/24 0327   WBC 15.70* 19.72*   HGB 7.1* 5.8*   HCT 24.0* 19.3*   * 622*     CMP:   Recent Labs   Lab 06/03/24 0327   CALCIUM 8.2*   ALBUMIN 3.1*      K 3.0*   CO2 21*      BUN 3.5*   CREATININE 0.70   ALKPHOS 53   ALT 9   AST 16   BILITOT 0.4         Microbiology Data Reviewed: yes  Pertinent Findings:  Microbiology Results (last 7 days)       Procedure Component Value Units Date/Time    Urine culture [[id number]]  (Abnormal) Collected: 06/02/24 1425    Order Status: Completed Specimen: Urine Updated: 06/03/24 0716     Urine Culture >/= 100,000 colonies/ml Gram-negative Rods    Blood Culture #2 **CANNOT BE ORDERED STAT** [[id number]]     Order Status: Canceled Specimen: Blood     Blood Culture #1 **CANNOT BE ORDERED STAT** [[id number]]     Order Status: Canceled Specimen: Blood     Blood culture x two cultures. Draw prior to antibiotics. [[id number]] Collected: 06/02/24 1444    Order Status: Resulted Specimen: Blood from Arm, Right Updated: 06/02/24 1448    Blood culture x two cultures. Draw prior to antibiotics. [[id number]] Collected: 06/02/24 1444    Order Status: Resulted Specimen: Blood from Arm,  Left Updated: 06/02/24 1448                Radiology:  Imaging Results              CT Abdomen Pelvis With IV Contrast NO Oral Contrast (Final result)  Result time 06/02/24 18:07:16      Final result by William Mayfield MD (06/02/24 18:07:16)                   Impression:      1. Right kidney wedge configuration hypodensities could be related to pyelonephritis.  Attention to follow-up exams.    2. Details of other findings above.      Electronically signed by: William Mayfield  Date:    06/02/2024  Time:    18:07               Narrative:    EXAMINATION:  CT ABDOMEN PELVIS WITH IV CONTRAST    CLINICAL HISTORY:  UTI, recurrent/complicated (Female);    TECHNIQUE:  Multidetector axial images were obtained of the abdomen and pelvis following the administration of IV contrast. Oral contrast was not administered.    Dose length product of 326 mGycm. Automated exposure control was utilized to minimize radiation dose.    COMPARISON:  None available.    FINDINGS:  Included portion of the lungs are without suspicious nodularity, acute air space infiltrates or fluid within the pleural spaces.    Hepatic volume and attenuation is unremarkable. Gallbladder wall is not thickened and there is no intra luminal calcified calculus. No biliary dilation identified. Pancreatic unremarkable attenuation without acute peripancreatic phlegmons. Main pancreatic duct is not dilated. Spleen size is upper limits of normal measuring 12.4 cm without focal lesion.    The adrenal glands appear within normal limits.  There is left kidney lower pole 7 mm hypodense structure for which no specific follow-up imaging is recommended.  There are right kidney cortical wedgeconfiguration hypodensities on image 63, image 70 and image 78 series 5 which could be related to pyelonephritis.  Attention to follow-up exams.  No renal calculi or hydronephrosis or hydroureter. No perinephric fluid strandings or collections identified.    Stomach is partially decompressed.   There is mild fluid within loops of small bowel without significant abnormal dilatation and could be related to ileus.  Portion of the appendix is seen on image 40 series 601 and is nondilated.  Colonic fecal loading throughout without pericolonic acute strandings.  No free fluid.    Urinary bladder appears within normal limits.  Endometrium is not prominent.  There is no pelvic free fluid.    No acute or otherwise osseous abnormality identified.                                       X-Ray Chest AP Portable (Final result)  Result time 06/02/24 15:04:04      Final result by Gage Torres MD (06/02/24 15:04:04)                   Narrative:    EXAMINATION  XR CHEST AP PORTABLE    CLINICAL HISTORY  Sepsis;    TECHNIQUE  A total of 1 frontal image(s) of the chest.    COMPARISON  13 July 2016    FINDINGS  Lines/tubes/devices: ECG leads overlie the imaged region.    The cardiomediastinal silhouette and central pulmonary vasculature are unremarkable for utilized technique.  The trachea is midline. There is no lobar consolidation, pleural effusion, or pneumothorax.    There is no acute osseous or extrathoracic abnormality.    IMPRESSION  No convincing acute radiographic abnormality.      Electronically signed by: Gage Torres  Date:    06/02/2024  Time:    15:04                                    Current Medications:     Infusions:   lactated ringers   Intravenous Continuous 100 mL/hr at 06/03/24 1113 New Bag at 06/03/24 1113        Scheduled:   aluminum-magnesium hydroxide-simethicone  30 mL Oral QID (AC & HS)    ferrous sulfate  1 tablet Oral BID    folic acid  1,000 mcg Oral Daily    oxyCODONE-acetaminophen  1 tablet Oral Once    pantoprazole  40 mg Oral Daily    piperacillin-tazobactam (Zosyn) IV (PEDS and ADULTS) (extended infusion is not appropriate)  4.5 g Intravenous Q8H    sucralfate  1 g Oral Q6H    vancomycin (VANCOCIN) IV (PEDS and ADULTS)  750 mg Intravenous Q12H        PRN:    Current Facility-Administered  Medications:     0.9%  NaCl infusion (for blood administration), , Intravenous, Q24H PRN    acetaminophen, 650 mg, Oral, Q6H PRN    dextrose 10%, 12.5 g, Intravenous, PRN    dextrose 10%, 25 g, Intravenous, PRN    glucagon (human recombinant), 1 mg, Intramuscular, PRN    glucose, 16 g, Oral, PRN    glucose, 24 g, Oral, PRN    naloxone, 0.02 mg, Intravenous, PRN    ondansetron, 4 mg, Intravenous, Q8H PRN    potassium bicarbonate, 25 mEq, Oral, Q6H PRN    sodium chloride 0.9%, 10 mL, Intravenous, Q12H PRN    Pharmacy to dose Vancomycin consult, , , Once **AND** vancomycin - pharmacy to dose, , Intravenous, pharmacy to manage frequency    Antibiotics and Day Number of Therapy:  Vancomycin day 2  Zosyn day 2      Intake/Output Summary (Last 24 hours) at 6/3/2024 1328  Last data filed at 6/3/2024 1010  Gross per 24 hour   Intake 3608.35 ml   Output 125 ml   Net 3483.35 ml       Lines/Drains/Airways       Peripheral Intravenous Line  Duration                  Peripheral IV - Single Lumen 06/02/24 1424 20 G Anterior;Right Forearm <1 day                      Assessment & Plan:     Sepsis with source of Right-Sided Pyelonephritis   - 3/4 SIRS criteria met in ED (, Tmax 103.2F, RR 20, WBC 15.70)  - Urine showing 2+ nitrites, many bacteria, >100 WBCs, and trace blood  - Blood cultures x2 pending  - Urine culture with >100,000 colonies/mL gram negative rods. Pending speciation and sensitivities.  - Lactic Acid 0.7 (initially 2.1)  - CT Abd shows wedge hypodensities in the right kidney, possibly related to pyelonephritis.   - Continue Vanc and Zosyn  - Leukocytosis increasing (WBC 19.7 today). Repeat CBC in AM  - BP soft this AM (80s/50s); 1L NS bolus given     Hypokalemia  Hypomagnesemia - improved  - Potassium 3; Mg 2  - 2g Mg sulfate given in ED  - Potassium repleted today. Monitor w/CMP in AM   - LR @ 100cc/h     Iron deficiency anemia  - H/H downtrending 5.8/19.3 (from 7.1/24.0 yesterday)  - Transfusing 2u pRBC today.  Will recheck CBC post-transfusion.  - ferous sulfate tablet; folic acid tablet        CODE STATUS: Full Code  Diet: Adult Regular   DVT Prophylaxis:  SCDs given hx of Crohn's and iron deficiency anemia      Kortney Quach MD  [facility], [facility]

## 2024-06-03 NOTE — CARE UPDATE
Patient with H @ 5.8, asymptomatic, denies dizziness, weakness and has normal mentation. Hx of chronic Crohn's disease. Systolic @ 85/50, MAP 62. Follows with Dr. Valdes for GI issues, last appt last Tuesday at which point H/H was 8.2/27.2. May consider GI consult while inpatient.    Gave 2 units pRBC.  Gave 1L LR Bolus.    K @ 3.0. gave extra 25 mEq tablet Potassium Bicarb, in addition to 25 mEq already scheduled.       One-time Toradol 30 mg IV given for generalized bodyache.       Ozzie Barcenas MD, MBS  [facility] Resident, [facility]

## 2024-06-03 NOTE — PLAN OF CARE
06/03/24 1111   Discharge Assessment   Assessment Type Discharge Planning Assessment   Confirmed/corrected address, phone number and insurance Yes   Confirmed Demographics Correct on Facesheet   Source of Information patient   When was your last doctors appointment?   ([facility])   Reason For Admission Tachcardia, nausea, CP   People in Home child(solomon), dependent;significant other   Facility Arrived From: Home   Do you expect to return to your current living situation? Yes   Do you have help at home or someone to help you manage your care at home? Yes   Who are your caregiver(s) and their phone number(s)? Pedro Pablo Jacinto (Significant other)  [phone]   Prior to hospitilization cognitive status: Alert/Oriented   Current cognitive status: Alert/Oriented   Walking or Climbing Stairs Difficulty no   Dressing/Bathing Difficulty no   Equipment Currently Used at Home none   Readmission within 30 days? No   Patient currently being followed by outpatient case management? No   Do you currently have service(s) that help you manage your care at home? No   Do you take prescription medications? Yes   Do you have prescription coverage? Yes   Coverage Medicaid   Do you have any problems affording any of your prescribed medications? No   Is the patient taking medications as prescribed? yes   Who is going to help you get home at discharge? Pedro Pablo Jacinto (Significant other)  [phone]   How do you get to doctors appointments? family or friend will provide   Are you on dialysis? No   Do you take coumadin? No   Discharge Plan A Home with family   DME Needed Upon Discharge  none   Discharge Plan discussed with: Patient   Physical Activity   On average, how many days per week do you engage in moderate to strenuous exercise (like a brisk walk)? 1 day   On average, how many minutes do you engage in exercise at this level? 10 min   Financial Resource Strain   How hard is it for you to pay for the very basics like food, housing,  medical care, and heating? Not hard   Housing Stability   In the last 12 months, was there a time when you were not able to pay the mortgage or rent on time? N   At any time in the past 12 months, were you homeless or living in a shelter (including now)? N   Transportation Needs   Has the lack of transportation kept you from medical appointments, meetings, work or from getting things needed for daily living? No   Food Insecurity   Within the past 12 months, you worried that your food would run out before you got the money to buy more. Never true   Within the past 12 months, the food you bought just didn't last and you didn't have money to get more. Never true   Stress   Do you feel stress - tense, restless, nervous, or anxious, or unable to sleep at night because your mind is troubled all the time - these days? Not at all   Social Isolation   How often do you feel lonely or isolated from those around you?  Never   Alcohol Use   Q1: How often do you have a drink containing alcohol? Monthly or l   Q2: How many drinks containing alcohol do you have on a typical day when you are drinking? 1 or 2   Q3: How often do you have six or more drinks on one occasion? Never   Utilities   In the past 12 months has the electric, gas, oil, or water company threatened to shut off services in your home? No   Health Literacy   How often do you need to have someone help you when you read instructions, pamphlets, or other written material from your doctor or pharmacy? Never   OTHER   Name(s) of People in Home Pedro Pablo Jacinto (Significant other)  [phone]

## 2024-06-03 NOTE — PROGRESS NOTES
"Pharmacokinetic Initial Assessment: IV Vancomycin    Assessment/Plan:    Initiate intravenous vancomycin with loading dose of 1250 mg once followed by a maintenance dose of vancomycin 750mg IV every 12 hours  Desired empiric serum trough concentration is 10 to 15 mcg/mL  Draw vancomycin trough level 60 min prior to fourth dose on 06/04/24 at approximately 0900  Pharmacy will continue to follow and monitor vancomycin.      Please contact pharmacy at extension [phone] with any questions regarding this assessment.     Thank you for the consult,   Cassy Ly       Patient brief summary:  Elaina Pierce is a 38 y.o. female initiated on antimicrobial therapy with IV Vancomycin for treatment of suspected urinary tract infection    Drug Allergies:   Review of patient's allergies indicates:  No Known Allergies    Actual Body Weight:   52.6 kg    Renal Function:   Estimated Creatinine Clearance: 80.4 mL/min (based on SCr of 0.73 mg/dL).,     Dialysis Method (if applicable):  N/A    CBC (last 72 hours):  Recent Labs   Lab Result Units 06/02/24  1448   WBC x10(3)/mcL 15.70*   Hgb g/dL 7.1*   Hct % 24.0*   Platelet x10(3)/mcL 714*   Mono % % 5.0   Eos % % 0.1   Basophil % % 0.3       Metabolic Panel (last 72 hours):  Recent Labs   Lab Result Units 06/02/24  1425 06/02/24  1448   Sodium mmol/L  --  135*   Potassium mmol/L  --  3.2*   Chloride mmol/L  --  105   CO2 mmol/L  --  17*   Glucose mg/dL  --  110*   Glucose, UA  Normal  --    Blood Urea Nitrogen mg/dL  --  4.9*   Creatinine mg/dL  --  0.73   Albumin g/dL  --  4.1   Bilirubin Total mg/dL  --  0.5   ALP unit/L  --  69   AST unit/L  --  18   ALT unit/L  --  7   Magnesium Level mg/dL  --  1.50*       Drug levels (last 3 results):  No results for input(s): "VANCOMYCINRA", "VANCORANDOM", "VANCOMYCINPE", "VANCOPEAK", "VANCOMYCINTR", "VANCOTROUGH" in the last 72 hours.    Microbiologic Results:  Microbiology Results (last 7 days)       Procedure Component Value " Units Date/Time    Blood Culture #2 **CANNOT BE ORDERED STAT** [[id number]]     Order Status: Canceled Specimen: Blood     Blood Culture #1 **CANNOT BE ORDERED STAT** [[id number]]     Order Status: Canceled Specimen: Blood     Urine culture [[id number]] Collected: 06/02/24 1425    Order Status: Sent Specimen: Urine Updated: 06/02/24 1514    Blood culture x two cultures. Draw prior to antibiotics. [[id number]] Collected: 06/02/24 1444    Order Status: Resulted Specimen: Blood from Arm, Right Updated: 06/02/24 1448    Blood culture x two cultures. Draw prior to antibiotics. [[id number]] Collected: 06/02/24 1444    Order Status: Resulted Specimen: Blood from Arm, Left Updated: 06/02/24 1448

## 2024-06-04 VITALS
RESPIRATION RATE: 18 BRPM | BODY MASS INDEX: 21.9 KG/M2 | OXYGEN SATURATION: 100 % | DIASTOLIC BLOOD PRESSURE: 73 MMHG | TEMPERATURE: 99 F | SYSTOLIC BLOOD PRESSURE: 106 MMHG | HEIGHT: 61 IN | WEIGHT: 116 LBS | HEART RATE: 93 BPM

## 2024-06-04 PROBLEM — A41.50 SEPSIS DUE TO GRAM-NEGATIVE UTI: Status: ACTIVE | Noted: 2024-06-04

## 2024-06-04 PROBLEM — N39.0 SEPSIS DUE TO GRAM-NEGATIVE UTI: Status: ACTIVE | Noted: 2024-06-04

## 2024-06-04 PROBLEM — N12 PYELONEPHRITIS: Status: ACTIVE | Noted: 2024-06-04

## 2024-06-04 LAB
ALBUMIN SERPL-MCNC: 2.7 G/DL (ref 3.5–5)
ALBUMIN/GLOB SERPL: 0.8 RATIO (ref 1.1–2)
ALP SERPL-CCNC: 55 UNIT/L (ref 40–150)
ALT SERPL-CCNC: 9 UNIT/L (ref 0–55)
ANION GAP SERPL CALC-SCNC: 10 MEQ/L
ANION GAP SERPL CALC-SCNC: 4 MEQ/L
AST SERPL-CCNC: 19 UNIT/L (ref 5–34)
BACTERIA UR CULT: ABNORMAL
BASOPHILS # BLD AUTO: 0.03 X10(3)/MCL
BASOPHILS NFR BLD AUTO: 0.2 %
BILIRUB SERPL-MCNC: 0.9 MG/DL
BUN SERPL-MCNC: 6.4 MG/DL (ref 7–18.7)
BUN SERPL-MCNC: 7.2 MG/DL (ref 7–18.7)
CALCIUM SERPL-MCNC: 8.2 MG/DL (ref 8.4–10.2)
CALCIUM SERPL-MCNC: 8.8 MG/DL (ref 8.4–10.2)
CHLORIDE SERPL-SCNC: 104 MMOL/L (ref 98–107)
CHLORIDE SERPL-SCNC: 105 MMOL/L (ref 98–107)
CO2 SERPL-SCNC: 22 MMOL/L (ref 22–29)
CO2 SERPL-SCNC: 27 MMOL/L (ref 22–29)
CREAT SERPL-MCNC: 0.79 MG/DL (ref 0.55–1.02)
CREAT SERPL-MCNC: 1.01 MG/DL (ref 0.55–1.02)
CREAT/UREA NIT SERPL: 7
CREAT/UREA NIT SERPL: 8
EOSINOPHIL # BLD AUTO: 0.05 X10(3)/MCL (ref 0–0.9)
EOSINOPHIL NFR BLD AUTO: 0.3 %
ERYTHROCYTE [DISTWIDTH] IN BLOOD BY AUTOMATED COUNT: 18 % (ref 11.5–17)
GFR SERPLBLD CREATININE-BSD FMLA CKD-EPI: >60 ML/MIN/1.73/M2
GFR SERPLBLD CREATININE-BSD FMLA CKD-EPI: >60 ML/MIN/1.73/M2
GLOBULIN SER-MCNC: 3.3 GM/DL (ref 2.4–3.5)
GLUCOSE SERPL-MCNC: 103 MG/DL (ref 74–100)
GLUCOSE SERPL-MCNC: 162 MG/DL (ref 74–100)
HCT VFR BLD AUTO: 25.8 % (ref 37–47)
HGB BLD-MCNC: 8.4 G/DL (ref 12–16)
HOLD SPECIMEN: NORMAL
IMM GRANULOCYTES # BLD AUTO: 0.1 X10(3)/MCL (ref 0–0.04)
IMM GRANULOCYTES NFR BLD AUTO: 0.6 %
LYMPHOCYTES # BLD AUTO: 1.26 X10(3)/MCL (ref 0.6–4.6)
LYMPHOCYTES NFR BLD AUTO: 7.9 %
MAGNESIUM SERPL-MCNC: 1.9 MG/DL (ref 1.6–2.6)
MCH RBC QN AUTO: 26 PG (ref 27–31)
MCHC RBC AUTO-ENTMCNC: 32.6 G/DL (ref 33–36)
MCV RBC AUTO: 79.9 FL (ref 80–94)
MONOCYTES # BLD AUTO: 1.54 X10(3)/MCL (ref 0.1–1.3)
MONOCYTES NFR BLD AUTO: 9.7 %
NEUTROPHILS # BLD AUTO: 12.93 X10(3)/MCL (ref 2.1–9.2)
NEUTROPHILS NFR BLD AUTO: 81.3 %
NRBC BLD AUTO-RTO: 0.3 %
PLATELET # BLD AUTO: 646 X10(3)/MCL (ref 130–400)
PMV BLD AUTO: 11 FL (ref 7.4–10.4)
POTASSIUM SERPL-SCNC: 2.9 MMOL/L (ref 3.5–5.1)
POTASSIUM SERPL-SCNC: 4.3 MMOL/L (ref 3.5–5.1)
PROT SERPL-MCNC: 6 GM/DL (ref 6.4–8.3)
RBC # BLD AUTO: 3.23 X10(6)/MCL (ref 4.2–5.4)
SODIUM SERPL-SCNC: 135 MMOL/L (ref 136–145)
SODIUM SERPL-SCNC: 137 MMOL/L (ref 136–145)
VANCOMYCIN TROUGH SERPL-MCNC: 8.5 UG/ML (ref 15–20)
WBC # SPEC AUTO: 15.91 X10(3)/MCL (ref 4.5–11.5)

## 2024-06-04 PROCEDURE — 63600175 PHARM REV CODE 636 W HCPCS

## 2024-06-04 PROCEDURE — 25000003 PHARM REV CODE 250

## 2024-06-04 PROCEDURE — 85025 COMPLETE CBC W/AUTO DIFF WBC: CPT

## 2024-06-04 PROCEDURE — 25000003 PHARM REV CODE 250: Performed by: STUDENT IN AN ORGANIZED HEALTH CARE EDUCATION/TRAINING PROGRAM

## 2024-06-04 PROCEDURE — 80053 COMPREHEN METABOLIC PANEL: CPT

## 2024-06-04 PROCEDURE — 36415 COLL VENOUS BLD VENIPUNCTURE: CPT

## 2024-06-04 PROCEDURE — 80202 ASSAY OF VANCOMYCIN: CPT

## 2024-06-04 PROCEDURE — 63600175 PHARM REV CODE 636 W HCPCS: Performed by: STUDENT IN AN ORGANIZED HEALTH CARE EDUCATION/TRAINING PROGRAM

## 2024-06-04 PROCEDURE — 83735 ASSAY OF MAGNESIUM: CPT

## 2024-06-04 RX ORDER — LEVOFLOXACIN 750 MG/1
750 TABLET ORAL DAILY
Status: DISCONTINUED | OUTPATIENT
Start: 2024-06-04 | End: 2024-06-04 | Stop reason: HOSPADM

## 2024-06-04 RX ORDER — LEVOFLOXACIN 750 MG/1
750 TABLET ORAL DAILY
Qty: 4 TABLET | Refills: 0 | Status: SHIPPED | OUTPATIENT
Start: 2024-06-04 | End: 2024-06-11 | Stop reason: ALTCHOICE

## 2024-06-04 RX ORDER — POTASSIUM CHLORIDE 20 MEQ/1
40 TABLET, EXTENDED RELEASE ORAL ONCE
Status: COMPLETED | OUTPATIENT
Start: 2024-06-04 | End: 2024-06-04

## 2024-06-04 RX ORDER — MAGNESIUM SULFATE HEPTAHYDRATE 40 MG/ML
2 INJECTION, SOLUTION INTRAVENOUS ONCE
Status: COMPLETED | OUTPATIENT
Start: 2024-06-04 | End: 2024-06-04

## 2024-06-04 RX ORDER — POTASSIUM CHLORIDE 20 MEQ/1
40 TABLET, EXTENDED RELEASE ORAL ONCE
Status: DISCONTINUED | OUTPATIENT
Start: 2024-06-04 | End: 2024-06-04

## 2024-06-04 RX ORDER — MAGNESIUM SULFATE HEPTAHYDRATE 40 MG/ML
2 INJECTION, SOLUTION INTRAVENOUS ONCE
Status: DISCONTINUED | OUTPATIENT
Start: 2024-06-04 | End: 2024-06-04

## 2024-06-04 RX ADMIN — SUCRALFATE 1 G: 1 SUSPENSION ORAL at 05:06

## 2024-06-04 RX ADMIN — LEVOFLOXACIN 750 MG: 750 TABLET, FILM COATED ORAL at 12:06

## 2024-06-04 RX ADMIN — ACETAMINOPHEN 650 MG: 325 TABLET, FILM COATED ORAL at 03:06

## 2024-06-04 RX ADMIN — POTASSIUM BICARBONATE 50 MEQ: 977.5 TABLET, EFFERVESCENT ORAL at 09:06

## 2024-06-04 RX ADMIN — PIPERACILLIN SODIUM AND TAZOBACTAM SODIUM 4.5 G: 4; .5 INJECTION, POWDER, LYOPHILIZED, FOR SOLUTION INTRAVENOUS at 01:06

## 2024-06-04 RX ADMIN — MAGNESIUM SULFATE HEPTAHYDRATE 2 G: 40 INJECTION, SOLUTION INTRAVENOUS at 08:06

## 2024-06-04 RX ADMIN — PANTOPRAZOLE SODIUM 40 MG: 40 TABLET, DELAYED RELEASE ORAL at 09:06

## 2024-06-04 RX ADMIN — SODIUM CHLORIDE, POTASSIUM CHLORIDE, SODIUM LACTATE AND CALCIUM CHLORIDE: 600; 310; 30; 20 INJECTION, SOLUTION INTRAVENOUS at 05:06

## 2024-06-04 RX ADMIN — ALUMINUM HYDROXIDE, MAGNESIUM HYDROXIDE, AND DIMETHICONE 30 ML: 200; 20; 200 SUSPENSION ORAL at 05:06

## 2024-06-04 RX ADMIN — ACETAMINOPHEN 650 MG: 325 TABLET, FILM COATED ORAL at 12:06

## 2024-06-04 RX ADMIN — SUCRALFATE 1 G: 1 SUSPENSION ORAL at 12:06

## 2024-06-04 RX ADMIN — FOLIC ACID 1000 MCG: 1 TABLET ORAL at 09:06

## 2024-06-04 RX ADMIN — FERROUS SULFATE TAB 325 MG (65 MG ELEMENTAL FE) 1 EACH: 325 (65 FE) TAB at 09:06

## 2024-06-04 RX ADMIN — POTASSIUM CHLORIDE 40 MEQ: 1500 TABLET, EXTENDED RELEASE ORAL at 12:06

## 2024-06-04 NOTE — PROGRESS NOTES
Pharmacokinetic Assessment Follow Up: IV Vancomycin    Vancomycin serum concentration assessment(s):    The trough level was drawn correctly and can be used to guide therapy at this time. The measurement is below the desired definitive target range of 10 to 15 mcg/mL.    Vancomycin Regimen Plan:    Change regimen to Vancomycin 750 mg IV every 8 hours with next serum trough concentration measured at 1000 prior to the  dose on 06/05    Drug levels (last 3 results):  Recent Labs   Lab Result Units 06/04/24  0852   Vancomycin Trough ug/ml 8.5*       Pharmacy will continue to follow and monitor vancomycin.    Please contact pharmacy at extension [phone] for questions regarding this assessment.    Thank you for the consult,   Venus Babcock       Patient brief summary:  Elaina Pierce is a 38 y.o. female initiated on antimicrobial therapy with IV Vancomycin for treatment of sepsis    The patient's current regimen is 750 mg every 8 hours    Drug Allergies:   Review of patient's allergies indicates:  No Known Allergies    Actual Body Weight:   52.6  kg    Renal Function:   Estimated Creatinine Clearance: 74.3 mL/min (based on SCr of 0.79 mg/dL).,     Dialysis Method (if applicable):  N/A    CBC (last 72 hours):  Recent Labs   Lab Result Units 06/02/24  1448 06/03/24  0327 06/03/24  1905 06/04/24  0412   WBC x10(3)/mcL 15.70* 19.72* 18.69* 15.91*   Hgb g/dL 7.1* 5.8* 8.5* 8.4*   Hct % 24.0* 19.3* 26.4* 25.8*   Platelet x10(3)/mcL 714* 622* 627* 646*   Mono % % 5.0 7.3 8.2 9.7   Eos % % 0.1 0.6 0.2 0.3   Basophil % % 0.3 0.2 0.2 0.2       Metabolic Panel (last 72 hours):  Recent Labs   Lab Result Units 06/02/24  1425 06/02/24  1448 06/03/24  0327 06/04/24  0412   Sodium mmol/L  --  135* 136 137   Potassium mmol/L  --  3.2* 3.0* 2.9*   Chloride mmol/L  --  105 107 105   CO2 mmol/L  --  17* 21* 22   Glucose mg/dL  --  110* 138* 162*   Glucose, UA  Normal  --   --   --    Blood Urea Nitrogen mg/dL  --  4.9* 3.5* 6.4*    Creatinine mg/dL  --  0.73 0.70 0.79   Albumin g/dL  --  4.1 3.1* 2.7*   Bilirubin Total mg/dL  --  0.5 0.4 0.9   ALP unit/L  --  69 53 55   AST unit/L  --  18 16 19   ALT unit/L  --  7 9 9   Magnesium Level mg/dL  --  1.50* 2.00 1.90       Vancomycin Administrations:  vancomycin given in the last 96 hours                     vancomycin 750 mg in dextrose 5 % (D5W) 250 mL IVPB (mg) 750 mg New Bag 06/03/24 2216     750 mg New Bag  1016    vancomycin (VANCOCIN) 1,250 mg in dextrose 5 % (D5W) 250 mL IVPB (mg) 1,250 mg New Bag 06/02/24 2143                    Microbiologic Results:  Microbiology Results (last 7 days)       Procedure Component Value Units Date/Time    Urine culture [[id number]]  (Abnormal)  (Susceptibility) Collected: 06/02/24 1425    Order Status: Completed Specimen: Urine Updated: 06/04/24 0645     Urine Culture >/= 100,000 colonies/ml Escherichia coli    Blood culture x two cultures. Draw prior to antibiotics. [[id number]]  (Normal) Collected: 06/02/24 1444    Order Status: Completed Specimen: Blood from Arm, Left Updated: 06/03/24 1701     Blood Culture No Growth At 24 Hours    Blood culture x two cultures. Draw prior to antibiotics. [[id number]]  (Normal) Collected: 06/02/24 1444    Order Status: Completed Specimen: Blood from Arm, Right Updated: 06/03/24 1701     Blood Culture No Growth At 24 Hours    Blood Culture #2 **CANNOT BE ORDERED STAT** [[id number]]     Order Status: Canceled Specimen: Blood     Blood Culture #1 **CANNOT BE ORDERED STAT** [[id number]]     Order Status: Canceled Specimen: Blood

## 2024-06-04 NOTE — PROGRESS NOTES
[facility]   Progress Note     Resident Team: [facility]  Attending Physician: Bradley Parham MD  Resident: Roni  Intern: Toi Quach   Hospital Length of Stay: 2 days    Subjective:      Brief HPI:  37 yo F with PMH of Crohn's disease and JAE (requiring multiple transfusions in past) presented 6/2/24 with c/o fevers/chills morning of presentation. Associated HA, myalgias, n/v and fatigue. Denies hematemesis, dark/bloody stools, vaginal bleeding. No abdominal pain or diarrhea.     In ED, febrile to 103.2F, tachycardic to 132, /80. H/H of 7.1/24. Leukocytosis 15.7. Hypokalemia and hypomagnesemia. UA with 2+ nitrites, >100 WBC, many bacteria and 500 LE. CT abd/pelvis consistent with R sided pyelonephritis. Admitted to Medicine for treatment of pyelonephritis.    Interval History: Tmax 102.1F overnight, -126 and BP /55-74. Transfused 2u pRBC yesterday with appropriate hematologic response; H/H 8.4-25.8 this morning. Pt denies chest pain, palpitations, SOB, weakness, abdominal pain, n/v/d, dysuria. No acute complaints. Denies hematemesis, vaginal bleeding, hematuria, dark/bloody stools.    ROS negative unless stated above.       Objective:     Vital Signs (Most Recent):  Temp: (!) 102.1 °F (38.9 °C) (06/04/24 0338)  Pulse: (!) 121 (06/04/24 0338)  Resp: 17 (06/03/24 1935)  BP: 95/64 (06/04/24 0338)  SpO2: 98 % (06/04/24 0338) Vital Signs (24h Range):  Temp:  [97.9 °F (36.6 °C)-102.9 °F (39.4 °C)] 102.1 °F (38.9 °C)  Pulse:  [100-126] 121  Resp:  [16-24] 17  SpO2:  [92 %-100 %] 98 %  BP: ()/(55-74) 95/64     Physical Exam  Vitals reviewed.   Constitutional:       General: She is not in acute distress.  HENT:      Head: Normocephalic and atraumatic.      Mouth/Throat:      Mouth: Mucous membranes are moist.   Eyes:      Extraocular Movements: Extraocular movements intact.   Cardiovascular:      Rate and Rhythm: Regular rhythm. Tachycardia present.      Pulses:            Radial pulses are 2+ on the right side and 2+ on the left side.      Heart sounds: No murmur heard.  Pulmonary:      Effort: Pulmonary effort is normal. No respiratory distress.      Breath sounds: Normal breath sounds. No decreased breath sounds, wheezing, rhonchi or rales.   Abdominal:      General: Abdomen is flat. Bowel sounds are normal. There is no distension.      Palpations: Abdomen is soft.      Tenderness: There is no abdominal tenderness.   Musculoskeletal:      Cervical back: Normal range of motion and neck supple.      Right lower leg: No edema.      Left lower leg: No edema.   Skin:     General: Skin is warm.      Capillary Refill: Capillary refill takes less than 2 seconds.   Neurological:      Mental Status: She is alert and oriented to person, place, and time.           Laboratory:  Most Recent Data:  CBC:   Recent Labs   Lab 06/03/24  0327 06/03/24  1905 06/04/24  0412   WBC 19.72* 18.69* 15.91*   HGB 5.8* 8.5* 8.4*   HCT 19.3* 26.4* 25.8*   * 627* 646*     CMP:   Recent Labs   Lab 06/04/24  0412   CALCIUM 8.2*   ALBUMIN 2.7*      K 2.9*   CO2 22      BUN 6.4*   CREATININE 0.79   ALKPHOS 55   ALT 9   AST 19   BILITOT 0.9         Microbiology Data Reviewed: yes  Pertinent Findings:  Microbiology Results (last 7 days)       Procedure Component Value Units Date/Time    Blood culture x two cultures. Draw prior to antibiotics. [[id number]]  (Normal) Collected: 06/02/24 1444    Order Status: Completed Specimen: Blood from Arm, Left Updated: 06/03/24 1701     Blood Culture No Growth At 24 Hours    Blood culture x two cultures. Draw prior to antibiotics. [[id number]]  (Normal) Collected: 06/02/24 1444    Order Status: Completed Specimen: Blood from Arm, Right Updated: 06/03/24 1701     Blood Culture No Growth At 24 Hours    Urine culture [[id number]]  (Abnormal) Collected: 06/02/24 1425    Order Status: Completed Specimen: Urine Updated: 06/03/24 0716     Urine Culture >/= 100,000  colonies/ml Gram-negative Rods    Blood Culture #2 **CANNOT BE ORDERED STAT** [[id number]]     Order Status: Canceled Specimen: Blood     Blood Culture #1 **CANNOT BE ORDERED STAT** [[id number]]     Order Status: Canceled Specimen: Blood                 Radiology:  Imaging Results              CT Abdomen Pelvis With IV Contrast NO Oral Contrast (Final result)  Result time 06/02/24 18:07:16      Final result by William Mayfield MD (06/02/24 18:07:16)                   Impression:      1. Right kidney wedge configuration hypodensities could be related to pyelonephritis.  Attention to follow-up exams.    2. Details of other findings above.      Electronically signed by: William Mayfield  Date:    06/02/2024  Time:    18:07               Narrative:    EXAMINATION:  CT ABDOMEN PELVIS WITH IV CONTRAST    CLINICAL HISTORY:  UTI, recurrent/complicated (Female);    TECHNIQUE:  Multidetector axial images were obtained of the abdomen and pelvis following the administration of IV contrast. Oral contrast was not administered.    Dose length product of 326 mGycm. Automated exposure control was utilized to minimize radiation dose.    COMPARISON:  None available.    FINDINGS:  Included portion of the lungs are without suspicious nodularity, acute air space infiltrates or fluid within the pleural spaces.    Hepatic volume and attenuation is unremarkable. Gallbladder wall is not thickened and there is no intra luminal calcified calculus. No biliary dilation identified. Pancreatic unremarkable attenuation without acute peripancreatic phlegmons. Main pancreatic duct is not dilated. Spleen size is upper limits of normal measuring 12.4 cm without focal lesion.    The adrenal glands appear within normal limits.  There is left kidney lower pole 7 mm hypodense structure for which no specific follow-up imaging is recommended.  There are right kidney cortical wedgeconfiguration hypodensities on image 63, image 70 and image 78 series 5 which could  be related to pyelonephritis.  Attention to follow-up exams.  No renal calculi or hydronephrosis or hydroureter. No perinephric fluid strandings or collections identified.    Stomach is partially decompressed.  There is mild fluid within loops of small bowel without significant abnormal dilatation and could be related to ileus.  Portion of the appendix is seen on image 40 series 601 and is nondilated.  Colonic fecal loading throughout without pericolonic acute strandings.  No free fluid.    Urinary bladder appears within normal limits.  Endometrium is not prominent.  There is no pelvic free fluid.    No acute or otherwise osseous abnormality identified.                                       X-Ray Chest AP Portable (Final result)  Result time 06/02/24 15:04:04      Final result by Gage Torres MD (06/02/24 15:04:04)                   Narrative:    EXAMINATION  XR CHEST AP PORTABLE    CLINICAL HISTORY  Sepsis;    TECHNIQUE  A total of 1 frontal image(s) of the chest.    COMPARISON  13 July 2016    FINDINGS  Lines/tubes/devices: ECG leads overlie the imaged region.    The cardiomediastinal silhouette and central pulmonary vasculature are unremarkable for utilized technique.  The trachea is midline. There is no lobar consolidation, pleural effusion, or pneumothorax.    There is no acute osseous or extrathoracic abnormality.    IMPRESSION  No convincing acute radiographic abnormality.      Electronically signed by: Gage Torres  Date:    06/02/2024  Time:    15:04                                    Current Medications:     Infusions:   lactated ringers   Intravenous Continuous 100 mL/hr at 06/04/24 0610 Rate Verify at 06/04/24 0610        Scheduled:   aluminum-magnesium hydroxide-simethicone  30 mL Oral QID (AC & HS)    ferrous sulfate  1 tablet Oral BID    folic acid  1,000 mcg Oral Daily    oxyCODONE-acetaminophen  1 tablet Oral Once    pantoprazole  40 mg Oral Daily    piperacillin-tazobactam (Zosyn) IV (PEDS and  ADULTS) (extended infusion is not appropriate)  4.5 g Intravenous Q8H    sucralfate  1 g Oral Q6H    vancomycin (VANCOCIN) IV (PEDS and ADULTS)  750 mg Intravenous Q12H        PRN:    Current Facility-Administered Medications:     0.9%  NaCl infusion (for blood administration), , Intravenous, Q24H PRN    acetaminophen, 650 mg, Oral, Q6H PRN    dextrose 10%, 12.5 g, Intravenous, PRN    dextrose 10%, 25 g, Intravenous, PRN    glucagon (human recombinant), 1 mg, Intramuscular, PRN    glucose, 16 g, Oral, PRN    glucose, 24 g, Oral, PRN    naloxone, 0.02 mg, Intravenous, PRN    ondansetron, 4 mg, Intravenous, Q8H PRN    potassium bicarbonate, 25 mEq, Oral, Q6H PRN    sodium chloride 0.9%, 10 mL, Intravenous, Q12H PRN    Pharmacy to dose Vancomycin consult, , , Once **AND** vancomycin - pharmacy to dose, , Intravenous, pharmacy to manage frequency    Antibiotics and Day Number of Therapy:  Vancomycin day 2  Zosyn day 2      Intake/Output Summary (Last 24 hours) at 6/4/2024 0636  Last data filed at 6/4/2024 0610  Gross per 24 hour   Intake 4497.28 ml   Output --   Net 4497.28 ml       Lines/Drains/Airways       Peripheral Intravenous Line  Duration                  Peripheral IV - Single Lumen 06/02/24 1424 20 G Anterior;Right Forearm 1 day                      Assessment & Plan:     Sepsis with source of Right-Sided Pyelonephritis   - 3/4 SIRS criteria met in ED (, Tmax 103.2F, RR 20, WBC 15.70)  - Urine showing 2+ nitrites, many bacteria, >100 WBCs, and trace blood  - Blood cultures x2 with no growth x 24h  - Urine culture with >100,000 colonies/mL gram negative rods. Sensitive to Levaquin. Vanc/Zosyn discontinued. Given first dose of Levaquin PO today. Will plan to discharge home with PO Levaquin to complete 5 days total.  - Lactic Acid 0.7 (initially 2.1)  - CT Abd shows wedge hypodensities in the right kidney, possibly related to pyelonephritis.   - Leukocytosis downtrending (WBC 15.9 today)      Hypokalemia  Hypomagnesemia - improved  - Potassium 3; Mg 2  - 2g Mg sulfate given in ED  - Potassium repleted today. Monitor w/CMP in AM   - LR @ 100cc/h     Iron deficiency anemia  - s/p transfusion 2u pRBC yesterday with appropriate hematologic response. H/H 8.4/25.8 (up from 5.8/19.3 yesterday)  - ferrous sulfate tablet; folic acid tablet        CODE STATUS: Full Code  Diet: Adult Regular   DVT Prophylaxis:  SCDs given hx of Crohn's and iron deficiency anemia      Dispo: admitted for sepsis in setting of R sided pyelonephritis. Ucx >100,000 E coli sensitive to Levaquin. Transitioned to PO Levaquin today. S/p transfusion 2u pRBC yesterday with appropriate hematologic response. Hypokalemia on AM labs; repleting. Discharge likely today or tomorrow pending repeat BMP this afternoon.    Kortney Quach MD  [facility], [facility]

## 2024-06-04 NOTE — DISCHARGE SUMMARY
[facility] Discharge Summary    Admitting Physician: Bradley Parham MD  Attending Physician: Bradley Parham MD  Date of Admit: 6/2/2024  Date of Discharge: 6/4/2024    Condition: Stable  Outcome: Patient tolerated treatment/procedure well without complication and is now ready for discharge.  DISPOSITION: Home or Self Care          Discharge Diagnoses     Patient Active Problem List   Diagnosis    Abnormal urine finding    B12 deficiency    Folate deficiency    Abnormal bone marrow examination    Iron deficiency anemia    History of blood transfusion    MGUS (monoclonal gammopathy of unknown significance)    Constipation    Indeterminate colitis    Free monoclonal light chain    Sepsis due to gram-negative UTI    Pyelonephritis       Principal Problem:  Pyelonephritis    Consultants and Procedures     Consultants:  IP CONSULT TO INTERNAL MEDICINE    Procedures:   * No surgery found *     Brief Admission History      37 yo F with PMH of Crohn's disease and JAE (requiring multiple transfusions in past) presented 6/2/24 with c/o fevers/chills morning of presentation. Associated HA, myalgias, n/v and fatigue. Denies hematemesis, dark/bloody stools, vaginal bleeding. No abdominal pain or diarrhea.      In ED, febrile to 103.2F, tachycardic to 132, /80. H/H of 7.1/24. Leukocytosis 15.7. Hypokalemia and hypomagnesemia. UA with 2+ nitrites, >100 WBC, many bacteria and 500 LE. CT abd/pelvis consistent with R sided pyelonephritis. Admitted to Medicine for sepsis in setting of pyelonephritis.    Hospital Course with Pertinent Findings     Admitted for treatment of pyelonephritis. Received 2 days empiric antibiotic treatment with Vanc/Zosyn. Urine culture resulted >100,000 E coli with sensitivity to Levaquin. Patient transitioned to PO Levaquin and received first dose prior to discharge. Meds to bed provided for additional abx to complete 5d total course. Blood cultures with no growth x 24 h.  "Leukocytosis downtrended.    Hospital course complicated by anemia requiring transfusion of 2u pRBC with appropriate hematologic response (H/H 5.8/19.3 -> 8.4/25.8). Pt will need to continue home PO iron, folic acid, and vit B12 supplements. On day of discharge, hemodynamically stable. No active bleeding. Voiding spontaneously. Tolerating PO intake without nausea/vomiting/diarrhea. Denies abdominal pain or dysuria. Transitioned to PO Levaquin. Hypokalemia improved. Stable for discharge with hospital follow up in 1 week with [facility].    Discharge physical exam:  Vitals  BP: 113/77  Temp: 98.4 °F (36.9 °C)  Temp Source: Oral  Pulse: (!) 113  Resp: 18  SpO2: 97 %  Height: 5' 1" (154.9 cm)  Weight: 52.6 kg (116 lb)        TIME SPENT ON DISCHARGE: 60 minutes    Discharge Medications        Medication List        START taking these medications      levoFLOXacin 750 MG tablet  Commonly known as: LEVAQUIN  Take 1 tablet (750 mg total) by mouth once daily. for 4 days            CONTINUE taking these medications      cyanocobalamin (vitamin B-12) 1,000 mcg Lozg  Place 1 tablet under the tongue every morning.     ergocalciferol 50,000 unit Cap  Commonly known as: ERGOCALCIFEROL  Take 1 capsule (50,000 Units total) by mouth every Monday and Friday.     FeroSuL 325 mg (65 mg iron) Tab tablet  Generic drug: ferrous sulfate  Take 1 tablet (325 mg total) by mouth 2 (two) times daily.     folic acid 1 MG tablet  Commonly known as: FOLVITE  Take 1 tablet (1,000 mcg total) by mouth once daily.     lubiprostone 24 MCG Cap  Commonly known as: AMITIZA  Take 1 capsule (24 mcg total) by mouth 2 (two) times daily.     pantoprazole 40 MG tablet  Commonly known as: PROTONIX  Take 1 tablet (40 mg total) by mouth once daily.               Where to Get Your Medications        These medications were sent to [facility] [address] [place] [address] [address]  [address] [facility] [address]      Phone: [phone] "   levoFLOXacin 750 MG tablet         Discharge Information:     Pt instructed to complete 5 day course of Levaquin PO. Meds to bed provided. Advised to attend hospital follow up visit in approximately 1 week with [facility]. ED precautions provided should symptoms worsen/no improvement.    Kortney Quach MD  [facility], [facility]

## 2024-06-05 ENCOUNTER — TELEPHONE (OUTPATIENT)
Dept: FAMILY MEDICINE | Facility: CLINIC | Age: 39
End: 2024-06-05
Payer: MEDICAID

## 2024-06-05 NOTE — TELEPHONE ENCOUNTER
Hospital F/U Appointment Date: 06/11/2024    Discharge Date: 06/04/2024    Discharge Facility: [facility]    If External Facility, is Discharge paperwork available: Yes    Discharge Diagnosis: Sepsis    Specialty Referrals / Appointments? Follow up with Primary     Home Health Services or DME? No    Discharged Medication Reviewed?  Yes    Questions regarding medications? None     Advised to bring ALL medications for visit: Yes    Is the patient experiencing any symptoms today? None    Clinic Phone number given to patient?  Yes [phone]

## 2024-06-07 LAB
BACTERIA BLD CULT: NORMAL
BACTERIA BLD CULT: NORMAL

## 2024-06-11 ENCOUNTER — OFFICE VISIT (OUTPATIENT)
Dept: FAMILY MEDICINE | Facility: CLINIC | Age: 39
End: 2024-06-11
Payer: MEDICAID

## 2024-06-11 VITALS
TEMPERATURE: 98 F | HEART RATE: 85 BPM | WEIGHT: 118 LBS | DIASTOLIC BLOOD PRESSURE: 83 MMHG | SYSTOLIC BLOOD PRESSURE: 118 MMHG | OXYGEN SATURATION: 97 % | RESPIRATION RATE: 18 BRPM | HEIGHT: 61 IN | BODY MASS INDEX: 22.28 KG/M2

## 2024-06-11 DIAGNOSIS — E53.8 FOLATE DEFICIENCY: ICD-10-CM

## 2024-06-11 DIAGNOSIS — Z09 HOSPITAL DISCHARGE FOLLOW-UP: ICD-10-CM

## 2024-06-11 DIAGNOSIS — D50.0 IRON DEFICIENCY ANEMIA DUE TO CHRONIC BLOOD LOSS: Primary | ICD-10-CM

## 2024-06-11 DIAGNOSIS — Z76.0 ENCOUNTER FOR MEDICATION REFILL: ICD-10-CM

## 2024-06-11 PROBLEM — N12 PYELONEPHRITIS: Status: RESOLVED | Noted: 2024-06-04 | Resolved: 2024-06-11

## 2024-06-11 PROCEDURE — 1111F DSCHRG MED/CURRENT MED MERGE: CPT | Mod: CPTII,,, | Performed by: NURSE PRACTITIONER

## 2024-06-11 PROCEDURE — 1160F RVW MEDS BY RX/DR IN RCRD: CPT | Mod: CPTII,,, | Performed by: NURSE PRACTITIONER

## 2024-06-11 PROCEDURE — 99214 OFFICE O/P EST MOD 30 MIN: CPT | Mod: PBBFAC | Performed by: NURSE PRACTITIONER

## 2024-06-11 PROCEDURE — 3079F DIAST BP 80-89 MM HG: CPT | Mod: CPTII,,, | Performed by: NURSE PRACTITIONER

## 2024-06-11 PROCEDURE — 3074F SYST BP LT 130 MM HG: CPT | Mod: CPTII,,, | Performed by: NURSE PRACTITIONER

## 2024-06-11 PROCEDURE — 99495 TRANSJ CARE MGMT MOD F2F 14D: CPT | Mod: S$PBB,,, | Performed by: NURSE PRACTITIONER

## 2024-06-11 PROCEDURE — 1159F MED LIST DOCD IN RCRD: CPT | Mod: CPTII,,, | Performed by: NURSE PRACTITIONER

## 2024-06-11 RX ORDER — FERROUS SULFATE TAB 325 MG (65 MG ELEMENTAL FE) 325 (65 FE) MG
325 TAB ORAL DAILY
Qty: 90 TABLET | Refills: 3 | Status: SHIPPED | OUTPATIENT
Start: 2024-06-11

## 2024-06-11 RX ORDER — FOLIC ACID 1 MG/1
1000 TABLET ORAL DAILY
Qty: 90 TABLET | Refills: 3 | Status: SHIPPED | OUTPATIENT
Start: 2024-06-11

## 2024-06-11 NOTE — PROGRESS NOTES
Patient Name: Elaina Pierce   : 1985  MRN: [id number]     SUBJECTIVE DATA:    CHIEF COMPLAINT:   Elaina Pierce is a 38 y.o. female who presents to clinic today with Follow-up      HPI:  38-year-old female presents to the clinic to follow-up after hospital discharge.  PMH of Crohn's disease and JAE (requiring multiple transfusions in past).  Patient presented to emergency room department on 2024 complaining of fever and chills on that morning.  Also reported some fatigue and body aches and headaches.  In the ED she had a fever of 103.2 F, tachycardic, H&H 7.2 /24 .  CBC 15.7, hypokalemic 3 point and hypomagnesemia 1.5 mg/dl,  leukocyte 500, positive nitrate 2+, CT abd/pelvis consistent with R sided pyelonephritis.   Admitted to Medicine for treatment of pyelonephritis.  Patient was transfused with 2 units of packed red blood cells with appropriate hematologic response; H/H 8.4-25.8   Blood cultures were negative  Urine culture positive for E coli which was the cause for pyelonephritis/sepsis.  Received IV antibiotics.  Discharged with Levaquin p.o. for 5 days.  Magnesium and potassium corrected during visit.  Patient denies any complaints at this time.  Patient state she is feeling well.  Patient requesting refill on medications she forgot when she was on a vacation.  Rx ferrous sulfate 324 mg p.o. daily sent to preferred pharmacy.  Rx folic acid 1000 mcg p.o. once daily.  Advise patient to call hematology to schedule an appointment since she missed her last 2 appointments for continuum of care as well to continue iron infusions.  Keep appointment with Gastroenterology as directed.  Return to clinic in September to complete wellness with labs.  Return to clinic sooner if needed.  Questions solicited and answered, patient verbalized and agreed to plan.    Patient denies chest pain, shortness of breath, dyspnea on exertion, palpitations, peripheral edema, abdominal pain, nausea, vomiting,  "diarrhea, constipation, fatigue, fever, chills, dysuria,  hematuria, dark stools or bloody stools.          Magnesium and potassium corrected during hospital visit  ALLERGIES: Review of patient's allergies indicates:  No Known Allergies      ROS:  Review of Systems   All other systems reviewed and are negative.        OBJECTIVE DATA:  Vital signs  Vitals:    06/11/24 1152   BP: 118/83   Pulse: 85   Resp: 18   Temp: 98.4 °F (36.9 °C)   TempSrc: Oral   SpO2: 97%   Weight: 53.5 kg (118 lb)   Height: 5' 1" (1.549 m)      Body mass index is 22.3 kg/m².    PHYSICAL EXAM:   Physical Exam  Vitals and nursing note reviewed.   Constitutional:       General: She is awake. She is not in acute distress.     Appearance: Normal appearance. She is well-developed, well-groomed and normal weight. She is not ill-appearing, toxic-appearing or diaphoretic.   HENT:      Head: Normocephalic and atraumatic.      Right Ear: Tympanic membrane, ear canal and external ear normal.      Left Ear: Tympanic membrane, ear canal and external ear normal.      Nose: Nose normal.      Mouth/Throat:      Lips: Pink.      Mouth: Mucous membranes are moist.      Dentition: Abnormal dentition.      Tongue: Tongue does not deviate from midline.      Palate: No lesions.      Pharynx: Oropharynx is clear. Uvula midline.      Comments: Dentist list provided for patient to call and schedule an appointment for dental care.  Patient verbalized  Eyes:      General: Lids are normal.      Extraocular Movements: Extraocular movements intact.      Conjunctiva/sclera: Conjunctivae normal.      Pupils: Pupils are equal, round, and reactive to light.   Neck:      Trachea: Trachea and phonation normal.   Cardiovascular:      Rate and Rhythm: Normal rate and regular rhythm.      Pulses: Normal pulses.           Radial pulses are 2+ on the right side and 2+ on the left side.      Heart sounds: Normal heart sounds. No murmur heard.  Pulmonary:      Effort: Pulmonary effort " is normal.      Breath sounds: Normal breath sounds and air entry. No wheezing or rhonchi.   Abdominal:      General: Bowel sounds are normal.      Palpations: Abdomen is soft.      Tenderness: There is no abdominal tenderness.   Genitourinary:     Comments: Denies any urinary symptoms or bowel habit change.  Musculoskeletal:         General: Normal range of motion.      Cervical back: Full passive range of motion without pain, normal range of motion and neck supple. No rigidity or tenderness.      Right lower leg: No edema.      Left lower leg: No edema.   Lymphadenopathy:      Cervical: No cervical adenopathy.   Skin:     General: Skin is warm and dry.      Capillary Refill: Capillary refill takes less than 2 seconds.   Neurological:      General: No focal deficit present.      Mental Status: She is alert and oriented to person, place, and time. Mental status is at baseline.      GCS: GCS eye subscore is 4. GCS verbal subscore is 5. GCS motor subscore is 6.      Cranial Nerves: Cranial nerves 2-12 are intact. No cranial nerve deficit.      Sensory: Sensation is intact. No sensory deficit.      Motor: Motor function is intact. No weakness.      Coordination: Coordination is intact. Coordination normal.      Gait: Gait is intact. Gait normal.   Psychiatric:         Attention and Perception: Attention and perception normal.         Mood and Affect: Mood and affect normal.         Speech: Speech normal.         Behavior: Behavior normal. Behavior is cooperative.         Thought Content: Thought content normal.         Cognition and Memory: Cognition and memory normal.         Judgment: Judgment normal.          ASSESSMENT/PLAN:  1. Encounter for medication refill  -     folic acid (FOLVITE) 1 MG tablet; Take 1 tablet (1,000 mcg total) by mouth once daily.  Dispense: 90 tablet; Refill: 3  -     FEROSUL 325 mg (65 mg iron) Tab tablet; Take 1 tablet (325 mg total) by mouth once daily.  Dispense: 90 tablet; Refill:  3    2. Hospital discharge follow-up    3. Crohn's disease of colon with other complication    4. Anemia, unspecified type    5. Iron deficiency anemia due to chronic blood loss  -     folic acid (FOLVITE) 1 MG tablet; Take 1 tablet (1,000 mcg total) by mouth once daily.  Dispense: 90 tablet; Refill: 3  -     FEROSUL 325 mg (65 mg iron) Tab tablet; Take 1 tablet (325 mg total) by mouth once daily.  Dispense: 90 tablet; Refill: 3    6. Folate deficiency  -     folic acid (FOLVITE) 1 MG tablet; Take 1 tablet (1,000 mcg total) by mouth once daily.  Dispense: 90 tablet; Refill: 3  -     FEROSUL 325 mg (65 mg iron) Tab tablet; Take 1 tablet (325 mg total) by mouth once daily.  Dispense: 90 tablet; Refill: 3           RESULTS:  Recent Results (from the past 1008 hour(s))   Comprehensive Metabolic Panel    Collection Time: 05/28/24  3:20 PM   Result Value Ref Range    Sodium 137 136 - 145 mmol/L    Potassium 3.6 3.5 - 5.1 mmol/L    Chloride 106 98 - 107 mmol/L    CO2 23 22 - 29 mmol/L    Glucose 99 74 - 100 mg/dL    Blood Urea Nitrogen 8.8 7.0 - 18.7 mg/dL    Creatinine 0.69 0.55 - 1.02 mg/dL    Calcium 9.2 8.4 - 10.2 mg/dL    Protein Total 8.1 6.4 - 8.3 gm/dL    Albumin 4.2 3.5 - 5.0 g/dL    Globulin 3.9 (H) 2.4 - 3.5 gm/dL    Albumin/Globulin Ratio 1.1 1.1 - 2.0 ratio    Bilirubin Total 0.2 <=1.5 mg/dL    ALP 68 40 - 150 unit/L    ALT 10 0 - 55 unit/L    AST 15 5 - 34 unit/L    eGFR >60 mL/min/1.73/m2    Anion Gap 8.0 mEq/L    BUN/Creatinine Ratio 13    C-Reactive Protein    Collection Time: 05/28/24  3:20 PM   Result Value Ref Range    CRP 1.40 <5.00 mg/L   Iron and TIBC    Collection Time: 05/28/24  3:20 PM   Result Value Ref Range    Iron Binding Capacity Unsaturated 291 70 - 310 ug/dL    Iron Level 15 (L) 50 - 170 ug/dL    Transferrin 282 180 - 382 mg/dL    Iron Binding Capacity Total 306 250 - 450 ug/dL    Iron Saturation 5 (L) 20 - 50 %   Ferritin    Collection Time: 05/28/24  3:20 PM   Result Value Ref Range     Ferritin Level 6.04 4.63 - 204.00 ng/mL   Folate    Collection Time: 05/28/24  3:20 PM   Result Value Ref Range    Folate Level 4.1 (L) 7.0 - 31.4 ng/mL   Vitamin B12    Collection Time: 05/28/24  3:20 PM   Result Value Ref Range    Vitamin B12 313 213 - 816 pg/mL   Vitamin D    Collection Time: 05/28/24  3:20 PM   Result Value Ref Range    Vitamin D 6 (L) 30 - 80 ng/mL   Zinc    Collection Time: 05/28/24  3:20 PM   Result Value Ref Range    Zinc, Serum/Plasma 83.1 60.0 - 120.0 ug/dL   CBC with Differential    Collection Time: 05/28/24  3:20 PM   Result Value Ref Range    WBC 7.23 4.50 - 11.50 x10(3)/mcL    RBC 3.36 (L) 4.20 - 5.40 x10(6)/mcL    Hgb 8.2 (L) 12.0 - 16.0 g/dL    Hct 27.2 (L) 37.0 - 47.0 %    MCV 81.0 80.0 - 94.0 fL    MCH 24.4 (L) 27.0 - 31.0 pg    MCHC 30.1 (L) 33.0 - 36.0 g/dL    RDW 18.2 (H) 11.5 - 17.0 %    Platelet 111 (L) 130 - 400 x10(3)/mcL    MPV      IPF 4.5 0.9 - 11.2 %    Neut % 57.1 %    Lymph % 30.0 %    Mono % 8.7 %    Eos % 2.8 %    Basophil % 0.8 %    Lymph # 2.17 0.6 - 4.6 x10(3)/mcL    Neut # 4.13 2.1 - 9.2 x10(3)/mcL    Mono # 0.63 0.1 - 1.3 x10(3)/mcL    Eos # 0.20 0 - 0.9 x10(3)/mcL    Baso # 0.06 <=0.2 x10(3)/mcL    IG# 0.04 0 - 0.04 x10(3)/mcL    IG% 0.6 %    NRBC% 0.0 %   EKG 12-lead    Collection Time: 06/02/24  2:24 PM   Result Value Ref Range    QRS Duration 86 ms    [facility] QTC Calculation 437 ms   Urinalysis, Reflex to Urine Culture    Collection Time: 06/02/24  2:25 PM    Specimen: Urine   Result Value Ref Range    Color, UA Light-Yellow Yellow, Light-Yellow, Dark Yellow, Christal, Straw    Appearance, UA Hazy (A) Clear    Specific Gravity, UA 1.016 1.005 - 1.030    pH, UA 5.5 5.0 - 8.5    Protein, UA Negative Negative    Glucose, UA Normal Negative, Normal    Ketones, UA Negative Negative    Blood, UA Trace (A) Negative    Bilirubin, UA Negative Negative    Urobilinogen, UA Normal 0.2, 1.0, Normal    Nitrites, UA 2+ (A) Negative    Leukocyte Esterase,  (A) Negative     WBC, UA >100 (A) None Seen, 0-2, 3-5, 0-5 /HPF    Bacteria, UA Many (A) None Seen /HPF    Squamous Epithelial Cells, UA Trace (A) None Seen /HPF    Mucous, UA Trace (A) None Seen /LPF    Hyaline Casts, UA None Seen None Seen /lpf    RBC, UA 0-5 None Seen, 0-2, 3-5, 0-5 /HPF   COVID/RSV/FLU A&B PCR    Collection Time: 06/02/24  2:25 PM   Result Value Ref Range    Influenza A PCR Not Detected Not Detected    Influenza B PCR Not Detected Not Detected    Respiratory Syncytial Virus PCR Not Detected Not Detected    SARS-CoV-2 PCR Not Detected Not Detected, Negative   Urine culture    Collection Time: 06/02/24  2:25 PM    Specimen: Urine   Result Value Ref Range    Urine Culture >/= 100,000 colonies/ml Escherichia coli (A)        Susceptibility    Escherichia coli -  (no method available)     Amox/K Clav <=2 Sensitive      Ampicillin <=2 Sensitive      Cefepime <=0.12 Sensitive      Ceftriaxone <=0.25 Sensitive      Cefuroxime 4 Sensitive      Ciprofloxacin <=0.25 Sensitive      Gentamicin <=1 Sensitive      Levofloxacin <=0.12 Sensitive      Meropenem <=0.25 Sensitive      Nitrofurantoin <=16 Sensitive      Piperacillin/Tazobactam <=4 Sensitive      Trimethoprim/Sulfamethoxazole <=20 Sensitive      Tetracycline <=1 Sensitive      Tobramycin <=1 Sensitive    Pregnancy, urine rapid    Collection Time: 06/02/24  2:25 PM   Result Value Ref Range    hCG Qualitative, Urine Negative Negative   Blood culture x two cultures. Draw prior to antibiotics.    Collection Time: 06/02/24  2:44 PM    Specimen: Arm, Left; Blood   Result Value Ref Range    Blood Culture No Growth at 5 days    Blood culture x two cultures. Draw prior to antibiotics.    Collection Time: 06/02/24  2:44 PM    Specimen: Arm, Right; Blood   Result Value Ref Range    Blood Culture No Growth at 5 days    Comprehensive metabolic panel    Collection Time: 06/02/24  2:48 PM   Result Value Ref Range    Sodium 135 (L) 136 - 145 mmol/L    Potassium 3.2 (L) 3.5 - 5.1  mmol/L    Chloride 105 98 - 107 mmol/L    CO2 17 (L) 22 - 29 mmol/L    Glucose 110 (H) 74 - 100 mg/dL    Blood Urea Nitrogen 4.9 (L) 7.0 - 18.7 mg/dL    Creatinine 0.73 0.55 - 1.02 mg/dL    Calcium 9.6 8.4 - 10.2 mg/dL    Protein Total 8.2 6.4 - 8.3 gm/dL    Albumin 4.1 3.5 - 5.0 g/dL    Globulin 4.1 (H) 2.4 - 3.5 gm/dL    Albumin/Globulin Ratio 1.0 (L) 1.1 - 2.0 ratio    Bilirubin Total 0.5 <=1.5 mg/dL    ALP 69 40 - 150 unit/L    ALT 7 0 - 55 unit/L    AST 18 5 - 34 unit/L    eGFR >60 mL/min/1.73/m2    Anion Gap 13.0 mEq/L    BUN/Creatinine Ratio 7    Lactic Acid, Plasma #1    Collection Time: 06/02/24  2:48 PM   Result Value Ref Range    Lactic Acid Level 2.1 0.5 - 2.2 mmol/L   Magnesium    Collection Time: 06/02/24  2:48 PM   Result Value Ref Range    Magnesium Level 1.50 (L) 1.60 - 2.60 mg/dL   Lipase    Collection Time: 06/02/24  2:48 PM   Result Value Ref Range    Lipase Level 14 <=60 U/L   CBC with Differential    Collection Time: 06/02/24  2:48 PM   Result Value Ref Range    WBC 15.70 (H) 4.50 - 11.50 x10(3)/mcL    RBC 2.96 (L) 4.20 - 5.40 x10(6)/mcL    Hgb 7.1 (L) 12.0 - 16.0 g/dL    Hct 24.0 (L) 37.0 - 47.0 %    MCV 81.1 80.0 - 94.0 fL    MCH 24.0 (L) 27.0 - 31.0 pg    MCHC 29.6 (L) 33.0 - 36.0 g/dL    RDW 18.6 (H) 11.5 - 17.0 %    Platelet 714 (H) 130 - 400 x10(3)/mcL    MPV 10.5 (H) 7.4 - 10.4 fL    Neut % 90.1 %    Lymph % 3.9 %    Mono % 5.0 %    Eos % 0.1 %    Basophil % 0.3 %    Lymph # 0.61 0.6 - 4.6 x10(3)/mcL    Neut # 14.16 (H) 2.1 - 9.2 x10(3)/mcL    Mono # 0.78 0.1 - 1.3 x10(3)/mcL    Eos # 0.02 0 - 0.9 x10(3)/mcL    Baso # 0.04 <=0.2 x10(3)/mcL    IG# 0.09 (H) 0 - 0.04 x10(3)/mcL    IG% 0.6 %    NRBC% 0.4 %   Blood Smear Microscopic Exam    Collection Time: 06/02/24  2:48 PM   Result Value Ref Range    RBC Morph Abnormal (A) Normal    Anisocytosis 2+ (A) (none)    Hypochromasia 2+ (A) (none)    Ovalocytes 1+ (A) (none)    Poikilocytosis 1+ (A) (none)    Polychromasia 1+ (A) (none)    Tear  Drops Slight (A) (none)    Platelets Increased (A) Normal, Adequate   Light Blue Top Hold    Collection Time: 06/02/24  2:50 PM   Result Value Ref Range    Extra Tube Hold for add-ons.    Red Top Hold    Collection Time: 06/02/24  2:50 PM   Result Value Ref Range    Extra Tube Hold for add-ons.    Light Green Top Hold    Collection Time: 06/02/24  2:50 PM   Result Value Ref Range    Extra Tube Hold for add-ons.    Lavender Top Hold    Collection Time: 06/02/24  2:50 PM   Result Value Ref Range    Extra Tube Hold for add-ons.    Gold Top Hold    Collection Time: 06/02/24  2:50 PM   Result Value Ref Range    Extra Tube Hold for add-ons.    Pink Top Hold    Collection Time: 06/02/24  2:50 PM   Result Value Ref Range    Extra Tube Hold for add-ons.    Lactic Acid, Plasma    Collection Time: 06/02/24  5:06 PM   Result Value Ref Range    Lactic Acid Level 0.7 0.5 - 2.2 mmol/L   Comprehensive Metabolic Panel (CMP)    Collection Time: 06/03/24  3:27 AM   Result Value Ref Range    Sodium 136 136 - 145 mmol/L    Potassium 3.0 (L) 3.5 - 5.1 mmol/L    Chloride 107 98 - 107 mmol/L    CO2 21 (L) 22 - 29 mmol/L    Glucose 138 (H) 74 - 100 mg/dL    Blood Urea Nitrogen 3.5 (L) 7.0 - 18.7 mg/dL    Creatinine 0.70 0.55 - 1.02 mg/dL    Calcium 8.2 (L) 8.4 - 10.2 mg/dL    Protein Total 6.4 6.4 - 8.3 gm/dL    Albumin 3.1 (L) 3.5 - 5.0 g/dL    Globulin 3.3 2.4 - 3.5 gm/dL    Albumin/Globulin Ratio 0.9 (L) 1.1 - 2.0 ratio    Bilirubin Total 0.4 <=1.5 mg/dL    ALP 53 40 - 150 unit/L    ALT 9 0 - 55 unit/L    AST 16 5 - 34 unit/L    eGFR >60 mL/min/1.73/m2    Anion Gap 8.0 mEq/L    BUN/Creatinine Ratio 5    Type & Screen    Collection Time: 06/03/24  3:27 AM   Result Value Ref Range    Group & Rh B POS     Indirect Velma GEL NEG     Specimen Outdate 06/06/2024 23:59    CBC with Differential    Collection Time: 06/03/24  3:27 AM   Result Value Ref Range    WBC 19.72 (H) 4.50 - 11.50 x10(3)/mcL    RBC 2.42 (L) 4.20 - 5.40 x10(6)/mcL    Hgb  5.8 (LL) 12.0 - 16.0 g/dL    Hct 19.3 (LL) 37.0 - 47.0 %    MCV 79.8 (L) 80.0 - 94.0 fL    MCH 24.0 (L) 27.0 - 31.0 pg    MCHC 30.1 (L) 33.0 - 36.0 g/dL    RDW 18.8 (H) 11.5 - 17.0 %    Platelet 622 (H) 130 - 400 x10(3)/mcL    MPV 10.3 7.4 - 10.4 fL    Neut % 86.3 %    Lymph % 4.9 %    Mono % 7.3 %    Eos % 0.6 %    Basophil % 0.2 %    Lymph # 0.96 0.6 - 4.6 x10(3)/mcL    Neut # 17.04 (H) 2.1 - 9.2 x10(3)/mcL    Mono # 1.43 (H) 0.1 - 1.3 x10(3)/mcL    Eos # 0.12 0 - 0.9 x10(3)/mcL    Baso # 0.04 <=0.2 x10(3)/mcL    IG# 0.13 (H) 0 - 0.04 x10(3)/mcL    IG% 0.7 %    NRBC% 0.3 %   Prepare RBC 2 Units; Hemoglobin @ 5.8    Collection Time: 06/03/24  3:27 AM   Result Value Ref Range    UNIT NUMBER [id number]     UNIT ABO/RH B POS     DISPENSE STATUS Transfused     Unit Expiration [id number]     Product Code T5316Q29     Unit Blood Type Code 7300     CROSSMATCH INTERPRETATION Compatible     UNIT NUMBER [id number]     UNIT ABO/RH B POS     DISPENSE STATUS Transfused     Unit Expiration [id number]     Product Code V3801W82     Unit Blood Type Code 7300     CROSSMATCH INTERPRETATION Compatible    Magnesium    Collection Time: 06/03/24  3:27 AM   Result Value Ref Range    Magnesium Level 2.00 1.60 - 2.60 mg/dL   CBC with Differential    Collection Time: 06/03/24  7:05 PM   Result Value Ref Range    WBC 18.69 (H) 4.50 - 11.50 x10(3)/mcL    RBC 3.26 (L) 4.20 - 5.40 x10(6)/mcL    Hgb 8.5 (L) 12.0 - 16.0 g/dL    Hct 26.4 (L) 37.0 - 47.0 %    MCV 81.0 80.0 - 94.0 fL    MCH 26.1 (L) 27.0 - 31.0 pg    MCHC 32.2 (L) 33.0 - 36.0 g/dL    RDW 18.5 (H) 11.5 - 17.0 %    Platelet 627 (H) 130 - 400 x10(3)/mcL    MPV 10.2 7.4 - 10.4 fL    Neut % 81.0 %    Lymph % 9.5 %    Mono % 8.2 %    Eos % 0.2 %    Basophil % 0.2 %    Lymph # 1.78 0.6 - 4.6 x10(3)/mcL    Neut # 15.13 (H) 2.1 - 9.2 x10(3)/mcL    Mono # 1.54 (H) 0.1 - 1.3 x10(3)/mcL    Eos # 0.04 0 - 0.9 x10(3)/mcL    Baso # 0.04 <=0.2 x10(3)/mcL    IG# 0.16 (H) 0 - 0.04 x10(3)/mcL     IG% 0.9 %    NRBC% 0.3 %   Light Green Top Hold    Collection Time: 06/03/24  7:23 PM   Result Value Ref Range    Extra Tube Hold for add-ons.    Gold Top Hold    Collection Time: 06/03/24  7:23 PM   Result Value Ref Range    Extra Tube Hold for add-ons.    Comprehensive Metabolic Panel (CMP)    Collection Time: 06/04/24  4:12 AM   Result Value Ref Range    Sodium 137 136 - 145 mmol/L    Potassium 2.9 (L) 3.5 - 5.1 mmol/L    Chloride 105 98 - 107 mmol/L    CO2 22 22 - 29 mmol/L    Glucose 162 (H) 74 - 100 mg/dL    Blood Urea Nitrogen 6.4 (L) 7.0 - 18.7 mg/dL    Creatinine 0.79 0.55 - 1.02 mg/dL    Calcium 8.2 (L) 8.4 - 10.2 mg/dL    Protein Total 6.0 (L) 6.4 - 8.3 gm/dL    Albumin 2.7 (L) 3.5 - 5.0 g/dL    Globulin 3.3 2.4 - 3.5 gm/dL    Albumin/Globulin Ratio 0.8 (L) 1.1 - 2.0 ratio    Bilirubin Total 0.9 <=1.5 mg/dL    ALP 55 40 - 150 unit/L    ALT 9 0 - 55 unit/L    AST 19 5 - 34 unit/L    eGFR >60 mL/min/1.73/m2    Anion Gap 10.0 mEq/L    BUN/Creatinine Ratio 8    CBC with Differential    Collection Time: 06/04/24  4:12 AM   Result Value Ref Range    WBC 15.91 (H) 4.50 - 11.50 x10(3)/mcL    RBC 3.23 (L) 4.20 - 5.40 x10(6)/mcL    Hgb 8.4 (L) 12.0 - 16.0 g/dL    Hct 25.8 (L) 37.0 - 47.0 %    MCV 79.9 (L) 80.0 - 94.0 fL    MCH 26.0 (L) 27.0 - 31.0 pg    MCHC 32.6 (L) 33.0 - 36.0 g/dL    RDW 18.0 (H) 11.5 - 17.0 %    Platelet 646 (H) 130 - 400 x10(3)/mcL    MPV 11.0 (H) 7.4 - 10.4 fL    Neut % 81.3 %    Lymph % 7.9 %    Mono % 9.7 %    Eos % 0.3 %    Basophil % 0.2 %    Lymph # 1.26 0.6 - 4.6 x10(3)/mcL    Neut # 12.93 (H) 2.1 - 9.2 x10(3)/mcL    Mono # 1.54 (H) 0.1 - 1.3 x10(3)/mcL    Eos # 0.05 0 - 0.9 x10(3)/mcL    Baso # 0.03 <=0.2 x10(3)/mcL    IG# 0.10 (H) 0 - 0.04 x10(3)/mcL    IG% 0.6 %    NRBC% 0.3 %   Gold Top Hold    Collection Time: 06/04/24  4:12 AM   Result Value Ref Range    Extra Tube Hold for add-ons.    Magnesium    Collection Time: 06/04/24  4:12 AM   Result Value Ref Range    Magnesium Level  1.90 1.60 - 2.60 mg/dL   VANCOMYCIN, TROUGH    Collection Time: 06/04/24  8:52 AM   Result Value Ref Range    Vancomycin Trough 8.5 (L) 15.0 - 20.0 ug/ml   Light Blue Top Hold    Collection Time: 06/04/24  9:43 AM   Result Value Ref Range    Extra Tube Hold for add-ons.    Red Top Hold    Collection Time: 06/04/24  9:43 AM   Result Value Ref Range    Extra Tube Hold for add-ons.    Basic Metabolic Panel    Collection Time: 06/04/24  2:34 PM   Result Value Ref Range    Sodium 135 (L) 136 - 145 mmol/L    Potassium 4.3 3.5 - 5.1 mmol/L    Chloride 104 98 - 107 mmol/L    CO2 27 22 - 29 mmol/L    Glucose 103 (H) 74 - 100 mg/dL    Blood Urea Nitrogen 7.2 7.0 - 18.7 mg/dL    Creatinine 1.01 0.55 - 1.02 mg/dL    BUN/Creatinine Ratio 7     Calcium 8.8 8.4 - 10.2 mg/dL    Anion Gap 4.0 mEq/L    eGFR >60 mL/min/1.73/m2         Follow Up:  Follow up in about 3 months (around 9/11/2024).      Previous medical history/lab work/radiology reviewed and considered during medical management decisions.   Medication list reviewed and medication reconciliation performed.  Patient was provided  and care about his/her current diagnosis (es) and medications including risk/benefit and side effects/adverse events, over the counter medication uses/doses, home self-care and contact precautions,  and red flags and indications for when to seek immediate medical attention.   Patient was advised to continue compliance with current medication list and medical recommendations.  Patient dvised continued compliance with recommended eating habits/ diets for medical conditions and exercise 150 minutes/ week (if possible) for medical condition (s).  Educational handouts and instructions on selected disease management in AVS (After Visit Summary).    All of the patient's questions were answered to patient's satisfaction.   The patient was receptive, expressed verbal understanding and agreement the above plan.          This note was created with the  assistance of a voice recognition software or phone dictation. There may be transcription errors as a result of using this technology however minimal. Effort has been made to assure accuracy of transcription but any obvious errors or omissions should be clarified with the author of the document

## 2024-06-11 NOTE — ASSESSMENT & PLAN NOTE
Continue ferrous sulfate 324 mg p.o. once daily.  Continue folic acid 1000 mcg p.o. once daily.  Keep appointment with Gastroenterology as directed.  Keep appointment with Hematology as directed.

## 2024-06-24 ENCOUNTER — INFUSION (OUTPATIENT)
Dept: INFUSION THERAPY | Facility: HOSPITAL | Age: 39
End: 2024-06-24
Attending: INTERNAL MEDICINE
Payer: MEDICAID

## 2024-06-24 VITALS
RESPIRATION RATE: 18 BRPM | HEART RATE: 78 BPM | DIASTOLIC BLOOD PRESSURE: 76 MMHG | HEIGHT: 61 IN | SYSTOLIC BLOOD PRESSURE: 118 MMHG | BODY MASS INDEX: 22.23 KG/M2 | WEIGHT: 117.75 LBS | TEMPERATURE: 98 F | OXYGEN SATURATION: 100 %

## 2024-06-24 DIAGNOSIS — D50.0 IRON DEFICIENCY ANEMIA DUE TO CHRONIC BLOOD LOSS: Primary | ICD-10-CM

## 2024-06-24 PROCEDURE — 96365 THER/PROPH/DIAG IV INF INIT: CPT

## 2024-06-24 PROCEDURE — 63600175 PHARM REV CODE 636 W HCPCS: Mod: JZ,JG | Performed by: INTERNAL MEDICINE

## 2024-06-24 PROCEDURE — 25000003 PHARM REV CODE 250: Performed by: INTERNAL MEDICINE

## 2024-06-24 RX ORDER — HEPARIN 100 UNIT/ML
5 SYRINGE INTRAVENOUS
Status: DISCONTINUED | OUTPATIENT
Start: 2024-06-24 | End: 2024-06-24 | Stop reason: HOSPADM

## 2024-06-24 RX ORDER — EPINEPHRINE 0.3 MG/.3ML
0.3 INJECTION SUBCUTANEOUS ONCE AS NEEDED
Status: CANCELLED | OUTPATIENT
Start: 2024-07-01

## 2024-06-24 RX ORDER — EPINEPHRINE 0.3 MG/.3ML
0.3 INJECTION SUBCUTANEOUS ONCE AS NEEDED
Status: DISCONTINUED | OUTPATIENT
Start: 2024-06-24 | End: 2024-06-24 | Stop reason: HOSPADM

## 2024-06-24 RX ORDER — EPINEPHRINE 0.3 MG/.3ML
0.3 INJECTION SUBCUTANEOUS ONCE AS NEEDED
OUTPATIENT
Start: 2024-07-01

## 2024-06-24 RX ORDER — HEPARIN 100 UNIT/ML
5 SYRINGE INTRAVENOUS
OUTPATIENT
Start: 2024-07-01

## 2024-06-24 RX ORDER — SODIUM CHLORIDE 9 MG/ML
INJECTION, SOLUTION INTRAVENOUS CONTINUOUS
Status: DISCONTINUED | OUTPATIENT
Start: 2024-06-24 | End: 2024-06-24 | Stop reason: HOSPADM

## 2024-06-24 RX ORDER — SODIUM CHLORIDE 9 MG/ML
INJECTION, SOLUTION INTRAVENOUS CONTINUOUS
OUTPATIENT
Start: 2024-07-01

## 2024-06-24 RX ORDER — DIPHENHYDRAMINE HYDROCHLORIDE 50 MG/ML
50 INJECTION INTRAMUSCULAR; INTRAVENOUS ONCE AS NEEDED
OUTPATIENT
Start: 2024-07-01

## 2024-06-24 RX ORDER — SODIUM CHLORIDE 0.9 % (FLUSH) 0.9 %
10 SYRINGE (ML) INJECTION
OUTPATIENT
Start: 2024-07-01

## 2024-06-24 RX ORDER — SODIUM CHLORIDE 0.9 % (FLUSH) 0.9 %
10 SYRINGE (ML) INJECTION
Status: DISCONTINUED | OUTPATIENT
Start: 2024-06-24 | End: 2024-06-24 | Stop reason: HOSPADM

## 2024-06-24 RX ORDER — DIPHENHYDRAMINE HYDROCHLORIDE 50 MG/ML
50 INJECTION INTRAMUSCULAR; INTRAVENOUS ONCE AS NEEDED
Status: DISCONTINUED | OUTPATIENT
Start: 2024-06-24 | End: 2024-06-24 | Stop reason: HOSPADM

## 2024-06-24 RX ADMIN — SODIUM CHLORIDE: 9 INJECTION, SOLUTION INTRAVENOUS at 08:06

## 2024-06-24 RX ADMIN — FERRIC CARBOXYMALTOSE INJECTION 750 MG: 50 INJECTION, SOLUTION INTRAVENOUS at 08:06

## 2024-06-24 NOTE — NURSING
Pt received Venofer # 1 of 2 via peripheral IV without incident; AVS given & pt verbalized understanding next Venofer appt is 7/1/24 at 1 pm.

## 2024-07-01 ENCOUNTER — INFUSION (OUTPATIENT)
Dept: INFUSION THERAPY | Facility: HOSPITAL | Age: 39
End: 2024-07-01
Attending: INTERNAL MEDICINE
Payer: MEDICAID

## 2024-07-01 VITALS
DIASTOLIC BLOOD PRESSURE: 95 MMHG | BODY MASS INDEX: 23.06 KG/M2 | WEIGHT: 122.13 LBS | HEIGHT: 61 IN | HEART RATE: 96 BPM | RESPIRATION RATE: 20 BRPM | TEMPERATURE: 99 F | OXYGEN SATURATION: 100 % | SYSTOLIC BLOOD PRESSURE: 132 MMHG

## 2024-07-01 DIAGNOSIS — D50.0 IRON DEFICIENCY ANEMIA DUE TO CHRONIC BLOOD LOSS: Primary | ICD-10-CM

## 2024-07-01 PROCEDURE — A4216 STERILE WATER/SALINE, 10 ML: HCPCS | Performed by: INTERNAL MEDICINE

## 2024-07-01 PROCEDURE — 96365 THER/PROPH/DIAG IV INF INIT: CPT

## 2024-07-01 PROCEDURE — 63600175 PHARM REV CODE 636 W HCPCS: Mod: JZ,JG | Performed by: INTERNAL MEDICINE

## 2024-07-01 PROCEDURE — 25000003 PHARM REV CODE 250: Performed by: INTERNAL MEDICINE

## 2024-07-01 RX ORDER — HEPARIN 100 UNIT/ML
5 SYRINGE INTRAVENOUS
Status: CANCELLED | OUTPATIENT
Start: 2024-07-01

## 2024-07-01 RX ORDER — HEPARIN 100 UNIT/ML
5 SYRINGE INTRAVENOUS
Status: DISCONTINUED | OUTPATIENT
Start: 2024-07-01 | End: 2024-07-01 | Stop reason: HOSPADM

## 2024-07-01 RX ORDER — SODIUM CHLORIDE 0.9 % (FLUSH) 0.9 %
10 SYRINGE (ML) INJECTION
Status: DISCONTINUED | OUTPATIENT
Start: 2024-07-01 | End: 2024-07-01 | Stop reason: HOSPADM

## 2024-07-01 RX ORDER — EPINEPHRINE 0.3 MG/.3ML
0.3 INJECTION SUBCUTANEOUS ONCE AS NEEDED
OUTPATIENT
Start: 2024-07-01

## 2024-07-01 RX ORDER — SODIUM CHLORIDE 9 MG/ML
INJECTION, SOLUTION INTRAVENOUS CONTINUOUS
Status: CANCELLED | OUTPATIENT
Start: 2024-07-01

## 2024-07-01 RX ORDER — SODIUM CHLORIDE 0.9 % (FLUSH) 0.9 %
10 SYRINGE (ML) INJECTION
Status: CANCELLED | OUTPATIENT
Start: 2024-07-01

## 2024-07-01 RX ORDER — SODIUM CHLORIDE 9 MG/ML
INJECTION, SOLUTION INTRAVENOUS CONTINUOUS
Status: DISCONTINUED | OUTPATIENT
Start: 2024-07-01 | End: 2024-07-01 | Stop reason: HOSPADM

## 2024-07-01 RX ORDER — DIPHENHYDRAMINE HYDROCHLORIDE 50 MG/ML
50 INJECTION INTRAMUSCULAR; INTRAVENOUS ONCE AS NEEDED
OUTPATIENT
Start: 2024-07-01

## 2024-07-01 RX ADMIN — Medication 10 ML: at 02:07

## 2024-07-01 RX ADMIN — FERRIC CARBOXYMALTOSE INJECTION 750 MG: 50 INJECTION, SOLUTION INTRAVENOUS at 01:07

## 2024-09-09 PROBLEM — A41.50 SEPSIS DUE TO GRAM-NEGATIVE UTI: Status: RESOLVED | Noted: 2024-06-04 | Resolved: 2024-09-09

## 2024-09-09 PROBLEM — N39.0 SEPSIS DUE TO GRAM-NEGATIVE UTI: Status: RESOLVED | Noted: 2024-06-04 | Resolved: 2024-09-09

## 2024-09-10 ENCOUNTER — OFFICE VISIT (OUTPATIENT)
Dept: FAMILY MEDICINE | Facility: CLINIC | Age: 39
End: 2024-09-10
Payer: MEDICAID

## 2024-09-10 VITALS
BODY MASS INDEX: 22.28 KG/M2 | OXYGEN SATURATION: 100 % | WEIGHT: 118 LBS | SYSTOLIC BLOOD PRESSURE: 114 MMHG | HEIGHT: 61 IN | HEART RATE: 93 BPM | TEMPERATURE: 98 F | DIASTOLIC BLOOD PRESSURE: 83 MMHG | RESPIRATION RATE: 18 BRPM

## 2024-09-10 DIAGNOSIS — R39.15 URINARY URGENCY: ICD-10-CM

## 2024-09-10 DIAGNOSIS — Z00.00 ANNUAL PHYSICAL EXAM: Primary | ICD-10-CM

## 2024-09-10 DIAGNOSIS — N30.01 ACUTE CYSTITIS WITH HEMATURIA: ICD-10-CM

## 2024-09-10 LAB
ALBUMIN SERPL-MCNC: 4.3 G/DL (ref 3.5–5)
ALBUMIN/GLOB SERPL: 1.2 RATIO (ref 1.1–2)
ALP SERPL-CCNC: 73 UNIT/L (ref 40–150)
ALT SERPL-CCNC: 8 UNIT/L (ref 0–55)
ANION GAP SERPL CALC-SCNC: 7 MEQ/L
AST SERPL-CCNC: 11 UNIT/L (ref 5–34)
BACTERIA #/AREA URNS AUTO: ABNORMAL /HPF
BASOPHILS # BLD AUTO: 0.03 X10(3)/MCL
BASOPHILS NFR BLD AUTO: 0.8 %
BILIRUB SERPL-MCNC: 0.2 MG/DL
BILIRUB SERPL-MCNC: NORMAL MG/DL
BILIRUB UR QL STRIP.AUTO: NEGATIVE
BLOOD URINE, POC: NORMAL
BUN SERPL-MCNC: 6.6 MG/DL (ref 7–18.7)
CALCIUM SERPL-MCNC: 9.1 MG/DL (ref 8.4–10.2)
CHLORIDE SERPL-SCNC: 108 MMOL/L (ref 98–107)
CLARITY UR: ABNORMAL
CLARITY, POC UA: CLEAR
CO2 SERPL-SCNC: 21 MMOL/L (ref 22–29)
COLOR UR AUTO: ABNORMAL
COLOR, POC UA: YELLOW
CREAT SERPL-MCNC: 0.65 MG/DL (ref 0.55–1.02)
CREAT/UREA NIT SERPL: 10
EOSINOPHIL # BLD AUTO: 0.1 X10(3)/MCL (ref 0–0.9)
EOSINOPHIL NFR BLD AUTO: 2.6 %
ERYTHROCYTE [DISTWIDTH] IN BLOOD BY AUTOMATED COUNT: 14.1 % (ref 11.5–17)
GFR SERPLBLD CREATININE-BSD FMLA CKD-EPI: >60 ML/MIN/1.73/M2
GLOBULIN SER-MCNC: 3.6 GM/DL (ref 2.4–3.5)
GLUCOSE SERPL-MCNC: 92 MG/DL (ref 74–100)
GLUCOSE UR QL STRIP: NORMAL
GLUCOSE UR QL STRIP: NORMAL
HCT VFR BLD AUTO: 33.4 % (ref 37–47)
HGB BLD-MCNC: 10 G/DL (ref 12–16)
HGB UR QL STRIP: NEGATIVE
HYALINE CASTS #/AREA URNS LPF: ABNORMAL /LPF
IMM GRANULOCYTES # BLD AUTO: 0.01 X10(3)/MCL (ref 0–0.04)
IMM GRANULOCYTES NFR BLD AUTO: 0.3 %
KETONES UR QL STRIP: NEGATIVE
KETONES UR QL STRIP: NORMAL
LEUKOCYTE ESTERASE UR QL STRIP: 250
LEUKOCYTE ESTERASE URINE, POC: NORMAL
LYMPHOCYTES # BLD AUTO: 1.28 X10(3)/MCL (ref 0.6–4.6)
LYMPHOCYTES NFR BLD AUTO: 33.5 %
MCH RBC QN AUTO: 25.8 PG (ref 27–31)
MCHC RBC AUTO-ENTMCNC: 29.9 G/DL (ref 33–36)
MCV RBC AUTO: 86.1 FL (ref 80–94)
MONOCYTES # BLD AUTO: 0.35 X10(3)/MCL (ref 0.1–1.3)
MONOCYTES NFR BLD AUTO: 9.2 %
MUCOUS THREADS URNS QL MICRO: ABNORMAL /LPF
NEUTROPHILS # BLD AUTO: 2.05 X10(3)/MCL (ref 2.1–9.2)
NEUTROPHILS NFR BLD AUTO: 53.6 %
NITRITE UR QL STRIP: ABNORMAL
NITRITE, POC UA: NORMAL
NRBC BLD AUTO-RTO: 0 %
PH UR STRIP: 6 [PH]
PH, POC UA: 6
PLATELET # BLD AUTO: 232 X10(3)/MCL (ref 130–400)
PMV BLD AUTO: 10.7 FL (ref 7.4–10.4)
POTASSIUM SERPL-SCNC: 3.5 MMOL/L (ref 3.5–5.1)
PROT SERPL-MCNC: 7.9 GM/DL (ref 6.4–8.3)
PROT UR QL STRIP: NEGATIVE
PROTEIN, POC: NORMAL
RBC # BLD AUTO: 3.88 X10(6)/MCL (ref 4.2–5.4)
RBC #/AREA URNS AUTO: ABNORMAL /HPF
SODIUM SERPL-SCNC: 136 MMOL/L (ref 136–145)
SP GR UR STRIP.AUTO: 1.02 (ref 1–1.03)
SPECIFIC GRAVITY, POC UA: 1.02
SQUAMOUS #/AREA URNS LPF: ABNORMAL /HPF
TSH SERPL-ACNC: 1.23 UIU/ML (ref 0.35–4.94)
UROBILINOGEN UR STRIP-ACNC: NORMAL
UROBILINOGEN, POC UA: 0.2
WBC # BLD AUTO: 3.82 X10(3)/MCL (ref 4.5–11.5)
WBC #/AREA URNS AUTO: ABNORMAL /HPF

## 2024-09-10 PROCEDURE — 85025 COMPLETE CBC W/AUTO DIFF WBC: CPT | Performed by: NURSE PRACTITIONER

## 2024-09-10 PROCEDURE — 87077 CULTURE AEROBIC IDENTIFY: CPT | Performed by: NURSE PRACTITIONER

## 2024-09-10 PROCEDURE — 87086 URINE CULTURE/COLONY COUNT: CPT | Performed by: NURSE PRACTITIONER

## 2024-09-10 PROCEDURE — 99395 PREV VISIT EST AGE 18-39: CPT | Mod: S$PBB,,, | Performed by: NURSE PRACTITIONER

## 2024-09-10 PROCEDURE — 36415 COLL VENOUS BLD VENIPUNCTURE: CPT | Performed by: NURSE PRACTITIONER

## 2024-09-10 PROCEDURE — 80053 COMPREHEN METABOLIC PANEL: CPT | Performed by: NURSE PRACTITIONER

## 2024-09-10 PROCEDURE — 1160F RVW MEDS BY RX/DR IN RCRD: CPT | Mod: CPTII,,, | Performed by: NURSE PRACTITIONER

## 2024-09-10 PROCEDURE — 3079F DIAST BP 80-89 MM HG: CPT | Mod: CPTII,,, | Performed by: NURSE PRACTITIONER

## 2024-09-10 PROCEDURE — 3008F BODY MASS INDEX DOCD: CPT | Mod: CPTII,,, | Performed by: NURSE PRACTITIONER

## 2024-09-10 PROCEDURE — 81001 URINALYSIS AUTO W/SCOPE: CPT | Performed by: NURSE PRACTITIONER

## 2024-09-10 PROCEDURE — 84443 ASSAY THYROID STIM HORMONE: CPT | Performed by: NURSE PRACTITIONER

## 2024-09-10 PROCEDURE — 3074F SYST BP LT 130 MM HG: CPT | Mod: CPTII,,, | Performed by: NURSE PRACTITIONER

## 2024-09-10 PROCEDURE — 87186 SC STD MICRODIL/AGAR DIL: CPT | Performed by: NURSE PRACTITIONER

## 2024-09-10 PROCEDURE — 81002 URINALYSIS NONAUTO W/O SCOPE: CPT | Mod: PBBFAC | Performed by: NURSE PRACTITIONER

## 2024-09-10 PROCEDURE — 1159F MED LIST DOCD IN RCRD: CPT | Mod: CPTII,,, | Performed by: NURSE PRACTITIONER

## 2024-09-10 PROCEDURE — 99214 OFFICE O/P EST MOD 30 MIN: CPT | Mod: S$PBB,25,, | Performed by: NURSE PRACTITIONER

## 2024-09-10 PROCEDURE — 99215 OFFICE O/P EST HI 40 MIN: CPT | Mod: PBBFAC | Performed by: NURSE PRACTITIONER

## 2024-09-10 RX ORDER — NITROFURANTOIN 25; 75 MG/1; MG/1
100 CAPSULE ORAL 2 TIMES DAILY
Qty: 14 CAPSULE | Refills: 0 | Status: SHIPPED | OUTPATIENT
Start: 2024-09-10 | End: 2024-09-17

## 2024-09-10 NOTE — ASSESSMENT & PLAN NOTE
Urinary urgency started about 3-4 days ago.  Patient state usually is to start of a bladder infection.  Leukocyte positive, blood positive.  Urine culture pending  Patient agreed to initiate antibiotics Rx Macrobid 100 mg p.o. b.i.d. for 7 days.  Will notify of test results when it becomes available.

## 2024-09-10 NOTE — PROGRESS NOTES
Patient Name: Elaina Pierce   : 1985  MRN: [id number]     SUBJECTIVE DATA:    CHIEF COMPLAINT:   Elaina Pierce is a 39 y.o. female who presents to clinic today with Annual Exam      HPI:  39-year-old female presents to the clinic to complete yearly wellness exam with labs.  Patient reports urinary urgency and would like to rule out bladder infection.    Wellness:  Social Determinants of Health     Tobacco Use: Low Risk  (9/10/2024)    Patient History     Smoking Tobacco Use: Never     Smokeless Tobacco Use: Never     Passive Exposure: Not on file   Alcohol Use: Not At Risk (6/3/2024)    AUDIT-C     Frequency of Alcohol Consumption: Monthly or less     Average Number of Drinks: 1 or 2     Frequency of Binge Drinking: Never   Financial Resource Strain: Low Risk  (6/3/2024)    Overall Financial Resource Strain (CARDIA)     Difficulty of Paying Living Expenses: Not hard at all   Food Insecurity: No Food Insecurity (6/3/2024)    Hunger Vital Sign     Worried About Running Out of Food in the Last Year: Never true     Ran Out of Food in the Last Year: Never true   Transportation Needs: No Transportation Needs (6/3/2024)    TRANSPORTATION NEEDS     Transportation : No   Physical Activity: Insufficiently Active (6/3/2024)    Exercise Vital Sign     Days of Exercise per Week: 1 day     Minutes of Exercise per Session: 10 min   Stress: No Stress Concern Present (6/3/2024)    [employer] - Occupational Stress Questionnaire     Feeling of Stress : Not at all   Housing Stability: Low Risk  (6/3/2024)    Housing Stability Vital Sign     Unable to Pay for Housing in the Last Year: No     Homeless in the Last Year: No   Depression: Low Risk  (2024)    Depression     Last PHQ-4: Flowsheet Data: 0   Utilities: Not At Risk (6/3/2024)    [facility] Utilities     Threatened with loss of utilities: No   Health Literacy: Adequate Health Literacy (6/3/2024)     Health Literacy      "Frequency of need for help with medical instructions: Never   Social Isolation: Socially Integrated (6/3/2024)    Social Isolation     Social Isolation: 1     Last menstrual cycle: 08/16/2024 regular  Car safety:  Seat belt used all the time.  Water:  Stay hydrated with fluids, drink 6-8 cups of water daily.  Alcohol:  Drink in moderation.  Exercise: exercise 30 minutes a day up to 5 days a week.  Stay active.  Lose weight.  Nutrition:  Follow low-cholesterol, low-fat, low-salt diet.  Eat fresh fruits and vegetables.  Keep in mind portion size.  Keep fiber intake between 25-30 g per day.  Discharge education materials  Dental:  Patient does follow-up with a dentist yearly.  Ophthalmology:  Patient does not follow-up with ophthalmologist yearly.  Ophthalmology referral initiated  Cervical cancer screening exam:  07/2023 NEGATIVE FOR INTRAEPITHELIAL LESION OR MALIGNANCY. NIL , HPV negative  Immunizations:  Up-to-date    Lab work work drawn today will notify of test results when they become available.     Patient denies chest pain, shortness of breath, dyspnea on exertion, palpitations, peripheral edema, abdominal pain, nausea, vomiting, diarrhea, constipation, fatigue, fever, chills, dysuria,  hematuria, dark stools or bloody stools        ALLERGIES: Review of patient's allergies indicates:  No Known Allergies      ROS:  Review of Systems   Genitourinary:  Positive for urgency.   All other systems reviewed and are negative.        OBJECTIVE DATA:  Vital signs  Vitals:    09/10/24 0741   BP: 114/83   Pulse: 93   Resp: 18   Temp: 98.1 °F (36.7 °C)   TempSrc: Oral   SpO2: 100%   Weight: 53.5 kg (118 lb)   Height: 5' 1" (1.549 m)      Body mass index is 22.3 kg/m².    PHYSICAL EXAM:   Physical Exam  Vitals and nursing note reviewed.   Constitutional:       General: She is awake. She is not in acute distress.     Appearance: Normal appearance. She is well-developed, well-groomed and normal weight. She is not ill-appearing, " toxic-appearing or diaphoretic.   HENT:      Head: Normocephalic and atraumatic.      Right Ear: Tympanic membrane, ear canal and external ear normal.      Left Ear: Tympanic membrane, ear canal and external ear normal.      Nose: Nose normal.      Mouth/Throat:      Lips: Pink. No lesions.      Mouth: Mucous membranes are moist.      Dentition: Abnormal dentition.      Tongue: Tongue does not deviate from midline.      Palate: No lesions.      Pharynx: Oropharynx is clear. Uvula midline.      Tonsils: 1+ on the right. 1+ on the left.      Comments: Dentist list provided for patient to call and schedule  Eyes:      General: Lids are normal. Gaze aligned appropriately.      Extraocular Movements: Extraocular movements intact.      Conjunctiva/sclera: Conjunctivae normal.      Pupils: Pupils are equal, round, and reactive to light.      Visual Fields: Right eye visual fields normal and left eye visual fields normal.   Neck:      Thyroid: No thyroid mass, thyromegaly or thyroid tenderness.      Trachea: Trachea and phonation normal.   Cardiovascular:      Rate and Rhythm: Normal rate and regular rhythm.      Pulses: Normal pulses.           Carotid pulses are 2+ on the right side and 2+ on the left side.       Radial pulses are 2+ on the right side and 2+ on the left side.        Femoral pulses are 2+ on the right side and 2+ on the left side.       Dorsalis pedis pulses are 2+ on the right side and 2+ on the left side.        Posterior tibial pulses are 2+ on the right side and 2+ on the left side.      Heart sounds: Normal heart sounds. No murmur heard.  Pulmonary:      Effort: Pulmonary effort is normal.      Breath sounds: Normal breath sounds and air entry. No wheezing or rhonchi.   Abdominal:      General: Abdomen is flat. Bowel sounds are normal. There is no distension.      Palpations: Abdomen is soft. There is no mass.      Tenderness: There is no abdominal tenderness. There is no right CVA tenderness, left  CVA tenderness, guarding or rebound.      Hernia: No hernia is present.   Musculoskeletal:         General: Normal range of motion.      Cervical back: Normal range of motion and neck supple. No rigidity or tenderness.      Right lower leg: No edema.      Left lower leg: No edema.   Lymphadenopathy:      Cervical: No cervical adenopathy.   Skin:     General: Skin is warm and dry.      Capillary Refill: Capillary refill takes less than 2 seconds.      Findings: No rash.   Neurological:      General: No focal deficit present.      Mental Status: She is alert and oriented to person, place, and time. Mental status is at baseline.      GCS: GCS eye subscore is 4. GCS verbal subscore is 5. GCS motor subscore is 6.      Cranial Nerves: Cranial nerves 2-12 are intact. No cranial nerve deficit.      Sensory: Sensation is intact. No sensory deficit.      Motor: Motor function is intact. No weakness.      Coordination: Coordination is intact. Coordination normal.      Gait: Gait is intact. Gait normal.   Psychiatric:         Attention and Perception: Attention and perception normal.         Mood and Affect: Mood and affect normal.         Speech: Speech normal.         Behavior: Behavior normal. Behavior is cooperative.         Thought Content: Thought content normal.         Cognition and Memory: Cognition and memory normal.         Judgment: Judgment normal.          ASSESSMENT/PLAN:  1. Annual physical exam  -     CBC Auto Differential  -     Comprehensive Metabolic Panel  -     TSH  -     Urinalysis, Reflex to Urine Culture  -     Ambulatory referral/consult to Ophthalmology; Future; Expected date: 09/17/2024    2. Acute cystitis with hematuria  Assessment & Plan:  Urinary urgency started about 3-4 days ago.  Patient state usually is to start of a bladder infection.  Leukocyte positive, blood positive.  Urine culture pending  Patient agreed to initiate antibiotics Rx Macrobid 100 mg p.o. b.i.d. for 7 days.  Will notify of  test results when it becomes available.    Orders:  -     Urine culture  -     nitrofurantoin, macrocrystal-monohydrate, (MACROBID) 100 MG capsule; Take 1 capsule (100 mg total) by mouth 2 (two) times daily. for 7 days  Dispense: 14 capsule; Refill: 0    3. Urinary urgency  Assessment & Plan:  Urinary urgency started about 3-4 days ago.  Patient state usually is to start of a bladder infection.  Leukocyte positive, blood positive.  Urine culture pending  Patient agreed to initiate antibiotics Rx Macrobid 100 mg p.o. b.i.d. for 7 days.  Will notify of test results when it becomes available.    Orders:  -     POCT URINE DIPSTICK WITHOUT MICROSCOPE  -     Urine culture  -     nitrofurantoin, macrocrystal-monohydrate, (MACROBID) 100 MG capsule; Take 1 capsule (100 mg total) by mouth 2 (two) times daily. for 7 days  Dispense: 14 capsule; Refill: 0           RESULTS:  Recent Results (from the past 1008 hour(s))   POCT URINE DIPSTICK WITHOUT MICROSCOPE    Collection Time: 09/10/24  8:22 AM   Result Value Ref Range    Glucose, UA N     Bilirubin, POC N     Ketones, UA N     Spec Grav UA 1.020     Blood, UA Trace     pH, UA 6.0     Protein, POC N     Urobilinogen, UA 0.2     Nitrite, UA N     WBC, UA Trace     Color, UA Yellow     Clarity, UA Clear          Follow Up:  Follow up in about 1 year (around 9/11/2025).     30 minutes of total time spent on the encounter, which includes face to face time and non-face to face time preparing to see the patient (eg, review of tests), Obtaining and/or reviewing separately obtained history, Documenting clinical information in the electronic or other health record, Independently interpreting results (not separately reported) and communicating results to the patient/family/caregiver, or Care coordination (not separately reported).        Previous medical history/lab work/radiology reviewed and considered during medical management decisions.   Medication list reviewed and medication  reconciliation performed.  Patient was provided  and care about his/her current diagnosis (es) and medications including risk/benefit and side effects/adverse events, over the counter medication uses/doses, home self-care and contact precautions,  and red flags and indications for when to seek immediate medical attention.   Patient was advised to continue compliance with current medication list and medical recommendations.  Patient dvised continued compliance with recommended eating habits/ diets for medical conditions and exercise 150 minutes/ week (if possible) for medical condition (s).  Educational handouts and instructions on selected disease management in AVS (After Visit Summary).    All of the patient's questions were answered to patient's satisfaction.   The patient was receptive, expressed verbal understanding and agreement the above plan.      This note was created with the assistance of a voice recognition software or phone dictation. There may be transcription errors as a result of using this technology however minimal. Effort has been made to assure accuracy of transcription but any obvious errors or omissions should be clarified with the author of the document

## 2024-09-10 NOTE — PATIENT INSTRUCTIONS
Sridhar Delaney,     If you are due for any health screening(s) below please notify me so we can arrange them to be ordered and scheduled. Most healthy patients at your age complete them, but you are free to accept or refuse.     If you can't do it, I'll definitely understand. If you can, I'd certainly appreciate it!    All of your core healthy metrics are met.

## 2024-09-12 LAB — BACTERIA UR CULT: ABNORMAL

## 2024-09-16 ENCOUNTER — TELEPHONE (OUTPATIENT)
Dept: FAMILY MEDICINE | Facility: CLINIC | Age: 39
End: 2024-09-16
Payer: MEDICAID

## 2024-09-16 PROBLEM — Z09 HOSPITAL DISCHARGE FOLLOW-UP: Status: RESOLVED | Noted: 2024-06-11 | Resolved: 2024-09-16

## 2024-09-16 NOTE — TELEPHONE ENCOUNTER
Called patient to give results. Patient verbalized understanding. No additional questions at this time.       ----- Message from ANNA Alcala sent at 9/16/2024 11:43 AM CDT -----  PLEASE CALL PATIENTS WITH RESULTS  Urine culture positive for E coli infection.  Antibiotics provided Macrobid is appropriate for treatment.  Make sure to complete course of antibiotic.  Electrolytes, kidney liver functions within normal range.  No sign of infection, anemia noted.  Keep appointment with GI and infusions.   Thyroid function test within normal range.

## 2024-12-22 NOTE — ASSESSMENT & PLAN NOTE
[facility] EMERGENCY ROOM  [address] [place] [address]  Phone: [phone]  Fax: [phone]    Isidoro Estrella was seen and treated in our emergency department on 12/22/2024.  He may return to work on 12/25/2024.         Thank you for choosing [facility].    Jose Miguel Sunshine MD       Urinary urgency started about 3-4 days ago.  Patient state usually is to start of a bladder infection.  Leukocyte positive, blood positive.  Urine culture pending  Patient agreed to initiate antibiotics Rx Macrobid 100 mg p.o. b.i.d. for 7 days.  Will notify of test results when it becomes available.

## 2025-01-07 ENCOUNTER — PATIENT MESSAGE (OUTPATIENT)
Dept: GASTROENTEROLOGY | Facility: CLINIC | Age: 40
End: 2025-01-07

## 2025-01-07 ENCOUNTER — OFFICE VISIT (OUTPATIENT)
Dept: GASTROENTEROLOGY | Facility: CLINIC | Age: 40
End: 2025-01-07
Payer: MEDICAID

## 2025-01-07 VITALS
TEMPERATURE: 98 F | HEIGHT: 61 IN | DIASTOLIC BLOOD PRESSURE: 86 MMHG | BODY MASS INDEX: 21.52 KG/M2 | WEIGHT: 114 LBS | RESPIRATION RATE: 16 BRPM | HEART RATE: 90 BPM | SYSTOLIC BLOOD PRESSURE: 122 MMHG | OXYGEN SATURATION: 97 %

## 2025-01-07 DIAGNOSIS — K59.04 CHRONIC IDIOPATHIC CONSTIPATION: ICD-10-CM

## 2025-01-07 DIAGNOSIS — K52.3 INDETERMINATE COLITIS: ICD-10-CM

## 2025-01-07 DIAGNOSIS — D50.0 IRON DEFICIENCY ANEMIA DUE TO CHRONIC BLOOD LOSS: Primary | ICD-10-CM

## 2025-01-07 LAB
25(OH)D3+25(OH)D2 SERPL-MCNC: 16 NG/ML (ref 30–80)
BASOPHILS # BLD AUTO: 0.05 X10(3)/MCL
BASOPHILS NFR BLD AUTO: 1 %
CRP SERPL-MCNC: <1 MG/L
EOSINOPHIL # BLD AUTO: 0.14 X10(3)/MCL (ref 0–0.9)
EOSINOPHIL NFR BLD AUTO: 2.9 %
ERYTHROCYTE [DISTWIDTH] IN BLOOD BY AUTOMATED COUNT: 20.2 % (ref 11.5–17)
FERRITIN SERPL-MCNC: 6.45 NG/ML (ref 4.63–204)
HCT VFR BLD AUTO: 30.3 % (ref 37–47)
HGB BLD-MCNC: 8.6 G/DL (ref 12–16)
IMM GRANULOCYTES # BLD AUTO: 0.01 X10(3)/MCL (ref 0–0.04)
IMM GRANULOCYTES NFR BLD AUTO: 0.2 %
IRON SATN MFR SERPL: 17 % (ref 20–50)
IRON SERPL-MCNC: 50 UG/DL (ref 50–170)
LYMPHOCYTES # BLD AUTO: 1.7 X10(3)/MCL (ref 0.6–4.6)
LYMPHOCYTES NFR BLD AUTO: 34.8 %
MCH RBC QN AUTO: 21.1 PG (ref 27–31)
MCHC RBC AUTO-ENTMCNC: 28.4 G/DL (ref 33–36)
MCV RBC AUTO: 74.3 FL (ref 80–94)
MONOCYTES # BLD AUTO: 0.46 X10(3)/MCL (ref 0.1–1.3)
MONOCYTES NFR BLD AUTO: 9.4 %
NEUTROPHILS # BLD AUTO: 2.53 X10(3)/MCL (ref 2.1–9.2)
NEUTROPHILS NFR BLD AUTO: 51.7 %
NRBC BLD AUTO-RTO: 0 %
PLATELET # BLD AUTO: 206 X10(3)/MCL (ref 130–400)
PLATELETS.RETICULATED NFR BLD AUTO: 0.5 % (ref 0.9–11.2)
PMV BLD AUTO: ABNORMAL FL
RBC # BLD AUTO: 4.08 X10(6)/MCL (ref 4.2–5.4)
TIBC SERPL-MCNC: 243 UG/DL (ref 70–310)
TIBC SERPL-MCNC: 293 UG/DL (ref 250–450)
TRANSFERRIN SERPL-MCNC: 268 MG/DL (ref 180–382)
VIT B12 SERPL-MCNC: 604 PG/ML (ref 213–816)
WBC # BLD AUTO: 4.89 X10(3)/MCL (ref 4.5–11.5)

## 2025-01-07 PROCEDURE — 85025 COMPLETE CBC W/AUTO DIFF WBC: CPT | Performed by: INTERNAL MEDICINE

## 2025-01-07 PROCEDURE — 99213 OFFICE O/P EST LOW 20 MIN: CPT | Mod: PBBFAC | Performed by: INTERNAL MEDICINE

## 2025-01-07 PROCEDURE — 82306 VITAMIN D 25 HYDROXY: CPT | Performed by: INTERNAL MEDICINE

## 2025-01-07 PROCEDURE — 83550 IRON BINDING TEST: CPT | Performed by: INTERNAL MEDICINE

## 2025-01-07 PROCEDURE — 36415 COLL VENOUS BLD VENIPUNCTURE: CPT | Performed by: INTERNAL MEDICINE

## 2025-01-07 PROCEDURE — 82607 VITAMIN B-12: CPT | Performed by: INTERNAL MEDICINE

## 2025-01-07 PROCEDURE — 82728 ASSAY OF FERRITIN: CPT | Performed by: INTERNAL MEDICINE

## 2025-01-07 PROCEDURE — 86140 C-REACTIVE PROTEIN: CPT | Performed by: INTERNAL MEDICINE

## 2025-01-07 RX ORDER — ERGOCALCIFEROL 1.25 MG/1
50000 CAPSULE ORAL
Qty: 12 CAPSULE | Refills: 1 | Status: SHIPPED | OUTPATIENT
Start: 2025-01-07 | End: 2025-06-18

## 2025-01-07 NOTE — ASSESSMENT & PLAN NOTE
Severe anemia in 2022 in the setting of iron, B12, and folate deficiency.  No overt GI bleeding at that time     Dec 2023: EGD and colonoscopy unremarkable in regards to anemia  S/p Feraheme x 2 infusions in December 2023    Hgb 5.8 g/dL when hospitalized for pylenonephritis in June 2024.   Venofer x 2 June/July 2024  On ferrous sulfate 325 mg once daily - hasn't made constipation any worse    Repeat CBC, ferritin, folate, B12  Schedule VCE  Repeat iron infusions if needed

## 2025-01-07 NOTE — ASSESSMENT & PLAN NOTE
Diagnosed in the setting of anemia and rectal bleeding per patient report  Unclear how diagnosis was made and what involvement of the intestinal system was noted.  Based on recent colonoscopy - left colonic involvement possible up to hepatic flexure, possible TI  Reported Crohn's disease per patient - favor this given reported small bowel disease     Had treatment in the past with prednisone, pentasa, azathioprine and 3-5 doses of Remicade which was stopped due to infusion reaction (hives)  Off therapy for over 20 years and no significant GI symptoms other than constipation   Severe anemia in 2022 in the setting of iron, B12, and folate deficiency.  No overt GI bleeding at that time     December 6, 2023: Colonoscopy: Three scattered erosions in the TI. Four small patchy areas of mild inflammation in the rectum, sigmoid, and hepatic flexure.  Scarring and altered vascularity in the rectum to the descending colon.  Internal and external hemorrhoids     Labs and fecal calprotectin (haven't turned in to date)  Schedule VCE  If fecal calprotectin elevated, can consider mesalamine or budesonide to help treat areas seen on colonoscopy  Otherwise despite history, remains clinically asymptomatic with very mild endoscopic disease despite years off therapy.  Can continue to monitor disease closely off therapy.

## 2025-01-07 NOTE — PROGRESS NOTES
Inflammatory Bowel Disease        Established Patient Note         TODAY'S VISIT DATE:  1/7/2025  The patient's last visit with me was on Visit date not found.     PCP: Mio Payne      Referring MD:   No ref. provider found    History of Present Illness:    Elaina is a 39 year old AAF with iron deficiency anemia, indeterminate colitis favor Crohn's disease, chronic constipation here for follow-up.      She was diagnosed by Dr. Loyola around the age of 15.  She recalls being hospitalized for an extended period of time for severe anemia and rectal bleeding.  She recalls rectal bleeding was intermittent, associated with bright red blood without diarrhea.  She was then seen by Dr. Rivera for a period time followed by Dr. Andrade at [facility] around 2015.  Her last colonoscopy with Dr. Jalili was around the age of 17 (20 years ago).  Prior notes in the chart mention small bowel disease and previously treated with prednisone, pentasa, azathioprine, and 3-5 doses of Remicade which was stopped due to developing hives.  She has been off therapy for over 20 years.  Her rectal bleeding has since resolved.  She does have documentation of severe anemia for over 20 years requiring multiple blood  transfusions.  She has been on and off iron over the years.  She was off iron prior to her hospitalization at [facility] in 2022 when she presented with severe anemia Hgb 1.7 g/dL and pancytopenia. Labs during that admission showed B12: 201, folate 6.4, ferritin 27.5.  She was treated with B12, folic acid and given empiric prednisone in case immune mediated.  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  Intrinsic factor Ab negative.      At her initial evaluation with me in November 2023 she reported chronic constipation, 2 bowel movements weekly with associated abdominal pain.    CRP <1.0    December 6, 2023: EGD: 4 mm submucosal nodule at 35 cm. HP negative erosive  gastritis. Normal duodenum.     December 6, 2023: Colonoscopy: Three scattered erosions in the TI. Four small patchy areas of mild inflammation in the rectum, sigmoid, and hepatic flexure.  Scarring and altered vascularity in the rectum to the descending colon.  Internal and external hemorrhoids. Path: TI: mild chronic inflammation. Focal active colitis in right and left colon.  Mild chronic in rectum.     When last seen she was doing well except continued constipation issues with 2 bowel movements a week and associated cramping, incomplete evacuation.  She had tried MoM and dulcolax prn.  I gave her Amitiza 24 mcg BID to try.  She never turned in a fecal calprotectin.  CRP 1.4.    Hospitalized in June 2024 for pyelonephritis.  Hgb 5.8 g/dL then and given 2 uPRBCs.   Venofer x 2 June/July 2024  On ferrous sulfate 325 mg once daily    In December she had a 2 week course of 2-3 loose stools every day which is a departure from her normal once a week bowel movement.  This resolved and went back to once a week bowel movements.  Yesterday she had abdominal cramping and 3 loose stools with having to sit for a long time in order to get some relief.  She denies any rectal bleeding or melena.  Appetite is good and weight is stable.  No joint issues, eye problems or skin rashes.  She has been taking amitiza 24 mcg BID since last appointment and no change in bowel movements.     She has had 3 healthy pregnancies.  She did require blood transfusions due to anemia during her pregnancies.  She has monthly menstrual cycles with every other month she has heavy cycles with 5-6 tampons a day for 4-5 days with associated fatigue.      She denies any NSAID use.  She denies any FH of IBD.  She does not smoke, drink alcohol or use recreational drugs.         IBD History:   IBD disease: Indeterminate colitis, favor Crohn's disease   Disease location: colon (mainly left?, TI?)   Disease behavior: non penetrating and non  stricturing      Current IBD Therapy:  none    Therapeutic Drug Monitoring Labs:  none    Prior IBD Therapies:  Prednisone  Pentasa  Azathioprine  Remicade      Pertinent Endoscopy/Imaging:    2023: EGD: 4 mm submucosal nodule at 35 cm. HP negative erosive gastritis. Normal duodenum.     2023: Colonoscopy: Three scattered erosions in the TI. Four small patchy areas of mild inflammation in the rectum, sigmoid, and hepatic flexure.  Scarring and altered vascularity in the rectum to the descending colon.  Internal and external hemorrhoids. Path: TI: mild chronic inflammation. Focal active colitis in right and left colon.  Mild chronic in rectum.     Prior Pertinent Surgeries:   none    Complications:   none    Extraintestinal Manifestations:  None    Review of Systems   Constitutional:  Negative for appetite change and unexpected weight change.   HENT:  Negative for trouble swallowing.    Respiratory:  Negative for chest tightness.    Cardiovascular: Negative.    Gastrointestinal:  Positive for abdominal pain and constipation. Negative for change in bowel habit.   Musculoskeletal: Negative.    Neurological: Negative.    Psychiatric/Behavioral: Negative.     All other systems reviewed and are negative.         Medical/Surgical History:   Past Medical History:   Diagnosis Date    Chronic constipation     Crohn's disease     History of Crohn's disease     unclear how diagnosis was made    Inflammatory bowel disease     Iron deficiency anemia, unspecified      Past Surgical History:   Procedure Laterality Date    BONE MARROW ASPIRATION N/A 2023    Procedure: ASPIRATION, BONE MARROW;  Surgeon: Gayatri Howard MD;  Location: [facility] ENDOSCOPY;  Service: General;  Laterality: N/A;     SECTION      COLONOSCOPY      Luzmaria had several of these procedures    COLONOSCOPY, WITH 1 OR MORE BIOPSIES N/A 2023    Procedure: COLONOSCOPY, WITH 1 OR MORE BIOPSIES;  Surgeon: Bisi Holliday,  MD;  Location: [facility] ENDOSCOPY;  Service: Gastroenterology;  Laterality: N/A;    EGD, WITH CLOSED BIOPSY N/A 12/6/2023    Procedure: EGD, WITH CLOSED BIOPSY;  Surgeon: Bisi Holliday MD;  Location: [facility] ENDOSCOPY;  Service: Gastroenterology;  Laterality: N/A;    TONSILLECTOMY      TUBAL LIGATION  02/14/2019    UPPER GASTROINTESTINAL ENDOSCOPY  1999    I've had several of these         Family History:   Family History   Problem Relation Name Age of Onset    Hypertension Mother Halie Saavedra     Diabetes Mother Halie Saavedra     Drug abuse Mother Halie Saavedra     No Known Problems Father      Kidney disease Maternal Grandmother Halina James     Diabetes Maternal Grandmother Halina James     Stroke Paternal Grandmother Jade [place]         Social History:   Social History     Socioeconomic History    Marital status: Other    Number of children: 3    Highest education level: 12th grade   Occupational History    Occupation: self employed/   Tobacco Use    Smoking status: Never    Smokeless tobacco: Never   Substance and Sexual Activity    Alcohol use: Yes     Alcohol/week: 1.0 standard drink of alcohol     Types: 1 Glasses of wine per week    Drug use: Never    Sexual activity: Yes     Partners: Male     Birth control/protection: Other-see comments     Comment: Tubed tied     Social Drivers of Health     Financial Resource Strain: Low Risk  (6/3/2024)    Overall Financial Resource Strain (CARDIA)     Difficulty of Paying Living Expenses: Not hard at all   Food Insecurity: No Food Insecurity (6/3/2024)    Hunger Vital Sign     Worried About Running Out of Food in the Last Year: Never true     Ran Out of Food in the Last Year: Never true   Transportation Needs: No Transportation Needs (6/3/2024)    TRANSPORTATION NEEDS     Transportation : No   Physical Activity: Insufficiently Active (6/3/2024)    Exercise Vital Sign     Days of Exercise per Week: 1 day     Minutes of Exercise per Session: 10 min  "  Stress: No Stress Concern Present (6/3/2024)    [employer] - Occupational Stress Questionnaire     Feeling of Stress : Not at all   Housing Stability: Low Risk  (6/3/2024)    Housing Stability Vital Sign     Unable to Pay for Housing in the Last Year: No     Homeless in the Last Year: No        Review of patient's allergies indicates:  No Known Allergies    Current Medications:   Outpatient Medications Marked as Taking for the 1/7/25 encounter (Office Visit) with Bisi Holliday MD   Medication Sig Dispense Refill    cyanocobalamin, vitamin B-12, 1,000 mcg Lozg Place 1 tablet under the tongue every morning. 30 lozenge 6    FEROSUL 325 mg (65 mg iron) Tab tablet Take 1 tablet (325 mg total) by mouth once daily. 90 tablet 3    folic acid (FOLVITE) 1 MG tablet Take 1 tablet (1,000 mcg total) by mouth once daily. 90 tablet 3    lubiprostone (AMITIZA) 24 MCG Cap Take 1 capsule (24 mcg total) by mouth 2 (two) times daily. 60 capsule 11        Vital Signs:  /86 (BP Location: Left arm, Patient Position: Sitting)   Pulse 90   Temp 98.2 °F (36.8 °C) (Oral)   Resp 16   Ht 5' 1" (1.549 m)   Wt 51.7 kg (114 lb)   LMP 12/23/2024   SpO2 97%   BMI 21.54 kg/m²      Physical Exam  Constitutional:       Appearance: Normal appearance.   HENT:      Head: Normocephalic and atraumatic.      Mouth/Throat:      Mouth: Mucous membranes are moist.   Eyes:      Conjunctiva/sclera: Conjunctivae normal.   Cardiovascular:      Rate and Rhythm: Normal rate and regular rhythm.   Pulmonary:      Effort: Pulmonary effort is normal.      Breath sounds: Normal breath sounds.   Abdominal:      General: Bowel sounds are normal.      Palpations: Abdomen is soft.   Musculoskeletal:         General: Normal range of motion.      Cervical back: Normal range of motion.   Skin:     General: Skin is warm.   Neurological:      General: No focal deficit present.      Mental Status: She is alert and oriented to " person, place, and time. Mental status is at baseline.   Psychiatric:         Mood and Affect: Mood normal.         Behavior: Behavior normal.         Labs: Reviewed  Hgb   Date Value Ref Range Status   09/10/2024 10.0 (L) 12.0 - 16.0 g/dL Final     Hemoglobin   Date Value Ref Range Status   08/11/2022 5.4 (LL) 12.0 - 16.0 g/dL Final     Albumin   Date Value Ref Range Status   09/10/2024 4.3 3.5 - 5.0 g/dL Final     Iron   Date Value Ref Range Status   08/10/2022 107 25 - 156 ug/dL Final     Iron Level   Date Value Ref Range Status   05/28/2024 15 (L) 50 - 170 ug/dL Final     Ferritin   Date Value Ref Range Status   08/10/2022 27.5 5.0 - 204.0 NG/ML Final     Ferritin Level   Date Value Ref Range Status   05/28/2024 6.04 4.63 - 204.00 ng/mL Final     Folate   Date Value Ref Range Status   08/11/2022 6.4 (L) 7.3 - 19.9 ng/ml Final     Folate Level   Date Value Ref Range Status   05/28/2024 4.1 (L) 7.0 - 31.4 ng/mL Final     Vitamin B12   Date Value Ref Range Status   05/28/2024 313 213 - 816 pg/mL Final     Vitamin D   Date Value Ref Range Status   05/28/2024 6 (L) 30 - 80 ng/mL Final     CRP   Date Value Ref Range Status   05/28/2024 1.40 <5.00 mg/L Final     Fecal calprotectin:       Assessment/Plan:    Problem List Items Addressed This Visit       Iron deficiency anemia - Primary     Severe anemia in 2022 in the setting of iron, B12, and folate deficiency.  No overt GI bleeding at that time     Dec 2023: EGD and colonoscopy unremarkable in regards to anemia  S/p Feraheme x 2 infusions in December 2023    Hgb 5.8 g/dL when hospitalized for pylenonephritis in June 2024.   Venofer x 2 June/July 2024  On ferrous sulfate 325 mg once daily - hasn't made constipation any worse    Repeat CBC, ferritin, folate, B12  Schedule VCE  Repeat iron infusions if needed         Relevant Orders    CBC Auto Differential    C-Reactive Protein    Calprotectin, Stool    Iron and TIBC    Ferritin    Folate    Vitamin B12    Constipation      Amitiza ineffective  Start Linzess 145 mcg vs 290 mcg daily         Indeterminate colitis     Diagnosed in the setting of anemia and rectal bleeding per patient report  Unclear how diagnosis was made and what involvement of the intestinal system was noted.  Based on recent colonoscopy - left colonic involvement possible up to hepatic flexure, possible TI  Reported Crohn's disease per patient - favor this given reported small bowel disease     Had treatment in the past with prednisone, pentasa, azathioprine and 3-5 doses of Remicade which was stopped due to infusion reaction (hives)  Off therapy for over 20 years and no significant GI symptoms other than constipation   Severe anemia in 2022 in the setting of iron, B12, and folate deficiency.  No overt GI bleeding at that time     December 6, 2023: Colonoscopy: Three scattered erosions in the TI. Four small patchy areas of mild inflammation in the rectum, sigmoid, and hepatic flexure.  Scarring and altered vascularity in the rectum to the descending colon.  Internal and external hemorrhoids     Labs and fecal calprotectin (haven't turned in to date)  Schedule VCE  If fecal calprotectin elevated, can consider mesalamine or budesonide to help treat areas seen on colonoscopy  Otherwise despite history, remains clinically asymptomatic with very mild endoscopic disease despite years off therapy.  Can continue to monitor disease closely off therapy.         Relevant Orders    Vitamin D         HEALTH MAINTENANCE:     Vaccinations:    Influenza (inactive):  recommended annually   PCV 20 (Prevnar): May 2024  Tetanus (TdaP): July 2023, recommended every 10 years  HPV (males and females ages 11-46 yo): N/A  Meningococcal:  No risk factors   Hepatitis B: not immune   Hepatitis A:  not immune   MMR (live vaccine): UTD          Chickenpox status/Varicella (live vaccine):  vaccinated, +Ab  Shingrix: recommended   COVID-19: vaccinated 2021    Colorectal cancer risk:  Risk  factors: >  8 years of disease, > 1/3 colon involved    - Distribution of colonic disease: unclear    - Year of symptom onset: 2000    - Colonoscopy every 2-3 years after endoscopic remission    Ophthalmologic exam recommended yearly  Dermatologic exam recommended yearly due to risk of skin cancer with IBD/meds    Bone health:   Calcium [id number] mg daily and vitamin D 800 IU daily   Risk factors for osteopenia/osteoporosis: none   Vitamin D: 6   Ergocalciferol 50,000 IU weekly x 12 weeks completed, recheck     DEXA scan: Recommend baseline    Smoking status: nonsmoker      Follow up: 6 months

## 2025-01-08 RX ORDER — DIPHENHYDRAMINE HYDROCHLORIDE 50 MG/ML
50 INJECTION INTRAMUSCULAR; INTRAVENOUS ONCE AS NEEDED
OUTPATIENT
Start: 2025-01-08

## 2025-01-08 RX ORDER — EPINEPHRINE 0.3 MG/.3ML
0.3 INJECTION SUBCUTANEOUS ONCE AS NEEDED
OUTPATIENT
Start: 2025-01-08

## 2025-01-08 RX ORDER — SODIUM CHLORIDE 0.9 % (FLUSH) 0.9 %
10 SYRINGE (ML) INJECTION
OUTPATIENT
Start: 2025-01-08

## 2025-01-08 RX ORDER — HEPARIN 100 UNIT/ML
500 SYRINGE INTRAVENOUS
OUTPATIENT
Start: 2025-01-08

## 2025-01-13 ENCOUNTER — TELEPHONE (OUTPATIENT)
Dept: HEMATOLOGY/ONCOLOGY | Facility: CLINIC | Age: 40
End: 2025-01-13
Payer: MEDICAID

## 2025-01-13 DIAGNOSIS — D50.9 IRON DEFICIENCY ANEMIA, UNSPECIFIED IRON DEFICIENCY ANEMIA TYPE: Primary | ICD-10-CM

## 2025-01-14 ENCOUNTER — OFFICE VISIT (OUTPATIENT)
Dept: HEMATOLOGY/ONCOLOGY | Facility: CLINIC | Age: 40
End: 2025-01-14
Attending: INTERNAL MEDICINE
Payer: MEDICAID

## 2025-01-14 VITALS
HEART RATE: 97 BPM | OXYGEN SATURATION: 100 % | SYSTOLIC BLOOD PRESSURE: 114 MMHG | WEIGHT: 113.81 LBS | RESPIRATION RATE: 20 BRPM | TEMPERATURE: 98 F | DIASTOLIC BLOOD PRESSURE: 86 MMHG | BODY MASS INDEX: 21.49 KG/M2 | HEIGHT: 61 IN

## 2025-01-14 DIAGNOSIS — N92.0 MENORRHAGIA WITH REGULAR CYCLE: ICD-10-CM

## 2025-01-14 DIAGNOSIS — R89.8 ABNORMAL BONE MARROW EXAMINATION: Primary | ICD-10-CM

## 2025-01-14 DIAGNOSIS — E53.8 FOLATE DEFICIENCY: ICD-10-CM

## 2025-01-14 DIAGNOSIS — E53.8 B12 DEFICIENCY: ICD-10-CM

## 2025-01-14 DIAGNOSIS — D47.2 MGUS (MONOCLONAL GAMMOPATHY OF UNKNOWN SIGNIFICANCE): ICD-10-CM

## 2025-01-14 DIAGNOSIS — Z92.89 HISTORY OF BLOOD TRANSFUSION: ICD-10-CM

## 2025-01-14 DIAGNOSIS — R80.3 FREE MONOCLONAL LIGHT CHAIN: ICD-10-CM

## 2025-01-14 DIAGNOSIS — D50.0 IRON DEFICIENCY ANEMIA DUE TO CHRONIC BLOOD LOSS: ICD-10-CM

## 2025-01-14 DIAGNOSIS — R53.1 WEAKNESS: ICD-10-CM

## 2025-01-14 DIAGNOSIS — D50.0 ANEMIA DUE TO CHRONIC BLOOD LOSS: ICD-10-CM

## 2025-01-14 DIAGNOSIS — R19.5 OCCULT BLOOD POSITIVE STOOL: ICD-10-CM

## 2025-01-14 LAB
FOLATE SERPL-MCNC: 8.1 NG/ML (ref 7–31.4)
IGA SERPL-MCNC: 287 MG/DL (ref 65–421)
IGG SERPL-MCNC: 1831 MG/DL (ref 522–1631)
IGM SERPL-MCNC: 177 MG/DL (ref 33–293)
PROT UR STRIP-MCNC: 11.9 MG/DL
VIT B12 SERPL-MCNC: 540 PG/ML (ref 213–816)

## 2025-01-14 PROCEDURE — 3008F BODY MASS INDEX DOCD: CPT | Mod: CPTII,,, | Performed by: INTERNAL MEDICINE

## 2025-01-14 PROCEDURE — 83521 IG LIGHT CHAINS FREE EACH: CPT | Performed by: INTERNAL MEDICINE

## 2025-01-14 PROCEDURE — 1159F MED LIST DOCD IN RCRD: CPT | Mod: CPTII,,, | Performed by: INTERNAL MEDICINE

## 2025-01-14 PROCEDURE — 82784 ASSAY IGA/IGD/IGG/IGM EACH: CPT | Performed by: INTERNAL MEDICINE

## 2025-01-14 PROCEDURE — 3079F DIAST BP 80-89 MM HG: CPT | Mod: CPTII,,, | Performed by: INTERNAL MEDICINE

## 2025-01-14 PROCEDURE — 3074F SYST BP LT 130 MM HG: CPT | Mod: CPTII,,, | Performed by: INTERNAL MEDICINE

## 2025-01-14 PROCEDURE — 82746 ASSAY OF FOLIC ACID SERUM: CPT | Performed by: INTERNAL MEDICINE

## 2025-01-14 PROCEDURE — 84156 ASSAY OF PROTEIN URINE: CPT | Performed by: INTERNAL MEDICINE

## 2025-01-14 PROCEDURE — 84165 PROTEIN E-PHORESIS SERUM: CPT | Performed by: INTERNAL MEDICINE

## 2025-01-14 PROCEDURE — 82607 VITAMIN B-12: CPT | Performed by: INTERNAL MEDICINE

## 2025-01-14 PROCEDURE — 99214 OFFICE O/P EST MOD 30 MIN: CPT | Mod: S$PBB,,, | Performed by: INTERNAL MEDICINE

## 2025-01-14 PROCEDURE — 99214 OFFICE O/P EST MOD 30 MIN: CPT | Mod: PBBFAC | Performed by: INTERNAL MEDICINE

## 2025-01-14 PROCEDURE — 1160F RVW MEDS BY RX/DR IN RCRD: CPT | Mod: CPTII,,, | Performed by: INTERNAL MEDICINE

## 2025-01-14 PROCEDURE — 36415 COLL VENOUS BLD VENIPUNCTURE: CPT | Mod: 59 | Performed by: INTERNAL MEDICINE

## 2025-01-14 RX ORDER — EPINEPHRINE 0.3 MG/.3ML
0.3 INJECTION SUBCUTANEOUS ONCE AS NEEDED
Status: CANCELLED | OUTPATIENT
Start: 2025-01-27

## 2025-01-14 RX ORDER — DIPHENHYDRAMINE HYDROCHLORIDE 50 MG/ML
50 INJECTION INTRAMUSCULAR; INTRAVENOUS ONCE AS NEEDED
Status: CANCELLED | OUTPATIENT
Start: 2025-01-20

## 2025-01-14 RX ORDER — SODIUM CHLORIDE 0.9 % (FLUSH) 0.9 %
10 SYRINGE (ML) INJECTION
Status: CANCELLED | OUTPATIENT
Start: 2025-01-20

## 2025-01-14 RX ORDER — HEPARIN 100 UNIT/ML
500 SYRINGE INTRAVENOUS
Status: CANCELLED | OUTPATIENT
Start: 2025-01-27

## 2025-01-14 RX ORDER — SODIUM CHLORIDE 0.9 % (FLUSH) 0.9 %
10 SYRINGE (ML) INJECTION
Status: CANCELLED | OUTPATIENT
Start: 2025-01-27

## 2025-01-14 RX ORDER — HEPARIN 100 UNIT/ML
500 SYRINGE INTRAVENOUS
Status: CANCELLED | OUTPATIENT
Start: 2025-01-20

## 2025-01-14 RX ORDER — DIPHENHYDRAMINE HYDROCHLORIDE 50 MG/ML
50 INJECTION INTRAMUSCULAR; INTRAVENOUS ONCE AS NEEDED
Status: CANCELLED | OUTPATIENT
Start: 2025-01-27

## 2025-01-14 RX ORDER — EPINEPHRINE 0.3 MG/.3ML
0.3 INJECTION SUBCUTANEOUS ONCE AS NEEDED
Status: CANCELLED | OUTPATIENT
Start: 2025-01-20

## 2025-01-14 NOTE — Clinical Note
Orders for 01/14/2025:  Proceed with Feraheme x2 Assess response with repeat CBC, serum iron, TIBC, ferritin in 4 weeks Today, check folic acid level as well Continue vitamin B12 1000 mcg p.o. q.day Today, check B12 level as well  Refer to GI for capsule endoscopy Please inform her PCP that her menstrual blood loss (menorrhagia) is the source of iron-deficiency anemia and that it needs to be corrected Today, check SPEP, EAN, FLC assay and random urine for UPEP, urine EAN, urine FLC assay  Follow-up with me in 6 weeks with CBC, serum iron, TIBC, ferritin

## 2025-01-14 NOTE — PROGRESS NOTES
History:  Past Medical History:   Diagnosis Date    Chronic constipation     Crohn's disease     History of Crohn's disease     unclear how diagnosis was made    Inflammatory bowel disease     Iron deficiency anemia, unspecified    Past medical history:  Crohn's disease, diagnosed when she was 15 years old; diagnosed at [facility]; presenting complaint was severe bleeding; received close to 50 packed RBC transfusions; after she is turned 20, she could not get established with a GI.  Symptoms have been more or less under control spontaneously.  B12 deficiency.  Iron-deficiency.  Folic acid deficiency.  Social history:  .  Lives in [place], [place].  Has 3 children.  Does not work.  No history of tobacco, alcohol, or illicit drug abuse.    Family history:  Negative for malignancy or blood dyscrasias.    Health maintenance:  Primary care provider at [facility].  She is waiting to get established with a GI for ongoing surveillance/management of Crohn's disease.  Past Surgical History:   Procedure Laterality Date    BONE MARROW ASPIRATION N/A 2023    Procedure: ASPIRATION, BONE MARROW;  Surgeon: Gayatri Howard MD;  Location: [facility] ENDOSCOPY;  Service: General;  Laterality: N/A;     SECTION      COLONOSCOPY      Luzmaria had several of these procedures    COLONOSCOPY, WITH 1 OR MORE BIOPSIES N/A 2023    Procedure: COLONOSCOPY, WITH 1 OR MORE BIOPSIES;  Surgeon: Bisi Holliday MD;  Location: [facility] ENDOSCOPY;  Service: Gastroenterology;  Laterality: N/A;    EGD, WITH CLOSED BIOPSY N/A 2023    Procedure: EGD, WITH CLOSED BIOPSY;  Surgeon: Bisi Holliday MD;  Location: [facility] ENDOSCOPY;  Service: Gastroenterology;  Laterality: N/A;    TONSILLECTOMY      TUBAL LIGATION  2019    UPPER GASTROINTESTINAL ENDOSCOPY      I've had several of these      Social History     Socioeconomic History    Marital status: Other    Number of children: 3    Highest education level: 12th grade    Occupational History    Occupation: self employed/   Tobacco Use    Smoking status: Never    Smokeless tobacco: Never   Substance and Sexual Activity    Alcohol use: Yes     Alcohol/week: 1.0 standard drink of alcohol     Types: 1 Glasses of wine per week    Drug use: Never    Sexual activity: Yes     Partners: Male     Birth control/protection: Other-see comments     Comment: Tubed tied     Social Drivers of Health     Financial Resource Strain: Low Risk  (6/3/2024)    Overall Financial Resource Strain (CARDIA)     Difficulty of Paying Living Expenses: Not hard at all   Food Insecurity: No Food Insecurity (6/3/2024)    Hunger Vital Sign     Worried About Running Out of Food in the Last Year: Never true     Ran Out of Food in the Last Year: Never true   Transportation Needs: No Transportation Needs (6/3/2024)    TRANSPORTATION NEEDS     Transportation : No   Physical Activity: Insufficiently Active (6/3/2024)    Exercise Vital Sign     Days of Exercise per Week: 1 day     Minutes of Exercise per Session: 10 min   Stress: No Stress Concern Present (6/3/2024)    [employer] - Occupational Stress Questionnaire     Feeling of Stress : Not at all   Housing Stability: Low Risk  (6/3/2024)    Housing Stability Vital Sign     Unable to Pay for Housing in the Last Year: No     Homeless in the Last Year: No      Family History   Problem Relation Name Age of Onset    Hypertension Mother Halie Saavedra     Diabetes Mother Halie Saavedra     Drug abuse Mother Halie Saavedra     No Known Problems Father      Kidney disease Maternal Grandmother Halina James     Diabetes Maternal Grandmother Halina James     Stroke Paternal Grandmother [place]         Reason for Follow-up:  -chronic anemia, S/P multiple blood transfusions over several years  -vitamin B12 deficiency  -folic acid deficiency   -iron-deficiency  -Menorrhagia with regular cycle   -kappa light chain MGUS  -Abnormal bone  marrow biopsy, red cell aplasia, pancytopenia  -stool occult blood positive    History of Present Illness:   iron deficiency anemia        Oncologic/Hematologic History:  Oncology History    No history exists.   37-year-old lady, referred from [facility], with anemia.      Investigations/clinical events reviewed:     Hemoglobin: 13.5 (09/27/2022); 5.4 (08/11/2022); 6.7 (12/28/2020); 4.3 (12/27/2020)   MCV normal  (S/P PRBC x2 units 12/27/2020, for hemoglobin 4.3)     12/27/2020: Serum iron 12, low.  TIBC normal.  Ferritin not done.  Transferrin saturation 3%.     09/27/2022: Serum iron 48, low.  TIBC normal.  Ferritin 21.65.  Transferrin saturation 16%, low.  B12 level 516.  Folate normal.     B12 level:  516, normal (09/27/2022); 287, borderline (12/27/2020)     Admitted [facility] 08/10/2022-08/14/2022:  -profound anemia, symptomatic; pancytopenia; hepatomegaly; history of Crohn's disease and multiple transfusions  -on admission, hemoglobin 1.7, hematocrit 7.0, MCV 72, platelets 57 K, occult blood +; B12 level 201; ferritin 27.5; folate 6.4; started on intravenous folic acid and intramuscular B12; patient experiencing heavy menstrual cycles; platelets dropped to 13 K; WBC 4.3 K, normal; neutrophil count 2800 mm3; peripheral blood smear without dysplasia or blasts; low LDH; low retic count; no significant schistocytes; no evidence of hemolysis; after 1 unit of platelets on 08/13/2022, platelet count improved but subsequently dropped to 13,000 mm3; no bleeding except for menorrhagia; as therapeutic trial, prednisone was started  -parvovirus B19 PCR negative; parvovirus B19 IgG +; parvovirus B19 IgM negative; plasma zinc level normal, 56 (reference Range:   mcg/dL); hepatitis-B surface antigen negative; hepatitis-B surface antibody negative; hepatitis B core antibody negative; hepatitis-C antibody negative; hepatitis-B virus PCR quantitative negative (hepatitis-B surface  antigen + on 08/11/2022 but negative on 08/12/2022)  -history of Crohn's disease and chronic anemia; admitted with generalized weakness, hemoglobin 1.6, received PRBC x4 units, initially admitted to ICU; no acute GI bleed; significant thrombocytopenia; B12 and folic acid deficiency; platelets kept dropping despite replacement of B12 and folic acid and despite transfusion; underwent bone marrow aspiration and core biopsy 08/12/2022; on 08/14/2022, she left AMA; CT scan of A/P with IV contrast showed hepatomegaly  -08/12/2022: Bone marrow aspiration and core biopsy ([facility]):  PERIPHERAL SMEAR REVIEW, BONE MARROW ASPIRATION, BONE MARROW BIOPSY, AND   FLOW CYTOMETRIC EVALUATION AND CYTOGENETICS:   1.  ACUTE RED CELL APLASIA.   2.  RESULTING SEVERE ANEMIA.   3.  REACTIVE APPEARING NEUTROPHILIC SERIES CELLS PRESENT WITH INCREASED   NUMBER OF          MYELOBLASTS (6.4%) PRESENT.   4.  THROMBOCYTOPENIA.   5.  DECREASED NUMBERS OF MEGAKARYOCYTES PRESENT.   6.  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   7.  IRON 2+ OF 6.   8.  AWAIT CYTOGENETIC AND MOLECULAR EVALUATION FOR FINAL DIAGNOSIS.   9. FISH analysis AML standard: Normal  (The marrow reveals a markedly abnormal   marrow with reactive appearing changes in the myeloid series with a   shift to the left to include 6.4% myeloblasts.  However, maturation of   segmented neutrophils does occur even though there is a significant   monocyte bulge.  This is not morphologically felt to be acute leukemia   but await cytogenetics and moleuclar studies for final diagnosis.     Cytogenetics and molecular studies for AML have been requested and will   be reported at a later time.  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  There are clusters of immature erythroid   elements seen and there is perhaps an attempt at re-population.  These   are very noticeable in both aspirate clot section and the biopsy but    best seen in the biopsy.  Therefore, this is felt to be rather an acute   insult on this individual which has affected predominantly the erythroid   elements but megakaryocytes are also decreased in number and myeloid   cells are markedly reactive in nature.  There are numerous causes for   acute red cell aplasia though Parvovirus B19 is certainly included in the   differential diagnosis)     04/06/2023:  Pleasant, healthy-appearing young lady who presents for initial hematology consultation.  In no acute discomfort.  Crohn's disease, diagnosed when she was 15 years old; diagnosed at [facility]; presenting complaint was severe bleeding; received close to 50 packed RBC transfusions; after she is turned 20, she could not get established with a GI.  Symptoms have been more or less under control spontaneously.  B12 deficiency.  Iron-deficiency.  Folic acid deficiency.  For last several years, symptoms of Crohn's disease has been stable.  For last couple of weeks, occasional crampy pains.  Couple of loose bowel movements twice a week.  No blood in stool.  No fevers, chills, weakness, or fatigue.  Diagnosed with iron, B12, and folic acid deficiency during hospitalization at [facility], in August 2022, when she was hospitalized with severe symptomatic anemia, hemoglobin 1.7, platelets 57 K, B12 level 201, ferritin 27.5, and folic acid level 6.4.  On bone marrow biopsy, there was suspicion of acute red cell aplasia, 6.4% myeloblasts, etc..    Denies recurrent fevers, chills, drenching night sweats, anorexia, unintentional weight loss, chest pain, cough, dyspnea, dizziness, abdominal pain, etc..  ECOG 0.       Interval History:  INJECTAFER (FERRIC CARBOXYMALTOSE)   [No matching plan found]     11/22/2023:   -S/P Feraheme x2, 510 mg IV each, on 08/21/2023 and 08/22/2023  -11/22/2023:  Hemoglobin 10.8, improved from 8.8 on 08/14/2023; MCV normal; today's ferritin level is pending; however, transferrin  saturation remains low, 7%; CMP is unremarkable  Presents for follow-up visit.  Doing well.  No weakness or fatigue.  Does not feel any difference after iron infusions.  Apparently, because of chronicity of anemia, she had no significant symptoms to start with.  Occasional headaches, once or twice a week.  No associated neurological symptoms, nausea, vomiting.  Great appetite.  No rectal bleeding.  Menstrual cycles are not heavy.  Says that EGD and colonoscopy are due in December.    01/14/2025:  -11/22/2020: IgG, IgA, IgM normal; no monoclonal protein on SPEP and EAN; kappa 2.24, elevated; lambda normal; kappa/lambda ratio 2.09, elevated (was 1.78, slightly elevated, on 04/06/2023)  -11/29/2023: Skeletal survey:  No clear evidence of lytic or sclerotic bone lesions  -12/06/2023:  EGD and colonoscopy:  3 erosions in the terminal ileum; four small patchy areas mild inflammation distal rectum, in the sigmoid colon, and at the hepatic flexure; mucosal changes in the rectum, in the sigmoid colon, and in the descending colon; external and internal hemorrhoids; scarring and altered vascularity in the left colon suggestive of prior disease in this area; no significant active inflammation seen in:; single 4 mm submucosal nodule lower 3rd of esophagus, 35 cm from incisors; a few dispersed small erosions no bleeding and no stigmata of recent bleeding found in the gastric antrum:   Pathology:   Gastric biopsy:  Mild chronic gastritis, negative for H pylori/dysplasia   Terminal ileum biopsy:  Nonspecific chronic inflammation, no dysplasia   Right colon biopsy:  Focal active colitis   Left colon biopsy:  Focal mild active chronic colitis with cryptitis, crypt abscesses, etc.   Rectum biopsy:  Mild active chronic colitis with cryptitis, crypt abscesses, etc.  -S/P Feraheme 510 mg IV x2 (12/08/2023, 12/15/2023)  -capsule endoscopy scheduled for 01/23/2024  -no showed 01/03/2024  -no showed 01/31/2024  -no showed  03/12/2024  -hemoglobin: 9.2 on 01/14/2025; 98.6 on 01/07/2025; 10.0 on 09/10/2024; 5.8 on 06/03 1024; 8.2 on 05/28/2024, etc.  -MCV: Chronically low  -05/28/2024: Serum iron 15 low, TIBC normal, ferritin 6.04 low, transferrin saturation 5% low  -05/28/2024: Folate 4.1 low, B12 level 313 normal/borderline normal  -01/07/2025: Serum iron normal, TIBC normal, ferritin 6.45 low, transferrin saturation 17% low  -01/07/2025: B12 level 604, normal  -01/14/2025: Serum iron 23 low, TIBC normal, ferritin 7.69 very low, transferrin saturation 8% low  Presents for a follow-up visit.  She is coming after several months.  She says that she was out of [facility], and [place].  Emphasized the importance of regular follow-up.  Remains severely iron-deficiency.  Remain severely anemic.  Denies GI blood loss in the form of hematemesis, melena, or hematochezia.  No abdominal pain, nausea, vomiting.  Appetite is great.  Chronic weakness and fatigue.  Some headaches.  No focal neurological symptoms.  .  Has 3 children.  Takes care of 3 other children in-home .  Denies history of tobacco, alcohol, or illicit drug abuse.  On specific questioning, says that 1 month her menstrual cycles are normal, the other month there heavy, with blood clots.  In my assessment, menstrual blood loss is the main source of her iron-deficiency anemia.  I encouraged her to discuss this with her PCP who manages her overall health; she needs to be on some kind of hormone therapy to control menorrhagia.    Medications:  Current Outpatient Medications on File Prior to Visit   Medication Sig Dispense Refill    cyanocobalamin, vitamin B-12, 1,000 mcg Lozg Place 1 tablet under the tongue every morning. 30 lozenge 6    ergocalciferol (ERGOCALCIFEROL) 50,000 unit Cap Take 1 capsule (50,000 Units total) by mouth every 7 days. for 24 doses 12 capsule 1    FEROSUL 325 mg (65 mg iron) Tab tablet Take 1 tablet (325 mg total) by mouth once daily. 90 tablet 3     "folic acid (FOLVITE) 1 MG tablet Take 1 tablet (1,000 mcg total) by mouth once daily. 90 tablet 3    linaCLOtide (LINZESS) 145 mcg Cap capsule Take 1 capsule (145 mcg total) by mouth before breakfast. 30 capsule 5     No current facility-administered medications on file prior to visit.       Review of Systems:   All systems reviewed and found to be negative except for the symptoms detailed above    Physical Examination:   VITAL SIGNS:   Vitals:    01/14/25 1510   BP: 114/86   Pulse: 97   Resp: 20   Temp: 98.4 °F (36.9 °C)       GENERAL:  In no apparent distress.    HEAD:  No signs of head trauma.  EYES:  Pupils are equal.  Extraocular motions intact.    EARS:  Hearing grossly intact.  MOUTH:  Oropharynx is normal.   NECK:  No adenopathy, no JVD.     CHEST:  Chest with clear breath sounds bilaterally.  No wheezes, rales, rhonchi.    CARDIAC:  Regular rate and rhythm.  S1 and S2, without murmurs, gallops, rubs.  VASCULAR:  No Edema.  Peripheral pulses normal and equal in all extremities.  ABDOMEN:  Soft, without detectable tenderness.  No sign of distention.  No   rebound or guarding, and no masses palpated.   Bowel Sounds normal.  MUSCULOSKELETAL:  Good range of motion of all major joints. Extremities without clubbing, cyanosis or edema.    NEUROLOGIC EXAM:  Alert and oriented x 3.  No focal sensory or strength deficits.   Speech normal.  Follows commands.  PSYCHIATRIC:  Mood normal.    No results for input(s): "CBC" in the last 72 hours.   No results for input(s): "CMP" in the last 72 hours.     Assessment:  Problem List Items Addressed This Visit       B12 deficiency    Folate deficiency    Abnormal bone marrow examination - Primary    Iron deficiency anemia    History of blood transfusion    MGUS (monoclonal gammopathy of unknown significance)    Free monoclonal light chain    Occult blood positive stool    Anemia due to chronic blood loss    Menorrhagia with regular cycle       Orders for 01/14/2025:   Proceed " with Feraheme x2  Assess response with repeat CBC, serum iron, TIBC, ferritin in 4 weeks  Today, check folic acid level as well  Continue vitamin B12 1000 mcg p.o. q.day  Today, check B12 level as well   Refer to GI for capsule endoscopy  Please inform her PCP that her menstrual blood loss (menorrhagia) is the source of iron-deficiency anemia and that it needs to be corrected  Today, check SPEP, EAN, FLC assay and random urine for UPEP, urine EAN, urine FLC assay   Follow-up with me in 6 weeks with CBC, serum iron, TIBC, ferritin    Above discussed at length with the patient.  All questions answered.    Discussed labs.  Discussed plan of management.  Compliance emphasized.    She understands and agrees with this plan.  ===================================        # Chronic anemia:  Anemia for several years  Multiple blood transfusions over the years  Hemoglobin: 13.5 (09/27/2022); 5.4 (08/11/2022); 6.7 (12/28/2020); 4.3 (12/27/2020)   MCV normal  Hospitalized 08/2022 with severe symptomatic anemia, hemoglobin 1.7, MCV 72, platelets 57 K, platelets dropped to 13 K  Diagnosed with vitamin B12 deficiency and folic acid deficiency, and iron-deficiency  (Vitamin B12 level 201; folate 6.4; ferritin 27.5)  Bone marrow exam 08/12/2022:  Abnormalities secondary to vitamin B12 and folic acid deficiency  (Bone marrow abnormalities resolved 05/09/2023 after B12 and folic acid replacement  Possible menorrhagia  EGD and colonoscopy 12/06/2023:  No active bleeding lesions     -[facility] 08/[facility]; severe, symptomatic anemia, hemoglobin 1.7, hematocrit 7.0, MCV 72, platelets 57 K, B12 level 201, ferritin 27.5, folate 6.4;   -treated with intravenous folic acid and intramuscular B12;   -platelets dropped to 13 K; WBC, differential normal; no hemolysis;   -no dysplasia or blasts on peripheral blood smear; transfused;   -empirically, thrombocytopenia treated with prednisone; parvovirus B19 PCR and  serology negative but IgG +; hepatitis-B surface antigen + on 1 occasion, negative or another occasion; hepatitis-B surface antibody negative; hepatitis-B core antibody negative; hepatitis-C antibody negative;   -transfused with PRBC x4 units;   -no acute GI bleeding;   -CT A/P showed hepatomegaly;   -bone marrow biopsy report see below  -08/12/2022: Bone marrow biopsy ([facility]) (hemoglobin 1.7, hematocrit 7.0, MCV 72, platelets 57 K; WBC, differential normal):  1.  ACUTE RED CELL APLASIA.   2.  RESULTING SEVERE ANEMIA.   3.  REACTIVE APPEARING NEUTROPHILIC SERIES CELLS PRESENT WITH INCREASED   NUMBER OF          MYELOBLASTS (6.4%) PRESENT.   4.  THROMBOCYTOPENIA.   5.  DECREASED NUMBERS OF MEGAKARYOCYTES PRESENT.   6.  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   7.  IRON 2+ OF 6.   8.  FISH analysis AML standard: Normal  (Bone marrow biopsy revealed a markedly abnormal   marrow with reactive appearing changes in the myeloid series with a   shift to the left to include 6.4% myeloblasts.  However, maturation of   segmented neutrophils does occur even though there is a significant   monocyte bulge.  This is not morphologically felt to be acute leukemia   but await cytogenetics and moleuclar studies for final diagnosis.     Cytogenetics and molecular studies for AML have been requested and will   be reported at a later time.  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  There are clusters of immature erythroid   elements seen and there is perhaps an attempt at re-population.  These   are very noticeable in both aspirate clot section and the biopsy but   best seen in the biopsy.  Therefore, this is felt to be rather an acute   insult on this individual which has affected predominantly the erythroid   elements but megakaryocytes are also decreased in number and myeloid   cells are markedly reactive in nature.  There are numerous causes for    acute red cell aplasia though virus of B17 is certainly included in the   differential diagnosis)  -04/06/2023: Hemoglobin 9.1.  MCV normal.  Transferrin saturation 8%.  Ferritin 26.02.  B12, folate normal.  No hemolysis.  No M protein.  Kappa/lambda ratio 1.78, elevated.  -04/21/2023: Hemoglobin 8.8.  MCV normal.  Platelets 556 K  -bone marrow exam 05/09/2023: Hypercellular, 70-80%; markedly decreased iron stores; otherwise, essentially unremarkable  -08/14/2023:  Hemoglobin 8.8 (no improvement with Feraheme; was 9.4 on 05/09/2023); MCV 89.0; WBC and platelets normal; ANC 1.3, down; serum iron 15, low; TIBC 262, transferrin saturation 6%) remains low; was 8% on 05/04/2023).  CMP reviewed; ferritin level i 12.10  -08/14/2023: Her cycles last 5 days every month; every other month, she experiences blood clots with menstrual cycles.  Says that menstrual lost does not appear to be heavy as far as she can tell.  -EGD and colonoscopy 12/06/2023:  No active bleeding lesions  To summarize:   Anemia for several years  Multiple blood transfusions over the years  Hemoglobin: 13.5 (09/27/2022); 5.4 (08/11/2022); 6.7 (12/28/2020); 4.3 (12/27/2020)   MCV normal  Hospitalized 08/2022 with severe symptomatic anemia, hemoglobin 1.7, MCV 72, platelets 57 K, platelets dropped to 13 K  Diagnosed with vitamin B12 deficiency, folic acid deficiency, and iron-deficiency  (Vitamin B12 level 201; folate 6.4; ferritin 27.5)  Bone marrow exam 08/12/2022:  Abnormalities secondary to vitamin B12 and folic acid deficiency  (Bone marrow abnormalities resolved 05/09/2023 after B12 and folic acid replacement  Possible menorrhagia  EGD and colonoscopy 12/06/2023:  No active bleeding lesions  -hemoglobin: 9.2 on 01/14/2025; 98.6 on 01/07/2025; 10.0 on 09/10/2024; 5.8 on 06/03 1024; 8.2 on 05/28/2024, etc.  -05/28/2024: Ferritin 6  -05/28/2024: Folate 4.1 low, B12 level 313, borderline  -01/07/2025: Ferritin 6.45 low  -01/07/2025: B12 levels 604  normal  -01/14/2025:  Ferritin 7.69 low  >>>  -persistent iron-deficiency anemia  -proceed with Feraheme x2  -assess response with repeat CBC, serum iron, TIBC, ferritin in 4 weeks  -absorbing vitamin B12 well via oral route; continue vitamin B12 1000 mcg p.o. q.day  -has failed oral iron therapy in the past  -questionable menorrhagia; EGD and colonoscopy without definite source of bleeding  -capsule endoscopy is pending  -so far, source of iron loss/deficiency remains unclear      # Menorrhagia:   -01/14/2025: Tells me that 1 month or menstrual cycles are normal, the other month and they are heavy, with blood clots; cycles are regular  >>>  -in the absence of overt GI blood loss, menstrual blood losses are most likely the underlying etiology of her chronic iron-deficiency anemia  -01/14/2025:  encouraged her to follow-up with the PCP/gyn for management of menorrhagia      Bone marrow aspiration and core biopsy (bone marrow examination on 08/12/2022 showed acute red cell aplasia resulting in severe anemia, 6.4% myeloblasts apart from reactive neutrophilic series cells, thrombocytopenia, decreased megakaryocytes, markedly hypoplastic bone marrow, predominantly myeloid in nature, and AML FISH analysis negative)  -bone marrow exam 05/09/2023: Hypercellular, 70-80%; markedly decreased iron stores; otherwise, essentially unremarkable (oral B12 was started 08/2022; bone marrow picture improved)  >>>  Therefore, bone marrow is not an issue       # Vitamin B12 deficiency:  -B12 level 287, borderline, on 12/27/2020.    -B12 level 201.  Diagnosed when admitted to [facility] 08/2022, with severe anemia (see above);   -B12 level normal, 516, on 09/27/2022  -B12 deficiency can be due to terminal ileum involvement with Crohn's disease  -ever since 08/2022, has been taking 1 tablet of vitamin B12 daily  -04/06/2023: Hemoglobin 9.1.  MCV normal.  Transferrin saturation 8%.  Ferritin 26.02.  B12, folate  normal.  No hemolysis.  No M protein.  Kappa/lambda ratio 1.78, elevated.  -04/06/2023:  Intrinsic factor antibody negative  -04/21/2023: Hemoglobin 8.8.  MCV normal.  Platelets 556 K  -hemoglobin: 9.2 on 01/14/2025; 98.6 on 01/07/2025; 10.0 on 09/10/2024; 5.8 on 06/03 1024; 8.2 on 05/28/2024, etc.  -05/28/2024: Ferritin 6  -05/28/2024: Folate 4.1 low, B12 level 313, borderline  -01/07/2025: Ferritin 6.45 low  -01/07/2025: B12 levels 604 normal  -01/14/2025:  Ferritin 7.69 low  (Absorbing vitamin B12 via oral route, satisfactorily)      # Folic acid deficiency:   -folate 6.4, diagnosed when admitted to [facility] 08/2022 with severe anemia (see above)  -ever since 08/2022, has been taking folic acid tablet daily  -04/06/2023: Hemoglobin 9.1.  MCV normal.  Transferrin saturation 8%.  Ferritin 26.02.  B12, folate normal.  No hemolysis.  No M protein.  Kappa/lambda ratio 1.78, elevated.  -04/21/2023: Hemoglobin 8.8.  MCV normal.  Platelets 556 K  (Folic acid level has normalized as of 04/06/2023)       # Iron deficiency:  -12/27/2020: Serum iron 12, low.  TIBC normal.  Ferritin not done.  Transferrin saturation 3%  -diagnosed when admitted to [facility] 08/10/2022 with severe anemia (see above)  -09/27/2022: Serum iron 48, low.  TIBC normal.  Ferritin 21.65.  Transferrin saturation 16%, low.  -ever since 08/2022, has been taking 2 iron tablets daily  -04/06/2023: Hemoglobin 9.1.  MCV normal.  Transferrin saturation 8%.  Ferritin 26.02.  B12, folate normal.  No hemolysis.  No M protein.  Kappa/lambda ratio 1.78, elevated.  -04/21/2023: Hemoglobin 8.8.  MCV normal.  Platelets 556 K  -05/04/2023:  Hemoglobin 8.7.  MCV 79.6.  Platelets 77 K. ferritin 16.27.  CMP unremarkable.  (Has failed oral iron therapy; continues to have iron-deficiency anemia as of 04/06/2023, 04/21/2023, 05/04/2023)  -S/P Feraheme 510 mg IV x2 (06/13/2023, 06/20/2023)  -08/14/2023:  Hemoglobin 8.8 (no  improvement with Feraheme; was 9.4 on 05/09/2023); MCV 89.0; WBC and platelets normal; ANC 1.3, down; serum iron 15, low; TIBC 262, transferrin saturation 6%) remains low; was 8% on 05/04/2023).  CMP reviewed; ferritin level i 12.10  -08/14/2023: Her cycles last 5 days every month; every other month, she experiences blood clots with menstrual cycles.  Says that menstrual lost does not appear to be heavy as far as she can tell.  (Absolutely no response to Feraheme x2, administered in June 2023; the source of iron loss remains unclear; questionable history of menorrhagia)  -S/P Feraheme x2, 510 mg IV each, on 08/21/2023 and 08/22/2023  -11/22/2023:  Hemoglobin 10.8, improved from 8.8 on 08/14/2023; MCV normal; today's ferritin level is pending; however, transferrin saturation remains low, 7%; CMP is unremarkable  (Partial response to Feraheme x2, administered 08/2023)  -EGD and colonoscopy 12/06/2023:  No definite source of bleeding (see details)  -S/P Feraheme 510 mg IV x2 (12/08/2023, 12/15/2023)  -capsule endoscopy scheduled for 01/23/2024  -hemoglobin: 9.2 on 01/14/2025; 98.6 on 01/07/2025; 10.0 on 09/10/2024; 5.8 on 06/03 1024; 8.2 on 05/28/2024, etc.  -05/28/2024: Ferritin 6  -05/28/2024: Folate 4.1 low, B12 level 313, borderline  -01/07/2025: Ferritin 6.45 low  -01/07/2025: B12 levels 604 normal  -01/14/2025:  Ferritin 7.69 low      # New diagnosis of kappa light chain MGUS:  -04/06/2023: Hemoglobin 9.1.  MCV normal.  Transferrin saturation 8%.  Ferritin 26.02.  B12, folate normal.  No hemolysis.  No M protein.  Kappa/lambda ratio 1.78, elevated.  -11/22/2023: IgG, IgA, IgM normal; no monoclonal protein on SPEP and EAN; kappa 2.24, elevated; lambda normal; kappa/lambda ratio 2.09, elevated (was 1.78, slightly elevated, on 04/06/2023)  -skeletal survey 11/29/2023: Negative  >>>  -check SPEP, EAN, FLC assay, and random urine for UPEP, urine EAN, urine FLC assay      # History of Crohn's disease:   -Diagnosed when  she was 15.  Presented with severe GI bleeding.  Says that she received a total of 50 packed RBC transfusions.  After she is turned 20, she could not find another GI to follow-up with.  -04/06/2023: As of 04/06/2023, symptoms are pretty much under control spontaneously; for last couple of weeks, some crampy abdominal pains; no GI bleeding; couple of loose bowels a week; more or less, asymptomatic        Follow-up:  No follow-ups on file.    Answers submitted by the patient for this visit:    Answers submitted by the patient for this visit:  Review of Systems Questionnaire (Submitted on 1/7/2025)  appetite change : No  unexpected weight change: No  mouth sores: No  visual disturbance: No  cough: No  shortness of breath: No  chest pain: No  abdominal pain: Yes  diarrhea: Yes  frequency: No  back pain: No  rash: No  headaches: Yes  adenopathy: No  nervous/ anxious: No

## 2025-01-15 ENCOUNTER — TELEPHONE (OUTPATIENT)
Dept: INTERNAL MEDICINE | Facility: CLINIC | Age: 40
End: 2025-01-15
Payer: MEDICAID

## 2025-01-15 DIAGNOSIS — Z87.42 HISTORY OF MENORRHAGIA: Primary | ICD-10-CM

## 2025-01-15 LAB
ALBUMIN % SPEP (OHS): 43.95 (ref 48.1–59.5)
ALBUMIN SERPL-MCNC: 3.8 G/DL (ref 3.5–5)
ALBUMIN/GLOB SERPL: 0.8 RATIO (ref 1.1–2)
ALPHA 1 GLOB (OHS): 0.34 GM/DL (ref 0–0.4)
ALPHA 1 GLOB% (OHS): 4.01 (ref 2.3–4.9)
ALPHA 2 GLOB % (OHS): 11.89 (ref 6.9–13)
ALPHA 2 GLOB (OHS): 1.02 GM/DL (ref 0.4–1)
BETA GLOB (OHS): 1.32 GM/DL (ref 0.7–1.3)
BETA GLOB% (OHS): 15.37 (ref 13.8–19.7)
GAMMA GLOBULIN % (OHS): 24.79 (ref 10.1–21.9)
GAMMA GLOBULIN (OHS): 2.13 GM/DL (ref 0.4–1.8)
GLOBULIN SER-MCNC: 4.8 GM/DL (ref 2.4–3.5)
M SPIKE % (OHS): ABNORMAL
M SPIKE (OHS): ABNORMAL
PATH REV: NORMAL
PROT SERPL-MCNC: 8.6 GM/DL (ref 6.4–8.3)

## 2025-01-15 NOTE — TELEPHONE ENCOUNTER
Referral placed to [facility] clinic to eval and treat. TransVag US ordered.

## 2025-01-16 LAB
KAPPA LC FREE SER NEPH-MCNC: 3.23 MG/DL (ref 0.33–1.94)
KAPPA LC FREE/LAMBDA FREE SER NEPH: 2.04 {RATIO} (ref 0.26–1.65)
LAMBDA LC FREE SERPL-MCNC: 1.58 MG/DL (ref 0.57–2.63)

## 2025-01-30 ENCOUNTER — INFUSION (OUTPATIENT)
Dept: INFUSION THERAPY | Facility: HOSPITAL | Age: 40
End: 2025-01-30
Attending: INTERNAL MEDICINE
Payer: MEDICAID

## 2025-01-30 VITALS
WEIGHT: 113.81 LBS | RESPIRATION RATE: 20 BRPM | DIASTOLIC BLOOD PRESSURE: 89 MMHG | HEART RATE: 80 BPM | SYSTOLIC BLOOD PRESSURE: 113 MMHG | HEIGHT: 61 IN | TEMPERATURE: 97 F | BODY MASS INDEX: 21.49 KG/M2 | OXYGEN SATURATION: 100 %

## 2025-01-30 DIAGNOSIS — D50.0 IRON DEFICIENCY ANEMIA DUE TO CHRONIC BLOOD LOSS: Primary | ICD-10-CM

## 2025-01-30 PROCEDURE — 25000003 PHARM REV CODE 250: Performed by: INTERNAL MEDICINE

## 2025-01-30 PROCEDURE — 63600175 PHARM REV CODE 636 W HCPCS: Performed by: NURSE PRACTITIONER

## 2025-01-30 PROCEDURE — 96374 THER/PROPH/DIAG INJ IV PUSH: CPT

## 2025-01-30 RX ORDER — SODIUM CHLORIDE 0.9 % (FLUSH) 0.9 %
10 SYRINGE (ML) INJECTION
Status: DISCONTINUED | OUTPATIENT
Start: 2025-01-30 | End: 2025-01-30 | Stop reason: HOSPADM

## 2025-01-30 RX ORDER — HEPARIN 100 UNIT/ML
500 SYRINGE INTRAVENOUS
Status: DISCONTINUED | OUTPATIENT
Start: 2025-01-30 | End: 2025-01-30 | Stop reason: HOSPADM

## 2025-01-30 RX ORDER — DIPHENHYDRAMINE HYDROCHLORIDE 50 MG/ML
50 INJECTION INTRAMUSCULAR; INTRAVENOUS ONCE AS NEEDED
OUTPATIENT
Start: 2025-02-06

## 2025-01-30 RX ORDER — EPINEPHRINE 0.3 MG/.3ML
0.3 INJECTION SUBCUTANEOUS ONCE AS NEEDED
OUTPATIENT
Start: 2025-02-06

## 2025-01-30 RX ORDER — HEPARIN 100 UNIT/ML
500 SYRINGE INTRAVENOUS
OUTPATIENT
Start: 2025-02-06

## 2025-01-30 RX ORDER — SODIUM CHLORIDE 0.9 % (FLUSH) 0.9 %
10 SYRINGE (ML) INJECTION
OUTPATIENT
Start: 2025-02-06

## 2025-01-30 RX ADMIN — SODIUM CHLORIDE: 9 INJECTION, SOLUTION INTRAVENOUS at 01:01

## 2025-01-30 RX ADMIN — IRON SUCROSE 100 MG: 20 INJECTION, SOLUTION INTRAVENOUS at 01:01

## 2025-02-06 ENCOUNTER — INFUSION (OUTPATIENT)
Dept: INFUSION THERAPY | Facility: HOSPITAL | Age: 40
End: 2025-02-06
Attending: INTERNAL MEDICINE
Payer: MEDICAID

## 2025-02-06 ENCOUNTER — HOSPITAL ENCOUNTER (OUTPATIENT)
Dept: RADIOLOGY | Facility: HOSPITAL | Age: 40
Discharge: HOME | End: 2025-02-06
Payer: MEDICAID

## 2025-02-06 VITALS
BODY MASS INDEX: 21.97 KG/M2 | DIASTOLIC BLOOD PRESSURE: 80 MMHG | RESPIRATION RATE: 20 BRPM | TEMPERATURE: 98 F | WEIGHT: 116.38 LBS | HEIGHT: 61 IN | OXYGEN SATURATION: 99 % | HEART RATE: 90 BPM | SYSTOLIC BLOOD PRESSURE: 125 MMHG

## 2025-02-06 DIAGNOSIS — Z87.42 HISTORY OF MENORRHAGIA: ICD-10-CM

## 2025-02-06 DIAGNOSIS — D50.0 IRON DEFICIENCY ANEMIA DUE TO CHRONIC BLOOD LOSS: Primary | ICD-10-CM

## 2025-02-06 PROCEDURE — 96374 THER/PROPH/DIAG INJ IV PUSH: CPT

## 2025-02-06 PROCEDURE — 76856 US EXAM PELVIC COMPLETE: CPT | Mod: TC

## 2025-02-06 PROCEDURE — 63600175 PHARM REV CODE 636 W HCPCS: Performed by: NURSE PRACTITIONER

## 2025-02-06 RX ORDER — EPINEPHRINE 0.3 MG/.3ML
0.3 INJECTION SUBCUTANEOUS ONCE AS NEEDED
OUTPATIENT
Start: 2025-02-06

## 2025-02-06 RX ORDER — HEPARIN 100 UNIT/ML
500 SYRINGE INTRAVENOUS
Status: CANCELLED | OUTPATIENT
Start: 2025-02-06

## 2025-02-06 RX ORDER — DIPHENHYDRAMINE HYDROCHLORIDE 50 MG/ML
50 INJECTION INTRAMUSCULAR; INTRAVENOUS ONCE AS NEEDED
Status: DISCONTINUED | OUTPATIENT
Start: 2025-02-06 | End: 2025-02-06 | Stop reason: HOSPADM

## 2025-02-06 RX ORDER — HEPARIN 100 UNIT/ML
500 SYRINGE INTRAVENOUS
Status: DISCONTINUED | OUTPATIENT
Start: 2025-02-06 | End: 2025-02-06 | Stop reason: HOSPADM

## 2025-02-06 RX ORDER — SODIUM CHLORIDE 0.9 % (FLUSH) 0.9 %
10 SYRINGE (ML) INJECTION
Status: CANCELLED | OUTPATIENT
Start: 2025-02-06

## 2025-02-06 RX ORDER — EPINEPHRINE 1 MG/ML
0.3 INJECTION INTRAMUSCULAR; INTRAVENOUS; SUBCUTANEOUS ONCE AS NEEDED
Status: DISCONTINUED | OUTPATIENT
Start: 2025-02-06 | End: 2025-02-06 | Stop reason: HOSPADM

## 2025-02-06 RX ORDER — DIPHENHYDRAMINE HYDROCHLORIDE 50 MG/ML
50 INJECTION INTRAMUSCULAR; INTRAVENOUS ONCE AS NEEDED
OUTPATIENT
Start: 2025-02-06

## 2025-02-06 RX ORDER — SODIUM CHLORIDE 0.9 % (FLUSH) 0.9 %
10 SYRINGE (ML) INJECTION
Status: DISCONTINUED | OUTPATIENT
Start: 2025-02-06 | End: 2025-02-06 | Stop reason: HOSPADM

## 2025-02-06 RX ADMIN — IRON SUCROSE 100 MG: 20 INJECTION, SOLUTION INTRAVENOUS at 02:02

## 2025-02-07 ENCOUNTER — PATIENT MESSAGE (OUTPATIENT)
Dept: INTERNAL MEDICINE | Facility: CLINIC | Age: 40
End: 2025-02-07
Payer: MEDICAID

## 2025-02-11 ENCOUNTER — HOSPITAL ENCOUNTER (OUTPATIENT)
Facility: HOSPITAL | Age: 40
Discharge: HOME | End: 2025-02-11
Attending: INTERNAL MEDICINE | Admitting: INTERNAL MEDICINE
Payer: MEDICAID

## 2025-02-11 PROCEDURE — 91110 GI TRC IMG INTRAL ESOPH-ILE: CPT | Mod: TC | Performed by: INTERNAL MEDICINE

## 2025-02-11 PROCEDURE — 27201423 OPTIME MED/SURG SUP & DEVICES STERILE SUPPLY: Performed by: INTERNAL MEDICINE

## 2025-02-11 NOTE — PROGRESS NOTES
Called patient to discuss lab work.  Patient understood all information explained to patient that we will redo lab work in approximately 6-8 weeks because lab work was completed to close to her last dose of iron.  Patient verbalized understanding.

## 2025-02-17 ENCOUNTER — E-VISIT (OUTPATIENT)
Dept: URGENT CARE | Facility: CLINIC | Age: 40
End: 2025-02-17
Payer: MEDICAID

## 2025-02-17 DIAGNOSIS — Z87.42 H/O MENORRHAGIA: Primary | ICD-10-CM

## 2025-02-17 NOTE — PROGRESS NOTES
Patient ID: Elaina Pierce is a 39 y.o. female.    Chief Complaint: General Illness (Entered automatically based on patient selection in [employer].)    The patient initiated a request through [employer] on 2/17/2025 for evaluation and management with a chief complaint of General Illness (Entered automatically based on patient selection in [employer].)     I evaluated the questionnaire submission on 2/17/25.    [facility]    2/17/2025 10:55 AM CST - Filed by Patient   What do you need help with? Other Concern   Do you agree to participate in an E-Visit? Yes   If you have any of the following symptoms, please present to your local emergency room or call 911:  I acknowledge   Do you have any of the following pregnancy-related conditions? None   What is the main issue you would like addressed today? I had a pelvic ultrasound on the 6th that resulted in a cyst and an uterine fibroid..i was supposed to get referred to the a gynecologist...i need to know when that will happen...the heavy cycles are dropping my blood count and iron levels   Please describe your symptoms Heavy cycles, spotting between cycles, and major fatigue, low blood counts   Where is your problem located? Cervix   How severe are your symptoms? Severe   Have you had these symptoms before? Yes   How long have you been having these symptoms? For more than a month   Please list any medications or treatments you have used for your condition and indicate if it was effective or not. None   What makes this feel better? Nothing   What makes this feel worse? Everytime my cycle comes around   Are these symptoms related to a condition that you currently have? Yes   What is the condition? Cysts and fibroid....i also have severe anemia   When were you last seen for this condition? 2/6/2025   Please describe any probable cause for these symptoms Cysts and fibroids   Provide any additional information you feel is important. I just need to  know when I'm going to be referred somewhere to get this problem looked at and possibly solved   Please attach any relevant images or files    Are you able to take your vital signs? No         Encounter Diagnosis   Name Primary?    H/O menorrhagia Yes        Orders Placed This Encounter   Procedures    Ambulatory referral/consult to Gynecology     Referral Priority:   Routine     Referral Type:   Consultation     Referral Reason:   Specialty Services Required     Requested Specialty:   Gynecology     Number of Visits Requested:   1            No follow-ups on file.      E-Visit Time Tracking:    Day 1 Time (in minutes): 5    Total Time (in minutes): 5

## 2025-02-26 ENCOUNTER — RESULTS FOLLOW-UP (OUTPATIENT)
Dept: GASTROENTEROLOGY | Facility: HOSPITAL | Age: 40
End: 2025-02-26

## 2025-03-05 ENCOUNTER — E-VISIT (OUTPATIENT)
Dept: URGENT CARE | Facility: CLINIC | Age: 40
End: 2025-03-05
Payer: MEDICAID

## 2025-03-05 DIAGNOSIS — N92.0 MENORRHAGIA WITH REGULAR CYCLE: ICD-10-CM

## 2025-03-05 DIAGNOSIS — D50.0 IRON DEFICIENCY ANEMIA DUE TO CHRONIC BLOOD LOSS: Primary | ICD-10-CM

## 2025-03-05 NOTE — PROGRESS NOTES
Patient ID: Elaina Pierce is a 39 y.o. female.    Chief Complaint: General Illness (Entered automatically based on patient selection in [employer].) and Menstrual Problem    The patient initiated a request through [employer] on 3/5/2025 for evaluation and management with a chief complaint of General Illness (Entered automatically based on patient selection in [employer].) and Menstrual Problem     I evaluated the questionnaire submission on  03/05/2025.    [facility]    3/5/2025  8:34 AM CST - Filed by Patient   What do you need help with? Other Concern   Do you agree to participate in an E-Visit? Yes   If you have any of the following symptoms, please go to the nearest emergency room or call 911: I acknowledge   Do you have any of the following pregnancy-related conditions? None   What is the main issue you would like addressed today? My primary care doctor is out an i need to be referred to a gynecologist for ovarian cysts an uterine fibroid   Please describe your symptoms. Heavy menstruals causing severe anemia   Where is your problem located? Uterus   On a scale of 1-10, where 10 is the worst you can imagine, how severe are your symptoms? (range: 1 - 10) 5   Have you had these symptoms before? Yes   How long have you had these symptoms? More than a month   What helps with your symptoms? Nothing   What makes your symptoms feel worse? Menstrual cycles   Are your symptoms related to a condition you currently have? Yes   What condition do you currently have? Ovarian cysts an uterine fibroid   When were you last seen for this condition?    Provide any additional information you feel is important. Luzmaria gotten two referrals already but nothing has happened. The cysts an fibroid is causing me to lose my blood more rapidly and I'm already a severe anemic. So i need an appointment to possibly discuss a hysterectomy procedure.   Please attach any relevant images or files    Are you able to take  your vital signs? No     Patient is requesting referral to OBGYN for ovarian cysts and uterine fibroids.  Has had difficulty scheduling.  Reports menorrhagia causing anemia.  Would like to discuss hysterectomy.    Had referral to OBGYN placed on 01/15/2025, 02/17/2025.  Following with Heme-Onc, GI.    Ordering new referral to OBGYN.  Messaging her PCP's office to assist with scheduling.    Encounter Diagnoses   Name Primary?    Iron deficiency anemia due to chronic blood loss Yes    Menorrhagia with regular cycle         Orders Placed This Encounter   Procedures    Ambulatory referral/consult to Obstetrics / Gynecology     Referral Priority:   Routine     Referral Type:   Consultation     Referral Reason:   Specialty Services Required     Requested Specialty:   Obstetrics and Gynecology     Number of Visits Requested:   1            No follow-ups on file.      E-Visit Time Tracking:    Day 1 Time (in minutes): 8    Total Time (in minutes): 8

## 2025-03-25 ENCOUNTER — TELEPHONE (OUTPATIENT)
Dept: INTERNAL MEDICINE | Facility: CLINIC | Age: 40
End: 2025-03-25

## 2025-04-03 DIAGNOSIS — D50.0 IRON DEFICIENCY ANEMIA DUE TO CHRONIC BLOOD LOSS: Primary | ICD-10-CM

## 2025-04-04 ENCOUNTER — LAB VISIT (OUTPATIENT)
Dept: HEMATOLOGY/ONCOLOGY | Facility: CLINIC | Age: 40
End: 2025-04-04
Payer: MEDICAID

## 2025-04-04 ENCOUNTER — OFFICE VISIT (OUTPATIENT)
Dept: HEMATOLOGY/ONCOLOGY | Facility: CLINIC | Age: 40
End: 2025-04-04
Attending: INTERNAL MEDICINE
Payer: MEDICAID

## 2025-04-04 VITALS
BODY MASS INDEX: 21.22 KG/M2 | HEART RATE: 85 BPM | TEMPERATURE: 98 F | HEIGHT: 61 IN | RESPIRATION RATE: 18 BRPM | DIASTOLIC BLOOD PRESSURE: 89 MMHG | SYSTOLIC BLOOD PRESSURE: 123 MMHG | WEIGHT: 112.38 LBS | OXYGEN SATURATION: 100 %

## 2025-04-04 DIAGNOSIS — D50.0 IRON DEFICIENCY ANEMIA DUE TO CHRONIC BLOOD LOSS: ICD-10-CM

## 2025-04-04 DIAGNOSIS — N92.0 MENORRHAGIA WITH REGULAR CYCLE: ICD-10-CM

## 2025-04-04 DIAGNOSIS — R82.90 ABNORMAL URINE FINDING: Primary | ICD-10-CM

## 2025-04-04 DIAGNOSIS — D50.0 ANEMIA DUE TO CHRONIC BLOOD LOSS: ICD-10-CM

## 2025-04-04 DIAGNOSIS — R19.5 OCCULT BLOOD POSITIVE STOOL: ICD-10-CM

## 2025-04-04 DIAGNOSIS — R89.8 ABNORMAL BONE MARROW EXAMINATION: ICD-10-CM

## 2025-04-04 DIAGNOSIS — E53.8 FOLATE DEFICIENCY: ICD-10-CM

## 2025-04-04 DIAGNOSIS — R80.3 FREE MONOCLONAL LIGHT CHAIN: ICD-10-CM

## 2025-04-04 DIAGNOSIS — E53.8 B12 DEFICIENCY: ICD-10-CM

## 2025-04-04 DIAGNOSIS — D47.2 MGUS (MONOCLONAL GAMMOPATHY OF UNKNOWN SIGNIFICANCE): ICD-10-CM

## 2025-04-04 DIAGNOSIS — Z92.89 HISTORY OF BLOOD TRANSFUSION: ICD-10-CM

## 2025-04-04 LAB
BASOPHILS # BLD AUTO: 0.06 X10(3)/MCL
BASOPHILS NFR BLD AUTO: 1 %
EOSINOPHIL # BLD AUTO: 0.17 X10(3)/MCL (ref 0–0.9)
EOSINOPHIL NFR BLD AUTO: 2.8 %
ERYTHROCYTE [DISTWIDTH] IN BLOOD BY AUTOMATED COUNT: 20.4 % (ref 11.5–17)
FERRITIN SERPL-MCNC: 7.58 NG/ML (ref 4.63–204)
HCT VFR BLD AUTO: 28.2 % (ref 37–47)
HGB BLD-MCNC: 7.9 G/DL (ref 12–16)
IMM GRANULOCYTES # BLD AUTO: 0.03 X10(3)/MCL (ref 0–0.04)
IMM GRANULOCYTES NFR BLD AUTO: 0.5 %
IRON SATN MFR SERPL: 5 % (ref 20–50)
IRON SERPL-MCNC: 14 UG/DL (ref 50–170)
LYMPHOCYTES # BLD AUTO: 2.01 X10(3)/MCL (ref 0.6–4.6)
LYMPHOCYTES NFR BLD AUTO: 32.8 %
MCH RBC QN AUTO: 21.9 PG (ref 27–31)
MCHC RBC AUTO-ENTMCNC: 28 G/DL (ref 33–36)
MCV RBC AUTO: 78.3 FL (ref 80–94)
MONOCYTES # BLD AUTO: 0.51 X10(3)/MCL (ref 0.1–1.3)
MONOCYTES NFR BLD AUTO: 8.3 %
NEUTROPHILS # BLD AUTO: 3.34 X10(3)/MCL (ref 2.1–9.2)
NEUTROPHILS NFR BLD AUTO: 54.6 %
NRBC BLD AUTO-RTO: 0 %
PLATELET # BLD AUTO: 335 X10(3)/MCL (ref 130–400)
PMV BLD AUTO: 10.4 FL (ref 7.4–10.4)
RBC # BLD AUTO: 3.6 X10(6)/MCL (ref 4.2–5.4)
TIBC SERPL-MCNC: 253 UG/DL (ref 70–310)
TIBC SERPL-MCNC: 267 UG/DL (ref 250–450)
TRANSFERRIN SERPL-MCNC: 255 MG/DL (ref 180–382)
WBC # BLD AUTO: 6.12 X10(3)/MCL (ref 4.5–11.5)

## 2025-04-04 PROCEDURE — 83550 IRON BINDING TEST: CPT

## 2025-04-04 PROCEDURE — 85025 COMPLETE CBC W/AUTO DIFF WBC: CPT

## 2025-04-04 PROCEDURE — 3008F BODY MASS INDEX DOCD: CPT | Mod: CPTII,,, | Performed by: INTERNAL MEDICINE

## 2025-04-04 PROCEDURE — 3074F SYST BP LT 130 MM HG: CPT | Mod: CPTII,,, | Performed by: INTERNAL MEDICINE

## 2025-04-04 PROCEDURE — 99213 OFFICE O/P EST LOW 20 MIN: CPT | Mod: PBBFAC | Performed by: INTERNAL MEDICINE

## 2025-04-04 PROCEDURE — 36415 COLL VENOUS BLD VENIPUNCTURE: CPT

## 2025-04-04 PROCEDURE — 99214 OFFICE O/P EST MOD 30 MIN: CPT | Mod: S$PBB,,, | Performed by: INTERNAL MEDICINE

## 2025-04-04 PROCEDURE — 1160F RVW MEDS BY RX/DR IN RCRD: CPT | Mod: CPTII,,, | Performed by: INTERNAL MEDICINE

## 2025-04-04 PROCEDURE — 3079F DIAST BP 80-89 MM HG: CPT | Mod: CPTII,,, | Performed by: INTERNAL MEDICINE

## 2025-04-04 PROCEDURE — 1159F MED LIST DOCD IN RCRD: CPT | Mod: CPTII,,, | Performed by: INTERNAL MEDICINE

## 2025-04-04 PROCEDURE — 82728 ASSAY OF FERRITIN: CPT

## 2025-04-04 RX ORDER — SODIUM CHLORIDE 0.9 % (FLUSH) 0.9 %
10 SYRINGE (ML) INJECTION
OUTPATIENT
Start: 2025-04-07

## 2025-04-04 RX ORDER — HEPARIN 100 UNIT/ML
500 SYRINGE INTRAVENOUS
OUTPATIENT
Start: 2025-04-07

## 2025-04-04 RX ORDER — EPINEPHRINE 0.3 MG/.3ML
0.3 INJECTION SUBCUTANEOUS ONCE AS NEEDED
OUTPATIENT
Start: 2025-04-07

## 2025-04-04 NOTE — PROGRESS NOTES
History:  Past Medical History:   Diagnosis Date    Chronic constipation     Crohn's disease     History of Crohn's disease     unclear how diagnosis was made    Inflammatory bowel disease     Iron deficiency anemia, unspecified    Past medical history:  Crohn's disease, diagnosed when she was 15 years old; diagnosed at [facility]; presenting complaint was severe bleeding; received close to 50 packed RBC transfusions; after she is turned 20, she could not get established with a GI.  Symptoms have been more or less under control spontaneously.  B12 deficiency.  Iron-deficiency.  Folic acid deficiency.  Social history:  .  Lives in [place], [place].  Has 3 children.  Does not work.  No history of tobacco, alcohol, or illicit drug abuse.    Family history:  Negative for malignancy or blood dyscrasias.    Health maintenance:  Primary care provider at [facility].  She is waiting to get established with a GI for ongoing surveillance/management of Crohn's disease.  Past Surgical History:   Procedure Laterality Date    BONE MARROW ASPIRATION N/A 2023    Procedure: ASPIRATION, BONE MARROW;  Surgeon: Gayatri Howard MD;  Location: [facility] ENDOSCOPY;  Service: General;  Laterality: N/A;     SECTION  2012, 19    COLONOSCOPY      Luzmaria had several of these procedures    COLONOSCOPY, WITH 1 OR MORE BIOPSIES N/A 2023    Procedure: COLONOSCOPY, WITH 1 OR MORE BIOPSIES;  Surgeon: Bisi Holliday MD;  Location: [facility] ENDOSCOPY;  Service: Gastroenterology;  Laterality: N/A;    EGD, WITH CLOSED BIOPSY N/A 2023    Procedure: EGD, WITH CLOSED BIOPSY;  Surgeon: Bisi Holliday MD;  Location: [facility] ENDOSCOPY;  Service: Gastroenterology;  Laterality: N/A;    INTRALUMINAL GASTROINTESTINAL TRACT IMAGING VIA CAPSULE N/A 2025    Procedure: IMAGING PROCEDURE, GI TRACT, INTRALUMINAL, VIA CAPSULE;  Surgeon: Bisi Holliday MD;  Location: [facility] ENDOSCOPY;  Service: Gastroenterology;   Laterality: N/A;    TONSILLECTOMY      TUBAL LIGATION  02/14/2019    UPPER GASTROINTESTINAL ENDOSCOPY  1999    I've had several of these      Social History     Socioeconomic History    Marital status: Other    Number of children: 3    Highest education level: 12th grade   Occupational History    Occupation: self employed/   Tobacco Use    Smoking status: Never    Smokeless tobacco: Never   Substance and Sexual Activity    Alcohol use: Yes     Alcohol/week: 1.0 standard drink of alcohol     Types: 1 Glasses of wine per week    Drug use: Never    Sexual activity: Yes     Partners: Male     Birth control/protection: Other-see comments     Comment: Tubed tied     Social Drivers of Health     Financial Resource Strain: Low Risk  (6/3/2024)    Overall Financial Resource Strain (CARDIA)     Difficulty of Paying Living Expenses: Not hard at all   Food Insecurity: No Food Insecurity (6/3/2024)    Hunger Vital Sign     Worried About Running Out of Food in the Last Year: Never true     Ran Out of Food in the Last Year: Never true   Transportation Needs: No Transportation Needs (6/3/2024)    TRANSPORTATION NEEDS     Transportation : No   Physical Activity: Insufficiently Active (6/3/2024)    Exercise Vital Sign     Days of Exercise per Week: 1 day     Minutes of Exercise per Session: 10 min   Stress: No Stress Concern Present (6/3/2024)    [employer] - Occupational Stress Questionnaire     Feeling of Stress : Not at all   Housing Stability: Unknown (6/3/2024)    Housing Stability Vital Sign     Unable to Pay for Housing in the Last Year: No     Homeless in the Last Year: No      Family History   Problem Relation Name Age of Onset    Hypertension Mother Halie Saavedra     Diabetes Mother Halie Saavedra     Drug abuse Mother Halie Saavedra     No Known Problems Father      Kidney disease Maternal Grandmother Halina James     Diabetes Maternal Grandmother Halinashane James     Stroke Paternal  Grandmother Jade [place]       Reason for Follow-up:  -chronic anemia, S/P multiple blood transfusions over several years  -vitamin B12 deficiency  -folic acid deficiency   -iron-deficiency  -Menorrhagia with regular cycle   -kappa light chain MGUS  -Abnormal bone marrow biopsy, red cell aplasia, pancytopenia  -stool occult blood positive    History of Present Illness:   Abnormal bone marrow examination      Oncologic/Hematologic History:  Oncology History    No history exists.   37-year-old lady, referred from [facility], with anemia.    Please refer to assessment and plan section for details.    04/06/2023:  Pleasant, healthy-appearing young lady who presents for initial hematology consultation.  In no acute discomfort.  Crohn's disease, diagnosed when she was 15 years old; diagnosed at [facility]; presenting complaint was severe bleeding; received close to 50 packed RBC transfusions; after she is turned 20, she could not get established with a GI.  Symptoms have been more or less under control spontaneously.  B12 deficiency.  Iron-deficiency.  Folic acid deficiency.  For last several years, symptoms of Crohn's disease has been stable.  For last couple of weeks, occasional crampy pains.  Couple of loose bowel movements twice a week.  No blood in stool.  No fevers, chills, weakness, or fatigue.  Diagnosed with iron, B12, and folic acid deficiency during hospitalization at [facility], in August 2022, when she was hospitalized with severe symptomatic anemia, hemoglobin 1.7, platelets 57 K, B12 level 201, ferritin 27.5, and folic acid level 6.4.  On bone marrow biopsy, there was suspicion of acute red cell aplasia, 6.4% myeloblasts, etc..    Denies recurrent fevers, chills, drenching night sweats, anorexia, unintentional weight loss, chest pain, cough, dyspnea, dizziness, abdominal pain, etc..  ECOG 0.     Interval History:  OP IV IRON THERAPY   [No matching plan found]      01/14/2025:  -11/22/2020: IgG, IgA, IgM normal; no monoclonal protein on SPEP and EAN; kappa 2.24, elevated; lambda normal; kappa/lambda ratio 2.09, elevated (was 1.78, slightly elevated, on 04/06/2023)  -11/29/2023: Skeletal survey:  No clear evidence of lytic or sclerotic bone lesions  -12/06/2023:  EGD and colonoscopy:  3 erosions in the terminal ileum; four small patchy areas mild inflammation distal rectum, in the sigmoid colon, and at the hepatic flexure; mucosal changes in the rectum, in the sigmoid colon, and in the descending colon; external and internal hemorrhoids; scarring and altered vascularity in the left colon suggestive of prior disease in this area; no significant active inflammation seen in:; single 4 mm submucosal nodule lower 3rd of esophagus, 35 cm from incisors; a few dispersed small erosions no bleeding and no stigmata of recent bleeding found in the gastric antrum:   Pathology:   Gastric biopsy:  Mild chronic gastritis, negative for H pylori/dysplasia   Terminal ileum biopsy:  Nonspecific chronic inflammation, no dysplasia   Right colon biopsy:  Focal active colitis   Left colon biopsy:  Focal mild active chronic colitis with cryptitis, crypt abscesses, etc.   Rectum biopsy:  Mild active chronic colitis with cryptitis, crypt abscesses, etc.  -S/P Feraheme 510 mg IV x2 (12/08/2023, 12/15/2023)  -capsule endoscopy scheduled for 01/23/2024  -no showed 01/03/2024  -no showed 01/31/2024  -no showed 03/12/2024  -hemoglobin: 9.2 on 01/14/2025; 98.6 on 01/07/2025; 10.0 on 09/10/2024; 5.8 on 06/03 1024; 8.2 on 05/28/2024, etc.  -MCV: Chronically low  -05/28/2024: Serum iron 15 low, TIBC normal, ferritin 6.04 low, transferrin saturation 5% low  -05/28/2024: Folate 4.1 low, B12 level 313 normal/borderline normal  -01/07/2025: Serum iron normal, TIBC normal, ferritin 6.45 low, transferrin saturation 17% low  -01/07/2025: B12 level 604, normal  -01/14/2025: Serum iron 23 low, TIBC normal, ferritin  7.69 very low, transferrin saturation 8% low  Presents for a follow-up visit.  She is coming after several months.  She says that she was out of [facility], and [place].  Emphasized the importance of regular follow-up.  Remains severely iron-deficiency.  Remain severely anemic.  Denies GI blood loss in the form of hematemesis, melena, or hematochezia.  No abdominal pain, nausea, vomiting.  Appetite is great.  Chronic weakness and fatigue.  Some headaches.  No focal neurological symptoms.  .  Has 3 children.  Takes care of 3 other children in-home .  Denies history of tobacco, alcohol, or illicit drug abuse.  On specific questioning, says that 1 month her menstrual cycles are normal, the other month there heavy, with blood clots.  In my assessment, menstrual blood loss is the main source of her iron-deficiency anemia.  I encouraged her to discuss this with her PCP who manages her overall health; she needs to be on some kind of hormone therapy to control menorrhagia.    04/04/2025:  -Venofer:  100 mg on 01/30/2025; 100 mg on 02/06/2025  -02/11/2025: Hemoglobin 8.5 (too soon to assess), MCV 81.7 normal, serum iron 25 low, TIBC 276 low normal, ferritin 52.43 (up from 7.69 on 01/14/2025), transferrin saturation 9% low  -01/14/2025: B12 level 540 normal (absorbing well via oral route)  -01/14/2025: IgG 1831 elevated; IgM, IgA normal; no monoclonal bands per SPEP and EAN; kappa/lambda ratio 2.04 elevated, kappa 3.23 elevated, lambda normal  (parametrial stable)  -02/06/2025: Pelvic ultrasound:  Uterine fibroid; ovarian cyst on the right; left ovary was not clearly identified (uterine fibroid 1.9 x 2.2 x 2.3 cm; right ovarian complex cyst 1.6 x 1.3 x 1.4 cm; a complex structure abutting the right ovary identified might represent a parapelvic cyst 1.8 x 1.5 x 1.6 cm  -02/11/2025:  Video capsule endoscopy: Normal duodenum, normal jejunum, normal ileum  -04/04/2025:  Hemoglobin 7.9, MCV 78.3, serum iron 14 remains  low, TIBC normal, transferrin saturation ferritin 7.58 remains low, transferrin saturation 5% remains low  Presents for a follow-up visit.  When she is on her menstrual cycles, she feels weak, fatigued, and drained.  Other times, she feels well.  Remains severely iron-deficiency and anemic.  Has been unable to contact gyn for consultation as yet.  Today, we will send another request a gyn for consultation in respect of continued menorrhagia as well as for evaluation of abnormalities noted on recent pelvic ultrasound.  No bleeding in any other form.  Great appetite.  ECOG 0.    Immunization History   Administered Date(s) Administered    COVID-19, MRNA, LN-S, PF (MODERNA FULL 0.5 ML DOSE) 05/13/2021, 06/24/2021    Pneumococcal Conjugate - 20 Valent 05/28/2024    Tdap 09/29/2012, 07/13/2023     Review of patient's allergies indicates:  No Known Allergies    Medications:  Current Outpatient Medications on File Prior to Visit   Medication Sig Dispense Refill    cyanocobalamin, vitamin B-12, 1,000 mcg Lozg Place 1 tablet under the tongue every morning. 30 lozenge 6    ergocalciferol (ERGOCALCIFEROL) 50,000 unit Cap Take 1 capsule (50,000 Units total) by mouth every 7 days. for 24 doses 12 capsule 1    FEROSUL 325 mg (65 mg iron) Tab tablet Take 1 tablet (325 mg total) by mouth once daily. 90 tablet 3    folic acid (FOLVITE) 1 MG tablet Take 1 tablet (1,000 mcg total) by mouth once daily. 90 tablet 3    linaCLOtide (LINZESS) 145 mcg Cap capsule Take 1 capsule (145 mcg total) by mouth before breakfast. 30 capsule 5     No current facility-administered medications on file prior to visit.     Review of Systems:   All systems reviewed and found to be negative except for the symptoms detailed above    Physical Examination:   VITAL SIGNS:   Vitals:    04/04/25 0940   BP: 123/89   Pulse: 85   Resp: 18   Temp: 98.1 °F (36.7 °C)       GENERAL:  In no apparent distress.    HEAD:  No signs of head trauma.  EYES:  Pupils are equal.   "Extraocular motions intact.    EARS:  Hearing grossly intact.  MOUTH:  Oropharynx is normal.   NECK:  No adenopathy, no JVD.     CHEST:  Chest with clear breath sounds bilaterally.  No wheezes, rales, rhonchi.    CARDIAC:  Regular rate and rhythm.  S1 and S2, without murmurs, gallops, rubs.  VASCULAR:  No Edema.  Peripheral pulses normal and equal in all extremities.  ABDOMEN:  Soft, without detectable tenderness.  No sign of distention.  No   rebound or guarding, and no masses palpated.   Bowel Sounds normal.  MUSCULOSKELETAL:  Good range of motion of all major joints. Extremities without clubbing, cyanosis or edema.    NEUROLOGIC EXAM:  Alert and oriented x 3.  No focal sensory or strength deficits.   Speech normal.  Follows commands.  PSYCHIATRIC:  Mood normal.    No results for input(s): "CBC" in the last 72 hours.   No results for input(s): "CMP" in the last 72 hours.     Assessment:  Problem List Items Addressed This Visit       Abnormal urine finding - Primary    B12 deficiency    Folate deficiency    Abnormal bone marrow examination    Iron deficiency anemia    History of blood transfusion    MGUS (monoclonal gammopathy of unknown significance)    Free monoclonal light chain    Occult blood positive stool    Anemia due to chronic blood loss    Menorrhagia with regular cycle     Orders for 04/04/2025:  Proceed with Feraheme x2   Assess response with repeat CBC, serum iron, TIBC, ferritin in 6 weeks, then follow-up visit   Continue vitamin B12 1000 mcg p.o. q.day  Follow-up with gyn for abnormalities noted on pelvic ultrasound dated 02/06/2025  In July, repeat SPEP, EAN, FLC assay and random urine for UPEP, urine EAN, and urine FLC assay    Above discussed at length with the patient.  All questions answered.    Discussed labs.  Discussed plan of management.  Compliance emphasized.    She understands and agrees with this plan.  ===================================    # Chronic anemia:  (iron-deficiency, folate " deficiency, B12 deficiency)  -Anemia for several years  -Multiple blood transfusions over the years  -Hemoglobin: 13.5 (09/27/2022); 5.4 (08/11/2022); 6.7 (12/28/2020); 4.3 (12/27/2020)   -MCV normal  -Hospitalized 08/2022 with severe symptomatic anemia, hemoglobin 1.7, MCV 72, platelets 57 K, platelets dropped to 13 K  -Diagnosed with vitamin B12 deficiency and folic acid deficiency, and iron-deficiency  (Vitamin B12 level 201; folate 6.4; ferritin 27.5)  -Bone marrow exam 08/12/2022:  Abnormalities secondary to vitamin B12 and folic acid deficiency  -(Bone marrow abnormalities resolved 05/09/2023 after B12 and folic acid replacement  -Possible menorrhagia  -EGD and colonoscopy 12/06/2023:  No active bleeding lesions  -hemoglobin: 9.2 on 01/14/2025; 98.6 on 01/07/2025; 10.0 on 09/10/2024; 5.8 on 06/03 1024; 8.2 on 05/28/2024, etc.  -05/28/2024: Ferritin 6  -05/28/2024: Folate 4.1 low, B12 level 313, borderline  -01/07/2025: Ferritin 6.45 low  -01/07/2025: B12 levels 604 normal  -01/14/2025:  Ferritin 7.69 low  -Venofer:  100 mg on 01/30/2025; 100 mg on 02/06/2025  -02/11/2025: Hemoglobin 8.5 (too soon to assess), MCV 81.7 normal, serum iron 25 low, TIBC 276 low normal, ferritin 52.43 (up from 7.69 on 01/14/2025), transferrin saturation 9% low  -02/06/2025: Pelvic ultrasound:  Uterine fibroid; ovarian cyst on the right; left ovary was not clearly identified (uterine fibroid 1.9 x 2.2 x 2.3 cm; right ovarian complex cyst 1.6 x 1.3 x 1.4 cm; a complex structure abutting the right ovary identified might represent a parapelvic cyst 1.8 x 1.5 x 1.6 cm  -02/11/2025:  Video capsule endoscopy: Normal duodenum, normal jejunum, normal ileum  -04/04/2025:  Hemoglobin 7.9, MCV 78.3, serum iron 14 remains low, TIBC normal, transferrin saturation ferritin 7.58 remains low, transferrin saturation 5% remains low  >>>  -01/14/2025:  persistent iron-deficiency anemia  -inadequate parenteral iron supplementation, only 2 doses of  Venofer 100 mg each  -04/04/2020:  proceed with full-dose intravenous iron therapy with Feraheme 500 mg IV x2  -assess response with repeat CBC, serum iron, TIBC, ferritin in 1 month  -follow-up with gyn for abnormalities noted on pelvic ultrasound 02/06/2025  -absorbing vitamin B12 well via oral route; continue vitamin B12 1000 mcg p.o. q.day  -has failed oral iron therapy in the past  -probably, menorrhagia; EGD and colonoscopy without definite source of bleeding  -capsule endoscopy normal as well  -so far, source of iron loss/deficiency remains unclear but most likely, menstrual    # Menorrhagia:  -01/14/2025: Tells me that 1 month or menstrual cycles are normal, the other month and they are heavy, with blood clots; cycles are regular  -02/06/2025: Pelvic ultrasound:  Uterine fibroid; ovarian cyst on the right; left ovary was not clearly identified (uterine fibroid 1.9 x 2.2 x 2.3 cm; right ovarian complex cyst 1.6 x 1.3 x 1.4 cm; a complex structure abutting the right ovary identified might represent a parapelvic cyst 1.8 x 1.5 x 1.6 cm  >>>  -in the absence of overt GI blood loss, menstrual blood losses are most likely the underlying etiology of her chronic iron-deficiency anemia  -01/14/2025:  encouraged her to follow-up with the PCP/gyn for management of menorrhagia  -follow-up with gyn for evaluation of abnormalities noted on pelvic ultrasound 02/06/2025  -04/04/2025: Today, we are going to send another consultation request to gyn at [facility]    Bone marrow aspiration and core biopsy (bone marrow examination on 08/12/2022 showed 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    -B12 level 201.  Diagnosed when admitted to [facility] 08/2022, with severe anemia (see above);   -B12 level normal, 516, on 09/27/2022  -B12 deficiency can be due to terminal ileum involvement with Crohn's disease  -ever since 08/2022, has been taking 1 tablet of vitamin B12 daily  -04/06/2023: Hemoglobin 9.1.  MCV normal.  Transferrin saturation 8%.  Ferritin 26.02.  B12, folate normal.  No hemolysis.  No M protein.  Kappa/lambda ratio 1.78, elevated.  -04/06/2023:  Intrinsic factor antibody negative  -04/21/2023: Hemoglobin 8.8.  MCV normal.  Platelets 556 K  -hemoglobin: 9.2 on 01/14/2025; 98.6 on 01/07/2025; 10.0 on 09/10/2024; 5.8 on 06/03 1024; 8.2 on 05/28/2024, etc.  -05/28/2024: Ferritin 6  -05/28/2024: Folate 4.1 low, B12 level 313, borderline  -01/07/2025: Ferritin 6.45 low  -01/07/2025: B12 levels 604 normal  -01/14/2025:  Ferritin 7.69 low  -01/14/2025: B12 level 540 normal (absorbing well via oral route)  (Absorbing vitamin B12 via oral route, satisfactorily)  >>>  -continue vitamin B12 1000 mcg p.o. q.day    # Folic acid deficiency:   -folate 6.4, diagnosed when admitted to [facility] 08/2022 with severe anemia (see above)  -ever since 08/2022, has been taking folic acid tablet daily  -04/06/2023: Hemoglobin 9.1.  MCV normal.  Transferrin saturation 8%.  Ferritin 26.02.  B12, folate normal.  No hemolysis.  No M protein.  Kappa/lambda ratio 1.78, elevated.  -04/21/2023: Hemoglobin 8.8.  MCV normal.  Platelets 556 K  (Folic acid level has normalized as of 04/06/2023)     # Iron deficiency:  -12/27/2020: Serum iron 12, low.  TIBC normal.  Ferritin not done.  Transferrin saturation 3%  -diagnosed when admitted to [facility] 08/10/2022  with severe anemia (see above)  -09/27/2022: Serum iron 48, low.  TIBC normal.  Ferritin 21.65.  Transferrin saturation 16%, low.  -ever since 08/2022, has been taking 2 iron tablets daily  -04/06/2023: Hemoglobin 9.1.  MCV normal.  Transferrin saturation 8%.  Ferritin 26.02.  B12, folate normal.  No hemolysis.  No M protein.  Kappa/lambda ratio 1.78, elevated.  -04/21/2023: Hemoglobin 8.8.  MCV normal.  Platelets 556 K  -05/04/2023:  Hemoglobin 8.7.  MCV 79.6.  Platelets 77 K. ferritin 16.27.  CMP unremarkable.  (Has failed oral iron therapy; continues to have iron-deficiency anemia as of 04/06/2023, 04/21/2023, 05/04/2023)  -S/P Feraheme 510 mg IV x2 (06/13/2023, 06/20/2023)  -08/14/2023:  Hemoglobin 8.8 (no improvement with Feraheme; was 9.4 on 05/09/2023); MCV 89.0; WBC and platelets normal; ANC 1.3, down; serum iron 15, low; TIBC 262, transferrin saturation 6%) remains low; was 8% on 05/04/2023).  CMP reviewed; ferritin level i 12.10  -08/14/2023: Her cycles last 5 days every month; every other month, she experiences blood clots with menstrual cycles.  Says that menstrual lost does not appear to be heavy as far as she can tell.  (Absolutely no response to Feraheme x2, administered in June 2023; the source of iron loss remains unclear; questionable history of menorrhagia)  -S/P Feraheme x2, 510 mg IV each, on 08/21/2023 and 08/22/2023  -11/22/2023:  Hemoglobin 10.8, improved from 8.8 on 08/14/2023; MCV normal; today's ferritin level is pending; however, transferrin saturation remains low, 7%; CMP is unremarkable  (Partial response to Feraheme x2, administered 08/2023)  -EGD and colonoscopy 12/06/2023:  No definite source of bleeding (see details)  -S/P Feraheme 510 mg IV x2 (12/08/2023, 12/15/2023)  -capsule endoscopy scheduled for 01/23/2024  -hemoglobin: 9.2 on 01/14/2025; 98.6 on 01/07/2025; 10.0 on 09/10/2024; 5.8 on 06/03 1024; 8.2 on 05/28/2024, etc.  -05/28/2024: Ferritin 6  -05/28/2024: Folate 4.1 low,  B12 level 313, borderline  -01/07/2025: Ferritin 6.45 low  -01/07/2025: B12 levels 604 normal  -01/14/2025:  Ferritin 7.69 low (see above)    # New diagnosis of kappa light chain MGUS:  -04/06/2023: Hemoglobin 9.1.  MCV normal.  Transferrin saturation 8%.  Ferritin 26.02.  B12, folate normal.  No hemolysis.  No M protein.  Kappa/lambda ratio 1.78, elevated.  -11/22/2023: IgG, IgA, IgM normal; no monoclonal protein on SPEP and EAN; kappa 2.24, elevated; lambda normal; kappa/lambda ratio 2.09, elevated (was 1.78, slightly elevated, on 04/06/2023)  -skeletal survey 11/29/2023: Negative  -01/14/2025: IgG 1831 elevated; IgM, IgA normal; no monoclonal bands per SPEP and EAN; kappa/lambda ratio 2.04 elevated, kappa 3.23 elevated, lambda normal  (parametrial stable)  >>>  -repeat SPEP, EAN, FLC assay, and random urine for UPEP, urine EAN, urine FLC assay in 6 months (July 2025)    # History of Crohn's disease:   -Diagnosed when she was 15.  Presented with severe GI bleeding.  Says that she received a total of 50 packed RBC transfusions.  After she is turned 20, she could not find another GI to follow-up with.  -04/06/2023: As of 04/06/2023, symptoms are pretty much under control spontaneously; for last couple of weeks, some crampy abdominal pains; no GI bleeding; couple of loose bowels a week; more or less, asymptomatic        Follow-up:  No follow-ups on file.  Answers submitted by the patient for this visit:  Review of Systems Questionnaire (Submitted on 4/4/2025)  appetite change : No  unexpected weight change: No  mouth sores: No  visual disturbance: No  cough: No  shortness of breath: No  chest pain: No  abdominal pain: Yes  diarrhea: No  frequency: No  back pain: No  rash: No  headaches: Yes  adenopathy: No  nervous/ anxious: No

## 2025-04-04 NOTE — Clinical Note
Orders for 04/04/2025: Proceed with Feraheme x2  Assess response with repeat CBC, serum iron, TIBC, ferritin in 6 weeks, then follow-up visit  Continue vitamin B12 1000 mcg p.o. q.day Follow-up with gyn for abnormalities noted on pelvic ultrasound dated 02/06/2025 In July, repeat SPEP, EAN, FLC assay and random urine for UPEP, urine EAN, and urine FLC assay

## 2025-04-11 ENCOUNTER — TELEPHONE (OUTPATIENT)
Dept: GYNECOLOGY | Facility: CLINIC | Age: 40
End: 2025-04-11
Payer: MEDICAID

## 2025-04-11 NOTE — TELEPHONE ENCOUNTER
Called the patient to AUB causing anemia. Patient states that menses have worsened over the last couple of years. Menses are monthly . Lasting 5 days with heavy flow. She is not interested in surgical management at this time. She is interested in menstrual suppression with medical management. LMP was 4/6    Recent H/H 7.9/28.2    Will have patient scheduled in clinic within the next 3 weeks       Dana Olguin MD   [facility], PGY3

## 2025-04-15 ENCOUNTER — TELEPHONE (OUTPATIENT)
Dept: GYNECOLOGY | Facility: CLINIC | Age: 40
End: 2025-04-15
Payer: MEDICAID

## 2025-04-15 NOTE — TELEPHONE ENCOUNTER
Scheduled the appointment. Please call the patient and make her aware of the appt date/time. Thank you.       ----- Message from Manjinder sent at 4/15/2025 12:17 PM CDT -----    ----- Message -----  From: Dana Olguin MD  Sent: 4/11/2025   2:36 PM CDT  To: [facility]ne[facility] Front Office Staff    Hello , I would like to have this patient scheduled in clinic for workup of AUB within the next 3 weeksThanks!Dana Olguin MD [facility], PGY2

## 2025-04-16 ENCOUNTER — INFUSION (OUTPATIENT)
Dept: INFUSION THERAPY | Facility: HOSPITAL | Age: 40
End: 2025-04-16
Payer: MEDICAID

## 2025-04-16 VITALS
WEIGHT: 113.81 LBS | DIASTOLIC BLOOD PRESSURE: 80 MMHG | HEIGHT: 61 IN | OXYGEN SATURATION: 100 % | RESPIRATION RATE: 18 BRPM | HEART RATE: 92 BPM | BODY MASS INDEX: 21.49 KG/M2 | TEMPERATURE: 98 F | SYSTOLIC BLOOD PRESSURE: 119 MMHG

## 2025-04-16 DIAGNOSIS — D50.0 IRON DEFICIENCY ANEMIA DUE TO CHRONIC BLOOD LOSS: Primary | ICD-10-CM

## 2025-04-16 PROCEDURE — 63600175 PHARM REV CODE 636 W HCPCS: Mod: JZ,TB | Performed by: INTERNAL MEDICINE

## 2025-04-16 PROCEDURE — 25000003 PHARM REV CODE 250: Performed by: INTERNAL MEDICINE

## 2025-04-16 PROCEDURE — 96374 THER/PROPH/DIAG INJ IV PUSH: CPT

## 2025-04-16 PROCEDURE — A4216 STERILE WATER/SALINE, 10 ML: HCPCS | Performed by: INTERNAL MEDICINE

## 2025-04-16 RX ORDER — SODIUM CHLORIDE 0.9 % (FLUSH) 0.9 %
10 SYRINGE (ML) INJECTION
OUTPATIENT
Start: 2025-04-23

## 2025-04-16 RX ORDER — EPINEPHRINE 0.3 MG/.3ML
0.3 INJECTION SUBCUTANEOUS ONCE AS NEEDED
Status: CANCELLED | OUTPATIENT
Start: 2025-04-23

## 2025-04-16 RX ORDER — HEPARIN 100 UNIT/ML
500 SYRINGE INTRAVENOUS
OUTPATIENT
Start: 2025-04-23

## 2025-04-16 RX ORDER — EPINEPHRINE 1 MG/ML
0.3 INJECTION INTRAMUSCULAR; INTRAVENOUS; SUBCUTANEOUS ONCE AS NEEDED
Status: DISCONTINUED | OUTPATIENT
Start: 2025-04-16 | End: 2025-04-16 | Stop reason: HOSPADM

## 2025-04-16 RX ORDER — SODIUM CHLORIDE 0.9 % (FLUSH) 0.9 %
10 SYRINGE (ML) INJECTION
Status: DISCONTINUED | OUTPATIENT
Start: 2025-04-16 | End: 2025-04-16 | Stop reason: HOSPADM

## 2025-04-16 RX ORDER — EPINEPHRINE 0.3 MG/.3ML
0.3 INJECTION SUBCUTANEOUS ONCE AS NEEDED
OUTPATIENT
Start: 2025-04-23

## 2025-04-16 RX ADMIN — FERUMOXYTOL 510 MG: 510 INJECTION INTRAVENOUS at 02:04

## 2025-04-16 RX ADMIN — Medication 10 ML: at 02:04

## 2025-04-16 NOTE — NURSING
Pt presented to [facility] for Feraheme # 1 of 2, pt stated she feels tired and has had iron infusions in the past, she is good for a few months and then it drops again, stated she has heavy periods and has been seen by GI and has a GYN appt soon; proceeded with treatment.

## 2025-04-23 ENCOUNTER — INFUSION (OUTPATIENT)
Dept: INFUSION THERAPY | Facility: HOSPITAL | Age: 40
End: 2025-04-23
Payer: MEDICAID

## 2025-04-23 VITALS
SYSTOLIC BLOOD PRESSURE: 116 MMHG | RESPIRATION RATE: 20 BRPM | WEIGHT: 112.19 LBS | BODY MASS INDEX: 21.18 KG/M2 | DIASTOLIC BLOOD PRESSURE: 91 MMHG | OXYGEN SATURATION: 99 % | TEMPERATURE: 98 F | HEIGHT: 61 IN | HEART RATE: 96 BPM

## 2025-04-23 DIAGNOSIS — D50.0 IRON DEFICIENCY ANEMIA DUE TO CHRONIC BLOOD LOSS: Primary | ICD-10-CM

## 2025-04-23 PROCEDURE — 25000003 PHARM REV CODE 250: Performed by: INTERNAL MEDICINE

## 2025-04-23 PROCEDURE — 63600175 PHARM REV CODE 636 W HCPCS: Mod: JZ,TB | Performed by: INTERNAL MEDICINE

## 2025-04-23 PROCEDURE — 96365 THER/PROPH/DIAG IV INF INIT: CPT

## 2025-04-23 RX ORDER — SODIUM CHLORIDE 0.9 % (FLUSH) 0.9 %
10 SYRINGE (ML) INJECTION
Status: DISCONTINUED | OUTPATIENT
Start: 2025-04-23 | End: 2025-04-23 | Stop reason: HOSPADM

## 2025-04-23 RX ORDER — EPINEPHRINE 0.3 MG/.3ML
0.3 INJECTION SUBCUTANEOUS ONCE AS NEEDED
OUTPATIENT
Start: 2025-04-23

## 2025-04-23 RX ORDER — HEPARIN 100 UNIT/ML
500 SYRINGE INTRAVENOUS
OUTPATIENT
Start: 2025-04-23

## 2025-04-23 RX ORDER — SODIUM CHLORIDE 0.9 % (FLUSH) 0.9 %
10 SYRINGE (ML) INJECTION
OUTPATIENT
Start: 2025-04-23

## 2025-04-23 RX ORDER — DIPHENHYDRAMINE HYDROCHLORIDE 50 MG/ML
50 INJECTION, SOLUTION INTRAMUSCULAR; INTRAVENOUS ONCE AS NEEDED
OUTPATIENT
Start: 2025-04-23

## 2025-04-23 RX ADMIN — FERUMOXYTOL 510 MG: 510 INJECTION INTRAVENOUS at 02:04

## 2025-05-09 ENCOUNTER — OFFICE VISIT (OUTPATIENT)
Dept: GYNECOLOGY | Facility: CLINIC | Age: 40
End: 2025-05-09
Payer: MEDICAID

## 2025-05-09 VITALS
OXYGEN SATURATION: 100 % | SYSTOLIC BLOOD PRESSURE: 135 MMHG | BODY MASS INDEX: 21.27 KG/M2 | HEART RATE: 93 BPM | RESPIRATION RATE: 20 BRPM | WEIGHT: 112.63 LBS | TEMPERATURE: 99 F | DIASTOLIC BLOOD PRESSURE: 95 MMHG | HEIGHT: 61 IN

## 2025-05-09 DIAGNOSIS — D50.0 IRON DEFICIENCY ANEMIA DUE TO CHRONIC BLOOD LOSS: ICD-10-CM

## 2025-05-09 DIAGNOSIS — R82.90 ABNORMAL URINE FINDING: ICD-10-CM

## 2025-05-09 DIAGNOSIS — N92.0 MENORRHAGIA WITH REGULAR CYCLE: ICD-10-CM

## 2025-05-09 PROCEDURE — 99214 OFFICE O/P EST MOD 30 MIN: CPT | Mod: PBBFAC

## 2025-05-09 RX ORDER — MEDROXYPROGESTERONE ACETATE 10 MG/1
10 TABLET ORAL DAILY
Qty: 30 TABLET | Refills: 12 | Status: SHIPPED | OUTPATIENT
Start: 2025-05-09 | End: 2025-06-08

## 2025-05-09 NOTE — PROGRESS NOTES
[facility] OB/GYN CLINIC NOTE  [facility]  [address] [place] [address]  Phone: [phone]  Fax: [phone]    Subjective:     Elaina Pierce is a 39 y.o.  with hx of crohn's disease who presents for new patient visit to workup AUB.     Today she reports:    She reports increased cyclical heavy, menstrual bleeding that lasts 7days/cycle for the last 1-2 years  Patient reports using 7-8 pads/day, passing clots  Uses entire pack of pads within one cycle.  Endorsing sxs of fatigue, dizziness, and decreased QOL symptoms.  Denies bleeding between cycles. Has received multiple transfusions. Worked up for iron deficiency anemia.  Crohn's has been well controlled, no recent flares. Worked up for other causes of anemia by heme. They have ruled out not related crohn's disease. Recommending menstrual suppression.   Has not trialed anything for menstrual suppression/control.    Allergies: None  OBHx:, prior c-sections x2  GynHx:   Menarche @ 14, Regular cyclic menses, heavier since c-sections    LMP: 27  Denies Hx of STIs and abnormal paps  Contraception: None    MedHx:   Past Medical History:   Diagnosis Date    Chronic constipation     Crohn's disease     History of Crohn's disease     unclear how diagnosis was made    Inflammatory bowel disease     Iron deficiency anemia, unspecified        SurgHx:   Past Surgical History:   Procedure Laterality Date    BONE MARROW ASPIRATION N/A 2023    Procedure: ASPIRATION, BONE MARROW;  Surgeon: Gayatri Howard MD;  Location: [facility] ENDOSCOPY;  Service: General;  Laterality: N/A;     SECTION  2012, 19    COLONOSCOPY      Luzmaria had several of these procedures    COLONOSCOPY, WITH 1 OR MORE BIOPSIES N/A 2023    Procedure: COLONOSCOPY, WITH 1 OR MORE BIOPSIES;  Surgeon: Bisi Holliday MD;  Location: [facility] ENDOSCOPY;  Service: Gastroenterology;  Laterality: N/A;    EGD, WITH CLOSED BIOPSY N/A 2023    Procedure: EGD, WITH  "CLOSED BIOPSY;  Surgeon: Bisi Holliday MD;  Location: [facility] ENDOSCOPY;  Service: Gastroenterology;  Laterality: N/A;    INTRALUMINAL GASTROINTESTINAL TRACT IMAGING VIA CAPSULE N/A 02/11/2025    Procedure: IMAGING PROCEDURE, GI TRACT, INTRALUMINAL, VIA CAPSULE;  Surgeon: Bisi Holliday MD;  Location: [facility] ENDOSCOPY;  Service: Gastroenterology;  Laterality: N/A;    TONSILLECTOMY      TUBAL LIGATION  02/14/2019    UPPER GASTROINTESTINAL ENDOSCOPY  1999    I've had several of these       Medications:   Current Medications[1]    FM Hx: Denies hx of ovarian, uterine, endometrial, or colon cancer.  Social Hx: Denies tobacco, alcohol and illicit drug usage    Review of Systems  Denies fevers, chills, headache, blurry vision, nausea, vomiting, dizziness, or syncope.  Denies chest pain, shortness of breath, RUQ pain, or calf pain.    Objective:     Vitals:    05/09/25 1052   BP: (!) 135/95   BP Location: Left arm   Patient Position: Sitting   Pulse: 93   Resp: 20   Temp: 98.6 °F (37 °C)   TempSrc: Oral   SpO2: 100%   Weight: 51.1 kg (112 lb 9.6 oz)   Height: 5' 1" (1.549 m)     Body mass index is 21.28 kg/m².    Physical Exam:     General: alert and oriented, in no acute distress  Lungs: clear to auscultation bilaterally, no conversational dyspnea  Heart: regular rate and rhythm, S1, S2 normal, no murmur  Extremities: Normal, atraumatic, non-edematous, No cords or calf tenderness, No significant calf/ankle edema  External genitalia: deferred  Bimanual Exam: deferred  Speculum Exam: deferred    Labs  Lab Results   Component Value Date    WBC 6.12 04/04/2025    HGB 7.9 (L) 04/04/2025    HCT 28.2 (L) 04/04/2025    MCV 78.3 (L) 04/04/2025     04/04/2025       CMP  Sodium   Date Value Ref Range Status   09/10/2024 136 136 - 145 mmol/L Final   08/13/2022 142 136 - 145 mmol/L Final     Potassium   Date Value Ref Range Status   09/10/2024 3.5 3.5 - 5.1 mmol/L Final   08/13/2022 3.4 (L) 3.5 - 5.1 mmol/L Final "     Chloride   Date Value Ref Range Status   09/10/2024 108 (H) 98 - 107 mmol/L Final   08/13/2022 117 (H) 100 - 109 mmol/L Final     CO2   Date Value Ref Range Status   09/10/2024 21 (L) 22 - 29 mmol/L Final     Carbon Dioxide   Date Value Ref Range Status   08/13/2022 17 (L) 22 - 33 mmol/L Final     Glucose   Date Value Ref Range Status   09/10/2024 92 74 - 100 mg/dL Final     Blood Urea Nitrogen   Date Value Ref Range Status   09/10/2024 6.6 (L) 7.0 - 18.7 mg/dL Final   08/13/2022 6 5 - 25 mg/dL Final     Creatinine   Date Value Ref Range Status   09/10/2024 0.65 0.55 - 1.02 mg/dL Final   08/13/2022 0.56 (L) 0.57 - 1.25 mg/dL Final     Calcium   Date Value Ref Range Status   09/10/2024 9.1 8.4 - 10.2 mg/dL Final   08/13/2022 7.7 (L) 8.8 - 10.6 mg/dL Final     Protein Total   Date Value Ref Range Status   01/14/2025 8.6 (H) 6.4 - 8.3 gm/dL Final     Albumin   Date Value Ref Range Status   01/14/2025 3.8 3.5 - 5.0 g/dL Final     Bilirubin Total   Date Value Ref Range Status   09/10/2024 0.2 <=1.5 mg/dL Final     ALP   Date Value Ref Range Status   09/10/2024 73 40 - 150 unit/L Final     AST   Date Value Ref Range Status   09/10/2024 11 5 - 34 unit/L Final     ALT   Date Value Ref Range Status   09/10/2024 8 0 - 55 unit/L Final     Anion Gap   Date Value Ref Range Status   08/13/2022 8 8 - 16 mmol/L Final     eGFR   Date Value Ref Range Status   09/10/2024 >60 mL/min/1.73/m2 Final     9/24:   TSH - 1.2    TVUS in 2025:  Uterus:     Size: 10.2 x 5.1 x 5.6 cm  Masses: Fibroid identified measuring 1.9 x 2.2 by 2.3 cm  Endometrium: The endometrial stripe measures 1.4 cm.  Multiple cyst are identified in the cervical region     Right ovary:  Size: Right ovary measures 3.2 x 2.3 cm with a complex cyst that measures 1.6 by 1.3 x 1.4 cm a complex structures abutting the right ovary identified might represent a parapelvic cyst measuring 1.8 x 1.5 x 1.6 cm  Appearance: Normal   Vascular flow: Normal.  Left ovary:  Was not  visualized     Free Fluid:  There is some free fluid in the cul-de-sac  Impression:  Uterine fibroid.  Ovarian cyst on the right as above.     No other abnormalities.  Left ovary was not clearly identified    Assessment:   39 y.o.  with abnormal uterine bleeding consistent with AUB-L versus P and contributory factors with suspected adenomyosis.    Plan:     AUB  Risk factors for malignancy: None   EMB deferred as minimal risk factors.  Labs previously: CBC, TSH reviewed  Pelvic Ultrasound previously ordered: reviewed. Independent read - intracavitary mass potentially fibroid versus polyp. Signs of possible adenomyosis on ultrasound within the myometrium. Possible contributory factors to heavy menses.   Discussed that initial medical management is recommended. Discussed use of provera 10mg to help with decreasing menses heaviness. If failure of improvement in 3 months, can consider hysteroscopy D&C for possible intracavitary fibroid versus polyp removal. Discussed that surgical management with hysterectomy is not recommended given complex medical hx of crohn's disease.      HCM  Pap in : UTD, NILM    Recommend RTC in 3 months for telemed for provera followup     Patient and plan were discussed with Dr. Castillo.    Marilee Clark MD  [facility], PGY-2  25       [1]   Current Outpatient Medications:     cyanocobalamin, vitamin B-12, 1,000 mcg Lozg, Place 1 tablet under the tongue every morning., Disp: 30 lozenge, Rfl: 6    ergocalciferol (ERGOCALCIFEROL) 50,000 unit Cap, Take 1 capsule (50,000 Units total) by mouth every 7 days. for 24 doses, Disp: 12 capsule, Rfl: 1    FEROSUL 325 mg (65 mg iron) Tab tablet, Take 1 tablet (325 mg total) by mouth once daily., Disp: 90 tablet, Rfl: 3    folic acid (FOLVITE) 1 MG tablet, Take 1 tablet (1,000 mcg total) by mouth once daily., Disp: 90 tablet, Rfl: 3    linaCLOtide (LINZESS) 145 mcg Cap capsule, Take 1 capsule (145 mcg total) by mouth before  breakfast., Disp: 30 capsule, Rfl: 5    medroxyPROGESTERone (PROVERA) 10 MG tablet, Take 1 tablet (10 mg total) by mouth once daily., Disp: 30 tablet, Rfl: 12

## 2025-05-16 ENCOUNTER — OFFICE VISIT (OUTPATIENT)
Dept: HEMATOLOGY/ONCOLOGY | Facility: CLINIC | Age: 40
End: 2025-05-16
Attending: INTERNAL MEDICINE
Payer: MEDICAID

## 2025-05-16 VITALS
SYSTOLIC BLOOD PRESSURE: 122 MMHG | BODY MASS INDEX: 21.52 KG/M2 | HEART RATE: 102 BPM | RESPIRATION RATE: 18 BRPM | TEMPERATURE: 98 F | DIASTOLIC BLOOD PRESSURE: 85 MMHG | WEIGHT: 114 LBS | HEIGHT: 61 IN | OXYGEN SATURATION: 98 %

## 2025-05-16 DIAGNOSIS — Z92.89 HISTORY OF BLOOD TRANSFUSION: ICD-10-CM

## 2025-05-16 DIAGNOSIS — R80.3 FREE MONOCLONAL LIGHT CHAIN: ICD-10-CM

## 2025-05-16 DIAGNOSIS — D50.0 ANEMIA DUE TO CHRONIC BLOOD LOSS: ICD-10-CM

## 2025-05-16 DIAGNOSIS — E53.8 FOLATE DEFICIENCY: ICD-10-CM

## 2025-05-16 DIAGNOSIS — R82.90 ABNORMAL URINE FINDING: Primary | ICD-10-CM

## 2025-05-16 DIAGNOSIS — D50.0 IRON DEFICIENCY ANEMIA DUE TO CHRONIC BLOOD LOSS: ICD-10-CM

## 2025-05-16 DIAGNOSIS — R82.90 ABNORMAL URINE FINDING: ICD-10-CM

## 2025-05-16 DIAGNOSIS — N92.0 MENORRHAGIA WITH REGULAR CYCLE: ICD-10-CM

## 2025-05-16 DIAGNOSIS — R89.8 ABNORMAL BONE MARROW EXAMINATION: ICD-10-CM

## 2025-05-16 DIAGNOSIS — E53.8 B12 DEFICIENCY: ICD-10-CM

## 2025-05-16 DIAGNOSIS — R19.5 OCCULT BLOOD POSITIVE STOOL: ICD-10-CM

## 2025-05-16 DIAGNOSIS — D47.2 MGUS (MONOCLONAL GAMMOPATHY OF UNKNOWN SIGNIFICANCE): ICD-10-CM

## 2025-05-16 LAB
ANISOCYTOSIS BLD QL SMEAR: ABNORMAL
BASOPHILS # BLD AUTO: 0.03 X10(3)/MCL
BASOPHILS NFR BLD AUTO: 0.4 %
ELLIPTOCYTOSIS (OHS): ABNORMAL
EOSINOPHIL # BLD AUTO: 0.22 X10(3)/MCL (ref 0–0.9)
EOSINOPHIL NFR BLD AUTO: 3.2 %
ERYTHROCYTE [DISTWIDTH] IN BLOOD BY AUTOMATED COUNT: 25.8 % (ref 11.5–17)
FERRITIN SERPL-MCNC: 110.61 NG/ML (ref 4.63–204)
HCT VFR BLD AUTO: 38.9 % (ref 37–47)
HGB BLD-MCNC: 11.7 G/DL (ref 12–16)
HYPOCHROMIA BLD QL SMEAR: ABNORMAL
IMM GRANULOCYTES # BLD AUTO: 0.02 X10(3)/MCL (ref 0–0.04)
IMM GRANULOCYTES NFR BLD AUTO: 0.3 %
IRON SATN MFR SERPL: 25 % (ref 20–50)
IRON SERPL-MCNC: 53 UG/DL (ref 50–170)
LYMPHOCYTES # BLD AUTO: 1.56 X10(3)/MCL (ref 0.6–4.6)
LYMPHOCYTES NFR BLD AUTO: 22.7 %
MCH RBC QN AUTO: 26 PG (ref 27–31)
MCHC RBC AUTO-ENTMCNC: 30.1 G/DL (ref 33–36)
MCV RBC AUTO: 86.4 FL (ref 80–94)
MONOCYTES # BLD AUTO: 0.46 X10(3)/MCL (ref 0.1–1.3)
MONOCYTES NFR BLD AUTO: 6.7 %
NEUTROPHILS # BLD AUTO: 4.57 X10(3)/MCL (ref 2.1–9.2)
NEUTROPHILS NFR BLD AUTO: 66.7 %
NRBC BLD AUTO-RTO: 0 %
OVALOCYTES (OLG): ABNORMAL
PLATELET # BLD AUTO: 524 X10(3)/MCL (ref 130–400)
PLATELET # BLD EST: ABNORMAL 10*3/UL
PMV BLD AUTO: 9.5 FL (ref 7.4–10.4)
POLYCHROMASIA BLD QL SMEAR: ABNORMAL
RBC # BLD AUTO: 4.5 X10(6)/MCL (ref 4.2–5.4)
TEAR DROP CELL (OLG): SLIGHT
TIBC SERPL-MCNC: 162 UG/DL (ref 70–310)
TIBC SERPL-MCNC: 215 UG/DL (ref 250–450)
TRANSFERRIN SERPL-MCNC: 196 MG/DL (ref 180–382)
WBC # BLD AUTO: 6.86 X10(3)/MCL (ref 4.5–11.5)

## 2025-05-16 PROCEDURE — 3074F SYST BP LT 130 MM HG: CPT | Mod: CPTII,,, | Performed by: INTERNAL MEDICINE

## 2025-05-16 PROCEDURE — 99213 OFFICE O/P EST LOW 20 MIN: CPT | Mod: PBBFAC | Performed by: INTERNAL MEDICINE

## 2025-05-16 PROCEDURE — 85025 COMPLETE CBC W/AUTO DIFF WBC: CPT

## 2025-05-16 PROCEDURE — 99214 OFFICE O/P EST MOD 30 MIN: CPT | Mod: S$PBB,,, | Performed by: INTERNAL MEDICINE

## 2025-05-16 PROCEDURE — 1159F MED LIST DOCD IN RCRD: CPT | Mod: CPTII,,, | Performed by: INTERNAL MEDICINE

## 2025-05-16 PROCEDURE — 1160F RVW MEDS BY RX/DR IN RCRD: CPT | Mod: CPTII,,, | Performed by: INTERNAL MEDICINE

## 2025-05-16 PROCEDURE — 3008F BODY MASS INDEX DOCD: CPT | Mod: CPTII,,, | Performed by: INTERNAL MEDICINE

## 2025-05-16 PROCEDURE — 83550 IRON BINDING TEST: CPT

## 2025-05-16 PROCEDURE — 3079F DIAST BP 80-89 MM HG: CPT | Mod: CPTII,,, | Performed by: INTERNAL MEDICINE

## 2025-05-16 PROCEDURE — 82728 ASSAY OF FERRITIN: CPT

## 2025-05-16 NOTE — Clinical Note
Orders for 05/16/2025:  Mid August, recheck CBC, serum iron, TIBC, ferritin Continue vitamin B12 1000 mcg p.o. q.day Follow-up with gyn for management of menorrhagia In August, SPEP, EAN, FLC assay and random urine for UPEP, urine EAN, urine FLC assay Follow-up in August with labs

## 2025-05-20 ENCOUNTER — HOSPITAL ENCOUNTER (INPATIENT)
Facility: HOSPITAL | Age: 40
LOS: 3 days | Discharge: HOME | DRG: 386 | End: 2025-05-23
Attending: STUDENT IN AN ORGANIZED HEALTH CARE EDUCATION/TRAINING PROGRAM | Admitting: INTERNAL MEDICINE
Payer: COMMERCIAL

## 2025-05-20 DIAGNOSIS — R07.9 CHEST PAIN: ICD-10-CM

## 2025-05-20 DIAGNOSIS — R55 SYNCOPE: ICD-10-CM

## 2025-05-20 DIAGNOSIS — D64.9 SYMPTOMATIC ANEMIA: ICD-10-CM

## 2025-05-20 DIAGNOSIS — K52.3 INDETERMINATE COLITIS: Primary | ICD-10-CM

## 2025-05-20 LAB
ABO + RH BLD: NORMAL
ABO + RH BLD: NORMAL
ALBUMIN SERPL-MCNC: 3.3 G/DL (ref 3.5–5)
ALBUMIN/GLOB SERPL: 0.9 RATIO (ref 1.1–2)
ALP SERPL-CCNC: 60 UNIT/L (ref 40–150)
ALT SERPL-CCNC: 7 UNIT/L (ref 0–55)
ANION GAP SERPL CALC-SCNC: 10 MEQ/L
APTT PPP: 27.5 SECONDS (ref 23.2–33.7)
AST SERPL-CCNC: 12 UNIT/L (ref 11–45)
B-HCG SERPL QL: NEGATIVE
BACTERIA #/AREA URNS AUTO: ABNORMAL /HPF
BASOPHILS # BLD AUTO: 0.08 X10(3)/MCL
BASOPHILS NFR BLD AUTO: 0.9 %
BILIRUB SERPL-MCNC: 0.2 MG/DL
BILIRUB UR QL STRIP.AUTO: NEGATIVE
BLD PROD TYP BPU: NORMAL
BLD PROD TYP BPU: NORMAL
BLOOD UNIT EXPIRATION DATE: NORMAL
BLOOD UNIT EXPIRATION DATE: NORMAL
BLOOD UNIT TYPE CODE: 7300
BLOOD UNIT TYPE CODE: 7300
BUN SERPL-MCNC: 8.3 MG/DL (ref 7–18.7)
CALCIUM SERPL-MCNC: 8.1 MG/DL (ref 8.4–10.2)
CHLORIDE SERPL-SCNC: 112 MMOL/L (ref 98–107)
CLARITY UR: CLEAR
CO2 SERPL-SCNC: 14 MMOL/L (ref 22–29)
COLOR UR AUTO: ABNORMAL
CREAT SERPL-MCNC: 0.64 MG/DL (ref 0.55–1.02)
CREAT/UREA NIT SERPL: 13
CROSSMATCH INTERPRETATION: NORMAL
CROSSMATCH INTERPRETATION: NORMAL
DISPENSE STATUS: NORMAL
DISPENSE STATUS: NORMAL
EOSINOPHIL # BLD AUTO: 0.08 X10(3)/MCL (ref 0–0.9)
EOSINOPHIL NFR BLD AUTO: 0.9 %
ERYTHROCYTE [DISTWIDTH] IN BLOOD BY AUTOMATED COUNT: 26.2 % (ref 11.5–17)
GFR SERPLBLD CREATININE-BSD FMLA CKD-EPI: >60 ML/MIN/1.73/M2
GLOBULIN SER-MCNC: 3.5 GM/DL (ref 2.4–3.5)
GLUCOSE SERPL-MCNC: 135 MG/DL (ref 74–100)
GLUCOSE UR QL STRIP: NORMAL
GROUP & RH: NORMAL
HCT VFR BLD AUTO: 26.8 % (ref 37–47)
HGB BLD-MCNC: 7.8 G/DL (ref 12–16)
HGB UR QL STRIP: ABNORMAL
HYALINE CASTS #/AREA URNS LPF: ABNORMAL /LPF
IMM GRANULOCYTES # BLD AUTO: 0.06 X10(3)/MCL (ref 0–0.04)
IMM GRANULOCYTES NFR BLD AUTO: 0.6 %
INDIRECT COOMBS: NORMAL
INR PPP: 1.1
KETONES UR QL STRIP: NEGATIVE
LACTATE SERPL-SCNC: 0.9 MMOL/L (ref 0.5–2.2)
LACTATE SERPL-SCNC: 0.9 MMOL/L (ref 0.5–2.2)
LACTATE SERPL-SCNC: 2.3 MMOL/L (ref 0.5–2.2)
LEUKOCYTE ESTERASE UR QL STRIP: 250
LIPASE SERPL-CCNC: 8 U/L
LYMPHOCYTES # BLD AUTO: 3.2 X10(3)/MCL (ref 0.6–4.6)
LYMPHOCYTES NFR BLD AUTO: 34.1 %
MAGNESIUM SERPL-MCNC: 1.6 MG/DL (ref 1.6–2.6)
MCH RBC QN AUTO: 25.9 PG (ref 27–31)
MCHC RBC AUTO-ENTMCNC: 29.1 G/DL (ref 33–36)
MCV RBC AUTO: 89 FL (ref 80–94)
MONOCYTES # BLD AUTO: 0.88 X10(3)/MCL (ref 0.1–1.3)
MONOCYTES NFR BLD AUTO: 9.4 %
MUCOUS THREADS URNS QL MICRO: ABNORMAL /LPF
NEUTROPHILS # BLD AUTO: 5.09 X10(3)/MCL (ref 2.1–9.2)
NEUTROPHILS NFR BLD AUTO: 54.1 %
NITRITE UR QL STRIP: NEGATIVE
NRBC BLD AUTO-RTO: 0 %
PH UR STRIP: 5.5 [PH]
PLATELET # BLD AUTO: 445 X10(3)/MCL (ref 130–400)
PMV BLD AUTO: 10.3 FL (ref 7.4–10.4)
POTASSIUM SERPL-SCNC: 3.9 MMOL/L (ref 3.5–5.1)
PROT SERPL-MCNC: 6.8 GM/DL (ref 6.4–8.3)
PROT UR QL STRIP: NEGATIVE
PROTHROMBIN TIME: 13.7 SECONDS (ref 12.5–14.5)
RBC # BLD AUTO: 3.01 X10(6)/MCL (ref 4.2–5.4)
RBC #/AREA URNS AUTO: ABNORMAL /HPF
SODIUM SERPL-SCNC: 136 MMOL/L (ref 136–145)
SP GR UR STRIP.AUTO: 1.05 (ref 1–1.03)
SPECIMEN OUTDATE: NORMAL
SQUAMOUS #/AREA URNS LPF: ABNORMAL /HPF
TROPONIN I SERPL-MCNC: <0.01 NG/ML (ref 0–0.04)
UNIT NUMBER: NORMAL
UNIT NUMBER: NORMAL
UROBILINOGEN UR STRIP-ACNC: NORMAL
WBC # BLD AUTO: 9.39 X10(3)/MCL (ref 4.5–11.5)
WBC #/AREA URNS AUTO: ABNORMAL /HPF

## 2025-05-20 PROCEDURE — 63600175 PHARM REV CODE 636 W HCPCS: Performed by: NURSE PRACTITIONER

## 2025-05-20 PROCEDURE — 83735 ASSAY OF MAGNESIUM: CPT | Performed by: STUDENT IN AN ORGANIZED HEALTH CARE EDUCATION/TRAINING PROGRAM

## 2025-05-20 PROCEDURE — 87045 FECES CULTURE AEROBIC BACT: CPT | Performed by: PHYSICIAN ASSISTANT

## 2025-05-20 PROCEDURE — 27000207 HC ISOLATION

## 2025-05-20 PROCEDURE — 83993 ASSAY FOR CALPROTECTIN FECAL: CPT | Performed by: PHYSICIAN ASSISTANT

## 2025-05-20 PROCEDURE — 80053 COMPREHEN METABOLIC PANEL: CPT | Performed by: STUDENT IN AN ORGANIZED HEALTH CARE EDUCATION/TRAINING PROGRAM

## 2025-05-20 PROCEDURE — P9016 RBC LEUKOCYTES REDUCED: HCPCS | Performed by: STUDENT IN AN ORGANIZED HEALTH CARE EDUCATION/TRAINING PROGRAM

## 2025-05-20 PROCEDURE — 86923 COMPATIBILITY TEST ELECTRIC: CPT | Mod: 91 | Performed by: STUDENT IN AN ORGANIZED HEALTH CARE EDUCATION/TRAINING PROGRAM

## 2025-05-20 PROCEDURE — 36430 TRANSFUSION BLD/BLD COMPNT: CPT

## 2025-05-20 PROCEDURE — 25500020 PHARM REV CODE 255: Performed by: STUDENT IN AN ORGANIZED HEALTH CARE EDUCATION/TRAINING PROGRAM

## 2025-05-20 PROCEDURE — 96375 TX/PRO/DX INJ NEW DRUG ADDON: CPT

## 2025-05-20 PROCEDURE — 21400001 HC TELEMETRY ROOM

## 2025-05-20 PROCEDURE — 85610 PROTHROMBIN TIME: CPT | Performed by: STUDENT IN AN ORGANIZED HEALTH CARE EDUCATION/TRAINING PROGRAM

## 2025-05-20 PROCEDURE — 63600175 PHARM REV CODE 636 W HCPCS: Performed by: STUDENT IN AN ORGANIZED HEALTH CARE EDUCATION/TRAINING PROGRAM

## 2025-05-20 PROCEDURE — 99285 EMERGENCY DEPT VISIT HI MDM: CPT | Mod: 25

## 2025-05-20 PROCEDURE — 81001 URINALYSIS AUTO W/SCOPE: CPT | Performed by: STUDENT IN AN ORGANIZED HEALTH CARE EDUCATION/TRAINING PROGRAM

## 2025-05-20 PROCEDURE — 87507 IADNA-DNA/RNA PROBE TQ 12-25: CPT | Performed by: PHYSICIAN ASSISTANT

## 2025-05-20 PROCEDURE — 96360 HYDRATION IV INFUSION INIT: CPT | Mod: 59

## 2025-05-20 PROCEDURE — 85025 COMPLETE CBC W/AUTO DIFF WBC: CPT | Performed by: STUDENT IN AN ORGANIZED HEALTH CARE EDUCATION/TRAINING PROGRAM

## 2025-05-20 PROCEDURE — 84484 ASSAY OF TROPONIN QUANT: CPT | Performed by: STUDENT IN AN ORGANIZED HEALTH CARE EDUCATION/TRAINING PROGRAM

## 2025-05-20 PROCEDURE — 25000003 PHARM REV CODE 250: Performed by: STUDENT IN AN ORGANIZED HEALTH CARE EDUCATION/TRAINING PROGRAM

## 2025-05-20 PROCEDURE — 25000003 PHARM REV CODE 250: Performed by: INTERNAL MEDICINE

## 2025-05-20 PROCEDURE — 85730 THROMBOPLASTIN TIME PARTIAL: CPT | Performed by: STUDENT IN AN ORGANIZED HEALTH CARE EDUCATION/TRAINING PROGRAM

## 2025-05-20 PROCEDURE — 11000001 HC ACUTE MED/SURG PRIVATE ROOM

## 2025-05-20 PROCEDURE — 96374 THER/PROPH/DIAG INJ IV PUSH: CPT

## 2025-05-20 PROCEDURE — 30233N1 TRANSFUSION OF NONAUTOLOGOUS RED BLOOD CELLS INTO PERIPHERAL VEIN, PERCUTANEOUS APPROACH: ICD-10-PCS | Performed by: STUDENT IN AN ORGANIZED HEALTH CARE EDUCATION/TRAINING PROGRAM

## 2025-05-20 PROCEDURE — 25000003 PHARM REV CODE 250: Performed by: NURSE PRACTITIONER

## 2025-05-20 PROCEDURE — 87040 BLOOD CULTURE FOR BACTERIA: CPT | Performed by: STUDENT IN AN ORGANIZED HEALTH CARE EDUCATION/TRAINING PROGRAM

## 2025-05-20 PROCEDURE — 63600175 PHARM REV CODE 636 W HCPCS

## 2025-05-20 PROCEDURE — 86901 BLOOD TYPING SEROLOGIC RH(D): CPT | Performed by: STUDENT IN AN ORGANIZED HEALTH CARE EDUCATION/TRAINING PROGRAM

## 2025-05-20 PROCEDURE — 87086 URINE CULTURE/COLONY COUNT: CPT | Performed by: STUDENT IN AN ORGANIZED HEALTH CARE EDUCATION/TRAINING PROGRAM

## 2025-05-20 PROCEDURE — 84703 CHORIONIC GONADOTROPIN ASSAY: CPT | Performed by: STUDENT IN AN ORGANIZED HEALTH CARE EDUCATION/TRAINING PROGRAM

## 2025-05-20 PROCEDURE — 83605 ASSAY OF LACTIC ACID: CPT | Performed by: STUDENT IN AN ORGANIZED HEALTH CARE EDUCATION/TRAINING PROGRAM

## 2025-05-20 PROCEDURE — 83690 ASSAY OF LIPASE: CPT | Performed by: STUDENT IN AN ORGANIZED HEALTH CARE EDUCATION/TRAINING PROGRAM

## 2025-05-20 RX ORDER — MORPHINE SULFATE 4 MG/ML
4 INJECTION, SOLUTION INTRAMUSCULAR; INTRAVENOUS
Refills: 0 | Status: COMPLETED | OUTPATIENT
Start: 2025-05-20 | End: 2025-05-20

## 2025-05-20 RX ORDER — METHYLPREDNISOLONE SOD SUCC 125 MG
125 VIAL (EA) INJECTION
Status: COMPLETED | OUTPATIENT
Start: 2025-05-20 | End: 2025-05-20

## 2025-05-20 RX ORDER — SODIUM CHLORIDE 0.9 % (FLUSH) 0.9 %
10 SYRINGE (ML) INJECTION
Status: DISCONTINUED | OUTPATIENT
Start: 2025-05-20 | End: 2025-05-23 | Stop reason: HOSPADM

## 2025-05-20 RX ORDER — MORPHINE SULFATE 4 MG/ML
2 INJECTION, SOLUTION INTRAMUSCULAR; INTRAVENOUS EVERY 6 HOURS PRN
Refills: 0 | Status: DISCONTINUED | OUTPATIENT
Start: 2025-05-20 | End: 2025-05-23 | Stop reason: HOSPADM

## 2025-05-20 RX ORDER — FENTANYL CITRATE 50 UG/ML
50 INJECTION, SOLUTION INTRAMUSCULAR; INTRAVENOUS
Refills: 0 | Status: COMPLETED | OUTPATIENT
Start: 2025-05-20 | End: 2025-05-20

## 2025-05-20 RX ORDER — IBUPROFEN 200 MG
16 TABLET ORAL
Status: DISCONTINUED | OUTPATIENT
Start: 2025-05-20 | End: 2025-05-23 | Stop reason: HOSPADM

## 2025-05-20 RX ORDER — ACETAMINOPHEN 325 MG/1
650 TABLET ORAL EVERY 4 HOURS PRN
Status: DISCONTINUED | OUTPATIENT
Start: 2025-05-20 | End: 2025-05-23 | Stop reason: HOSPADM

## 2025-05-20 RX ORDER — PANTOPRAZOLE SODIUM 40 MG/10ML
80 INJECTION, POWDER, LYOPHILIZED, FOR SOLUTION INTRAVENOUS
Status: COMPLETED | OUTPATIENT
Start: 2025-05-20 | End: 2025-05-20

## 2025-05-20 RX ORDER — SODIUM BICARBONATE 1 MEQ/ML
VIAL (ML) INTRAVENOUS CONTINUOUS
Status: DISCONTINUED | OUTPATIENT
Start: 2025-05-20 | End: 2025-05-20

## 2025-05-20 RX ORDER — ONDANSETRON HYDROCHLORIDE 2 MG/ML
4 INJECTION, SOLUTION INTRAVENOUS
Status: COMPLETED | OUTPATIENT
Start: 2025-05-20 | End: 2025-05-20

## 2025-05-20 RX ORDER — ONDANSETRON HYDROCHLORIDE 2 MG/ML
4 INJECTION, SOLUTION INTRAVENOUS EVERY 4 HOURS PRN
Status: DISCONTINUED | OUTPATIENT
Start: 2025-05-20 | End: 2025-05-23 | Stop reason: HOSPADM

## 2025-05-20 RX ORDER — IBUPROFEN 200 MG
24 TABLET ORAL
Status: DISCONTINUED | OUTPATIENT
Start: 2025-05-20 | End: 2025-05-23 | Stop reason: HOSPADM

## 2025-05-20 RX ORDER — ACETAMINOPHEN 500 MG
1000 TABLET ORAL EVERY 6 HOURS PRN
Status: DISCONTINUED | OUTPATIENT
Start: 2025-05-20 | End: 2025-05-23 | Stop reason: HOSPADM

## 2025-05-20 RX ORDER — HYDROCODONE BITARTRATE AND ACETAMINOPHEN 500; 5 MG/1; MG/1
TABLET ORAL
Status: DISCONTINUED | OUTPATIENT
Start: 2025-05-20 | End: 2025-05-23 | Stop reason: HOSPADM

## 2025-05-20 RX ORDER — QUETIAPINE FUMARATE 25 MG/1
25 TABLET, FILM COATED ORAL 2 TIMES DAILY PRN
Status: DISCONTINUED | OUTPATIENT
Start: 2025-05-20 | End: 2025-05-20

## 2025-05-20 RX ORDER — NALOXONE HCL 0.4 MG/ML
0.02 VIAL (ML) INJECTION
Status: DISCONTINUED | OUTPATIENT
Start: 2025-05-20 | End: 2025-05-23 | Stop reason: HOSPADM

## 2025-05-20 RX ORDER — PROCHLORPERAZINE EDISYLATE 5 MG/ML
5 INJECTION INTRAMUSCULAR; INTRAVENOUS EVERY 6 HOURS PRN
Status: DISCONTINUED | OUTPATIENT
Start: 2025-05-20 | End: 2025-05-23 | Stop reason: HOSPADM

## 2025-05-20 RX ORDER — GLUCAGON 1 MG
1 KIT INJECTION
Status: DISCONTINUED | OUTPATIENT
Start: 2025-05-20 | End: 2025-05-23 | Stop reason: HOSPADM

## 2025-05-20 RX ORDER — FOLIC ACID 1 MG/1
1000 TABLET ORAL DAILY
Status: DISCONTINUED | OUTPATIENT
Start: 2025-05-22 | End: 2025-05-23 | Stop reason: HOSPADM

## 2025-05-20 RX ORDER — VIT C/E/ZN/COPPR/LUTEIN/ZEAXAN 250MG-90MG
1000 CAPSULE ORAL DAILY
Status: DISCONTINUED | OUTPATIENT
Start: 2025-05-22 | End: 2025-05-23 | Stop reason: HOSPADM

## 2025-05-20 RX ADMIN — FENTANYL CITRATE 50 MCG: 50 INJECTION, SOLUTION INTRAMUSCULAR; INTRAVENOUS at 06:05

## 2025-05-20 RX ADMIN — MORPHINE SULFATE 2 MG: 4 INJECTION INTRAVENOUS at 10:05

## 2025-05-20 RX ADMIN — ONDANSETRON 4 MG: 2 INJECTION INTRAMUSCULAR; INTRAVENOUS at 06:05

## 2025-05-20 RX ADMIN — PIPERACILLIN AND TAZOBACTAM 4.5 G: 4; .5 INJECTION, POWDER, LYOPHILIZED, FOR SOLUTION INTRAVENOUS; PARENTERAL at 02:05

## 2025-05-20 RX ADMIN — PIPERACILLIN AND TAZOBACTAM 4.5 G: 4; .5 INJECTION, POWDER, LYOPHILIZED, FOR SOLUTION INTRAVENOUS; PARENTERAL at 10:05

## 2025-05-20 RX ADMIN — SODIUM CHLORIDE: 9 INJECTION, SOLUTION INTRAVENOUS at 10:05

## 2025-05-20 RX ADMIN — IOHEXOL 100 ML: 350 INJECTION, SOLUTION INTRAVENOUS at 10:05

## 2025-05-20 RX ADMIN — MORPHINE SULFATE 4 MG: 4 INJECTION INTRAVENOUS at 08:05

## 2025-05-20 RX ADMIN — MORPHINE SULFATE 4 MG: 4 INJECTION INTRAVENOUS at 01:05

## 2025-05-20 RX ADMIN — SODIUM BICARBONATE: 84 INJECTION, SOLUTION INTRAVENOUS at 10:05

## 2025-05-20 RX ADMIN — PANTOPRAZOLE SODIUM 80 MG: 40 INJECTION, POWDER, FOR SOLUTION INTRAVENOUS at 06:05

## 2025-05-20 RX ADMIN — SODIUM CHLORIDE, POTASSIUM CHLORIDE, SODIUM LACTATE AND CALCIUM CHLORIDE 1000 ML: 600; 310; 30; 20 INJECTION, SOLUTION INTRAVENOUS at 06:05

## 2025-05-20 RX ADMIN — METHYLPREDNISOLONE SODIUM SUCCINATE 125 MG: 125 INJECTION, POWDER, FOR SOLUTION INTRAMUSCULAR; INTRAVENOUS at 02:05

## 2025-05-20 RX ADMIN — SODIUM CHLORIDE, POTASSIUM CHLORIDE, SODIUM LACTATE AND CALCIUM CHLORIDE 1000 ML: 600; 310; 30; 20 INJECTION, SOLUTION INTRAVENOUS at 02:05

## 2025-05-20 NOTE — CONSULTS
Consult Note    Reason for Consult:      We were consulted to evaluate this patient for crohns flare and symptomatic anemia.     HPI:     39 year old AAF known to Dr. Bisi Holliday at [facility] with chronic anemia, hx of iron deficiency/b12 deficiency/folate deficiency, indeterminate colitis favor Crohn's disease (left colon and TI), chronic constipation.    Patient presented to the ED this a.m. with complaints of GI bleeding and syncopal episode.    Patient states that yesterday morning around 630 she began having hematochezia.  She reports 5-6 episodes of bright red blood with clots throughout the day yesterday.  No associated abdominal pain or anorectal pain.  She felt fine throughout the day.  Slept well last night.  Early this morning around 6:00 a.m. she began having some mild lower abdominal cramping.  Got out of bed to defecate.  Does not remember anything after this.  Reportedly had a syncopal episode and was incontinent of stool and bright red blood reportedly large volume.  EMS was called.    Per EMS, patient was actively passing bright red blood, tachycardic and blood pressure in the 95 systolic.  On presentation, patient tachycardic 124, normotensive 122/72, afebrile and otherwise VSS.  Labs notable for normocytic anemia with hemoglobin 7.8, thrombocytosis 445 K, lactic acid 2.3 which normalized.  UA concerning for possible UTI, culture obtained and pending.  CT head negative.  CT abdomen/pelvis with/without contrast noted colonic wall thickening and mucosal enhancement extending from the splenic flexure to the rectum consistent with active inflammatory bowel disease; site of active GI bleeding not clearly seen.  Patient was admitted and transfused 2 PRBCs.  GI consulted.  Since being admitted she has had 2 episodes of bleeding and reports that it is a little darker than before.  She is complaining of left lower quadrant pain.    Compliant with ferrous sulfate 325 mg once daily, B12, and folate  supplementation.  Was started on provera about 2 weeks ago to help with menorrhagia to see if this helps with anemia.  Last menstrual cycle was 3 weeks ago.  Saw heme on Friday, 5/16 at which time hgb was 11.7, ferritin nml 116.1, iron low 14, %sat low 5, TIBC normal 267.      Denies NSAID use.  Denies recent antibiotics.  Denies recent sick contacts.  No recent fevers.  No nausea or vomiting.  Appetite has been good and weight has been stable.  Prior to the onset of the bleeding yesterday, her last bowel movement was on Friday.  She continues to have 1-2 bowel movements a week with the use of Amitiza.     Prior GI history is as follows:   She was diagnosed by Dr. Loyola around the age of 15.  She recalls being hospitalized for an extended period of time for severe anemia and rectal bleeding.  She recalls rectal bleeding was intermittent, associated with bright red blood without diarrhea.  She was then seen by Dr. Rivera for a period time followed by Dr. Andrade at [facility] around 2015.  Her last colonoscopy with Dr. Jalili was around the age of 17 (20 years ago).  Prior notes in the chart mention small bowel disease and previously treated with prednisone, pentasa, azathioprine, and 3-5 doses of Remicade which was stopped due to developing hives.  She has been off therapy for over 20 years.  Her rectal bleeding has since resolved.  She does have documentation of severe anemia for over 20 years requiring multiple blood  transfusions.  She has been on and off iron over the years.  She was off iron prior to her hospitalization at [facility] in 2022 when she presented with severe anemia Hgb 1.7 g/dL and pancytopenia. Labs during that admission showed B12: 201, folate 6.4, ferritin 27.5.  She was treated with B12, folic acid and given empiric prednisone in case immune mediated.  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  Intrinsic factor Ab negative. She has chronic constipation, 2  bowel movements weekly with associated abdominal pain.  No improvement with amitiza 24 mcg BID.  Hospitalized in June 2024 for pyelonephritis.  Hgb 5.8 g/dL then and given 2 uPRBCs. Venofer x 2 June/July 2024.  She has had 3 healthy pregnancies.  She did require blood transfusions due to anemia during her pregnancies.  She has monthly menstrual cycles with every other month she has heavy cycles with 5-6 tampons a day for 4-5 days with associated fatigue.  She denies any NSAID use.  She denies any FH of IBD.  She does not smoke, drink alcohol or use recreational drugs.      Most Recent Endoscopy:  EGD 12/6/23: 4 mm submucosal nodule at 35 cm. HP negative erosive gastritis. Normal duodenum.   Colonoscopy 12/6/23: Three scattered erosions in the TI. Four small patchy areas of mild inflammation in the rectum, sigmoid, and hepatic flexure.  Scarring and altered vascularity in the rectum to the descending colon.  Internal and external hemorrhoids. Path: TI: mild chronic inflammation. Focal active colitis in right and left colon.  Mild chronic in rectum.   VCE 2/11/2025: Entire small bowel was visualized.  Preparation was good.  Small bowel including TI normal.    Most recently seen by Dr. MARTY Holliday on 1/2025.  Plan was to obtain fecal calprotectin.  If fecal calprotectin elevated, can consider mesalamine or budesonide to help treat areas seen on colonoscopy. Otherwise despite history, remains clinically asymptomatic with very mild endoscopic disease despite years off therapy.  Can continue to monitor disease closely off therapy.  It seems she never turned in the fecal calprotectin.     Previous records reviewed. Collateral information obtained from family member present at bedside.    PCP:  No primary care provider on file.    Review of patient's allergies indicates:  No Known Allergies     Current Medications[1]  Prescriptions Prior to Admission[2]    Past Medical History:  Past Medical History:   Diagnosis Date     Chronic constipation     Crohn's disease     History of Crohn's disease     unclear how diagnosis was made    Inflammatory bowel disease     Iron deficiency anemia, unspecified       Past Surgical History:  Past Surgical History:   Procedure Laterality Date    BONE MARROW ASPIRATION N/A 2023    Procedure: ASPIRATION, BONE MARROW;  Surgeon: Gayatri Howard MD;  Location: [facility] ENDOSCOPY;  Service: General;  Laterality: N/A;     SECTION  2012, 19    COLONOSCOPY      Luzmaria had several of these procedures    COLONOSCOPY, WITH 1 OR MORE BIOPSIES N/A 2023    Procedure: COLONOSCOPY, WITH 1 OR MORE BIOPSIES;  Surgeon: Bisi Holliday MD;  Location: [facility] ENDOSCOPY;  Service: Gastroenterology;  Laterality: N/A;    EGD, WITH CLOSED BIOPSY N/A 2023    Procedure: EGD, WITH CLOSED BIOPSY;  Surgeon: Bisi Holliday MD;  Location: [facility] ENDOSCOPY;  Service: Gastroenterology;  Laterality: N/A;    INTRALUMINAL GASTROINTESTINAL TRACT IMAGING VIA CAPSULE N/A 2025    Procedure: IMAGING PROCEDURE, GI TRACT, INTRALUMINAL, VIA CAPSULE;  Surgeon: Bisi Holliday MD;  Location: [facility] ENDOSCOPY;  Service: Gastroenterology;  Laterality: N/A;    TONSILLECTOMY      TUBAL LIGATION  2019    UPPER GASTROINTESTINAL ENDOSCOPY      I've had several of these      Family History:  Family History   Problem Relation Name Age of Onset    Hypertension Mother Halie Saavedra     Diabetes Mother Halie Saavedra     Drug abuse Mother Halie Saavedra     No Known Problems Father      Kidney disease Maternal Grandmother Halina Erika     Diabetes Maternal Grandmother Halina Erika     Stroke Paternal Grandmother Jade Yong      Social History:  Social History     Tobacco Use    Smoking status: Never     Passive exposure: Never    Smokeless tobacco: Never   Substance Use Topics    Alcohol use: Yes     Alcohol/week: 1.0 standard drink of alcohol     Types: 1 Glasses of wine per week        Review of Systems:     Review of Systems   Constitutional:  Negative for appetite change, chills, diaphoresis, fatigue, fever and unexpected weight change.   HENT:  Negative for trouble swallowing.    Respiratory:  Negative for cough, chest tightness and shortness of breath.    Cardiovascular:  Negative for chest pain, palpitations and leg swelling.   Gastrointestinal:  Positive for abdominal pain, blood in stool, constipation and diarrhea. Negative for abdominal distention, nausea, rectal pain and vomiting.   Skin:  Negative for color change and pallor.   Neurological:  Positive for dizziness, syncope, weakness and light-headedness.   Psychiatric/Behavioral:  Negative for confusion.        Objective:     VITAL SIGNS: 24 HR MIN & MAX LAST    Temp  Min: 98.1 °F (36.7 °C)  Max: 98.4 °F (36.9 °C)  98.4 °F (36.9 °C)        BP  Min: 107/82  Max: 130/87  (!) 124/91     Pulse  Min: 95  Max: 124  98     Resp  Min: 12  Max: 25  13    SpO2  Min: 96 %  Max: 100 %  100 %        Intake/Output Summary (Last 24 hours) at 5/20/2025 1606  Last data filed at 5/20/2025 1500  Gross per 24 hour   Intake 1739 ml   Output --   Net 1739 ml       Physical Exam  Constitutional:       General: She is not in acute distress.     Appearance: She is not ill-appearing.   HENT:      Head: Normocephalic and atraumatic.   Eyes:      General: No scleral icterus.     Extraocular Movements: Extraocular movements intact.   Cardiovascular:      Rate and Rhythm: Regular rhythm. Tachycardia present.   Pulmonary:      Effort: Pulmonary effort is normal. No respiratory distress.   Abdominal:      General: Bowel sounds are normal. There is no distension.      Palpations: Abdomen is soft. There is no mass.      Tenderness: There is abdominal tenderness (focal in LLQ - moderate). There is no guarding or rebound.   Musculoskeletal:         General: Normal range of motion.      Right lower leg: No edema.      Left lower leg: No edema.   Skin:     General: Skin  is warm and dry.   Neurological:      Mental Status: She is alert and oriented to person, place, and time.   Psychiatric:         Mood and Affect: Mood and affect normal.           Recent Results (from the past 48 hours)   Comprehensive metabolic panel    Collection Time: 05/20/25  5:59 AM   Result Value Ref Range    Sodium 136 136 - 145 mmol/L    Potassium 3.9 3.5 - 5.1 mmol/L    Chloride 112 (H) 98 - 107 mmol/L    CO2 14 (L) 22 - 29 mmol/L    Glucose 135 (H) 74 - 100 mg/dL    Blood Urea Nitrogen 8.3 7.0 - 18.7 mg/dL    Creatinine 0.64 0.55 - 1.02 mg/dL    Calcium 8.1 (L) 8.4 - 10.2 mg/dL    Protein Total 6.8 6.4 - 8.3 gm/dL    Albumin 3.3 (L) 3.5 - 5.0 g/dL    Globulin 3.5 2.4 - 3.5 gm/dL    Albumin/Globulin Ratio 0.9 (L) 1.1 - 2.0 ratio    Bilirubin Total 0.2 <=1.5 mg/dL    ALP 60 40 - 150 unit/L    ALT 7 0 - 55 unit/L    AST 12 11 - 45 unit/L    eGFR >60 mL/min/1.73/m2    Anion Gap 10.0 mEq/L    BUN/Creatinine Ratio 13    Troponin I    Collection Time: 05/20/25  5:59 AM   Result Value Ref Range    Troponin-I <0.010 0.000 - 0.045 ng/mL   Magnesium    Collection Time: 05/20/25  5:59 AM   Result Value Ref Range    Magnesium Level 1.60 1.60 - 2.60 mg/dL   Lactic acid, plasma    Collection Time: 05/20/25  5:59 AM   Result Value Ref Range    Lactic Acid Level 2.3 (H) 0.5 - 2.2 mmol/L   Protime-INR    Collection Time: 05/20/25  5:59 AM   Result Value Ref Range    PT 13.7 12.5 - 14.5 seconds    INR 1.1 <=1.3   APTT    Collection Time: 05/20/25  5:59 AM   Result Value Ref Range    PTT 27.5 23.2 - 33.7 seconds   Type & Screen    Collection Time: 05/20/25  5:59 AM   Result Value Ref Range    Group & Rh B POS     Indirect Velma GEL NEG     Specimen Outdate 05/23/2025 23:59    Lipase    Collection Time: 05/20/25  5:59 AM   Result Value Ref Range    Lipase Level 8 <=60 U/L   CBC with Differential    Collection Time: 05/20/25  5:59 AM   Result Value Ref Range    WBC 9.39 4.50 - 11.50 x10(3)/mcL    RBC 3.01 (L) 4.20 - 5.40  x10(6)/mcL    Hgb 7.8 (L) 12.0 - 16.0 g/dL    Hct 26.8 (L) 37.0 - 47.0 %    MCV 89.0 80.0 - 94.0 fL    MCH 25.9 (L) 27.0 - 31.0 pg    MCHC 29.1 (L) 33.0 - 36.0 g/dL    RDW 26.2 (H) 11.5 - 17.0 %    Platelet 445 (H) 130 - 400 x10(3)/mcL    MPV 10.3 7.4 - 10.4 fL    Neut % 54.1 %    Lymph % 34.1 %    Mono % 9.4 %    Eos % 0.9 %    Basophil % 0.9 %    Imm Grans % 0.6 %    Neut # 5.09 2.1 - 9.2 x10(3)/mcL    Lymph # 3.20 0.6 - 4.6 x10(3)/mcL    Mono # 0.88 0.1 - 1.3 x10(3)/mcL    Eos # 0.08 0 - 0.9 x10(3)/mcL    Baso # 0.08 <=0.2 x10(3)/mcL    Imm Gran # 0.06 (H) 0.00 - 0.04 x10(3)/mcL    NRBC% 0.0 %   Prepare RBC 2 Units; Hgb 7.8, active bleeding    Collection Time: 05/20/25  5:59 AM   Result Value Ref Range    UNIT NUMBER [id number]     UNIT ABO/RH B POS     DISPENSE STATUS Issued     Unit Expiration [id number]     Product Code F2721L23     Unit Blood Type Code 7300     CROSSMATCH INTERPRETATION Compatible     UNIT NUMBER [id number]     UNIT ABO/RH B POS     DISPENSE STATUS Issued     Unit Expiration [id number]     Product Code H5702Q98     Unit Blood Type Code 7300     CROSSMATCH INTERPRETATION Compatible    Lactic Acid, Plasma    Collection Time: 05/20/25  8:48 AM   Result Value Ref Range    Lactic Acid Level 0.9 0.5 - 2.2 mmol/L   Pregnancy Test, Serum Rapid    Collection Time: 05/20/25  8:48 AM   Result Value Ref Range    Beta HCG Qual Negative Negative   Urinalysis, Reflex to Urine Culture Urine, Clean Catch    Collection Time: 05/20/25 11:22 AM    Specimen: Urine   Result Value Ref Range    Color, UA Light-Yellow Yellow, Light-Yellow, Colorless, Straw, Dark-Yellow    Appearance, UA Clear Clear    Specific Gravity, UA 1.047 (H) 1.005 - 1.030    pH, UA 5.5 5.0 - 8.5    Protein, UA Negative Negative    Glucose, UA Normal Negative, Normal    Ketones, UA Negative Negative    Blood, UA 1+ (A) Negative    Bilirubin, UA Negative Negative    Urobilinogen, UA Normal 0.2, 1.0, Normal    Nitrites, UA Negative  Negative    Leukocyte Esterase,  (A) Negative    RBC, UA 0-5 None Seen, 0-2, 3-5, 0-5 /HPF    WBC, UA 21-50 (A) None Seen, 0-2, 3-5, 0-5 /HPF    Bacteria, UA Moderate (A) None Seen, Trace /HPF    Squamous Epithelial Cells, UA Trace None Seen, Trace /HPF    Mucous, UA Occasional (A) None Seen /LPF    Hyaline Casts, UA 0-2 (A) None Seen /lpf   Lactic acid, plasma    Collection Time: 05/20/25  2:24 PM   Result Value Ref Range    Lactic Acid Level 0.9 0.5 - 2.2 mmol/L   CV Ultrasound Bilateral Doppler Carotid    Collection Time: 05/20/25  2:53 PM   Result Value Ref Range    Right CCA prox sys 80 cm/s    Right CCA prox pizarro 31 cm/s    Right CCA dist sys 85 cm/s    Right CCA dist pizarro 34 cm/s    Right ICA prox sys 72 cm/s    Right ICA prox pizarro 32 cm/s    Right ICA mid sys 96 cm/s    Right ICA mid pizarro 49 cm/s    Right ICA dist sys 98 cm/s    Right ICA dist pizarro 54 cm/s    Right ECA sys 85 cm/s    Right ECA pizarro 23 cm/s    Right vertebral sys 52 cm/s    Right vertebral pizarro 30 cm/s    Right ICA/CCA ratio 1.15     Right Highest ICA 98.00     Right Highest EDV 54.00     Right Highest CCA 85     Left CCA prox sys 109 cm/s    Left CCA prox pizarro 42 cm/s    Left CCA dist sys 99 cm/s    Left CCA dist pizarro 43 cm/s    Left ICA prox sys 65 cm/s    Left ICA prox pizarro 26 cm/s    Left ICA mid sys 95 cm/s    Left ICA mid pizarro 54 cm/s    Left ICA dist sys 126 cm/s    Left ICA dist pizarro 61 cm/s    Left vertebral sys 48 cm/s    Left vertebral pizarro 27 cm/s    Left ICA/CCA ratio 1.27     Left Highest .00     LT Highest EDV 61.00     Left Highest         CT Abdomen Pelvis W Wo Contrast  Result Date: 5/20/2025  EXAMINATION: CT ABDOMEN PELVIS W WO CONTRAST CLINICAL HISTORY: GI bleed;. Syncope. TECHNIQUE: Helical acquisition through the abdomen and pelvis without and with IV contrast.  Three plane reconstructions were provided for review. DLP 1165 mGycm. Automatic exposure control, adjustment of mA/kV or iterative  reconstruction technique was used to reduce radiation. COMPARISON: 2 June 2024, 28 December 2020 FINDINGS: The limited imaged lung bases are clear. There is no significant abnormality of the liver.  Distended gallbladder but no significant pericholecystic inflammation.  No significant biliary ductal dilatation.  Mild splenomegaly.  An enhancing lesion within the spleen on image 25 series 9 is stable going back to 2020. Similar mild dilatation of the main pancreatic duct.  Normal adrenals.  No hydronephrosis. There is no bowel obstruction.  There is colonic wall thickening and mucosal enhancement extending from the splenic flexure through the rectum.  Site of active GI bleeding is not seen.  No free air. Urinary bladder distended.  No pelvic free fluid.  Abdominal aorta is normal in caliber. There are no acute osseous findings.     Active inflammatory bowel disease descending colon through the rectum.  A site of active GI bleeding is not clearly seen. Electronically signed by: Julio Haas Date:    05/20/2025 Time:    10:47    CT Head Without Contrast  Result Date: 5/20/2025  EXAMINATION: CT HEAD WITHOUT CONTRAST CLINICAL HISTORY: Syncope.Head trauma, moderate-severe; TECHNIQUE: CT imaging of the head performed from the skull base to the vertex without intravenous contrast.   mGycm. Automatic exposure control, adjustment of mA/kV or iterative reconstruction technique was used to reduce radiation. COMPARISON: 7 January 2015 FINDINGS: There is no acute cortical infarct, hemorrhage or mass lesion.  The ventricles are normal in size. Visualized paranasal sinuses and mastoid air cells are clear. The calvarium is grossly intact.     No acute intracranial findings. Electronically signed by: Julio Haas Date:    05/20/2025 Time:    10:07      Assessment / Plan:     39 year old AAF known to Dr. Bisi Holliday at [facility] with chronic anemia, hx of iron deficiency/b12 deficiency/folate deficiency, indeterminate colitis  favor Crohn's disease (left colon and TI), chronic constipation. Here with GI bleeding and syncopal episode.    CT a/p w/wo contrast: colonic wall thickening and mucosal enhancement extending from the splenic flexure to the rectum consistent with active inflammatory bowel disease; site of active GI bleeding not clearly seen.     Acute on chronic symptomatic anemia   11 on 5/16 -- 7.8 -- 2u prbcs  Crohn's disease with apparent flare - no therapy x20 yrs  Hematochezia - suspected secondary to above    -Obtain ESR, CRP, and fecal calprotectin.  -Check stool studies for infection for completeness   -Repeat H/H now post transfusion.    -Monitor stools for bleeding  -Recommend inpatient colonoscopy.  Discussed with Dr. Pulido, will do this in the coming days.    -Ok for clear liquids for now.  -Received IV solumedrol in  mg x1.        Thank you for allowing us to participate in this patient's care.         [1]   Current Facility-Administered Medications   Medication Dose Route Frequency Provider Last Rate Last Admin    0.9%  NaCl infusion (for blood administration)   Intravenous Q24H PRN Robin Martinez MD   Stopped at 05/20/25 1500    acetaminophen tablet 1,000 mg  1,000 mg Oral Q6H PRN José Miguel Lo FNP        acetaminophen tablet 650 mg  650 mg Oral Q4H PRN José Miguel Lo FNP        dextrose 50% injection 12.5 g  12.5 g Intravenous PRN José Miguel Lo FNP        dextrose 50% injection 25 g  25 g Intravenous PRN José Miguel Lo FNP        glucagon (human recombinant) injection 1 mg  1 mg Intramuscular PRN José Miguel Lo FNP        glucose chewable tablet 16 g  16 g Oral PRN José Miguel Lo FNP        glucose chewable tablet 24 g  24 g Oral PRN José Miguel Lo, ANNA        naloxone 0.4 mg/mL injection 0.02 mg  0.02 mg Intravenous PRJosé Miguel Smith FNP        ondansetron injection 4 mg  4 mg Intravenous Q4H PRN José Miguel Lo FNP         piperacillin-tazobactam (ZOSYN) 4.5 g in D5W 100 mL IVPB (MB+)  4.5 g Intravenous Q8H José Miguel Lo FNP        prochlorperazine injection Soln 5 mg  5 mg Intravenous Q6H PRN José Miguel Lo FNP        sodium chloride 0.9% flush 10 mL  10 mL Intravenous PRN José Miguel Lo FNP         Current Outpatient Medications   Medication Sig Dispense Refill    cyanocobalamin, vitamin B-12, 1,000 mcg Lozg Place 1 tablet under the tongue every morning. 30 lozenge 6    ergocalciferol (ERGOCALCIFEROL) 50,000 unit Cap Take 1 capsule (50,000 Units total) by mouth every 7 days. for 24 doses 12 capsule 1    FEROSUL 325 mg (65 mg iron) Tab tablet Take 1 tablet (325 mg total) by mouth once daily. 90 tablet 3    folic acid (FOLVITE) 1 MG tablet Take 1 tablet (1,000 mcg total) by mouth once daily. 90 tablet 3    medroxyPROGESTERone (PROVERA) 10 MG tablet Take 1 tablet (10 mg total) by mouth once daily. 30 tablet 12   [2] (Not in a hospital admission)

## 2025-05-20 NOTE — H&P
[facility] Medicine History & Physical Examination       Patient Name: Elaina Pierce  MRN: [id number]  Patient Class: IP- Inpatient   Admission Date: 5/20/2025   Admitting Physician: LEYDI Service   Length of Stay: 0  Attending Physician: Dr Marylin Seo  Primary Care Provider: non-staff  Face-to-Face encounter date: 05/20/2025  Code Status: Full code  Chief Complaint: GI Bleeding (Arrives aasi unit 6 - reports bright red gi bleed this morning w syncopal episode w head strike - hx of crohn's)        Screening for Social Drivers for health:  Patient screened for food insecurity, housing instability, transportation needs, utility difficulties, and interpersonal safety (select all that apply as identified as concern)  []Housing or Food  []Transportation Needs  []Utility Difficulties  []Interpersonal safety  [x]None    Patient information was obtained from patient, patient's family, past medical records and/or ER records.     HISTORY OF PRESENT ILLNESS:   Elaina Pierce is a 39 y.o. female who  PMH includes Crohn's disease, anemia, chronic constipation; presents to the ED at [facility] on 5/20/2025 with a primary complaint of bright red bleeding per rectum with associated abdominal pain with reports of Crohn's disease flare up.  Patient also reports a syncopal event this morning prior to arrival in the ED, no complaints of head injury; does not dose generalized weakness.  No reported chest pain, nausea, vomiting, fever, chills, cough, congestion or any sick contacts.  Lab work reviewed demonstrated H&H 7.8/26.8, CO2 14, glucose 135, lactic acid 2.3 we will repeat value 0.9; other indices unremarkable.  CT of head having without contrast impression reviewed demonstrated no acute intracranial findings.  CT of abdomen and pelvis with and without contrast impression reviewed demonstrated active inflammatory bowel disease descending colon through the rectum a site of active GI  bleed is not clearly seen.  In reviewing patient's medical records her H&H on 2025 was 11.7/38.9.  Patient reports she follows up with Hematology and GI services at [facility]; Dr. Durant and Dr Holliday.  She reports she was diagnosed with Crohn's disease at the age of 14 and has had several hospitalizations from the age of [age].  She has never been on biologic therapy.  She reports she was on Imuran and steroid therapy in the past.  She currently does not take any medications for her Crohn's disease.  She reports she recently had endoscopy done and there was question that she may no longer have Crohn's disease.  She does endorse having heavy periods with her last menstrual cycle the 1st week of this month.  She reports her bleeding started on Monday which she was intermittently and progressed to the point to large volume bloody stool this morning prior to arrival in the ED with associated syncope.  Initial vital signs /72 pulse 124 respirations 25 temperature 98.1° F O2 saturation 98% on room air.  Patient did receive 80 mg of Protonix IV push.  Patient was typed and match and started on PRBC transfusion.  ED provider did speak with the GI services who will see patient in consultation.  Patient is admitted to hospital medicine services for further management.    PAST MEDICAL HISTORY:     Past Medical History:   Diagnosis Date    Chronic constipation     Crohn's disease     History of Crohn's disease     unclear how diagnosis was made    Inflammatory bowel disease     Iron deficiency anemia, unspecified        PAST SURGICAL HISTORY:     Past Surgical History:   Procedure Laterality Date    BONE MARROW ASPIRATION N/A 2023    Procedure: ASPIRATION, BONE MARROW;  Surgeon: Gayatri Howard MD;  Location: [facility] ENDOSCOPY;  Service: General;  Laterality: N/A;     SECTION  2012, 19    COLONOSCOPY      Luzmaria had several of these procedures    COLONOSCOPY, WITH 1 OR MORE BIOPSIES N/A  12/06/2023    Procedure: COLONOSCOPY, WITH 1 OR MORE BIOPSIES;  Surgeon: Bisi Holliday MD;  Location: [facility] ENDOSCOPY;  Service: Gastroenterology;  Laterality: N/A;    EGD, WITH CLOSED BIOPSY N/A 12/06/2023    Procedure: EGD, WITH CLOSED BIOPSY;  Surgeon: Bisi Holliady MD;  Location: [facility] ENDOSCOPY;  Service: Gastroenterology;  Laterality: N/A;    INTRALUMINAL GASTROINTESTINAL TRACT IMAGING VIA CAPSULE N/A 02/11/2025    Procedure: IMAGING PROCEDURE, GI TRACT, INTRALUMINAL, VIA CAPSULE;  Surgeon: Bisi Holliday MD;  Location: [facility] ENDOSCOPY;  Service: Gastroenterology;  Laterality: N/A;    TONSILLECTOMY      TUBAL LIGATION  02/14/2019    UPPER GASTROINTESTINAL ENDOSCOPY  1999    I've had several of these       ALLERGIES:   Patient has no known allergies.    FAMILY HISTORY:   Reviewed and negative    SOCIAL HISTORY:     Social History     Tobacco Use    Smoking status: Never     Passive exposure: Never    Smokeless tobacco: Never   Substance Use Topics    Alcohol use: Yes     Alcohol/week: 1.0 standard drink of alcohol     Types: 1 Glasses of wine per week        HOME MEDICATIONS:   As documented  Prior to Admission medications    Medication Sig Start Date End Date Taking? Authorizing Provider   cyanocobalamin, vitamin B-12, 1,000 mcg Lozg Place 1 tablet under the tongue every morning. 9/27/22   Mio Payne FNP   ergocalciferol (ERGOCALCIFEROL) 50,000 unit Cap Take 1 capsule (50,000 Units total) by mouth every 7 days. for 24 doses 1/7/25 6/18/25  Bisi Holliday MD   FEROSUL 325 mg (65 mg iron) Tab tablet Take 1 tablet (325 mg total) by mouth once daily. 6/11/24   Mio Payne FNP   folic acid (FOLVITE) 1 MG tablet Take 1 tablet (1,000 mcg total) by mouth once daily. 6/11/24   Mio Payne FNP   medroxyPROGESTERone (PROVERA) 10 MG tablet Take 1 tablet (10 mg total) by mouth once daily. 5/9/25 6/8/25  Marilee Clark MD   linaCLOtide (LINZESS) 145 mcg Cap capsule Take 1  capsule (145 mcg total) by mouth before breakfast. 1/7/25 5/20/25  Bisi Holliday MD       REVIEW OF SYSTEMS:   Except as documented, all other systems reviewed and negative     PHYSICAL EXAM:     VITAL SIGNS: 24 HRS MIN & MAX LAST   Temp  Min: 98.1 °F (36.7 °C)  Max: 98.4 °F (36.9 °C) 98.4 °F (36.9 °C)   BP  Min: 107/82  Max: 130/87 (!) 124/91   Pulse  Min: 95  Max: 124  98   Resp  Min: 12  Max: 25 13   SpO2  Min: 96 %  Max: 100 % 100 %       General appearance: Well-developed, well-nourished female , fatigued complaints of abdominal pain; nontoxic appearing, in no apparent distress.  HENT: Atraumatic head. Moist mucous membranes of oral cavity.  Eyes:  PERRL pale conjunctiva  Lungs: Clear to auscultation bilaterally. No wheezing present.   Heart: Regular rate and rhythm. S1 and S2 present, cap refill brisk  Abdomen: Soft, non-distended, generalized TTP,  Bowel sounds are normal.   Extremities: No cyanosis, clubbing,generalized weakness  Skin: warm and dry  Neuro:  oriented no acute focal deficits  Psych/mental status: flat affect, cooperative    LABS AND IMAGING:     Recent Labs   Lab 05/16/25  0931 05/20/25  0559   WBC 6.86 9.39   RBC 4.50 3.01*   HGB 11.7* 7.8*   HCT 38.9 26.8*   MCV 86.4 89.0   MCH 26.0* 25.9*   MCHC 30.1* 29.1*   RDW 25.8* 26.2*   * 445*   MPV 9.5 10.3       Recent Labs   Lab 05/20/25  0559      K 3.9   *   CO2 14*   BUN 8.3   CREATININE 0.64   *   CALCIUM 8.1*   MG 1.60   ALBUMIN 3.3*   PROT 6.8   ALKPHOS 60   ALT 7   AST 12   BILITOT 0.2       Microbiology Results (last 7 days)       Procedure Component Value Units Date/Time    Blood Culture #1 **CANNOT BE ORDERED STAT** [[id number]] Collected: 05/20/25 1424    Order Status: Sent Specimen: Blood     Blood Culture #2 **CANNOT BE ORDERED STAT** [[id number]] Collected: 05/20/25 1424    Order Status: Sent Specimen: Blood     Urine culture [[id number]] Collected: 05/20/25 1122    Order Status: Sent Specimen: Urine  Updated: 05/20/25 1221             CT Abdomen Pelvis W Wo Contrast  Narrative: EXAMINATION:  CT ABDOMEN PELVIS W WO CONTRAST    CLINICAL HISTORY:  GI bleed;. Syncope.    TECHNIQUE:  Helical acquisition through the abdomen and pelvis without and with IV contrast.  Three plane reconstructions were provided for review. DLP 1165 mGycm. Automatic exposure control, adjustment of mA/kV or iterative reconstruction technique was used to reduce radiation.    COMPARISON:  2 June 2024, 28 December 2020    FINDINGS:  The limited imaged lung bases are clear.    There is no significant abnormality of the liver.  Distended gallbladder but no significant pericholecystic inflammation.  No significant biliary ductal dilatation.  Mild splenomegaly.  An enhancing lesion within the spleen on image 25 series 9 is stable going back to 2020.    Similar mild dilatation of the main pancreatic duct.  Normal adrenals.  No hydronephrosis.    There is no bowel obstruction.  There is colonic wall thickening and mucosal enhancement extending from the splenic flexure through the rectum.  Site of active GI bleeding is not seen.  No free air.    Urinary bladder distended.  No pelvic free fluid.  Abdominal aorta is normal in caliber.    There are no acute osseous findings.  Impression: Active inflammatory bowel disease descending colon through the rectum.  A site of active GI bleeding is not clearly seen.    Electronically signed by: Julio Haas  Date:    05/20/2025  Time:    10:47  CT Head Without Contrast  Narrative: EXAMINATION:  CT HEAD WITHOUT CONTRAST    CLINICAL HISTORY:  Syncope.Head trauma, moderate-severe;    TECHNIQUE:  CT imaging of the head performed from the skull base to the vertex without intravenous contrast.   mGycm. Automatic exposure control, adjustment of mA/kV or iterative reconstruction technique was used to reduce radiation.    COMPARISON:  7 January 2015    FINDINGS:  There is no acute cortical infarct, hemorrhage or  mass lesion.  The ventricles are normal in size.    Visualized paranasal sinuses and mastoid air cells are clear. The calvarium is grossly intact.  Impression: No acute intracranial findings.    Electronically signed by: Julio Haas  Date:    05/20/2025  Time:    10:07        ASSESSMENT & PLAN:   ASSESSMENT:  Acute syncope with collapse-POA   Symptomatic anemia-secondary to blood loss from GI bleed-POA   Acute GI bleed-lower-POA   Acute Crohn's exacerbation-POA   Tachycardia-suspect secondary to symptomatic anemia-POA   Weakness-POA       PLAN:  Consult GI Services appreciate assistance and recommendations   Type and match and transfuse PRBCs   Patient received 80 mg pantoprazole IV push in the ED  Serial H&H   Fall precautions   Syncope was likely secondary to symptomatic anemia however we will get echocardiogram and carotid ultrasound for completeness   Neurochecks q.4 hours times 24 hours   Repeat lab work in a.m.   Resume home medication as deemed necessary      VTE Prophylaxis:  SCD for DVT prophylaxis and will be advised to be as mobile as possible and sit in a chair as tolerated    Patient condition:  Stable    __________________________________________________________________________  INPATIENT LIST OF MEDICATIONS     Scheduled Meds:   lactated ringers  1,000 mL Intravenous ED 1 Time    methylPREDNISolone sodium succinate  125 mg Intravenous ED 1 Time    piperacillin-tazobactam (Zosyn) IV (PEDS and ADULTS) (extended infusion is not appropriate)  4.5 g Intravenous ED 1 Time    piperacillin-tazobactam (Zosyn) IV (PEDS and ADULTS) (extended infusion is not appropriate)  4.5 g Intravenous Q8H     Continuous Infusions:  PRN Meds:.  Current Facility-Administered Medications:     0.9%  NaCl infusion (for blood administration), , Intravenous, Q24H PRN    acetaminophen, 1,000 mg, Oral, Q6H PRN    acetaminophen, 650 mg, Oral, Q4H PRN    dextrose 50%, 12.5 g, Intravenous, PRN    dextrose 50%, 25 g, Intravenous, PRN     glucagon (human recombinant), 1 mg, Intramuscular, PRN    glucose, 16 g, Oral, PRN    glucose, 24 g, Oral, PRN    naloxone, 0.02 mg, Intravenous, PRN    ondansetron, 4 mg, Intravenous, Q4H PRN    prochlorperazine, 5 mg, Intravenous, Q6H PRN    sodium chloride 0.9%, 10 mL, Intravenous, PRN      I, ANNA Chambers have reviewed and discussed the case with Dr. Marylin Seo.  Please see the following addendum for further assessment and plan from their attending MD.This note was created with a assistance of electronic voice recognition software.  There may be transcription errors as a result of using this technology however minimal; and effort has been made to assure accuracy of the transcription but any obvious areas or admissions should be clarified with the author of the document   ANNA Weaver   05/20/2025    ________________________________________________________________________________    MD Addendum:  I, Dr.Praneet Gabbi MD assumed care of this patient today   For the patient encounter, I performed the substantive portion of the visit, I reviewed the NP/PA documentation, treatment plan, and medical decision making.  I had face to face time with this patient       39-year-old female Patient presented to ED for evaluation of bloody stools , syncopal episode     She reported being diagnosed with Crohn's when she was 14 years old, reported no flares in the last 2 decades, she reported passing bloody stools starting 05/19/2025 1 day prior to arrival, worsened to the point she had a syncopal episode on the day of presentation.  She reported she follows with the GI, Hematology at [facility].  She reported she was doing well all these years without any treatments ,she no longer has  diagnosis of Crohn's .  She also reported left lower quadrant abdominal pain that started yesterday.  Reported able to tolerate all kinds of diet , no dietary restrictions, no lactose intolerance.      On arrival to  ER patient was tachycardic 124, tachypneic 25 normotensive on room air.  Labs were significant for hemoglobin of 7.8, hematocrit 26.8, lactate 2.3, bicarb 14, UA significant for bacteria, pyuria, esterase positive 2 units PRBC being transfused, received IV fluids.  Repeat lactate normalized.  Patient received IV Zosyn, Solu-Medrol.  GI team was consulted.  Admitted to  service.      Exam   General: NAD   Heart: RRR   Lungs: CTA   Abdomen:  Soft, nondistended mild TTP LLQ  Extremities: No pedal edema   Neuro: Alert, oriented       MDM   Hematochezia ?  Crohn's flare  Acute on chronic symptomatic anemia   Syncope due to above  Metabolic acidosis from diarrhea  UTI UA  Lactic acidosis-normalized    Monitor on tele   Being transfused 2 units PRBC ,Recheck H&H, transfuse to keep hemoglobin greater than 7  IV bicarb drip x1 L  Follow inflammatory markers  Follow up stool studies for any infectious diarrhea  Follow GI team recommendations, planning inpatient colonoscopy   steroids per GI team  IV Zosyn  Supportive care    Critical care time spent: 35 minutes   Critical care diagnosis:  Symptomatic anemia needing PRBC transfusion, metabolic acidosis bicarb drip

## 2025-05-20 NOTE — ED PROVIDER NOTES
Encounter Date: 2025    SCRIBE #1 NOTE: I, Sherry Rodarte, am scribing for, and in the presence of,  Robin Martinez MD. I have scribed the following portions of the note - Other sections scribed: HPI, ROS, PE.       History     Chief Complaint   Patient presents with    GI Bleeding     Arrives [facility] - reports bright red gi bleed this morning w syncopal episode w head strike - hx of crohn's     Patient is a 39-year-old female with a PMHx of chronic constipation, crohn's disease, IBS, and anemia presents to the ED via EMS for rectal bleeding beginning at 6:30 yesterday morning. EMS reports patient had a syncopal episode this morning. EMS reports patient is passing bright red blood. EMS reports patient has a history of crohn's disease but has not had a flare up in years. EMS reports administering fluid prior to arrival, patient initially tachycardic with a blood pressure of 95s systolic.    Patient reports waking up yesterday morning passing bright red blood. She reports syncopal episode this morning causing her to fall. She is unsure how long she lost consciousness. She reports headache, abdominal pain, and nausea currently. She reports seeing her hematologist 4 days ago. She denies difficulty urinating, vaginal bleeding, or vaginal discharge.    The history is provided by the patient, medical records and the EMS personnel. No  was used.     Review of patient's allergies indicates:  No Known Allergies  Past Medical History:   Diagnosis Date    Chronic constipation     Crohn's disease     History of Crohn's disease     unclear how diagnosis was made    Inflammatory bowel disease     Iron deficiency anemia, unspecified      Past Surgical History:   Procedure Laterality Date    BONE MARROW ASPIRATION N/A 2023    Procedure: ASPIRATION, BONE MARROW;  Surgeon: Gayatri Howard MD;  Location: [facility] ENDOSCOPY;  Service: General;  Laterality: N/A;     SECTION  2012, 19     COLONOSCOPY  1999    Luzmaria had several of these procedures    COLONOSCOPY, WITH 1 OR MORE BIOPSIES N/A 12/06/2023    Procedure: COLONOSCOPY, WITH 1 OR MORE BIOPSIES;  Surgeon: Bisi Holliday MD;  Location: [facility] ENDOSCOPY;  Service: Gastroenterology;  Laterality: N/A;    EGD, WITH CLOSED BIOPSY N/A 12/06/2023    Procedure: EGD, WITH CLOSED BIOPSY;  Surgeon: Bisi Holliday MD;  Location: [facility] ENDOSCOPY;  Service: Gastroenterology;  Laterality: N/A;    INTRALUMINAL GASTROINTESTINAL TRACT IMAGING VIA CAPSULE N/A 02/11/2025    Procedure: IMAGING PROCEDURE, GI TRACT, INTRALUMINAL, VIA CAPSULE;  Surgeon: Bisi Holliday MD;  Location: [facility] ENDOSCOPY;  Service: Gastroenterology;  Laterality: N/A;    TONSILLECTOMY      TUBAL LIGATION  02/14/2019    UPPER GASTROINTESTINAL ENDOSCOPY  1999    I've had several of these     Family History   Problem Relation Name Age of Onset    Hypertension Mother Halie Saavedra     Diabetes Mother Halie Saavedra     Drug abuse Mother Halie Saavedra     No Known Problems Father      Kidney disease Maternal Grandmother Halina James     Diabetes Maternal Grandmother Halina James     Stroke Paternal Grandmother Jade [place]      Social History[1]  Review of Systems   Constitutional:  Negative for chills and fever.   HENT:  Negative for congestion, drooling and sore throat.    Eyes:  Negative for pain and visual disturbance.   Respiratory:  Negative for chest tightness, shortness of breath and wheezing.    Cardiovascular:  Negative for chest pain, palpitations and leg swelling.   Gastrointestinal:  Positive for abdominal pain, anal bleeding and nausea. Negative for vomiting.   Genitourinary:  Negative for difficulty urinating, dysuria, hematuria, vaginal bleeding and vaginal discharge.   Musculoskeletal:  Negative for back pain, neck pain and neck stiffness.   Skin:  Negative for pallor and rash.   Neurological:  Positive for syncope and headaches. Negative for weakness and  numbness.   Hematological:  Does not bruise/bleed easily.       Physical Exam     Initial Vitals [05/20/25 0537]   BP Pulse Resp Temp SpO2   122/72 (!) 124 (!) 25 98.1 °F (36.7 °C) 98 %      MAP       --         Physical Exam    Nursing note and vitals reviewed.  Constitutional: She appears well-developed and well-nourished. She is not diaphoretic. She is active. No distress.   Appears tearful.   HENT:   Head: Normocephalic and atraumatic.   Nose: Nose normal. Mouth/Throat: Oropharynx is clear and moist.   Eyes: EOM are normal. Pupils are equal, round, and reactive to light.   Neck: Neck supple.   Normal range of motion.  Cardiovascular:  Regular rhythm.   Tachycardia present.         No murmur heard.  Pulmonary/Chest: Breath sounds normal. No respiratory distress. She has no wheezes. She has no rales.   Abdominal: Abdomen is soft. She exhibits no distension. There is generalized abdominal tenderness (worse in upper quadrants). There is no rebound and no guarding.   Genitourinary:    Genitourinary Comments: Rectal exam chaperoned by RN     Musculoskeletal:      Cervical back: Normal range of motion and neck supple.      Comments: Dried blood noted to bilateral lower extremities.     Neurological: She is alert and oriented to person, place, and time. She has normal strength. No cranial nerve deficit or sensory deficit.   Skin: Skin is warm. Capillary refill takes less than 2 seconds. No rash noted. There is pallor (generalized).   Psychiatric: Her mood appears anxious.         ED Course   Critical Care    Date/Time: 5/20/2025 8:00 AM    Performed by: Robin Martinez MD  Authorized by: Robin Martinez MD  Direct patient critical care time: 35 minutes  Total critical care time (exclusive of procedural time) : 35 minutes  Critical care time was exclusive of separately billable procedures and treating other patients.  Critical care was necessary to treat or prevent imminent or life-threatening deterioration  of the following conditions: circulatory failure.  Critical care was time spent personally by me on the following activities: development of treatment plan with patient or surrogate, discussions with consultants, evaluation of patient's response to treatment, examination of patient, obtaining history from patient or surrogate, ordering and performing treatments and interventions, ordering and review of radiographic studies, ordering and review of laboratory studies, pulse oximetry and re-evaluation of patient's condition.        Labs Reviewed   COMPREHENSIVE METABOLIC PANEL - Abnormal       Result Value    Sodium 136      Potassium 3.9      Chloride 112 (*)     CO2 14 (*)     Glucose 135 (*)     Blood Urea Nitrogen 8.3      Creatinine 0.64      Calcium 8.1 (*)     Protein Total 6.8      Albumin 3.3 (*)     Globulin 3.5      Albumin/Globulin Ratio 0.9 (*)     Bilirubin Total 0.2      ALP 60      ALT 7      AST 12      eGFR >60      Anion Gap 10.0      BUN/Creatinine Ratio 13     LACTIC ACID, PLASMA - Abnormal    Lactic Acid Level 2.3 (*)    URINALYSIS, REFLEX TO URINE CULTURE - Abnormal    Color, UA Light-Yellow      Appearance, UA Clear      Specific Gravity, UA 1.047 (*)     pH, UA 5.5      Protein, UA Negative      Glucose, UA Normal      Ketones, UA Negative      Blood, UA 1+ (*)     Bilirubin, UA Negative      Urobilinogen, UA Normal      Nitrites, UA Negative      Leukocyte Esterase,  (*)     RBC, UA 0-5      WBC, UA 21-50 (*)     Bacteria, UA Moderate (*)     Squamous Epithelial Cells, UA Trace      Mucous, UA Occasional (*)     Hyaline Casts, UA 0-2 (*)    CBC WITH DIFFERENTIAL - Abnormal    WBC 9.39      RBC 3.01 (*)     Hgb 7.8 (*)     Hct 26.8 (*)     MCV 89.0      MCH 25.9 (*)     MCHC 29.1 (*)     RDW 26.2 (*)     Platelet 445 (*)     MPV 10.3      Neut % 54.1      Lymph % 34.1      Mono % 9.4      Eos % 0.9      Basophil % 0.9      Imm Grans % 0.6      Neut # 5.09      Lymph # 3.20      Mono #  0.88      Eos # 0.08      Baso # 0.08      Imm Gran # 0.06 (*)     NRBC% 0.0     TROPONIN I - Normal    Troponin-I <0.010     MAGNESIUM - Normal    Magnesium Level 1.60     PROTIME-INR - Normal    PT 13.7      INR 1.1      Narrative:     Protimes are used to monitor anticoagulant agents such as warfarin. PT INR values are based on the current patient normal mean and the YADIRA value for the specific instrument reagent used.  **Routine theraputic target values for the INR are 2.0-3.0**   APTT - Normal    PTT 27.5     LIPASE - Normal    Lipase Level 8     LACTIC ACID, PLASMA - Normal    Lactic Acid Level 0.9     HCG, SERUM, QUALITATIVE - Normal    Beta HCG Qual Negative     LACTIC ACID, PLASMA - Normal    Lactic Acid Level 0.9     BLOOD CULTURE OLG   BLOOD CULTURE OLG   STOOL CULTURE OLG   CBC W/ AUTO DIFFERENTIAL    Narrative:     The following orders were created for panel order CBC auto differential.  Procedure                               Abnormality         Status                     ---------                               -----------         ------                     CBC with Differential[[id number]]       Abnormal            Final result                 Please view results for these tests on the individual orders.   CALPROTECTIN, STOOL   GASTROINTESTINAL PATHOGENS PANEL, PCR   TYPE & SCREEN    Group & Rh B POS      Indirect Velma GEL NEG      Specimen Outdate 05/23/2025 23:59     PREPARE RBC SOFT    UNIT NUMBER [id number]      UNIT ABO/RH B POS      DISPENSE STATUS Transfused      Unit Expiration [id number]      Product Code S6597K67      Unit Blood Type Code 7300      CROSSMATCH INTERPRETATION Compatible      UNIT NUMBER [id number]      UNIT ABO/RH B POS      DISPENSE STATUS Transfused      Unit Expiration [id number]      Product Code N2231S82      Unit Blood Type Code 7300      CROSSMATCH INTERPRETATION Compatible            Imaging Results              CT Abdomen Pelvis W Wo Contrast (Final result)   Result time 05/20/25 10:47:11      Final result by Julio Haas MD (05/20/25 10:47:11)                   Impression:      Active inflammatory bowel disease descending colon through the rectum.  A site of active GI bleeding is not clearly seen.      Electronically signed by: Julio Haas  Date:    05/20/2025  Time:    10:47               Narrative:    EXAMINATION:  CT ABDOMEN PELVIS W WO CONTRAST    CLINICAL HISTORY:  GI bleed;. Syncope.    TECHNIQUE:  Helical acquisition through the abdomen and pelvis without and with IV contrast.  Three plane reconstructions were provided for review. DLP 1165 mGycm. Automatic exposure control, adjustment of mA/kV or iterative reconstruction technique was used to reduce radiation.    COMPARISON:  2 June 2024, 28 December 2020    FINDINGS:  The limited imaged lung bases are clear.    There is no significant abnormality of the liver.  Distended gallbladder but no significant pericholecystic inflammation.  No significant biliary ductal dilatation.  Mild splenomegaly.  An enhancing lesion within the spleen on image 25 series 9 is stable going back to 2020.    Similar mild dilatation of the main pancreatic duct.  Normal adrenals.  No hydronephrosis.    There is no bowel obstruction.  There is colonic wall thickening and mucosal enhancement extending from the splenic flexure through the rectum.  Site of active GI bleeding is not seen.  No free air.    Urinary bladder distended.  No pelvic free fluid.  Abdominal aorta is normal in caliber.    There are no acute osseous findings.                                       CT Head Without Contrast (Final result)  Result time 05/20/25 10:07:29      Final result by Julio Haas MD (05/20/25 10:07:29)                   Impression:      No acute intracranial findings.      Electronically signed by: Julio Haas  Date:    05/20/2025  Time:    10:07               Narrative:    EXAMINATION:  CT HEAD WITHOUT CONTRAST    CLINICAL  HISTORY:  Syncope.Head trauma, moderate-severe;    TECHNIQUE:  CT imaging of the head performed from the skull base to the vertex without intravenous contrast.   mGycm. Automatic exposure control, adjustment of mA/kV or iterative reconstruction technique was used to reduce radiation.    COMPARISON:  7 January 2015    FINDINGS:  There is no acute cortical infarct, hemorrhage or mass lesion.  The ventricles are normal in size.    Visualized paranasal sinuses and mastoid air cells are clear. The calvarium is grossly intact.                                       Medications   0.9%  NaCl infusion (for blood administration) ( Intravenous Stopped 5/20/25 1500)   piperacillin-tazobactam (ZOSYN) 4.5 g in D5W 100 mL IVPB (MB+) (4.5 g Intravenous New Bag 5/21/25 0550)   sodium chloride 0.9% flush 10 mL (has no administration in time range)   ondansetron injection 4 mg (has no administration in time range)   prochlorperazine injection Soln 5 mg (has no administration in time range)   acetaminophen tablet 650 mg (has no administration in time range)   acetaminophen tablet 1,000 mg (has no administration in time range)   naloxone 0.4 mg/mL injection 0.02 mg (has no administration in time range)   glucose chewable tablet 16 g (has no administration in time range)   glucose chewable tablet 24 g (has no administration in time range)   dextrose 50% injection 12.5 g (has no administration in time range)   dextrose 50% injection 25 g (has no administration in time range)   glucagon (human recombinant) injection 1 mg (has no administration in time range)   folic acid tablet 1,000 mcg (has no administration in time range)   cyanocobalamin tablet 1,000 mcg (has no administration in time range)   sodium bicarbonate 150 mEq in D5W 1,000 mL infusion ( Intravenous New Bag 5/20/25 7675)   morphine injection 2 mg (2 mg Intravenous Given 5/21/25 0549)   lactated ringers bolus 1,000 mL (0 mLs Intravenous Stopped 5/20/25 0700)    pantoprazole injection 80 mg (80 mg Intravenous Given 5/20/25 0618)   fentaNYL injection 50 mcg (50 mcg Intravenous Given 5/20/25 0618)   ondansetron injection 4 mg (4 mg Intravenous Given 5/20/25 0618)   morphine injection 4 mg (4 mg Intravenous Given 5/20/25 0840)   iohexoL (OMNIPAQUE 350) injection 100 mL (100 mLs Intravenous Given 5/20/25 1038)   morphine injection 4 mg (4 mg Intravenous Given 5/20/25 1326)   piperacillin-tazobactam (ZOSYN) 4.5 g in D5W 100 mL IVPB (MB+) (0 g Intravenous Stopped 5/20/25 1529)   lactated ringers bolus 1,000 mL (0 mLs Intravenous Stopped 5/20/25 1557)   methylPREDNISolone sodium succinate injection 125 mg (125 mg Intravenous Given 5/20/25 1458)     Medical Decision Making  Amount and/or Complexity of Data Reviewed  Independent Historian: EMS  Labs: ordered.  Radiology: ordered.  ECG/medicine tests: ordered.    Risk  Prescription drug management.  Decision regarding hospitalization.    Differential diagnosis (includes but is not limited to):   IBD flare, diverticulitis, diverticulosis, intra-abdominal infection, abscess, sepsis, symptomatic anemia, upper GI bleed, PUD, GERD    MDM Narrative  39-year-old female presents for evaluation of several grossly bloody bowel movements at home.  She states she did have a syncopal episode earlier today as well.  IV fluid resuscitation ongoing.  Labs reviewed, symptomatic anemia noted with hemoglobin 7.8.  Blood transfusion ordered.  Urine with evidence of infection, will cover with antibiotics.  CT of the head obtained due to head injury with a persistent headache, no acute pathology identified.  CT of the abdomen pelvis GI bleed protocol performed and shows evidence of active colitis with no contrast blush.  Steroid dose given, Protonix ordered.  GI consulted.  Case discussed with the hospitalist, will admit for further care.    Dispo: Admit    My independent radiology interpretation: as above  Point of care US (independently performed and  interpreted):   Decision rules/clinical scoring:     Sepsis Perfusion Assessment:     Amount and/or Complexity of Data Reviewed  Independent historian: none   Summary of history:   External data reviewed: notes from previous ED visits and notes from clinic visits  Summary of data reviewed: Prior records reviewed  Risk and benefits of testing: discussed   Labs: ordered and reviewed  Radiology: ordered and independent interpretation performed (see above or ED course)  ECG/medicine tests: ordered and independent interpretation performed (see above or ED course)  Discussion of management or test interpretation with external provider(s): discussed with hospitalist physician and discussed with GI consultant   Summary of discussion: as above    Risk  Parenteral controlled substances   Drug therapy requiring intense monitoring for toxicity   Decision regarding hospitalization  Shared decision making     Critical Care  30-74 minutes     Data Reviewed/Counseling: I have personally reviewed the patient's vital signs, nursing notes, and other relevant tests, information, and imaging. I had a detailed discussion regarding the historical points, exam findings, and any diagnostic results supporting the discharge diagnosis. I personally performed the history, PE, MDM and procedures as documented above and agree with the scribe's documentation.    Portions of this note were dictated using voice recognition software. Although it was reviewed for accuracy, some inherent voice recognition errors may have occurred and may be present in this document.          Scribe Attestation:   Scribe #1: I performed the above scribed service and the documentation accurately describes the services I performed. I attest to the accuracy of the note.    Attending Attestation:           Physician Attestation for Scribe:  Physician Attestation Statement for Scribe #1: I, Robin Martinez MD, reviewed documentation, as scribed by Sherry Rodarte in my  presence, and it is both accurate and complete.             ED Course as of 05/21/25 0713   Tue May 20, 2025   1100 EKG independently interpreted by me.  EKG: NSR @ 88, no STEMI, Qtc 447 []   1103 Paged Hospitalist [MB]      ED Course User Index  [MB] RodarteSherry flores  [] Robin Martinez MD                           Clinical Impression:  Final diagnoses:  [R55] Syncope  [D64.9] Symptomatic anemia          ED Disposition Condition    Admit                       [1]   Social History  Tobacco Use    Smoking status: Never     Passive exposure: Never    Smokeless tobacco: Never   Substance Use Topics    Alcohol use: Yes     Alcohol/week: 1.0 standard drink of alcohol     Types: 1 Glasses of wine per week    Drug use: Never        Robin Martinez MD  05/21/25 0713

## 2025-05-21 ENCOUNTER — PATIENT MESSAGE (OUTPATIENT)
Dept: GASTROENTEROLOGY | Facility: CLINIC | Age: 40
End: 2025-05-21
Payer: COMMERCIAL

## 2025-05-21 LAB
ADV 40+41 DNA STL QL NAA+NON-PROBE: NOT DETECTED
ALBUMIN SERPL-MCNC: 3 G/DL (ref 3.5–5)
ALBUMIN/GLOB SERPL: 1.1 RATIO (ref 1.1–2)
ALP SERPL-CCNC: 43 UNIT/L (ref 40–150)
ALT SERPL-CCNC: 8 UNIT/L (ref 0–55)
ANION GAP SERPL CALC-SCNC: 9 MEQ/L
APICAL FOUR CHAMBER EJECTION FRACTION: 58 %
APICAL TWO CHAMBER EJECTION FRACTION: 58 %
AST SERPL-CCNC: 12 UNIT/L (ref 11–45)
ASTRO TYP 1-8 RNA STL QL NAA+NON-PROBE: NOT DETECTED
AV INDEX (PROSTH): 0.86
AV MEAN GRADIENT: 6 MMHG
AV PEAK GRADIENT: 10 MMHG
AV VALVE AREA BY VELOCITY RATIO: 2.8 CM²
AV VALVE AREA: 3 CM²
AV VELOCITY RATIO: 0.81
BASOPHILS # BLD AUTO: 0.02 X10(3)/MCL
BASOPHILS NFR BLD AUTO: 0.3 %
BILIRUB SERPL-MCNC: 0.5 MG/DL
BSA FOR ECHO PROCEDURE: 1.53 M2
BUN SERPL-MCNC: 4.5 MG/DL (ref 7–18.7)
C CAYETANENSIS DNA STL QL NAA+NON-PROBE: NOT DETECTED
C COLI+JEJ+UPSA DNA STL QL NAA+NON-PROBE: NOT DETECTED
CALCIUM SERPL-MCNC: 8.3 MG/DL (ref 8.4–10.2)
CHLORIDE SERPL-SCNC: 102 MMOL/L (ref 98–107)
CO2 SERPL-SCNC: 27 MMOL/L (ref 22–29)
CREAT SERPL-MCNC: 0.49 MG/DL (ref 0.55–1.02)
CREAT/UREA NIT SERPL: 9
CRP SERPL-MCNC: 16.1 MG/L
CRYPTOSP DNA STL QL NAA+NON-PROBE: NOT DETECTED
CV ECHO LV RWT: 0.36 CM
DOP CALC AO PEAK VEL: 1.6 M/S
DOP CALC AO VTI: 25 CM
DOP CALC LVOT AREA: 3.5 CM2
DOP CALC LVOT DIAMETER: 2.1 CM
DOP CALC LVOT PEAK VEL: 1.3 M/S
DOP CALC LVOT STROKE VOLUME: 74.8 CM3
DOP CALCLVOT PEAK VEL VTI: 21.6 CM
E HISTOLYT DNA STL QL NAA+NON-PROBE: NOT DETECTED
EAEC PAA PLAS AGGR+AATA ST NAA+NON-PRB: NOT DETECTED
EC STX1+STX2 GENES STL QL NAA+NON-PROBE: NOT DETECTED
ECHO LV POSTERIOR WALL: 0.8 CM (ref 0.6–1.1)
EOSINOPHIL # BLD AUTO: 0.01 X10(3)/MCL (ref 0–0.9)
EOSINOPHIL NFR BLD AUTO: 0.1 %
EPEC EAE GENE STL QL NAA+NON-PROBE: NOT DETECTED
ERYTHROCYTE [DISTWIDTH] IN BLOOD BY AUTOMATED COUNT: 22.1 % (ref 11.5–17)
ERYTHROCYTE [SEDIMENTATION RATE] IN BLOOD: 8 MM/HR (ref 0–20)
ETEC LTA+ST1A+ST1B TOX ST NAA+NON-PROBE: NOT DETECTED
FRACTIONAL SHORTENING: 44.4 % (ref 28–44)
G LAMBLIA DNA STL QL NAA+NON-PROBE: NOT DETECTED
GFR SERPLBLD CREATININE-BSD FMLA CKD-EPI: >60 ML/MIN/1.73/M2
GLOBULIN SER-MCNC: 2.8 GM/DL (ref 2.4–3.5)
GLUCOSE SERPL-MCNC: 124 MG/DL (ref 74–100)
HCT VFR BLD AUTO: 25.8 % (ref 37–47)
HGB BLD-MCNC: 8.4 G/DL (ref 12–16)
IMM GRANULOCYTES # BLD AUTO: 0.02 X10(3)/MCL (ref 0–0.04)
IMM GRANULOCYTES NFR BLD AUTO: 0.3 %
INTERVENTRICULAR SEPTUM: 0.7 CM (ref 0.6–1.1)
LEFT ATRIUM SIZE: 2.9 CM
LEFT INTERNAL DIMENSION IN SYSTOLE: 2.5 CM (ref 2.1–4)
LEFT VENTRICLE DIASTOLIC VOLUME INDEX: 60.53 ML/M2
LEFT VENTRICLE DIASTOLIC VOLUME: 92 ML
LEFT VENTRICLE END DIASTOLIC VOLUME APICAL 2 CHAMBER: 77.3 ML
LEFT VENTRICLE END DIASTOLIC VOLUME APICAL 4 CHAMBER: 78.9 ML
LEFT VENTRICLE MASS INDEX: 68.7 G/M2
LEFT VENTRICLE SYSTOLIC VOLUME INDEX: 15.1 ML/M2
LEFT VENTRICLE SYSTOLIC VOLUME: 23 ML
LEFT VENTRICULAR INTERNAL DIMENSION IN DIASTOLE: 4.5 CM (ref 3.5–6)
LEFT VENTRICULAR MASS: 104.5 G
LVED V (TEICH): 92.4 ML
LVES V (TEICH): 22.5 ML
LVOT MG: 4 MMHG
LVOT MV: 0.88 CM/S
LYMPHOCYTES # BLD AUTO: 1.18 X10(3)/MCL (ref 0.6–4.6)
LYMPHOCYTES NFR BLD AUTO: 15.8 %
MCH RBC QN AUTO: 26.8 PG (ref 27–31)
MCHC RBC AUTO-ENTMCNC: 32.6 G/DL (ref 33–36)
MCV RBC AUTO: 82.2 FL (ref 80–94)
MONOCYTES # BLD AUTO: 0.97 X10(3)/MCL (ref 0.1–1.3)
MONOCYTES NFR BLD AUTO: 13 %
NEUTROPHILS # BLD AUTO: 5.26 X10(3)/MCL (ref 2.1–9.2)
NEUTROPHILS NFR BLD AUTO: 70.5 %
NOROVIRUS GI+II RNA STL QL NAA+NON-PROBE: NOT DETECTED
NRBC BLD AUTO-RTO: 0 %
OHS LV EJECTION FRACTION SIMPSONS BIPLANE MOD: 58 %
OHS QRS DURATION: 88 MS
OHS QTC CALCULATION: 447 MS
P SHIGELLOIDES DNA STL QL NAA+NON-PROBE: NOT DETECTED
PLATELET # BLD AUTO: 207 X10(3)/MCL (ref 130–400)
PMV BLD AUTO: 10.5 FL (ref 7.4–10.4)
POTASSIUM SERPL-SCNC: 3.3 MMOL/L (ref 3.5–5.1)
PROT SERPL-MCNC: 5.8 GM/DL (ref 6.4–8.3)
PV PEAK GRADIENT: 3 MMHG
PV PEAK VELOCITY: 0.87 M/S
RA PRESSURE ESTIMATED: 3 MMHG
RBC # BLD AUTO: 3.14 X10(6)/MCL (ref 4.2–5.4)
RVA RNA STL QL NAA+NON-PROBE: NOT DETECTED
S ENT+BONG DNA STL QL NAA+NON-PROBE: NOT DETECTED
SAPO I+II+IV+V RNA STL QL NAA+NON-PROBE: NOT DETECTED
SHIGELLA SP+EIEC IPAH ST NAA+NON-PROBE: NOT DETECTED
SODIUM SERPL-SCNC: 138 MMOL/L (ref 136–145)
TDI LATERAL: 0.21 M/S
TDI SEPTAL: 0.12 M/S
TDI: 0.17 M/S
TRICUSPID ANNULAR PLANE SYSTOLIC EXCURSION: 2.8 CM
V CHOL+PARA+VUL DNA STL QL NAA+NON-PROBE: NOT DETECTED
V CHOLERAE DNA STL QL NAA+NON-PROBE: NOT DETECTED
WBC # BLD AUTO: 7.46 X10(3)/MCL (ref 4.5–11.5)
Y ENTEROCOL DNA STL QL NAA+NON-PROBE: NOT DETECTED
Z-SCORE OF LEFT VENTRICULAR DIMENSION IN END DIASTOLE: 0.11
Z-SCORE OF LEFT VENTRICULAR DIMENSION IN END SYSTOLE: -0.79

## 2025-05-21 PROCEDURE — 93010 ELECTROCARDIOGRAM REPORT: CPT | Mod: ,,, | Performed by: INTERNAL MEDICINE

## 2025-05-21 PROCEDURE — 85025 COMPLETE CBC W/AUTO DIFF WBC: CPT | Performed by: NURSE PRACTITIONER

## 2025-05-21 PROCEDURE — 80053 COMPREHEN METABOLIC PANEL: CPT | Performed by: NURSE PRACTITIONER

## 2025-05-21 PROCEDURE — 93005 ELECTROCARDIOGRAM TRACING: CPT

## 2025-05-21 PROCEDURE — 63600175 PHARM REV CODE 636 W HCPCS: Performed by: NURSE PRACTITIONER

## 2025-05-21 PROCEDURE — 63600175 PHARM REV CODE 636 W HCPCS: Mod: JZ,TB | Performed by: PHYSICIAN ASSISTANT

## 2025-05-21 PROCEDURE — 25000003 PHARM REV CODE 250: Performed by: NURSE PRACTITIONER

## 2025-05-21 PROCEDURE — 25000003 PHARM REV CODE 250: Performed by: PHYSICIAN ASSISTANT

## 2025-05-21 PROCEDURE — 85652 RBC SED RATE AUTOMATED: CPT | Performed by: PHYSICIAN ASSISTANT

## 2025-05-21 PROCEDURE — 86140 C-REACTIVE PROTEIN: CPT | Performed by: PHYSICIAN ASSISTANT

## 2025-05-21 PROCEDURE — 36415 COLL VENOUS BLD VENIPUNCTURE: CPT | Performed by: PHYSICIAN ASSISTANT

## 2025-05-21 PROCEDURE — 27000207 HC ISOLATION

## 2025-05-21 PROCEDURE — 63600175 PHARM REV CODE 636 W HCPCS

## 2025-05-21 PROCEDURE — 25000003 PHARM REV CODE 250: Performed by: INTERNAL MEDICINE

## 2025-05-21 PROCEDURE — 21400001 HC TELEMETRY ROOM

## 2025-05-21 RX ORDER — SODIUM, POTASSIUM,MAG SULFATES 17.5-3.13G
1 SOLUTION, RECONSTITUTED, ORAL ORAL 2 TIMES DAILY
Status: COMPLETED | OUTPATIENT
Start: 2025-05-21 | End: 2025-05-22

## 2025-05-21 RX ORDER — POTASSIUM CHLORIDE 20 MEQ/1
40 TABLET, EXTENDED RELEASE ORAL ONCE
Status: COMPLETED | OUTPATIENT
Start: 2025-05-21 | End: 2025-05-21

## 2025-05-21 RX ADMIN — PIPERACILLIN AND TAZOBACTAM 4.5 G: 4; .5 INJECTION, POWDER, LYOPHILIZED, FOR SOLUTION INTRAVENOUS; PARENTERAL at 05:05

## 2025-05-21 RX ADMIN — MORPHINE SULFATE 2 MG: 4 INJECTION INTRAVENOUS at 02:05

## 2025-05-21 RX ADMIN — POTASSIUM CHLORIDE 40 MEQ: 1500 TABLET, EXTENDED RELEASE ORAL at 08:05

## 2025-05-21 RX ADMIN — SODIUM SULFATE, POTASSIUM SULFATE, MAGNESIUM SULFATE 177 ML: 17.5; 3.13; 1.6 SOLUTION, CONCENTRATE ORAL at 05:05

## 2025-05-21 RX ADMIN — MORPHINE SULFATE 2 MG: 4 INJECTION INTRAVENOUS at 11:05

## 2025-05-21 RX ADMIN — MORPHINE SULFATE 2 MG: 4 INJECTION INTRAVENOUS at 05:05

## 2025-05-21 RX ADMIN — METHYLPREDNISOLONE SODIUM SUCCINATE 40 MG: 40 INJECTION, POWDER, FOR SOLUTION INTRAMUSCULAR; INTRAVENOUS at 08:05

## 2025-05-21 RX ADMIN — PIPERACILLIN AND TAZOBACTAM 4.5 G: 4; .5 INJECTION, POWDER, LYOPHILIZED, FOR SOLUTION INTRAVENOUS; PARENTERAL at 11:05

## 2025-05-21 RX ADMIN — PIPERACILLIN AND TAZOBACTAM 4.5 G: 4; .5 INJECTION, POWDER, LYOPHILIZED, FOR SOLUTION INTRAVENOUS; PARENTERAL at 02:05

## 2025-05-21 NOTE — PLAN OF CARE
Patient lives in a single story home with spouse and 3 kids (ages 6, 12, and 16). Patient is independent at home and drives self. Denies any current needs at this time.     PCP: Mio Payne NP  Pharmacy: [facility] [address]       05/21/25 0952   Discharge Assessment   Assessment Type Discharge Planning Assessment   Confirmed/corrected address, phone number and insurance Yes   Confirmed Demographics Correct on Facesheet   Source of Information patient   People in Home spouse;child(solomon), dependent   Name(s) of People in Home Pedro Pablo (spouse) [phone]   Do you expect to return to your current living situation? Yes   Do you have help at home or someone to help you manage your care at home? Yes   Prior to hospitilization cognitive status: Alert/Oriented   Current cognitive status: Alert/Oriented   Walking or Climbing Stairs Difficulty no   Dressing/Bathing Difficulty no   Home Accessibility wheelchair accessible   Home Layout Able to live on 1st floor   Equipment Currently Used at Home none   Readmission within 30 days? No   Patient currently being followed by outpatient case management? No   Do you currently have service(s) that help you manage your care at home? No   Do you take prescription medications? Yes   How do you get to doctors appointments? car, drives self   Discharge Plan A Home with family   Discharge Plan B Home   DME Needed Upon Discharge  none   Discharge Plan discussed with: Patient   Transition of Care Barriers None

## 2025-05-21 NOTE — PROGRESS NOTES
[facility] Hospital Medicine Progress Note        Chief Complaint: Inpatient Follow-up     HPI:   39-year-old female Patient presented to ED for evaluation of bloody stools , syncopal episode.   She reported being diagnosed with Crohn's when she was 14 years old, reported no flares in the last 2 decades, she reported passing bloody stools starting 05/19/2025 1 day prior to arrival, worsened to the point she had a syncopal episode on the day of presentation.  She reported she follows with the GI, Hematology at [facility].  She reported she was doing well all these years without any treatments ,she no longer has  diagnosis of Crohn's .  She also reported left lower quadrant abdominal pain that started yesterday.  Reported was able to tolerate all kinds of diet , no dietary restrictions, no lactose intolerance.      On arrival to ER patient was tachycardic 124, tachypneic 25 normotensive on room air.  Labs were significant for hemoglobin of 7.8, hematocrit 26.8, lactate 2.3, bicarb 14, UA significant for bacteria, pyuria, esterase positive 2 units PRBC being transfused, received IV fluids.  Repeat lactate normalized.  Patient received IV Zosyn, Solu-Medrol.  GI team was consulted.  Admitted to HM service.    GI team evaluated the patient, recommended inpatient colonoscopy in the coming days    Interval Hx:   No acute events reported overnight.  Patient was seen at bedside this morning, she was lying in the bed she reports no BMs overnight had 1 episode of bloody BM this morning, reports frequency of stools has improved compared to past couple of days,+ similar left lower quadrant abdominal pain, discussed the GI consultation note, recommendations  Hemodynamically stable on room air     Labs from this morning showed hemoglobin 8.4 after 2 units PRBC, K3.3, bicarb has improved to 27, CRP 16, GI panel negative urine cultures growing greater than 100,000 colonies Gram-negative rods      Case was  discussed with patient's nurse     Objective/physical exam:  General: In no acute distress, afebrile  Chest: Clear to auscultation bilaterally  Heart: RRR, +S1, S2, no appreciable murmur  Abdomen: Soft, LLQ TTP  MSK: Warm, no lower extremity edema  Neurologic: Alert and oriented, grossly nonfocal  VITAL SIGNS: 24 HRS MIN & MAX LAST   Temp  Min: 97.8 °F (36.6 °C)  Max: 98.7 °F (37.1 °C) 98.5 °F (36.9 °C)   BP  Min: 105/66  Max: 125/87 108/70   Pulse  Min: 91  Max: 102  100   Resp  Min: 16  Max: 20 18   SpO2  Min: 98 %  Max: 100 % 100 %     I have reviewed the following labs:  Recent Labs   Lab 05/16/25  0931 05/20/25  0559 05/21/25  0634   WBC 6.86 9.39 7.46   RBC 4.50 3.01* 3.14*   HGB 11.7* 7.8* 8.4*   HCT 38.9 26.8* 25.8*   MCV 86.4 89.0 82.2   MCH 26.0* 25.9* 26.8*   MCHC 30.1* 29.1* 32.6*   RDW 25.8* 26.2* 22.1*   * 445* 207   MPV 9.5 10.3 10.5*     Recent Labs   Lab 05/20/25  0559 05/21/25  0634    138   K 3.9 3.3*   * 102   CO2 14* 27   BUN 8.3 4.5*   CREATININE 0.64 0.49*   * 124*   CALCIUM 8.1* 8.3*   MG 1.60  --    ALBUMIN 3.3* 3.0*   PROT 6.8 5.8*   ALKPHOS 60 43   ALT 7 8   AST 12 12   BILITOT 0.2 0.5     Microbiology Results (last 7 days)       Procedure Component Value Units Date/Time    Blood Culture #1 **CANNOT BE ORDERED STAT** [[id number]]  (Normal) Collected: 05/20/25 3467    Order Status: Completed Specimen: Blood Updated: 05/21/25 1701     Blood Culture No Growth At 24 Hours    Blood Culture #2 **CANNOT BE ORDERED STAT** [[id number]]  (Normal) Collected: 05/20/25 1424    Order Status: Completed Specimen: Blood Updated: 05/21/25 1701     Blood Culture No Growth At 24 Hours    Urine culture [[id number]]  (Abnormal) Collected: 05/20/25 1122    Order Status: Completed Specimen: Urine Updated: 05/21/25 0705     Urine Culture >/= 100,000 colonies/ml Gram-negative Rods    Stool Culture [[id number]] Collected: 05/20/25 0503    Order Status: Resulted Specimen: Stool Updated:  05/21/25 0557             See below for Radiology    Assessment/Plan:  Hematochezia ?  Crohn's flare  Acute on chronic symptomatic anemia s/p prbc transfusion  Syncope due to above  Metabolic acidosis from diarrhea-resolved  UTI POA  Hypokalemia  Lactic acidosis-normalized      Continue to monitor on tele  GI team evaluated the patient, recommended inpatient colonoscopy in the coming days, follow GI team recommendations   Steroids per GI, Trend inflammatory markers  Continue IV Zosyn   Potassium repletion   Supportive care   Otherwise continue current care and monitoring   Follow labs    VTE prophylaxis:  SCDs          Portions of this note dictated using EMR integrated voice recognition software, and may be subject to voice recognition errors not corrected at proofreading. Please contact writer for clarification if needed.   _____________________________________________________________________    Malnutrition Status:  Nutrition consulted. Most recent weight and BMI monitored-     Measurements:  Wt Readings from Last 1 Encounters:   05/20/25 54.4 kg (120 lb)   Body mass index is 22.67 kg/m².    Patient has been screened and assessed by RD.    Malnutrition Type:  Context:    Level:      Malnutrition Characteristic Summary:       Interventions/Recommendations (treatment strategy):        Scheduled Med:   [START ON 5/22/2025] cyanocobalamin  1,000 mcg Oral Daily    [START ON 5/22/2025] folic acid  1,000 mcg Oral Daily    methylPREDNISolone injection (PEDS and ADULTS)  40 mg Intravenous Q12H    piperacillin-tazobactam (Zosyn) IV (PEDS and ADULTS) (extended infusion is not appropriate)  4.5 g Intravenous Q8H    sodium,potassium,mag sulfates  1 Bottle Oral BID      Continuous Infusions:     PRN Meds:    Current Facility-Administered Medications:     0.9%  NaCl infusion (for blood administration), , Intravenous, Q24H PRN    acetaminophen, 1,000 mg, Oral, Q6H PRN    acetaminophen, 650 mg, Oral, Q4H PRN    dextrose 50%, 12.5  g, Intravenous, PRN    dextrose 50%, 25 g, Intravenous, PRN    glucagon (human recombinant), 1 mg, Intramuscular, PRN    glucose, 16 g, Oral, PRN    glucose, 24 g, Oral, PRN    morphine, 2 mg, Intravenous, Q6H PRN    naloxone, 0.02 mg, Intravenous, PRN    ondansetron, 4 mg, Intravenous, Q4H PRN    prochlorperazine, 5 mg, Intravenous, Q6H PRN    sodium chloride 0.9%, 10 mL, Intravenous, PRN     Radiology:  I have personally reviewed the following imaging and agree with the radiologist.     Echo Saline Bubble? Yes    Left Ventricle: The left ventricle is normal in size. Normal wall   thickness. There is normal systolic function with a visually estimated   ejection fraction of 60 - 65%.    Right Ventricle: The right ventricle is normal in size Systolic   function is normal. TAPSE is 2.8 cm.    Left Atrium: Agitated saline study of the atrial septum is positive,   consistent with an intracardiac shunt at the atrial level.    Mitral Valve: Mildly thickened leaflets.    IVC/SVC: Normal venous pressure at 3 mmHg.      Marylin Seo MD  Department of Hospital Medicine   [facility]   05/21/2025

## 2025-05-22 ENCOUNTER — ANESTHESIA EVENT (OUTPATIENT)
Dept: ENDOSCOPY | Facility: HOSPITAL | Age: 40
End: 2025-05-22
Payer: COMMERCIAL

## 2025-05-22 ENCOUNTER — ANESTHESIA (OUTPATIENT)
Dept: ENDOSCOPY | Facility: HOSPITAL | Age: 40
End: 2025-05-22
Payer: COMMERCIAL

## 2025-05-22 LAB
ANION GAP SERPL CALC-SCNC: 9 MEQ/L
ANISOCYTOSIS BLD QL SMEAR: ABNORMAL
B-HCG UR QL: NEGATIVE
BACTERIA UR CULT: ABNORMAL
BASOPHILS # BLD AUTO: 0 X10(3)/MCL
BASOPHILS NFR BLD AUTO: 0 %
BUN SERPL-MCNC: 4.1 MG/DL (ref 7–18.7)
CALCIUM SERPL-MCNC: 8.2 MG/DL (ref 8.4–10.2)
CHLORIDE SERPL-SCNC: 104 MMOL/L (ref 98–107)
CO2 SERPL-SCNC: 26 MMOL/L (ref 22–29)
CREAT SERPL-MCNC: 0.54 MG/DL (ref 0.55–1.02)
CREAT/UREA NIT SERPL: 8
CRP SERPL-MCNC: 10.7 MG/L
CTP QC/QA: YES
EOSINOPHIL # BLD AUTO: 0 X10(3)/MCL (ref 0–0.9)
EOSINOPHIL NFR BLD AUTO: 0 %
ERYTHROCYTE [DISTWIDTH] IN BLOOD BY AUTOMATED COUNT: 22.6 % (ref 11.5–17)
GFR SERPLBLD CREATININE-BSD FMLA CKD-EPI: >60 ML/MIN/1.73/M2
GLUCOSE SERPL-MCNC: 121 MG/DL (ref 74–100)
HCT VFR BLD AUTO: 26.3 % (ref 37–47)
HGB BLD-MCNC: 8.4 G/DL (ref 12–16)
HYPOCHROMIA BLD QL SMEAR: ABNORMAL
IMM GRANULOCYTES # BLD AUTO: 0.04 X10(3)/MCL (ref 0–0.04)
IMM GRANULOCYTES NFR BLD AUTO: 0.7 %
LEFT CCA DIST DIAS: 43 CM/S
LEFT CCA DIST SYS: 99 CM/S
LEFT CCA PROX DIAS: 42 CM/S
LEFT CCA PROX SYS: 109 CM/S
LEFT ICA DIST DIAS: 61 CM/S
LEFT ICA DIST SYS: 126 CM/S
LEFT ICA MID DIAS: 54 CM/S
LEFT ICA MID SYS: 95 CM/S
LEFT ICA PROX DIAS: 26 CM/S
LEFT ICA PROX SYS: 65 CM/S
LEFT VERTEBRAL DIAS: 27 CM/S
LEFT VERTEBRAL SYS: 48 CM/S
LYMPHOCYTES # BLD AUTO: 0.82 X10(3)/MCL (ref 0.6–4.6)
LYMPHOCYTES NFR BLD AUTO: 14.9 %
MCH RBC QN AUTO: 27.3 PG (ref 27–31)
MCHC RBC AUTO-ENTMCNC: 31.9 G/DL (ref 33–36)
MCV RBC AUTO: 85.4 FL (ref 80–94)
MONOCYTES # BLD AUTO: 0.3 X10(3)/MCL (ref 0.1–1.3)
MONOCYTES NFR BLD AUTO: 5.5 %
NEUTROPHILS # BLD AUTO: 4.33 X10(3)/MCL (ref 2.1–9.2)
NEUTROPHILS NFR BLD AUTO: 78.9 %
NRBC BLD AUTO-RTO: 0 %
OHS CV CAROTID RIGHT ICA EDV HIGHEST: 54
OHS CV CAROTID ULTRASOUND LEFT ICA/CCA RATIO: 1.27
OHS CV CAROTID ULTRASOUND RIGHT ICA/CCA RATIO: 1.15
OHS CV PV CAROTID LEFT HIGHEST CCA: 109
OHS CV PV CAROTID LEFT HIGHEST ICA: 126
OHS CV PV CAROTID RIGHT HIGHEST CCA: 85
OHS CV PV CAROTID RIGHT HIGHEST ICA: 98
OHS CV US CAROTID LEFT HIGHEST EDV: 61
PLATELET # BLD AUTO: 203 X10(3)/MCL (ref 130–400)
PLATELET # BLD EST: ADEQUATE 10*3/UL
PMV BLD AUTO: 10.8 FL (ref 7.4–10.4)
POIKILOCYTOSIS BLD QL SMEAR: SLIGHT
POLYCHROMASIA BLD QL SMEAR: SLIGHT
POTASSIUM SERPL-SCNC: 3.6 MMOL/L (ref 3.5–5.1)
RBC # BLD AUTO: 3.08 X10(6)/MCL (ref 4.2–5.4)
RBC MORPH BLD: ABNORMAL
RIGHT CCA DIST DIAS: 34 CM/S
RIGHT CCA DIST SYS: 85 CM/S
RIGHT CCA PROX DIAS: 31 CM/S
RIGHT CCA PROX SYS: 80 CM/S
RIGHT ECA DIAS: 23 CM/S
RIGHT ECA SYS: 85 CM/S
RIGHT ICA DIST DIAS: 54 CM/S
RIGHT ICA DIST SYS: 98 CM/S
RIGHT ICA MID DIAS: 49 CM/S
RIGHT ICA MID SYS: 96 CM/S
RIGHT ICA PROX DIAS: 32 CM/S
RIGHT ICA PROX SYS: 72 CM/S
RIGHT VERTEBRAL DIAS: 30 CM/S
RIGHT VERTEBRAL SYS: 52 CM/S
SODIUM SERPL-SCNC: 139 MMOL/L (ref 136–145)
WBC # BLD AUTO: 5.49 X10(3)/MCL (ref 4.5–11.5)

## 2025-05-22 PROCEDURE — 0DBL8ZX EXCISION OF TRANSVERSE COLON, VIA NATURAL OR ARTIFICIAL OPENING ENDOSCOPIC, DIAGNOSTIC: ICD-10-PCS | Performed by: INTERNAL MEDICINE

## 2025-05-22 PROCEDURE — 37000009 HC ANESTHESIA EA ADD 15 MINS: Performed by: INTERNAL MEDICINE

## 2025-05-22 PROCEDURE — 81025 URINE PREGNANCY TEST: CPT | Performed by: ANESTHESIOLOGY

## 2025-05-22 PROCEDURE — 25000003 PHARM REV CODE 250: Performed by: PHYSICIAN ASSISTANT

## 2025-05-22 PROCEDURE — 36415 COLL VENOUS BLD VENIPUNCTURE: CPT | Performed by: PHYSICIAN ASSISTANT

## 2025-05-22 PROCEDURE — 27201423 OPTIME MED/SURG SUP & DEVICES STERILE SUPPLY: Performed by: INTERNAL MEDICINE

## 2025-05-22 PROCEDURE — 25000003 PHARM REV CODE 250: Performed by: NURSE PRACTITIONER

## 2025-05-22 PROCEDURE — 37000008 HC ANESTHESIA 1ST 15 MINUTES: Performed by: INTERNAL MEDICINE

## 2025-05-22 PROCEDURE — 86036 ANCA SCREEN EACH ANTIBODY: CPT | Performed by: PHYSICIAN ASSISTANT

## 2025-05-22 PROCEDURE — 25000003 PHARM REV CODE 250: Performed by: INTERNAL MEDICINE

## 2025-05-22 PROCEDURE — 86140 C-REACTIVE PROTEIN: CPT | Performed by: PHYSICIAN ASSISTANT

## 2025-05-22 PROCEDURE — 0DBK8ZX EXCISION OF ASCENDING COLON, VIA NATURAL OR ARTIFICIAL OPENING ENDOSCOPIC, DIAGNOSTIC: ICD-10-PCS | Performed by: INTERNAL MEDICINE

## 2025-05-22 PROCEDURE — 45380 COLONOSCOPY AND BIOPSY: CPT | Performed by: INTERNAL MEDICINE

## 2025-05-22 PROCEDURE — 63600175 PHARM REV CODE 636 W HCPCS: Performed by: NURSE PRACTITIONER

## 2025-05-22 PROCEDURE — 63600175 PHARM REV CODE 636 W HCPCS: Mod: JZ,TB | Performed by: PHYSICIAN ASSISTANT

## 2025-05-22 PROCEDURE — 25000003 PHARM REV CODE 250

## 2025-05-22 PROCEDURE — 0DBB8ZX EXCISION OF ILEUM, VIA NATURAL OR ARTIFICIAL OPENING ENDOSCOPIC, DIAGNOSTIC: ICD-10-PCS | Performed by: INTERNAL MEDICINE

## 2025-05-22 PROCEDURE — 21400001 HC TELEMETRY ROOM

## 2025-05-22 PROCEDURE — 0DBM8ZX EXCISION OF DESCENDING COLON, VIA NATURAL OR ARTIFICIAL OPENING ENDOSCOPIC, DIAGNOSTIC: ICD-10-PCS | Performed by: INTERNAL MEDICINE

## 2025-05-22 PROCEDURE — 85025 COMPLETE CBC W/AUTO DIFF WBC: CPT | Performed by: PHYSICIAN ASSISTANT

## 2025-05-22 PROCEDURE — 0DBP8ZX EXCISION OF RECTUM, VIA NATURAL OR ARTIFICIAL OPENING ENDOSCOPIC, DIAGNOSTIC: ICD-10-PCS | Performed by: INTERNAL MEDICINE

## 2025-05-22 PROCEDURE — 80048 BASIC METABOLIC PNL TOTAL CA: CPT | Performed by: PHYSICIAN ASSISTANT

## 2025-05-22 PROCEDURE — 63600175 PHARM REV CODE 636 W HCPCS

## 2025-05-22 RX ORDER — LIDOCAINE HYDROCHLORIDE 20 MG/ML
INJECTION, SOLUTION EPIDURAL; INFILTRATION; INTRACAUDAL; PERINEURAL
Status: DISPENSED
Start: 2025-05-22 | End: 2025-05-23

## 2025-05-22 RX ORDER — LIDOCAINE HYDROCHLORIDE 20 MG/ML
INJECTION INTRAVENOUS
Status: DISCONTINUED | OUTPATIENT
Start: 2025-05-22 | End: 2025-05-22

## 2025-05-22 RX ORDER — PROPOFOL 10 MG/ML
VIAL (ML) INTRAVENOUS
Status: DISPENSED
Start: 2025-05-22 | End: 2025-05-23

## 2025-05-22 RX ORDER — GLYCOPYRROLATE 0.2 MG/ML
INJECTION INTRAMUSCULAR; INTRAVENOUS
Status: DISCONTINUED
Start: 2025-05-22 | End: 2025-05-22 | Stop reason: WASHOUT

## 2025-05-22 RX ORDER — PROPOFOL 10 MG/ML
VIAL (ML) INTRAVENOUS
Status: COMPLETED
Start: 2025-05-22 | End: 2025-05-22

## 2025-05-22 RX ORDER — PHENYLEPHRINE HCL IN 0.9% NACL 1 MG/10 ML
SYRINGE (ML) INTRAVENOUS
Status: DISCONTINUED
Start: 2025-05-22 | End: 2025-05-22 | Stop reason: WASHOUT

## 2025-05-22 RX ORDER — PROPOFOL 10 MG/ML
VIAL (ML) INTRAVENOUS
Status: DISCONTINUED | OUTPATIENT
Start: 2025-05-22 | End: 2025-05-22

## 2025-05-22 RX ADMIN — PIPERACILLIN AND TAZOBACTAM 4.5 G: 4; .5 INJECTION, POWDER, LYOPHILIZED, FOR SOLUTION INTRAVENOUS; PARENTERAL at 05:05

## 2025-05-22 RX ADMIN — CYANOCOBALAMIN TAB 500 MCG 1000 MCG: 500 TAB at 04:05

## 2025-05-22 RX ADMIN — FOLIC ACID 1000 MCG: 1 TABLET ORAL at 04:05

## 2025-05-22 RX ADMIN — PROPOFOL 70 MG: 10 INJECTION, EMULSION INTRAVENOUS at 02:05

## 2025-05-22 RX ADMIN — PROPOFOL 100 MG: 10 INJECTION, EMULSION INTRAVENOUS at 02:05

## 2025-05-22 RX ADMIN — PIPERACILLIN AND TAZOBACTAM 4.5 G: 4; .5 INJECTION, POWDER, LYOPHILIZED, FOR SOLUTION INTRAVENOUS; PARENTERAL at 04:05

## 2025-05-22 RX ADMIN — SODIUM CHLORIDE, SODIUM GLUCONATE, SODIUM ACETATE, POTASSIUM CHLORIDE AND MAGNESIUM CHLORIDE: 526; 502; 368; 37; 30 INJECTION, SOLUTION INTRAVENOUS at 02:05

## 2025-05-22 RX ADMIN — PROPOFOL 100 MCG/KG/MIN: 10 INJECTION, EMULSION INTRAVENOUS at 02:05

## 2025-05-22 RX ADMIN — METHYLPREDNISOLONE SODIUM SUCCINATE 40 MG: 40 INJECTION, POWDER, FOR SOLUTION INTRAMUSCULAR; INTRAVENOUS at 09:05

## 2025-05-22 RX ADMIN — SODIUM SULFATE, POTASSIUM SULFATE, MAGNESIUM SULFATE 177 ML: 17.5; 3.13; 1.6 SOLUTION, CONCENTRATE ORAL at 05:05

## 2025-05-22 RX ADMIN — METHYLPREDNISOLONE SODIUM SUCCINATE 40 MG: 40 INJECTION, POWDER, FOR SOLUTION INTRAMUSCULAR; INTRAVENOUS at 08:05

## 2025-05-22 RX ADMIN — LIDOCAINE HYDROCHLORIDE 80 MG: 20 INJECTION INTRAVENOUS at 02:05

## 2025-05-22 NOTE — ANESTHESIA POSTPROCEDURE EVALUATION
Anesthesia Post Evaluation    Patient: Elaina Pierce    Procedure(s) Performed: Procedure(s) (LRB):  COLON (N/A)  COLONOSCOPY, WITH 1 OR MORE BIOPSIES    Final Anesthesia Type: general      Patient location during evaluation: GI PACU  Patient participation: Yes- Able to Participate  Level of consciousness: awake and alert, awake and oriented  Post-procedure vital signs: reviewed and stable  Pain management: adequate  Airway patency: patent    PONV status at discharge: No PONV  Anesthetic complications: no      Cardiovascular status: blood pressure returned to baseline, hemodynamically stable and stable  Respiratory status: unassisted, spontaneous ventilation and room air  Hydration status: euvolemic  Follow-up not needed.              Vitals Value Taken Time   /74 05/22/25 14:59   Temp 36.7 °C (98.1 °F) 05/22/25 14:50   Pulse 92 05/22/25 14:59   Resp 19 05/22/25 14:59   SpO2 100 % 05/22/25 14:59         No case tracking events are documented in the log.      Pain/Joel Score: Pain Rating Prior to Med Admin: 0 (5/22/2025  2:53 PM)  Pain Rating Post Med Admin: 0 (5/22/2025  2:53 PM)  Joel Score: 9 (5/22/2025  2:50 PM)

## 2025-05-22 NOTE — ANESTHESIA PREPROCEDURE EVALUATION
2025  Elaina Pierce is a 39 y.o., female, fresh hematochezia x 2 days w syncopal episode this morning . Transfused 2 U PRBCs.    Pre-operative evaluation for Procedure(s) (LRB):  COLON (N/A)    Elaina Pierce is a 39 y.o. female     Problem List[1]    Review of patient's allergies indicates:  No Known Allergies    Medications Ordered Prior to Encounter[2]    Past Surgical History:   Procedure Laterality Date    BONE MARROW ASPIRATION N/A 2023    Procedure: ASPIRATION, BONE MARROW;  Surgeon: Gayatri Howard MD;  Location: [facility] ENDOSCOPY;  Service: General;  Laterality: N/A;     SECTION  2012, 19    COLONOSCOPY      Luzmaria had several of these procedures    COLONOSCOPY, WITH 1 OR MORE BIOPSIES N/A 2023    Procedure: COLONOSCOPY, WITH 1 OR MORE BIOPSIES;  Surgeon: Bisi Holliday MD;  Location: [facility] ENDOSCOPY;  Service: Gastroenterology;  Laterality: N/A;    EGD, WITH CLOSED BIOPSY N/A 2023    Procedure: EGD, WITH CLOSED BIOPSY;  Surgeon: Bisi Holliday MD;  Location: [facility] ENDOSCOPY;  Service: Gastroenterology;  Laterality: N/A;    INTRALUMINAL GASTROINTESTINAL TRACT IMAGING VIA CAPSULE N/A 2025    Procedure: IMAGING PROCEDURE, GI TRACT, INTRALUMINAL, VIA CAPSULE;  Surgeon: Bisi Holliday MD;  Location: [facility] ENDOSCOPY;  Service: Gastroenterology;  Laterality: N/A;    TONSILLECTOMY      TUBAL LIGATION  2019    UPPER GASTROINTESTINAL ENDOSCOPY      I've had several of these       Social History[3]      CBC:   Recent Labs     25  0634 25  0437   WBC 7.46 5.49   RBC 3.14* 3.08*   HGB 8.4* 8.4*   HCT 25.8* 26.3*    203   MCV 82.2 85.4   MCH 26.8* 27.3   MCHC 32.6* 31.9*       CMP:   Recent Labs     25  0559 25  0634 25  0437    138 139   K 3.9 3.3* 3.6   * 102 104   CO2 14*  27 26   BUN 8.3 4.5* 4.1*   CREATININE 0.64 0.49* 0.54*   * 124* 121*   MG 1.60  --   --    CALCIUM 8.1* 8.3* 8.2*   ALBUMIN 3.3* 3.0*  --    PROT 6.8 5.8*  --    ALKPHOS 60 43  --    ALT 7 8  --    AST 12 12  --    BILITOT 0.2 0.5  --        INR  Recent Labs     05/20/25  0559   INR 1.1   PROTIME 13.7   APTT 27.5           Diagnostic Studies:  CXR :    EKG 5/20/2025:  NSR=88. Cannot r/o ant infarct, age?  US Carotids: No stenosis  Echo EF=60-65%.  Pos bubble.  Trace AI/MR/TR.    Pre-op Assessment    I have reviewed the Patient Summary Reports.     I have reviewed the Nursing Notes. I have reviewed the NPO Status.   I have reviewed the Medications.     Review of Systems  Anesthesia Hx:  No problems with previous Anesthesia   History of prior surgery of interest to airway management or planning:  Previous anesthesia: General, Spinal BTL :; with general anesthesia.   C/Sx2. with spinal.      Airway issues documented on chart review include GETA     Denies Family Hx of Anesthesia complications.    Denies Personal Hx of Anesthesia complications.                    Social:  No Alcohol Use, Non-Smoker       Cardiovascular:     Denies Pacemaker.     Denies CABG/stent.            US Carotids: No stenosis  Echo EF=60-65%.  Pos bubble.  Trace AI/MR/TR. Patient not on beta blockers                          Pulmonary:    Denies COPD.  Denies Asthma.     Denies Sleep Apnea.                Hepatic/GI:      Denies GERD.   15y/o Crohns  but remission. Not Taking GLP-1 Agonists            Neurological:  Denies TIA.  Denies CVA.   Headaches Denies Seizures.                                    Physical Exam  General: Well nourished, Cooperative, Alert and Oriented    Airway:  Mallampati: II / I  Mouth Opening: Normal  TM Distance: Normal  Neck ROM: Normal ROM  Narrow upper palate roof  Dental:        Anesthesia Plan  Type of Anesthesia, risks & benefits discussed:    Anesthesia Type: Gen Natural Airway  Intra-op Monitoring Plan:  Standard ASA Monitors  Induction:  IV  Informed Consent: Informed consent signed with the Patient and all parties understand the risks and agree with anesthesia plan.  All questions answered.   ASA Score: 2  Day of Surgery Review of History & Physical: H&P Update referred to the surgeon/provider.I have interviewed and examined the patient. I have reviewed the patient's H&P dated:   Anesthesia Plan Notes: TIVA with mask/NC, GA back-up.   Nasal cannula vs facemask supplemental oxygenation   For patients with CHRISTAL/obesity, may consider SuperNoval Nasal CPAP      Ready For Surgery From Anesthesia Perspective.     .           [1]   Patient Active Problem List  Diagnosis    Abnormal urine finding    B12 deficiency    Folate deficiency    Abnormal bone marrow examination    Iron deficiency anemia    History of blood transfusion    MGUS (monoclonal gammopathy of unknown significance)    Constipation    Indeterminate colitis    Free monoclonal light chain    Encounter for medication refill    Annual physical exam    Acute cystitis with hematuria    Urinary urgency    Occult blood positive stool    Anemia due to chronic blood loss    Menorrhagia with regular cycle   [2]   No current facility-administered medications on file prior to encounter.     Current Outpatient Medications on File Prior to Encounter   Medication Sig Dispense Refill    cyanocobalamin, vitamin B-12, 1,000 mcg Lozg Place 1 tablet under the tongue every morning. 30 lozenge 6    ergocalciferol (ERGOCALCIFEROL) 50,000 unit Cap Take 1 capsule (50,000 Units total) by mouth every 7 days. for 24 doses 12 capsule 1    FEROSUL 325 mg (65 mg iron) Tab tablet Take 1 tablet (325 mg total) by mouth once daily. 90 tablet 3    folic acid (FOLVITE) 1 MG tablet Take 1 tablet (1,000 mcg total) by mouth once daily. 90 tablet 3    medroxyPROGESTERone (PROVERA) 10 MG tablet Take 1 tablet (10 mg total) by mouth once daily. 30 tablet 12   [3]   Social History  Socioeconomic  History    Marital status: Other    Number of children: 3    Highest education level: 12th grade   Occupational History    Occupation: self employed/   Tobacco Use    Smoking status: Never     Passive exposure: Never    Smokeless tobacco: Never   Substance and Sexual Activity    Alcohol use: Yes     Alcohol/week: 1.0 standard drink of alcohol     Types: 1 Glasses of wine per week    Drug use: Never    Sexual activity: Yes     Partners: Male     Birth control/protection: Other-see comments     Comment: Tubed tied     Social Drivers of Health     Financial Resource Strain: Low Risk  (5/20/2025)    Overall Financial Resource Strain (CARDIA)     Difficulty of Paying Living Expenses: Not hard at all   Food Insecurity: No Food Insecurity (5/20/2025)    Hunger Vital Sign     Worried About Running Out of Food in the Last Year: Never true     Ran Out of Food in the Last Year: Never true   Transportation Needs: No Transportation Needs (5/20/2025)    PRAPARE - Transportation     Lack of Transportation (Medical): No     Lack of Transportation (Non-Medical): No   Physical Activity: Insufficiently Active (6/3/2024)    Exercise Vital Sign     Days of Exercise per Week: 1 day     Minutes of Exercise per Session: 10 min   Stress: No Stress Concern Present (5/20/2025)    [employer] [place] [employer] - Occupational Stress Questionnaire     Feeling of Stress : Not at all   Housing Stability: Low Risk  (5/20/2025)    Housing Stability Vital Sign     Unable to Pay for Housing in the Last Year: No     Homeless in the Last Year: No

## 2025-05-22 NOTE — TRANSFER OF CARE
"Anesthesia Transfer of Care Note    Patient: Elaina Pierce    Procedure(s) Performed: Procedure(s) (LRB):  COLON (N/A)  COLONOSCOPY, WITH 1 OR MORE BIOPSIES    Patient location: PACU    Anesthesia Type: MAC    Transport from OR: Transported from OR on room air with adequate spontaneous ventilation    Post pain: adequate analgesia    Post assessment: no apparent anesthetic complications and tolerated procedure well    Post vital signs: stable    Level of consciousness: responds to stimulation    Nausea/Vomiting: no nausea/vomiting    Complications: none    Transfer of care protocol was followed    Last vitals: Visit Vitals  /84 (BP Location: Left arm, Patient Position: Lying)   Pulse 98   Temp 36.3 °C (97.3 °F) (Tympanic)   Resp 17   Ht 5' 1" (1.549 m)   Wt 54.4 kg (120 lb)   LMP 05/01/2025 (Exact Date)   SpO2 97%   Breastfeeding No   BMI 22.67 kg/m²     "

## 2025-05-22 NOTE — PROCEDURES
Elaina Pierce   MRN: [id number]   ADMISSION DATE: 2025  : 1985  AGE: 39 y.o.    PROCEDURE:  Colonoscopy with biopsy    DATE OF PROCEDURE:  2025     SURGEON: VIVI VENEGAS    PREOPERATIVE DIAGNOSIS:  1. Symptomatic anemia  2. History of inflammatory bowel disease (probable Crohn's disease)  3. Hematochezia     POSTOPERATIVE DIAGNOSIS:  1. Left-sided colitis,  moderate with small superficial linear ulcerations.  Suggestive of inflammatory bowel disease.  Multiple biopsies were obtained.  2. Random biopsies obtained from terminal ileum, right and transverse colon as well as rectum.  3. Internal hemorrhoids, grade 2-3.  Otherwise normal exam     HISTORY AND PHYSICAL:  As per preoperative note.      DESCRIPTION OF PROCEDURE:  Informed consent was obtained.  Patient was placed in left lateral position.  Sedation per anesthesia service.  Rectal exam was unremarkable.  Olympus video colonoscope was introduced into the rectum and was carefully advanced to cecum.  Quality of the preparation was satisfactory.  Patient tolerated the procedure well without any complications.    FINDINGS:  There was moderate inflammation noted in the left colon extending from splenic flexure to rectosigmoid region consistent with the findings on imaging study.  There were areas of multiple linear ulcerations which were clean based suggestive of inflammatory bowel disease (probable Crohn's disease).  There was also patchy areas of inflammation along with loss of vascularity in some areas.  Multiple biopsies were obtained.  Cecum, ascending colon and transverse colon appeared unremarkable.  Rectum appeared unremarkable as well except grade 2-3 internal hemorrhoids.  Multiple biopsies were obtained from right colon, transverse colon and rectum.  Terminal ileum was intubated during procedure and appeared to be unremarkable.  Additional biopsies were obtained from terminal ileum as well.    ESTIMATED BLOOD LOSS:  8-10  cc    COMPLICATIONS:  None    RECOMMENDATIONS:  1. Follow up biopsy results.    2. Presently on IV steroids along with antibiotic.  3. Patient will need follow up with the primary gastroenterologist Dr. Bisi Holliday at [facility] on discharge.

## 2025-05-23 VITALS
HEIGHT: 61 IN | SYSTOLIC BLOOD PRESSURE: 114 MMHG | WEIGHT: 120 LBS | OXYGEN SATURATION: 100 % | HEART RATE: 78 BPM | RESPIRATION RATE: 18 BRPM | BODY MASS INDEX: 22.66 KG/M2 | TEMPERATURE: 98 F | DIASTOLIC BLOOD PRESSURE: 76 MMHG

## 2025-05-23 LAB
BASOPHILS # BLD AUTO: 0.03 X10(3)/MCL
BASOPHILS NFR BLD AUTO: 0.3 %
CALPROTECTIN STL-MCNT: 2277 MCG/G
EOSINOPHIL # BLD AUTO: 0.06 X10(3)/MCL (ref 0–0.9)
EOSINOPHIL NFR BLD AUTO: 0.6 %
ERYTHROCYTE [DISTWIDTH] IN BLOOD BY AUTOMATED COUNT: 22.5 % (ref 11.5–17)
HCT VFR BLD AUTO: 30.4 % (ref 37–47)
HGB BLD-MCNC: 9.2 G/DL (ref 12–16)
IMM GRANULOCYTES # BLD AUTO: 0.08 X10(3)/MCL (ref 0–0.04)
IMM GRANULOCYTES NFR BLD AUTO: 0.9 %
LYMPHOCYTES # BLD AUTO: 1.74 X10(3)/MCL (ref 0.6–4.6)
LYMPHOCYTES NFR BLD AUTO: 18.5 %
MCH RBC QN AUTO: 26.7 PG (ref 27–31)
MCHC RBC AUTO-ENTMCNC: 30.3 G/DL (ref 33–36)
MCV RBC AUTO: 88.4 FL (ref 80–94)
MONOCYTES # BLD AUTO: 1.13 X10(3)/MCL (ref 0.1–1.3)
MONOCYTES NFR BLD AUTO: 12 %
NEUTROPHILS # BLD AUTO: 6.37 X10(3)/MCL (ref 2.1–9.2)
NEUTROPHILS NFR BLD AUTO: 67.7 %
NRBC BLD AUTO-RTO: 0.3 %
PLATELET # BLD AUTO: 120 X10(3)/MCL (ref 130–400)
PLATELETS.RETICULATED NFR BLD AUTO: 10.7 % (ref 0.9–11.2)
PMV BLD AUTO: 10.8 FL (ref 7.4–10.4)
RBC # BLD AUTO: 3.44 X10(6)/MCL (ref 4.2–5.4)
WBC # BLD AUTO: 9.41 X10(3)/MCL (ref 4.5–11.5)

## 2025-05-23 PROCEDURE — 36415 COLL VENOUS BLD VENIPUNCTURE: CPT | Performed by: INTERNAL MEDICINE

## 2025-05-23 PROCEDURE — 25000003 PHARM REV CODE 250: Performed by: NURSE PRACTITIONER

## 2025-05-23 PROCEDURE — 63600175 PHARM REV CODE 636 W HCPCS: Performed by: NURSE PRACTITIONER

## 2025-05-23 PROCEDURE — 63600175 PHARM REV CODE 636 W HCPCS: Mod: JZ,TB | Performed by: PHYSICIAN ASSISTANT

## 2025-05-23 PROCEDURE — 25000003 PHARM REV CODE 250: Performed by: INTERNAL MEDICINE

## 2025-05-23 PROCEDURE — 85025 COMPLETE CBC W/AUTO DIFF WBC: CPT | Performed by: INTERNAL MEDICINE

## 2025-05-23 RX ORDER — CIPROFLOXACIN 500 MG/1
500 TABLET, FILM COATED ORAL EVERY 12 HOURS
Qty: 6 TABLET | Refills: 0 | Status: SHIPPED | OUTPATIENT
Start: 2025-05-23 | End: 2025-05-27

## 2025-05-23 RX ORDER — PREDNISONE 10 MG/1
TABLET ORAL
Qty: 70 TABLET | Refills: 0 | Status: SHIPPED | OUTPATIENT
Start: 2025-05-23 | End: 2025-06-20

## 2025-05-23 RX ADMIN — METHYLPREDNISOLONE SODIUM SUCCINATE 40 MG: 40 INJECTION, POWDER, FOR SOLUTION INTRAMUSCULAR; INTRAVENOUS at 10:05

## 2025-05-23 RX ADMIN — PIPERACILLIN AND TAZOBACTAM 4.5 G: 4; .5 INJECTION, POWDER, LYOPHILIZED, FOR SOLUTION INTRAVENOUS; PARENTERAL at 08:05

## 2025-05-23 RX ADMIN — FOLIC ACID 1000 MCG: 1 TABLET ORAL at 08:05

## 2025-05-23 RX ADMIN — CYANOCOBALAMIN TAB 500 MCG 1000 MCG: 500 TAB at 08:05

## 2025-05-23 RX ADMIN — PIPERACILLIN AND TAZOBACTAM 4.5 G: 4; .5 INJECTION, POWDER, LYOPHILIZED, FOR SOLUTION INTRAVENOUS; PARENTERAL at 12:05

## 2025-05-23 NOTE — CONSULTS
Inpatient consult to Cardiology  Consult performed by: Wyatt Mckeon FNP  Consult ordered by: Cassidy Welsh MD  Reason for consult: Syncope/PFO        [facility]    Cardiology  Consult Note    Patient Name: Elaina Pierce  MRN: [id number]  Admission Date: 2025  Hospital Length of Stay: 3 days  Code Status: Full Code   Attending Provider: Cassidy Welsh MD   Consulting Provider: ANNA Webb  Primary Care Physician: No primary care provider on file.  Principal Problem:Indeterminate colitis    Patient information was obtained from patient, past medical records, ER records, and primary team.     Subjective:   Chief Complaint/Reason for Consult: Syncope/PFO    HPI:   Ms. Pierce is a 39 year old female, unknown to [facility], who presented to the hospital status post syncopal episode. Patient noted to be severely anemic requiring blood transfusion. Echo revealed PFO. [facility] consulted for PFO evaluation given her presenting syncope.    PMH: Chronic Constipation, Crohn's Disease, Anemia, IBS, Fe Deficiency   PSH: Bone Marrow Biopsy, , Colonoscopy, EGD, Capsule Study, Tubal Ligation  Family History: Mother- DM/Drug Abuse/Hypertension, Father- No known Problems  Social History: Tobacco- Negative, Alcohol- Infrequent Use, Substance Abuse- Negative    Previous Cardiac Diagnostics:   Echocardiogram (25):  Left Ventricle: The left ventricle is normal in size. Normal wall thickness. There is normal systolic function with a visually estimated ejection fraction of 60 - 65%.  Right Ventricle: The right ventricle is normal in size Systolic function is normal. TAPSE is 2.8 cm.  Left Atrium: Agitated saline study of the atrial septum is positive, consistent with an intracardiac shunt at the atrial level.  Mitral Valve: Mildly thickened leaflets.  IVC/SVC: Normal venous pressure at 3 mmHg.    Carotid US (25):  The right internal carotid artery is patent with no  evidence of stenosis.  The left internal carotid artery is patent with no evidence of stenosis.  Bilateral vertebral arteries are patent with antegrade flow.     Review of patient's allergies indicates:  No Known Allergies  No current facility-administered medications on file prior to encounter.     Current Outpatient Medications on File Prior to Encounter   Medication Sig    cyanocobalamin, vitamin B-12, 1,000 mcg Lozg Place 1 tablet under the tongue every morning.    ergocalciferol (ERGOCALCIFEROL) 50,000 unit Cap Take 1 capsule (50,000 Units total) by mouth every 7 days. for 24 doses    FEROSUL 325 mg (65 mg iron) Tab tablet Take 1 tablet (325 mg total) by mouth once daily.    folic acid (FOLVITE) 1 MG tablet Take 1 tablet (1,000 mcg total) by mouth once daily.    medroxyPROGESTERone (PROVERA) 10 MG tablet Take 1 tablet (10 mg total) by mouth once daily.     Review of Systems   Respiratory:  Negative for chest tightness.    Cardiovascular:  Negative for chest pain.   Neurological:  Positive for syncope.   All other systems reviewed and are negative.    Objective:     Vital Signs (Most Recent):  Temp: 97.9 °F (36.6 °C) (05/23/25 0748)  Pulse: 78 (05/23/25 0748)  Resp: 18 (05/23/25 0800)  BP: 114/76 (05/23/25 0748)  SpO2: 100 % (05/23/25 0748) Vital Signs (24h Range):  Temp:  [97.5 °F (36.4 °C)-98.3 °F (36.8 °C)] 97.9 °F (36.6 °C)  Pulse:  [78-99] 78  Resp:  [18-21] 18  SpO2:  [98 %-100 %] 100 %  BP: (104-128)/(66-79) 114/76   Weight: 54.4 kg (120 lb)  Body mass index is 22.67 kg/m².  SpO2: 100 %       Intake/Output Summary (Last 24 hours) at 5/23/2025 1103  Last data filed at 5/22/2025 1700  Gross per 24 hour   Intake 1360 ml   Output --   Net 1360 ml     Lines/Drains/Airways       Peripheral Intravenous Line  Duration                  Peripheral IV - Single Lumen 05/20/25 1435 18 G 2 1/4 in Yes Right;Medial Upper Arm 2 days                  Significant Labs:   Chemistries:   Recent Labs   Lab 05/20/25  3233  05/21/25  0634 05/22/25  0437    138 139   K 3.9 3.3* 3.6   * 102 104   CO2 14* 27 26   BUN 8.3 4.5* 4.1*   CREATININE 0.64 0.49* 0.54*   CALCIUM 8.1* 8.3* 8.2*   PROT 6.8 5.8*  --    BILITOT 0.2 0.5  --    ALKPHOS 60 43  --    ALT 7 8  --    AST 12 12  --    MG 1.60  --   --    TROPONINI <0.010  --   --         CBC/Anemia Labs: Coags:    Recent Labs   Lab 05/21/25  0634 05/22/25  0437 05/23/25  0939   WBC 7.46 5.49 9.41   HGB 8.4* 8.4* 9.2*   HCT 25.8* 26.3* 30.4*    203 120*   MCV 82.2 85.4 88.4   RDW 22.1* 22.6* 22.5*    Recent Labs   Lab 05/20/25  0559   INR 1.1   APTT 27.5        Significant Imaging:  Imaging Results              CT Abdomen Pelvis W Wo Contrast (Final result)  Result time 05/20/25 10:47:11      Final result by Julio Haas MD (05/20/25 10:47:11)                   Impression:      Active inflammatory bowel disease descending colon through the rectum.  A site of active GI bleeding is not clearly seen.      Electronically signed by: Julio Haas  Date:    05/20/2025  Time:    10:47               Narrative:    EXAMINATION:  CT ABDOMEN PELVIS W WO CONTRAST    CLINICAL HISTORY:  GI bleed;. Syncope.    TECHNIQUE:  Helical acquisition through the abdomen and pelvis without and with IV contrast.  Three plane reconstructions were provided for review. DLP 1165 mGycm. Automatic exposure control, adjustment of mA/kV or iterative reconstruction technique was used to reduce radiation.    COMPARISON:  2 June 2024, 28 December 2020    FINDINGS:  The limited imaged lung bases are clear.    There is no significant abnormality of the liver.  Distended gallbladder but no significant pericholecystic inflammation.  No significant biliary ductal dilatation.  Mild splenomegaly.  An enhancing lesion within the spleen on image 25 series 9 is stable going back to 2020.    Similar mild dilatation of the main pancreatic duct.  Normal adrenals.  No hydronephrosis.    There is no bowel obstruction.   There is colonic wall thickening and mucosal enhancement extending from the splenic flexure through the rectum.  Site of active GI bleeding is not seen.  No free air.    Urinary bladder distended.  No pelvic free fluid.  Abdominal aorta is normal in caliber.    There are no acute osseous findings.                                       CT Head Without Contrast (Final result)  Result time 05/20/25 10:07:29      Final result by Julio Haas MD (05/20/25 10:07:29)                   Impression:      No acute intracranial findings.      Electronically signed by: Julio Haas  Date:    05/20/2025  Time:    10:07               Narrative:    EXAMINATION:  CT HEAD WITHOUT CONTRAST    CLINICAL HISTORY:  Syncope.Head trauma, moderate-severe;    TECHNIQUE:  CT imaging of the head performed from the skull base to the vertex without intravenous contrast.   mGycm. Automatic exposure control, adjustment of mA/kV or iterative reconstruction technique was used to reduce radiation.    COMPARISON:  7 January 2015    FINDINGS:  There is no acute cortical infarct, hemorrhage or mass lesion.  The ventricles are normal in size.    Visualized paranasal sinuses and mastoid air cells are clear. The calvarium is grossly intact.                                    EKG:       Telemetry:  Sinus Rhythm    Physical Exam  Vitals and nursing note reviewed.   Constitutional:       Appearance: Normal appearance.   HENT:      Head: Normocephalic.      Mouth/Throat:      Mouth: Mucous membranes are moist.      Pharynx: Oropharynx is clear.   Cardiovascular:      Rate and Rhythm: Normal rate and regular rhythm.      Heart sounds: Normal heart sounds.   Pulmonary:      Effort: Pulmonary effort is normal. No respiratory distress.      Breath sounds: Normal breath sounds. No wheezing or rales.   Abdominal:      Palpations: Abdomen is soft.   Musculoskeletal:         General: Normal range of motion.   Skin:     General: Skin is warm and dry.    Neurological:      Mental Status: She is alert. Mental status is at baseline.   Psychiatric:         Behavior: Behavior normal.       Home Medications:   Medications Ordered Prior to Encounter[1]  Current Schedule Inpatient Medications:   cyanocobalamin  1,000 mcg Oral Daily    folic acid  1,000 mcg Oral Daily    methylPREDNISolone injection (PEDS and ADULTS)  40 mg Intravenous Q12H    piperacillin-tazobactam (Zosyn) IV (PEDS and ADULTS) (extended infusion is not appropriate)  4.5 g Intravenous Q8H       Assessment:   Syncope- likely due to Anemia    - Carotid US Negative    - CT Head: No Acute Intracranial Findings   Positive Bubble Study    - Agitated saline study of the atrial septum is positive, consistent with an intracardiac shunt at the atrial level (Echo 5.21.25)  Acute on Chronic Symptomatic Anemia    - Status Post PRBC Transfusion  UTI  Hypokalemia  Lactic Acidosis (Resolved)  Hematochezia due to Possible Crohn's Flare   Thrombocytopenia    Plan:   Echo findings discussed with patient  Nothing to do from Cardiac Standpoint.  DC planning as per Primary Team  Will sign off. Call if needed.    Thank you for your consult.     ANNA Webb  Cardiology  [facility]   Physician addendum:  I have seen and examined this patient as a split-shared visit with the GUANAK[place]O d/t complicated medical management of above problems written in assessment and high acuity requiring physician expertise in medical decision-making. I performed the substantive portion of the history and exam. Above medical decision-making is also formulated by me.    Cardiovascular exam:  S1, S2  Lungs:  Clear to auscultation bilaterally   Extremities:  no edema bilaterally    Plan:  Syncope not due to PFO.  Appears due to anemia and hypotension.  Continue current meds.  We will sign off.  Patient can be followed up in clinic if and whenever patient wants.  Provided information.  Sean Brar MD  Cardiologist          [1]   No current facility-administered medications on file prior to encounter.     Current Outpatient Medications on File Prior to Encounter   Medication Sig Dispense Refill    cyanocobalamin, vitamin B-12, 1,000 mcg Lozg Place 1 tablet under the tongue every morning. 30 lozenge 6    ergocalciferol (ERGOCALCIFEROL) 50,000 unit Cap Take 1 capsule (50,000 Units total) by mouth every 7 days. for 24 doses 12 capsule 1    FEROSUL 325 mg (65 mg iron) Tab tablet Take 1 tablet (325 mg total) by mouth once daily. 90 tablet 3    folic acid (FOLVITE) 1 MG tablet Take 1 tablet (1,000 mcg total) by mouth once daily. 90 tablet 3    medroxyPROGESTERone (PROVERA) 10 MG tablet Take 1 tablet (10 mg total) by mouth once daily. 30 tablet 12

## 2025-05-23 NOTE — PROGRESS NOTES
[facility] Hospital Medicine Progress Note        Chief Complaint: Inpatient Follow-up     HPI:   39-year-old female Patient presented to ED for evaluation of bloody stools , syncopal episode.   She reported being diagnosed with Crohn's when she was 14 years old, reported no flares in the last 2 decades, she reported passing bloody stools starting 05/19/2025 1 day prior to arrival, worsened to the point she had a syncopal episode on the day of presentation.  She reported she follows with the GI, Hematology at [facility].  She reported she was doing well all these years without any treatments ,she no longer has  diagnosis of Crohn's .  She also reported left lower quadrant abdominal pain that started yesterday.  Reported was able to tolerate all kinds of diet , no dietary restrictions, no lactose intolerance.      On arrival to ER patient was tachycardic 124, tachypneic 25 normotensive on room air.  Labs were significant for hemoglobin of 7.8, hematocrit 26.8, lactate 2.3, bicarb 14, UA significant for bacteria, pyuria, esterase positive 2 units PRBC being transfused, received IV fluids.  Repeat lactate normalized.  Patient received IV Zosyn, Solu-Medrol.  GI team was consulted.  Admitted to HM service.    GI team evaluated the patient, recommended inpatient colonoscopy in the coming days    Interval Hx:   No acute events reported overnight. Did colon prep with resolution of bleeding. Colonoscopy findings c/w Crohn's pending biopsies.     Advance diet tomorrow and hopefully discharge.    UCx + E.coli with susceptibilities available      Case was discussed with patient's nurse     Objective/physical exam:  General: In no acute distress, afebrile  Chest: Clear to auscultation bilaterally  Heart: RRR, +S1, S2, no appreciable murmur  Abdomen: Soft, LLQ TTP  MSK: Warm, no lower extremity edema  Neurologic: Alert and oriented, grossly nonfocal  VITAL SIGNS: 24 HRS MIN & MAX LAST   Temp  Min: 97.3 °F  (36.3 °C)  Max: 98.4 °F (36.9 °C) 97.9 °F (36.6 °C)   BP  Min: 98/68  Max: 133/84 104/69   Pulse  Min: 86  Max: 106  86   Resp  Min: 17  Max: 21 20   SpO2  Min: 97 %  Max: 100 % 98 %     I have reviewed the following labs:  Recent Labs   Lab 05/20/25 0559 05/21/25 0634 05/22/25  0437   WBC 9.39 7.46 5.49   RBC 3.01* 3.14* 3.08*   HGB 7.8* 8.4* 8.4*   HCT 26.8* 25.8* 26.3*   MCV 89.0 82.2 85.4   MCH 25.9* 26.8* 27.3   MCHC 29.1* 32.6* 31.9*   RDW 26.2* 22.1* 22.6*   * 207 203   MPV 10.3 10.5* 10.8*     Recent Labs   Lab 05/20/25 0559 05/21/25 0634 05/22/25  0437    138 139   K 3.9 3.3* 3.6   * 102 104   CO2 14* 27 26   BUN 8.3 4.5* 4.1*   CREATININE 0.64 0.49* 0.54*   * 124* 121*   CALCIUM 8.1* 8.3* 8.2*   MG 1.60  --   --    ALBUMIN 3.3* 3.0*  --    PROT 6.8 5.8*  --    ALKPHOS 60 43  --    ALT 7 8  --    AST 12 12  --    BILITOT 0.2 0.5  --      Microbiology Results (last 7 days)       Procedure Component Value Units Date/Time    Blood Culture #1 **CANNOT BE ORDERED STAT** [[id number]]  (Normal) Collected: 05/20/25 1424    Order Status: Completed Specimen: Blood Updated: 05/22/25 1701     Blood Culture No Growth At 48 Hours    Blood Culture #2 **CANNOT BE ORDERED STAT** [[id number]]  (Normal) Collected: 05/20/25 1424    Order Status: Completed Specimen: Blood Updated: 05/22/25 1701     Blood Culture No Growth At 48 Hours    Stool Culture [[id number]] Collected: 05/20/25 2323    Order Status: Completed Specimen: Stool Updated: 05/22/25 1014    Narrative:      Negative for Salmonella, Shigella, Campylobacter, Vibrio, Aeromonas, Pleisiomonas,or Yersinia.    Urine culture [[id number]]  (Abnormal)  (Susceptibility) Collected: 05/20/25 1122    Order Status: Completed Specimen: Urine Updated: 05/22/25 0715     Urine Culture >/= 100,000 colonies/ml Escherichia coli             See below for Radiology    Assessment/Plan:  Hematochezia ?  Crohn's flare  Acute on chronic symptomatic anemia s/p  prbc transfusion  Syncope due to above  Metabolic acidosis from diarrhea-resolved  UTI POA  Hypokalemia  Lactic acidosis-normalized      Continue to monitor on tele  GI team evaluated the patient, recommended inpatient colonoscopy in the coming days, follow GI team recommendations   Steroids per GI, Trend inflammatory markers  Continue IV Zosyn   Potassium repletion   Supportive care   Otherwise continue current care and monitoring   Follow labs    VTE prophylaxis:  SCDs          Portions of this note dictated using EMR integrated voice recognition software, and may be subject to voice recognition errors not corrected at proofreading. Please contact writer for clarification if needed.   _____________________________________________________________________    Malnutrition Status:  Nutrition consulted. Most recent weight and BMI monitored-     Measurements:  Wt Readings from Last 1 Encounters:   05/20/25 54.4 kg (120 lb)   Body mass index is 22.67 kg/m².    Patient has been screened and assessed by RD.    Malnutrition Type:  Context:    Level:      Malnutrition Characteristic Summary:       Interventions/Recommendations (treatment strategy):        Scheduled Med:   cyanocobalamin  1,000 mcg Oral Daily    folic acid  1,000 mcg Oral Daily    LIDOcaine (PF) 20 mg/mL (2%)        methylPREDNISolone injection (PEDS and ADULTS)  40 mg Intravenous Q12H    piperacillin-tazobactam (Zosyn) IV (PEDS and ADULTS) (extended infusion is not appropriate)  4.5 g Intravenous Q8H    propofol          Continuous Infusions:     PRN Meds:    Current Facility-Administered Medications:     0.9%  NaCl infusion (for blood administration), , Intravenous, Q24H PRN    acetaminophen, 1,000 mg, Oral, Q6H PRN    acetaminophen, 650 mg, Oral, Q4H PRN    dextrose 50%, 12.5 g, Intravenous, PRN    dextrose 50%, 25 g, Intravenous, PRN    glucagon (human recombinant), 1 mg, Intramuscular, PRN    glucose, 16 g, Oral, PRN    glucose, 24 g, Oral, PRN     LIDOcaine (PF) 20 mg/mL (2%), , ,     morphine, 2 mg, Intravenous, Q6H PRN    naloxone, 0.02 mg, Intravenous, PRN    ondansetron, 4 mg, Intravenous, Q4H PRN    prochlorperazine, 5 mg, Intravenous, Q6H PRN    propofol, , ,     sodium chloride 0.9%, 10 mL, Intravenous, PRN     Radiology:  I have personally reviewed the following imaging and agree with the radiologist.     CV Ultrasound Bilateral Doppler Carotid  The right internal carotid artery is patent with no evidence of stenosis.  The left internal carotid artery is patent with no evidence of stenosis.  Bilateral vertebral arteries are patent with antegrade flow.       Cassidy Welsh MD  Department of Hospital Medicine   [facility]   05/22/2025

## 2025-05-23 NOTE — PLAN OF CARE
05/23/25 1035   Final Note   Assessment Type Final Discharge Note   Anticipated Discharge Disposition Home   Post-Acute Status   Discharge Delays None known at this time

## 2025-05-25 LAB
BACTERIA BLD CULT: NORMAL
BACTERIA BLD CULT: NORMAL

## 2025-05-26 LAB — PSYCHE PATHOLOGY RESULT: NORMAL

## 2025-05-26 NOTE — DISCHARGE SUMMARY
[facility] Hospital Medicine Discharge Summary    Admit Date: 5/20/2025  Discharge Date and Time: 5/23/25  Admitting Physician:  Team  Discharging Physician: Cassidy Welsh MD.  Primary Care Physician: No primary care provider on file.  Consults: Cardiology and Gastroenterology    Discharge Diagnoses:  Syncope  Suspected Crohn's Disease  Symptomatic Anemia  UTI  PFO    Hospital Course:   39-year-old female Patient presented to ED for evaluation of bloody stools , syncopal episode.   She reported being diagnosed with Crohn's when she was 14 years old, reported no flares in the last 2 decades, she reported passing bloody stools starting 05/19/2025 1 day prior to arrival, worsened to the point she had a syncopal episode on the day of presentation.  She reported she follows with the GI, Hematology at [facility].  She reported she was doing well all these years without any treatments ,she no longer has  diagnosis of Crohn's .  She also reported left lower quadrant abdominal pain that started yesterday.  Reported was able to tolerate all kinds of diet , no dietary restrictions, no lactose intolerance.      On arrival to ER patient was tachycardic 124, tachypneic 25 normotensive on room air.  Labs were significant for hemoglobin of 7.8, hematocrit 26.8, lactate 2.3, bicarb 14, UA significant for bacteria, pyuria, esterase positive 2 units PRBC being transfused, received IV fluids.  Repeat lactate normalized.  Patient received IV Zosyn, Solu-Medrol.  GI team was consulted.  Admitted to  service.     GI team evaluated the patient, recommended inpatient colonoscopy in the coming days     Hospital Course.  Patient was transfused 2 units RBC with appropriate response.   Colonoscopy findings c/w Crohn's pending biopsies. GI recommends a  steroid taper and close  follow up with the primary gastroenterologist Dr. Bisi Holliday at [facility] on discharge.       UCx + E.coli Rx for  ciprofloxacin Rx sent.       Physical Exam:  General: In no acute distress, afebrile  Chest: Clear to auscultation bilaterally  Heart: RRR, +S1, S2, no appreciable murmur  Abdomen: Soft, LLQ TTP  MSK: Warm, no lower extremity edema  Neurologic: Alert and oriented, grossly nonfocal  Pt was seen and examined on the day of discharge    Procedures Performed: No admission procedures for hospital encounter.     Significant Diagnostic Studies: See Full reports for all details    Recent Labs   Lab 05/21/25  0634 05/22/25  0437 05/23/25  0939   WBC 7.46 5.49 9.41   RBC 3.14* 3.08* 3.44*   HGB 8.4* 8.4* 9.2*   HCT 25.8* 26.3* 30.4*   MCV 82.2 85.4 88.4   MCH 26.8* 27.3 26.7*   MCHC 32.6* 31.9* 30.3*   RDW 22.1* 22.6* 22.5*    203 120*   MPV 10.5* 10.8* 10.8*       Recent Labs   Lab 05/20/25  0559 05/21/25  0634 05/22/25  0437    138 139   K 3.9 3.3* 3.6   * 102 104   CO2 14* 27 26   BUN 8.3 4.5* 4.1*   CREATININE 0.64 0.49* 0.54*   * 124* 121*   CALCIUM 8.1* 8.3* 8.2*   MG 1.60  --   --    ALBUMIN 3.3* 3.0*  --    PROT 6.8 5.8*  --    ALKPHOS 60 43  --    ALT 7 8  --    AST 12 12  --    BILITOT 0.2 0.5  --         Microbiology Results (last 7 days)       Procedure Component Value Units Date/Time    Stool Culture [[id number]] Collected: 05/20/25 0283    Order Status: Completed Specimen: Stool Updated: 05/23/25 0926    Narrative:      Negative for Salmonella, Shigella, Campylobacter, Vibrio, Aeromonas, Pleisiomonas,or Yersinia.    Urine culture [[id number]]  (Abnormal)  (Susceptibility) Collected: 05/20/25 1122    Order Status: Completed Specimen: Urine Updated: 05/22/25 0715     Urine Culture >/= 100,000 colonies/ml Escherichia coli             CV Ultrasound Bilateral Doppler Carotid  The right internal carotid artery is patent with no evidence of stenosis.  The left internal carotid artery is patent with no evidence of stenosis.  Bilateral vertebral arteries are patent with antegrade flow.           Medication List        START taking these medications      ciprofloxacin HCl 500 MG tablet  Commonly known as: CIPRO  Take 1 tablet (500 mg total) by mouth every 12 (twelve) hours. for 3 days     predniSONE 10 MG tablet  Commonly known as: DELTASONE  Take 4 tablets (40 mg total) by mouth once daily for 7 days, THEN 3 tablets (30 mg total) once daily for 7 days, THEN 2 tablets (20 mg total) once daily for 7 days, THEN 1 tablet (10 mg total) once daily for 7 days.  Start taking on: May 23, 2025            CONTINUE taking these medications      cyanocobalamin (vitamin B-12) 1,000 mcg Lozg  Place 1 tablet under the tongue every morning.     ergocalciferol 50,000 unit Cap  Commonly known as: ERGOCALCIFEROL  Take 1 capsule (50,000 Units total) by mouth every 7 days. for 24 doses     FeroSuL 325 mg (65 mg iron) Tab tablet  Generic drug: ferrous sulfate  Take 1 tablet (325 mg total) by mouth once daily.     folic acid 1 MG tablet  Commonly known as: FOLVITE  Take 1 tablet (1,000 mcg total) by mouth once daily.     medroxyPROGESTERone 10 MG tablet  Commonly known as: PROVERA  Take 1 tablet (10 mg total) by mouth once daily.               Where to Get Your Medications        These medications were sent to [id number] [place], [place] - [address] () & [address]  [address] [address]      Phone: [phone]   ciprofloxacin HCl 500 MG tablet  predniSONE 10 MG tablet          Explained in detail to the patient about the discharge plan, medications, and follow-up visits. Pt understands and agrees with the treatment plan  Discharge Disposition: Home or Self Care   Discharged Condition: stable  Diet-    Medications Per DC med rec  Activities as tolerated   Follow-up Information       Dr. Bisi Holliday at [facility] on discharge. Follow up.               Bisi Holliday MD Follow up in 2 week(s).    Specialty: Gastroenterology  Contact  information:  [address] [address]  [phone]                           For further questions contact hospitalist office    Discharge time 33 minutes    For worsening symptoms, chest pain, shortness of breath, increased abdominal pain, high grade fever, stroke or stroke like symptoms, immediately go to the nearest Emergency Room or call 911 as soon as possible.      Cassidy Cheney M.D, on 5/25/2025. at 9:49 PM.

## 2025-05-27 ENCOUNTER — PATIENT OUTREACH (OUTPATIENT)
Dept: ADMINISTRATIVE | Facility: CLINIC | Age: 40
End: 2025-05-27
Payer: COMMERCIAL

## 2025-05-27 NOTE — PROGRESS NOTES
C3 nurse spoke with Elaina Pierce for a [facility] hospital discharge follow up call.  No pcp at this time.  Patient has appt with ANNA Patel on 10/2/25 to establish care.  Patient will be scheduling appt with Bisi Holliday MD (Gastroenterology).

## 2025-05-29 LAB
ANCA AB PATTERN SER IF-IMP: ABNORMAL
ANCA IGG SER QL: POSITIVE
BAKER'S YEAST IGA QN IA: 11.5 RU/ML
BAKER'S YEAST IGG QN IA: 3.1 RU/ML

## 2025-06-03 ENCOUNTER — OFFICE VISIT (OUTPATIENT)
Dept: GASTROENTEROLOGY | Facility: CLINIC | Age: 40
End: 2025-06-03
Payer: COMMERCIAL

## 2025-06-03 VITALS
RESPIRATION RATE: 16 BRPM | DIASTOLIC BLOOD PRESSURE: 89 MMHG | WEIGHT: 114 LBS | SYSTOLIC BLOOD PRESSURE: 128 MMHG | BODY MASS INDEX: 21.52 KG/M2 | OXYGEN SATURATION: 96 % | HEART RATE: 104 BPM | TEMPERATURE: 98 F | HEIGHT: 61 IN

## 2025-06-03 DIAGNOSIS — K52.3 INDETERMINATE COLITIS: Primary | ICD-10-CM

## 2025-06-03 DIAGNOSIS — D50.0 IRON DEFICIENCY ANEMIA DUE TO CHRONIC BLOOD LOSS: ICD-10-CM

## 2025-06-03 LAB
25(OH)D3+25(OH)D2 SERPL-MCNC: 7 NG/ML (ref 30–80)
ALBUMIN SERPL-MCNC: 3.7 G/DL (ref 3.5–5)
ALBUMIN/GLOB SERPL: 0.9 RATIO (ref 1.1–2)
ALP SERPL-CCNC: 63 UNIT/L (ref 40–150)
ALT SERPL-CCNC: 8 UNIT/L (ref 0–55)
ANION GAP SERPL CALC-SCNC: 9 MEQ/L
AST SERPL-CCNC: 10 UNIT/L (ref 11–45)
BASOPHILS # BLD AUTO: 0 X10(3)/MCL
BASOPHILS NFR BLD AUTO: 0 %
BILIRUB SERPL-MCNC: 0.2 MG/DL
BUN SERPL-MCNC: 8.8 MG/DL (ref 7–18.7)
CALCIUM SERPL-MCNC: 8.9 MG/DL (ref 8.4–10.2)
CHLORIDE SERPL-SCNC: 106 MMOL/L (ref 98–107)
CO2 SERPL-SCNC: 24 MMOL/L (ref 22–29)
CREAT SERPL-MCNC: 0.64 MG/DL (ref 0.55–1.02)
CREAT/UREA NIT SERPL: 14
CRP SERPL-MCNC: 7.3 MG/L
EOSINOPHIL # BLD AUTO: 0 X10(3)/MCL (ref 0–0.9)
EOSINOPHIL NFR BLD AUTO: 0 %
ERYTHROCYTE [DISTWIDTH] IN BLOOD BY AUTOMATED COUNT: 19 % (ref 11.5–17)
FERRITIN SERPL-MCNC: 27.41 NG/ML (ref 4.63–204)
GFR SERPLBLD CREATININE-BSD FMLA CKD-EPI: >60 ML/MIN/1.73/M2
GLOBULIN SER-MCNC: 3.9 GM/DL (ref 2.4–3.5)
GLUCOSE SERPL-MCNC: 109 MG/DL (ref 74–100)
HBV CORE AB SERPL QL IA: NONREACTIVE
HBV SURFACE AG SERPL QL IA: REACTIVE
HBV SURFACE AG SERPLBLD QL IA.RAPID: NORMAL
HCT VFR BLD AUTO: 29.4 % (ref 37–47)
HCV AB SERPL QL IA: NONREACTIVE
HGB BLD-MCNC: 8.8 G/DL (ref 12–16)
IMM GRANULOCYTES # BLD AUTO: 0.07 X10(3)/MCL (ref 0–0.04)
IMM GRANULOCYTES NFR BLD AUTO: 0.8 %
LYMPHOCYTES # BLD AUTO: 0.63 X10(3)/MCL (ref 0.6–4.6)
LYMPHOCYTES NFR BLD AUTO: 7.1 %
MCH RBC QN AUTO: 26.3 PG (ref 27–31)
MCHC RBC AUTO-ENTMCNC: 29.9 G/DL (ref 33–36)
MCV RBC AUTO: 88 FL (ref 80–94)
MONOCYTES # BLD AUTO: 0.41 X10(3)/MCL (ref 0.1–1.3)
MONOCYTES NFR BLD AUTO: 4.6 %
NEUTROPHILS # BLD AUTO: 7.72 X10(3)/MCL (ref 2.1–9.2)
NEUTROPHILS NFR BLD AUTO: 87.5 %
NRBC BLD AUTO-RTO: 0.3 %
PLATELET # BLD AUTO: 353 X10(3)/MCL (ref 130–400)
PMV BLD AUTO: 9.8 FL (ref 7.4–10.4)
POTASSIUM SERPL-SCNC: 3.4 MMOL/L (ref 3.5–5.1)
PROT SERPL-MCNC: 7.6 GM/DL (ref 6.4–8.3)
RBC # BLD AUTO: 3.34 X10(6)/MCL (ref 4.2–5.4)
SODIUM SERPL-SCNC: 139 MMOL/L (ref 136–145)
WBC # BLD AUTO: 8.83 X10(3)/MCL (ref 4.5–11.5)

## 2025-06-03 PROCEDURE — 85025 COMPLETE CBC W/AUTO DIFF WBC: CPT | Performed by: INTERNAL MEDICINE

## 2025-06-03 PROCEDURE — 86480 TB TEST CELL IMMUN MEASURE: CPT | Performed by: INTERNAL MEDICINE

## 2025-06-03 PROCEDURE — 86803 HEPATITIS C AB TEST: CPT | Performed by: INTERNAL MEDICINE

## 2025-06-03 PROCEDURE — 87340 HEPATITIS B SURFACE AG IA: CPT | Performed by: INTERNAL MEDICINE

## 2025-06-03 PROCEDURE — 86704 HEP B CORE ANTIBODY TOTAL: CPT | Performed by: INTERNAL MEDICINE

## 2025-06-03 PROCEDURE — 82728 ASSAY OF FERRITIN: CPT | Performed by: INTERNAL MEDICINE

## 2025-06-03 PROCEDURE — 82306 VITAMIN D 25 HYDROXY: CPT | Performed by: INTERNAL MEDICINE

## 2025-06-03 PROCEDURE — 84630 ASSAY OF ZINC: CPT | Performed by: INTERNAL MEDICINE

## 2025-06-03 PROCEDURE — 87341 HEP B SURFACE AG NEUTRLZJ IA: CPT | Performed by: INTERNAL MEDICINE

## 2025-06-03 PROCEDURE — 83993 ASSAY FOR CALPROTECTIN FECAL: CPT | Performed by: INTERNAL MEDICINE

## 2025-06-03 PROCEDURE — 86140 C-REACTIVE PROTEIN: CPT | Performed by: INTERNAL MEDICINE

## 2025-06-03 PROCEDURE — 80053 COMPREHEN METABOLIC PANEL: CPT | Performed by: INTERNAL MEDICINE

## 2025-06-03 RX ORDER — HYDROCORTISONE ACETATE 25 MG/1
25 SUPPOSITORY RECTAL 2 TIMES DAILY
Qty: 28 SUPPOSITORY | Refills: 1 | Status: SHIPPED | OUTPATIENT
Start: 2025-06-03 | End: 2025-06-09 | Stop reason: SDUPTHER

## 2025-06-03 NOTE — PROGRESS NOTES
Inflammatory Bowel Disease        Established Patient Note         TODAY'S VISIT DATE:  6/3/2025  The patient's last visit with me was on Visit date not found.     PCP: No primary care provider on file.      Referring MD:   No ref. provider found    History of Present Illness:    Elaina is a 39 year old AAF with iron deficiency anemia, indeterminate colitis favor Crohn's disease, chronic constipation here for follow-up.      She was diagnosed by Dr. Loyola around the age of 15.  She recalls being hospitalized for an extended period of time for severe anemia and rectal bleeding.  She recalls rectal bleeding was intermittent, associated with bright red blood without diarrhea.  She was then seen by Dr. Rivera for a period time followed by Dr. Andrade at [facility] around 2015.  Her last colonoscopy with Dr. Jalili was around the age of 17 (20 years ago).  Prior notes in the chart mention small bowel disease and previously treated with prednisone, pentasa, azathioprine, and 3-5 doses of Remicade which was stopped due to developing hives.  She has been off therapy for over 20 years.  Her rectal bleeding has since resolved.  She does have documentation of severe anemia for over 20 years requiring multiple blood  transfusions.  She has been on and off iron over the years.  She was off iron prior to her hospitalization at [facility] in 2022 when she presented with severe anemia Hgb 1.7 g/dL and pancytopenia. Labs during that admission showed B12: 201, folate 6.4, ferritin 27.5.  She was treated with B12, folic acid and given empiric prednisone in case immune mediated.  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  Intrinsic factor Ab negative.      At her initial evaluation with me in November 2023 she reported chronic constipation, 2 bowel movements weekly with associated abdominal pain.    CRP <1.0    December 6, 2023: EGD: 4 mm submucosal nodule at 35 cm. HP  "negative erosive gastritis. Normal duodenum.     December 6, 2023: Colonoscopy: Three scattered erosions in the TI. Four small patchy areas of mild inflammation in the rectum, sigmoid, and hepatic flexure.  Scarring and altered vascularity in the rectum to the descending colon.  Internal and external hemorrhoids. Path: TI: mild chronic inflammation. Focal active colitis in right and left colon.  Mild chronic in rectum.     She continued with constipation issues and was given Amitiza 24 mcg BID but this was not helpful with constipation.  She never turned in a fecal calprotectin.  CRP 1.4.    Hospitalized in June 2024 for pyelonephritis.  Hgb 5.8 g/dL then and given 2 uPRBCs.   Venofer x 2 June/July 2024 and again in Jan/Feb 2025  Feraheme x 2 in April 2025  On ferrous sulfate 325 mg once daily    February 11, 2025: VCE: normal    When seen last she had a few self limiting episodes of diarrhea.  I request a fecal calprotectin but she never turned one in.    Presented to [facility] on May 20, 2025 with acute onset of rectal bleeding and syncope - reported 5-6 episodes of BRB with clots.      May 22, 2025: Colonoscopy (Dr. Pulido): "moderate inflammation" from rectosigmoid to splenic flexure with multiple linear ulcerations.  Grade 2-3 internal hemorrhoids.   Images reviewed personally - patchy erythema, mild edema seen mainly in left colon with scattered punctate superficial ulcerations.    Pathology: random colon biopsies (location not specified) normal colon tissue and mildly active IBD and mild crypt architectural distortion without acute inflammation c/w quiescent IBD.  She was given prednisone on discharge.   Hemoglobin 9.2 g/dL on discharge.  CRP 10    Today she reports 4 loose stools a day with blood in toilet with most bowel movements.  She is not sure if the prednisone is helping or not.  Denies any nocturnal stools.  No urgency but some tenesmus.  She has some abdominal cramping with bowel movements.  Weight is " "stable and appetite is good.     She is on provera for menorrhagia.       She has had 3 healthy pregnancies.  She did require blood transfusions due to anemia during her pregnancies.  She has monthly menstrual cycles with every other month she has heavy cycles with 5-6 tampons a day for 4-5 days with associated fatigue.      She denies any NSAID use.  She denies any FH of IBD.  She does not smoke, drink alcohol or use recreational drugs.         IBD History:   IBD disease: Indeterminate colitis, favor Crohn's disease   Disease location: colon (mainly left?, TI?)   Disease behavior: non penetrating and non stricturing      Current IBD Therapy:  none    Therapeutic Drug Monitoring Labs:  none    Prior IBD Therapies:  Prednisone  Pentasa  Azathioprine  Remicade      Pertinent Endoscopy/Imaging:    December 6, 2023: EGD: 4 mm submucosal nodule at 35 cm. HP negative erosive gastritis. Normal duodenum.     December 6, 2023: Colonoscopy: Three scattered erosions in the TI. Four small patchy areas of mild inflammation in the rectum, sigmoid, and hepatic flexure.  Scarring and altered vascularity in the rectum to the descending colon.  Internal and external hemorrhoids. Path: TI: mild chronic inflammation. Focal active colitis in right and left colon.  Mild chronic in rectum.     February 11, 2025: VCE: normal    May 2025: Fecal calprotectin: 2277    May 22, 2025: Colonoscopy (Dr. Pulido): "moderate inflammation" from rectosigmoid to splenic flexure with multiple linear ulcerations.  Grade 2-3 internal hemorrhoids.   Images reviewed personally - patchy erythema, mild edema seen mainly in left colon with scattered punctate superficial ulcerations.    Pathology: random colon biopsies (location not specified) normal colon tissue and mildly active IBD and mild crypt architectural distortion without acute inflammation c/w quiescent IBD.        Prior Pertinent Surgeries:   none    Complications:   none    Extraintestinal " Manifestations:  None    Review of Systems   Constitutional:  Negative for appetite change and unexpected weight change.   HENT:  Negative for trouble swallowing.    Respiratory:  Negative for chest tightness.    Cardiovascular: Negative.    Gastrointestinal:  Positive for abdominal pain and constipation. Negative for change in bowel habit.   Musculoskeletal: Negative.    Neurological: Negative.    Psychiatric/Behavioral: Negative.     All other systems reviewed and are negative.         Medical/Surgical History:   Past Medical History:   Diagnosis Date    Chronic constipation     Crohn's disease     History of Crohn's disease     unclear how diagnosis was made    Inflammatory bowel disease     Iron deficiency anemia, unspecified      Past Surgical History:   Procedure Laterality Date    BONE MARROW ASPIRATION N/A 2023    Procedure: ASPIRATION, BONE MARROW;  Surgeon: Gayatri Howard MD;  Location: [facility] ENDOSCOPY;  Service: General;  Laterality: N/A;     SECTION  2012, 19    COLONOSCOPY      Luzmaria had several of these procedures    COLONOSCOPY N/A 2025    Procedure: COLON;  Surgeon: Lio Pulido MD;  Location: [facility] ENDOSCOPY;  Service: Gastroenterology;  Laterality: N/A;    COLONOSCOPY, WITH 1 OR MORE BIOPSIES N/A 2023    Procedure: COLONOSCOPY, WITH 1 OR MORE BIOPSIES;  Surgeon: Bisi Holliday MD;  Location: [facility] ENDOSCOPY;  Service: Gastroenterology;  Laterality: N/A;    COLONOSCOPY, WITH 1 OR MORE BIOPSIES  2025    Procedure: COLONOSCOPY, WITH 1 OR MORE BIOPSIES;  Surgeon: Lio Pulido MD;  Location: [facility] ENDOSCOPY;  Service: Gastroenterology;;    EGD, WITH CLOSED BIOPSY N/A 2023    Procedure: EGD, WITH CLOSED BIOPSY;  Surgeon: Bisi Holliday MD;  Location: [facility] ENDOSCOPY;  Service: Gastroenterology;  Laterality: N/A;    INTRALUMINAL GASTROINTESTINAL TRACT IMAGING VIA CAPSULE N/A 2025    Procedure: IMAGING PROCEDURE, GI TRACT,  INTRALUMINAL, VIA CAPSULE;  Surgeon: Bisi Holliday MD;  Location: [facility] ENDOSCOPY;  Service: Gastroenterology;  Laterality: N/A;    TONSILLECTOMY      TUBAL LIGATION  02/14/2019    UPPER GASTROINTESTINAL ENDOSCOPY  1999    I've had several of these         Family History:   Family History   Problem Relation Name Age of Onset    Hypertension Mother Halie Saavedra     Diabetes Mother Halie Saavedra     Drug abuse Mother Halie Saavedra     No Known Problems Father      Kidney disease Maternal Grandmother Halina James     Diabetes Maternal Grandmother Halina James     Stroke Paternal Grandmother Jade [place]         Social History:   Social History     Socioeconomic History    Marital status: Other    Number of children: 3    Highest education level: 12th grade   Occupational History    Occupation: self employed/   Tobacco Use    Smoking status: Never     Passive exposure: Never    Smokeless tobacco: Never   Substance and Sexual Activity    Alcohol use: Yes     Alcohol/week: 1.0 standard drink of alcohol     Types: 1 Glasses of wine per week    Drug use: Never    Sexual activity: Yes     Partners: Male     Birth control/protection: Other-see comments     Comment: Tubes tied     Social Drivers of Health     Financial Resource Strain: Low Risk  (5/20/2025)    Overall Financial Resource Strain (CARDIA)     Difficulty of Paying Living Expenses: Not hard at all   Food Insecurity: No Food Insecurity (5/20/2025)    Hunger Vital Sign     Worried About Running Out of Food in the Last Year: Never true     Ran Out of Food in the Last Year: Never true   Transportation Needs: No Transportation Needs (5/20/2025)    PRAPARE - Transportation     Lack of Transportation (Medical): No     Lack of Transportation (Non-Medical): No   Physical Activity: Insufficiently Active (6/3/2024)    Exercise Vital Sign     Days of Exercise per Week: 1 day     Minutes of Exercise per Session: 10 min   Stress: No Stress Concern Present  "(5/20/2025)    [employer] - Occupational Stress Questionnaire     Feeling of Stress : Not at all   Housing Stability: Low Risk  (5/20/2025)    Housing Stability Vital Sign     Unable to Pay for Housing in the Last Year: No     Homeless in the Last Year: No        Review of patient's allergies indicates:  No Known Allergies    Current Medications:   Outpatient Medications Marked as Taking for the 6/3/25 encounter (Office Visit) with Bisi Holliday MD   Medication Sig Dispense Refill    cyanocobalamin, vitamin B-12, 1,000 mcg Lozg Place 1 tablet under the tongue every morning. 30 lozenge 6    ergocalciferol (ERGOCALCIFEROL) 50,000 unit Cap Take 1 capsule (50,000 Units total) by mouth every 7 days. for 24 doses 12 capsule 1    FEROSUL 325 mg (65 mg iron) Tab tablet Take 1 tablet (325 mg total) by mouth once daily. 90 tablet 3    folic acid (FOLVITE) 1 MG tablet Take 1 tablet (1,000 mcg total) by mouth once daily. 90 tablet 3    medroxyPROGESTERone (PROVERA) 10 MG tablet Take 1 tablet (10 mg total) by mouth once daily. 30 tablet 12    predniSONE (DELTASONE) 10 MG tablet Take 4 tablets (40 mg total) by mouth once daily for 7 days, THEN 3 tablets (30 mg total) once daily for 7 days, THEN 2 tablets (20 mg total) once daily for 7 days, THEN 1 tablet (10 mg total) once daily for 7 days. 70 tablet 0        Vital Signs:  /89 (BP Location: Left arm, Patient Position: Sitting)   Pulse 104   Temp 98 °F (36.7 °C) (Oral)   Resp 16   Ht 5' 1" (1.549 m)   Wt 51.7 kg (114 lb)   LMP 05/01/2025 (Exact Date)   SpO2 96%   BMI 21.54 kg/m²      Physical Exam  Constitutional:       Appearance: Normal appearance.   HENT:      Head: Normocephalic and atraumatic.      Mouth/Throat:      Mouth: Mucous membranes are moist.   Eyes:      Conjunctiva/sclera: Conjunctivae normal.   Cardiovascular:      Rate and Rhythm: Normal rate and regular rhythm.   Pulmonary:      Effort: Pulmonary effort is " normal.      Breath sounds: Normal breath sounds.   Abdominal:      General: Bowel sounds are normal.      Palpations: Abdomen is soft.   Musculoskeletal:         General: Normal range of motion.      Cervical back: Normal range of motion.   Skin:     General: Skin is warm.   Neurological:      General: No focal deficit present.      Mental Status: She is alert and oriented to person, place, and time. Mental status is at baseline.   Psychiatric:         Mood and Affect: Mood normal.         Behavior: Behavior normal.         Labs: Reviewed  Hgb   Date Value Ref Range Status   05/23/2025 9.2 (L) 12.0 - 16.0 g/dL Final     Hemoglobin   Date Value Ref Range Status   08/11/2022 5.4 (LL) 12.0 - 16.0 g/dL Final     Albumin   Date Value Ref Range Status   05/21/2025 3.0 (L) 3.5 - 5.0 g/dL Final     Iron   Date Value Ref Range Status   08/10/2022 107 25 - 156 ug/dL Final     Iron Level   Date Value Ref Range Status   05/16/2025 53 50 - 170 ug/dL Final     Ferritin   Date Value Ref Range Status   08/10/2022 27.5 5.0 - 204.0 NG/ML Final     Ferritin Level   Date Value Ref Range Status   05/16/2025 110.61 4.63 - 204.00 ng/mL Final     Folate   Date Value Ref Range Status   08/11/2022 6.4 (L) 7.3 - 19.9 ng/ml Final     Folate Level   Date Value Ref Range Status   01/14/2025 8.1 7.0 - 31.4 ng/mL Final     Vitamin B12   Date Value Ref Range Status   01/14/2025 540 213 - 816 pg/mL Final     Vitamin D   Date Value Ref Range Status   01/07/2025 16 (L) 30 - 80 ng/mL Final     Zinc, Serum/Plasma   Date Value Ref Range Status   05/28/2024 83.1 60.0 - 120.0 ug/dL Final     Comment:     INTERPRETIVE INFORMATION: Zinc, Serum or Plasma    Elevated results may be due to skin or collection-related   contamination, including the use of a noncertified metal-free   collection/transport tube. If contamination concerns exist due to   elevated levels of serum/plasma zinc, confirmation with a second   specimen collected in a certified metal-free  tube is recommended.    Circulating zinc concentrations are dependent on albumin status   and are depressed with malnutrition.  Zinc may also be lowered   with infection, inflammation, stress, oral contraceptives, and   pregnancy.  Zinc may be elevated with zinc supplementation or   fasting.  Elevated zinc concentrations may interfere with copper   absorption.     This test was developed and its performance characteristics   determined by [facility]. It has not been cleared or   approved by the US Food and Drug Administration. This test was   performed in a CLIA certified laboratory and is intended for   clinical purposes.  Performed By: [facility]  [address] [place] [id number]  : Zurdo Knowles MD, PhD  CLIA Number: [id number]     CRP   Date Value Ref Range Status   05/22/2025 10.70 (H) <5.00 mg/L Final     Calprotectin, F   Date Value Ref Range Status   05/20/2025 2277 (H) <50.0 (Normal) mcg/g Final     Comment:     Interpretation: Abnormal (>120 mcg/g)     Test Performed by:  [facility] [place]  [address] [place] [address]  : Dheeraj Vega Ph.D.; CLIA# [id number]     Hep C Ab Interp   Date Value Ref Range Status   07/13/2023 Nonreactive Nonreactive Final        Assessment/Plan:    Problem List Items Addressed This Visit       Iron deficiency anemia    Indeterminate colitis - Primary    Diagnosed in the setting of anemia and rectal bleeding per patient report  Unclear how diagnosis was made and what involvement of the intestinal system was noted.  Based on recent colonoscopy - left colonic involvement possible up to hepatic flexure, possible TI  Reported Crohn's disease per patient - favor this given reported small bowel disease     Had treatment in the past with prednisone, pentasa, azathioprine and 3-5 doses of Remicade which was stopped due to infusion reaction (hives)  Off therapy for over 20  "years and no significant GI symptoms other than constipation   Severe anemia in 2022 in the setting of iron, B12, and folate deficiency.  No overt GI bleeding at that time     December 6, 2023: Colonoscopy: Three scattered erosions in the TI. Four small patchy areas of mild inflammation in the rectum, sigmoid, and hepatic flexure.  Scarring and altered vascularity in the rectum to the descending colon.  Internal and external hemorrhoids  February 11, 2025: VCE: normal     Hospitalized in May 2025 for rectal bleeding - thought due in part to active IBD  May 22, 2025: Colonoscopy (Dr. Pulido): "moderate inflammation" from rectosigmoid to splenic flexure with multiple linear ulcerations.  Grade 2-3 internal hemorrhoids.   Images reviewed personally - patchy erythema, mild edema seen mainly in left colon with scattered punctate superficial ulcerations.  Path with normal colon tissue and mild crypt architectural distortion without acute inflammation c/w quiescent IBD     Some discordant endoscopic and histology findings on May 2025 colonoscopy - query infectious colitis changes   May 2025: Fecal calprotectin: 2277    Continues with diarrhea and rectal bleeding  At this junction, given clinical symptoms and endoscopic findings, I am concerned that bleeding is mainly hemorrhoidal in nature  Given persistent symptoms, however, advanced therapy treatment was discussed today    Repeat fecal calprotectin now  Dicussed may need to consider repeat endoscopy, flex sig vs colonoscopy  Will proceed with Skyrizi therapy  Hydrocortisone suppositories, sitz baths for hemorrhoids  Recheck labs today  Feraheme iron replacement               Relevant Orders    Calprotectin, Stool (Completed)    CBC Auto Differential (Completed)    Comprehensive Metabolic Panel (Completed)    C-Reactive Protein (Completed)    Ferritin (Completed)    Vitamin D (Completed)    Zinc (Completed)    Quantiferon Gold TB (Completed)    Hepatitis B Surface Antigen " (Completed)    Hepatitis B Core Antibody, Total (Completed)    Hepatitis C Antibody (Completed)    Hepatitis B Surface Antigen, Confirmation (Completed)         HEALTH MAINTENANCE:     Vaccinations:    Influenza (inactive):  recommended annually   PCV 20 (Prevnar): May 2024  Tetanus (TdaP): July 2023, recommended every 10 years  HPV (males and females ages 11-46 yo): N/A  Meningococcal:  No risk factors   Hepatitis B: not immune   Hepatitis A:  not immune   MMR (live vaccine): UTD          Chickenpox status/Varicella (live vaccine):  vaccinated, +Ab  Shingrix: recommended   COVID-19: vaccinated 2021    Colorectal cancer risk:  Risk factors: >  8 years of disease, > 1/3 colon involved    - Distribution of colonic disease: unclear    - Year of symptom onset: 2000    - Colonoscopy every 2-3 years after endoscopic remission    Ophthalmologic exam recommended yearly  Dermatologic exam recommended yearly due to risk of skin cancer with IBD/meds    Bone health:   Calcium 2357-8237 mg daily and vitamin D 800 IU daily   Risk factors for osteopenia/osteoporosis: none   Vitamin D: 16   Ergocalciferol 50,000 IU weekly x 12 weeks completed, recheck     DEXA scan: Recommend baseline    Smoking status: nonsmoker      Follow up: 3 months

## 2025-06-03 NOTE — ASSESSMENT & PLAN NOTE
"Diagnosed in the setting of anemia and rectal bleeding per patient report  Unclear how diagnosis was made and what involvement of the intestinal system was noted.  Based on recent colonoscopy - left colonic involvement possible up to hepatic flexure, possible TI  Reported Crohn's disease per patient - favor this given reported small bowel disease     Had treatment in the past with prednisone, pentasa, azathioprine and 3-5 doses of Remicade which was stopped due to infusion reaction (hives)  Off therapy for over 20 years and no significant GI symptoms other than constipation   Severe anemia in 2022 in the setting of iron, B12, and folate deficiency.  No overt GI bleeding at that time     December 6, 2023: Colonoscopy: Three scattered erosions in the TI. Four small patchy areas of mild inflammation in the rectum, sigmoid, and hepatic flexure.  Scarring and altered vascularity in the rectum to the descending colon.  Internal and external hemorrhoids  February 11, 2025: VCE: normal     Hospitalized in May 2025 for rectal bleeding - thought due in part to active IBD  May 22, 2025: Colonoscopy (Dr. Pulido): "moderate inflammation" from rectosigmoid to splenic flexure with multiple linear ulcerations.  Grade 2-3 internal hemorrhoids.   Images reviewed personally - patchy erythema, mild edema seen mainly in left colon with scattered punctate superficial ulcerations.  Path with normal colon tissue and mild crypt architectural distortion without acute inflammation c/w quiescent IBD     Some discordant endoscopic and histology findings on May 2025 colonoscopy - query infectious colitis changes   May 2025: Fecal calprotectin: 2277    Continues with diarrhea and rectal bleeding  At this junction, given clinical symptoms and endoscopic findings, I am concerned that bleeding is mainly hemorrhoidal in nature  Given persistent symptoms, however, advanced therapy treatment was discussed today    Repeat fecal calprotectin now  Dicussed " may need to consider repeat endoscopy, flex sig vs colonoscopy  Will proceed with Skyrizi therapy  Hydrocortisone suppositories, sitz baths for hemorrhoids  Recheck labs today  Feraheme iron replacement

## 2025-06-05 LAB
GAMMA INTERFERON BACKGROUND BLD IA-ACNC: 0.02 IU/ML
M TB IFN-G BLD-IMP: NEGATIVE
M TB IFN-G CD4+ BCKGRND COR BLD-ACNC: 0 IU/ML
M TB IFN-G CD4+CD8+ BCKGRND COR BLD-ACNC: 0 IU/ML
MITOGEN IGNF BCKGRD COR BLD-ACNC: 1.8 IU/ML

## 2025-06-05 RX ORDER — HEPARIN 100 UNIT/ML
500 SYRINGE INTRAVENOUS
OUTPATIENT
Start: 2025-06-05

## 2025-06-05 RX ORDER — SODIUM CHLORIDE 0.9 % (FLUSH) 0.9 %
10 SYRINGE (ML) INJECTION
OUTPATIENT
Start: 2025-06-05

## 2025-06-05 RX ORDER — EPINEPHRINE 0.3 MG/.3ML
0.3 INJECTION SUBCUTANEOUS ONCE AS NEEDED
OUTPATIENT
Start: 2025-06-05

## 2025-06-06 LAB
AR ZINC, SERUM/PLASMA: 50.2 UG/DL
CALPROTECTIN STL-MCNT: 902 MCG/G

## 2025-06-08 ENCOUNTER — PATIENT MESSAGE (OUTPATIENT)
Dept: GASTROENTEROLOGY | Facility: CLINIC | Age: 40
End: 2025-06-08
Payer: MEDICAID

## 2025-06-09 DIAGNOSIS — K52.3 INDETERMINATE COLITIS: Primary | ICD-10-CM

## 2025-06-09 RX ORDER — HYDROCORTISONE ACETATE 25 MG/1
25 SUPPOSITORY RECTAL 2 TIMES DAILY
Qty: 28 SUPPOSITORY | Refills: 1 | Status: SHIPPED | OUTPATIENT
Start: 2025-06-09 | End: 2025-07-07

## 2025-06-12 ENCOUNTER — INFUSION (OUTPATIENT)
Dept: INFUSION THERAPY | Facility: HOSPITAL | Age: 40
End: 2025-06-12
Payer: COMMERCIAL

## 2025-06-12 ENCOUNTER — PATIENT MESSAGE (OUTPATIENT)
Dept: GASTROENTEROLOGY | Facility: CLINIC | Age: 40
End: 2025-06-12
Payer: COMMERCIAL

## 2025-06-12 VITALS
BODY MASS INDEX: 21.18 KG/M2 | WEIGHT: 112.19 LBS | HEIGHT: 61 IN | TEMPERATURE: 98 F | RESPIRATION RATE: 20 BRPM | OXYGEN SATURATION: 99 % | HEART RATE: 100 BPM | SYSTOLIC BLOOD PRESSURE: 130 MMHG | DIASTOLIC BLOOD PRESSURE: 100 MMHG

## 2025-06-12 DIAGNOSIS — D50.0 IRON DEFICIENCY ANEMIA DUE TO CHRONIC BLOOD LOSS: Primary | ICD-10-CM

## 2025-06-12 DIAGNOSIS — K52.3 INDETERMINATE COLITIS: Primary | ICD-10-CM

## 2025-06-12 PROCEDURE — 96365 THER/PROPH/DIAG IV INF INIT: CPT

## 2025-06-12 PROCEDURE — 63600175 PHARM REV CODE 636 W HCPCS: Mod: JZ,TB | Performed by: INTERNAL MEDICINE

## 2025-06-12 PROCEDURE — 25000003 PHARM REV CODE 250: Performed by: INTERNAL MEDICINE

## 2025-06-12 RX ORDER — EPINEPHRINE 1 MG/ML
0.3 INJECTION INTRAMUSCULAR; INTRAVENOUS; SUBCUTANEOUS ONCE AS NEEDED
Status: DISCONTINUED | OUTPATIENT
Start: 2025-06-12 | End: 2025-06-12 | Stop reason: HOSPADM

## 2025-06-12 RX ORDER — EPINEPHRINE 0.3 MG/.3ML
0.3 INJECTION SUBCUTANEOUS ONCE AS NEEDED
OUTPATIENT
Start: 2025-06-19

## 2025-06-12 RX ORDER — HEPARIN 100 UNIT/ML
500 SYRINGE INTRAVENOUS
OUTPATIENT
Start: 2025-06-19

## 2025-06-12 RX ORDER — HEPARIN 100 UNIT/ML
500 SYRINGE INTRAVENOUS
Status: DISCONTINUED | OUTPATIENT
Start: 2025-06-12 | End: 2025-06-12 | Stop reason: HOSPADM

## 2025-06-12 RX ORDER — SODIUM CHLORIDE 0.9 % (FLUSH) 0.9 %
10 SYRINGE (ML) INJECTION
Status: DISCONTINUED | OUTPATIENT
Start: 2025-06-12 | End: 2025-06-12 | Stop reason: HOSPADM

## 2025-06-12 RX ORDER — SODIUM CHLORIDE 0.9 % (FLUSH) 0.9 %
10 SYRINGE (ML) INJECTION
OUTPATIENT
Start: 2025-06-19

## 2025-06-12 RX ADMIN — FERUMOXYTOL 510 MG: 510 INJECTION INTRAVENOUS at 11:06

## 2025-06-12 NOTE — NURSING
1100 Arrive for tx 1/2 Feraheme q wk c/o of abdominal pain level 8/10 takes home meds for relief of pain.

## 2025-06-13 ENCOUNTER — RESULTS FOLLOW-UP (OUTPATIENT)
Dept: GASTROENTEROLOGY | Facility: CLINIC | Age: 40
End: 2025-06-13

## 2025-06-13 DIAGNOSIS — K52.3 INDETERMINATE COLITIS: Primary | ICD-10-CM

## 2025-06-13 RX ORDER — ERGOCALCIFEROL 1.25 MG/1
50000 CAPSULE ORAL
Qty: 24 CAPSULE | Refills: 0 | Status: SHIPPED | OUTPATIENT
Start: 2025-06-13 | End: 2025-09-02

## 2025-06-13 RX ORDER — DICYCLOMINE HYDROCHLORIDE 20 MG/1
20 TABLET ORAL
Qty: 120 TABLET | Refills: 0 | Status: CANCELLED | OUTPATIENT
Start: 2025-06-13 | End: 2025-07-13

## 2025-06-13 NOTE — TELEPHONE ENCOUNTER
Amy,  Can you ask her where her pain is located? Is it abdominal pain or rectal pain?  Still having bleeding but how are stools - still loose and if so, how many?  Is she still on the prednisone?   If rectal pain, add sitz baths.  No mention on scope from Dr. Pulido about concern for perianal abscess or fistula, but may need MRI pelvis to further evaluate for any perianal pathology.  I can add her on for a flex sig the week I return so I can have a look at things and help guide options.

## 2025-06-13 NOTE — TELEPHONE ENCOUNTER
----- Message from Bisi Holliday MD sent at 6/13/2025  5:58 AM CDT -----  Labs reviewed.  In addition to ferahema, she needs vitamin D and zinc.  I will send in those prescriptions.  Fecal lety and CRP still elevated but downtrended.   ----- Message -----  From: Lab, Background User  Sent: 6/3/2025   5:02 PM CDT  To: Bisi Holliday MD

## 2025-06-18 ENCOUNTER — TELEPHONE (OUTPATIENT)
Dept: GASTROENTEROLOGY | Facility: CLINIC | Age: 40
End: 2025-06-18
Payer: COMMERCIAL

## 2025-06-18 ENCOUNTER — LAB VISIT (OUTPATIENT)
Dept: LAB | Facility: HOSPITAL | Age: 40
End: 2025-06-18
Attending: INTERNAL MEDICINE
Payer: MEDICAID

## 2025-06-18 ENCOUNTER — RESULTS FOLLOW-UP (OUTPATIENT)
Dept: GASTROENTEROLOGY | Facility: CLINIC | Age: 40
End: 2025-06-18

## 2025-06-18 DIAGNOSIS — K52.3 INDETERMINATE COLITIS: ICD-10-CM

## 2025-06-18 LAB
ALBUMIN SERPL-MCNC: 3.5 G/DL (ref 3.5–5)
ALBUMIN/GLOB SERPL: 0.8 RATIO (ref 1.1–2)
ALP SERPL-CCNC: 69 UNIT/L (ref 40–150)
ALT SERPL-CCNC: 5 UNIT/L (ref 0–55)
ANION GAP SERPL CALC-SCNC: 10 MEQ/L
AST SERPL-CCNC: 9 UNIT/L (ref 11–45)
BASOPHILS # BLD AUTO: 0.02 X10(3)/MCL
BASOPHILS NFR BLD AUTO: 0.3 %
BILIRUB SERPL-MCNC: 0.2 MG/DL
BUN SERPL-MCNC: 4.3 MG/DL (ref 7–18.7)
CALCIUM SERPL-MCNC: 9.1 MG/DL (ref 8.4–10.2)
CHLORIDE SERPL-SCNC: 108 MMOL/L (ref 98–107)
CO2 SERPL-SCNC: 23 MMOL/L (ref 22–29)
CREAT SERPL-MCNC: 0.87 MG/DL (ref 0.55–1.02)
CREAT/UREA NIT SERPL: 5
CRP SERPL-MCNC: 6.5 MG/L
EOSINOPHIL # BLD AUTO: 0.14 X10(3)/MCL (ref 0–0.9)
EOSINOPHIL NFR BLD AUTO: 1.8 %
ERYTHROCYTE [DISTWIDTH] IN BLOOD BY AUTOMATED COUNT: 21.4 % (ref 11.5–17)
GFR SERPLBLD CREATININE-BSD FMLA CKD-EPI: >60 ML/MIN/1.73/M2
GLOBULIN SER-MCNC: 4.2 GM/DL (ref 2.4–3.5)
GLUCOSE SERPL-MCNC: 78 MG/DL (ref 74–100)
HCT VFR BLD AUTO: 35.7 % (ref 37–47)
HGB BLD-MCNC: 10.5 G/DL (ref 12–16)
IMM GRANULOCYTES # BLD AUTO: 0.13 X10(3)/MCL (ref 0–0.04)
IMM GRANULOCYTES NFR BLD AUTO: 1.6 %
LYMPHOCYTES # BLD AUTO: 2.1 X10(3)/MCL (ref 0.6–4.6)
LYMPHOCYTES NFR BLD AUTO: 26.3 %
MCH RBC QN AUTO: 25.8 PG (ref 27–31)
MCHC RBC AUTO-ENTMCNC: 29.4 G/DL (ref 33–36)
MCV RBC AUTO: 87.7 FL (ref 80–94)
MONOCYTES # BLD AUTO: 0.95 X10(3)/MCL (ref 0.1–1.3)
MONOCYTES NFR BLD AUTO: 11.9 %
NEUTROPHILS # BLD AUTO: 4.63 X10(3)/MCL (ref 2.1–9.2)
NEUTROPHILS NFR BLD AUTO: 58.1 %
NRBC BLD AUTO-RTO: 0 %
PLATELET # BLD AUTO: 283 X10(3)/MCL (ref 130–400)
PMV BLD AUTO: 10 FL (ref 7.4–10.4)
POTASSIUM SERPL-SCNC: 2.3 MMOL/L (ref 3.5–5.1)
PROT SERPL-MCNC: 7.7 GM/DL (ref 6.4–8.3)
RBC # BLD AUTO: 4.07 X10(6)/MCL (ref 4.2–5.4)
SODIUM SERPL-SCNC: 141 MMOL/L (ref 136–145)
WBC # BLD AUTO: 7.97 X10(3)/MCL (ref 4.5–11.5)

## 2025-06-18 PROCEDURE — 86140 C-REACTIVE PROTEIN: CPT

## 2025-06-18 PROCEDURE — 80053 COMPREHEN METABOLIC PANEL: CPT

## 2025-06-18 PROCEDURE — 36415 COLL VENOUS BLD VENIPUNCTURE: CPT

## 2025-06-18 PROCEDURE — 85025 COMPLETE CBC W/AUTO DIFF WBC: CPT

## 2025-06-18 NOTE — TELEPHONE ENCOUNTER
Informed pt of low Potassium , advised to present to the nearest ER for evaluation. Pt verbalized understanding.

## 2025-06-19 DIAGNOSIS — D50.9 IRON DEFICIENCY ANEMIA, UNSPECIFIED IRON DEFICIENCY ANEMIA TYPE: Primary | ICD-10-CM

## 2025-06-20 ENCOUNTER — RESULTS FOLLOW-UP (OUTPATIENT)
Dept: GASTROENTEROLOGY | Facility: CLINIC | Age: 40
End: 2025-06-20

## 2025-06-20 ENCOUNTER — INFUSION (OUTPATIENT)
Dept: INFUSION THERAPY | Facility: HOSPITAL | Age: 40
End: 2025-06-20
Payer: COMMERCIAL

## 2025-06-20 ENCOUNTER — HOSPITAL ENCOUNTER (EMERGENCY)
Facility: HOSPITAL | Age: 40
Discharge: HOME | End: 2025-06-20
Attending: EMERGENCY MEDICINE
Payer: COMMERCIAL

## 2025-06-20 VITALS
TEMPERATURE: 98 F | BODY MASS INDEX: 21.36 KG/M2 | RESPIRATION RATE: 18 BRPM | DIASTOLIC BLOOD PRESSURE: 98 MMHG | HEIGHT: 61 IN | WEIGHT: 113.13 LBS | HEART RATE: 102 BPM | SYSTOLIC BLOOD PRESSURE: 120 MMHG

## 2025-06-20 VITALS
OXYGEN SATURATION: 100 % | RESPIRATION RATE: 17 BRPM | HEART RATE: 93 BPM | BODY MASS INDEX: 21.29 KG/M2 | DIASTOLIC BLOOD PRESSURE: 85 MMHG | SYSTOLIC BLOOD PRESSURE: 128 MMHG | WEIGHT: 112.63 LBS | TEMPERATURE: 98 F

## 2025-06-20 DIAGNOSIS — E87.6 HYPOKALEMIA: Primary | ICD-10-CM

## 2025-06-20 DIAGNOSIS — D50.0 IRON DEFICIENCY ANEMIA DUE TO CHRONIC BLOOD LOSS: Primary | ICD-10-CM

## 2025-06-20 DIAGNOSIS — Z13.9 SCREENING DUE: ICD-10-CM

## 2025-06-20 LAB
ALBUMIN SERPL-MCNC: 3.4 G/DL (ref 3.5–5)
ALBUMIN/GLOB SERPL: 0.8 RATIO (ref 1.1–2)
ALP SERPL-CCNC: 64 UNIT/L (ref 40–150)
ALT SERPL-CCNC: 6 UNIT/L (ref 0–55)
ANION GAP SERPL CALC-SCNC: 7 MEQ/L
AST SERPL-CCNC: 10 UNIT/L (ref 11–45)
BILIRUB SERPL-MCNC: 0.2 MG/DL
BUN SERPL-MCNC: 5.8 MG/DL (ref 7–18.7)
CALCIUM SERPL-MCNC: 8.7 MG/DL (ref 8.4–10.2)
CHLORIDE SERPL-SCNC: 108 MMOL/L (ref 98–107)
CO2 SERPL-SCNC: 25 MMOL/L (ref 22–29)
CREAT SERPL-MCNC: 0.61 MG/DL (ref 0.55–1.02)
CREAT/UREA NIT SERPL: 10
GFR SERPLBLD CREATININE-BSD FMLA CKD-EPI: >60 ML/MIN/1.73/M2
GLOBULIN SER-MCNC: 4.1 GM/DL (ref 2.4–3.5)
GLUCOSE SERPL-MCNC: 108 MG/DL (ref 74–100)
HOLD SPECIMEN: NORMAL
MAGNESIUM SERPL-MCNC: 1.8 MG/DL (ref 1.6–2.6)
POTASSIUM SERPL-SCNC: 2.7 MMOL/L (ref 3.5–5.1)
PROT SERPL-MCNC: 7.5 GM/DL (ref 6.4–8.3)
SODIUM SERPL-SCNC: 140 MMOL/L (ref 136–145)

## 2025-06-20 PROCEDURE — 63600175 PHARM REV CODE 636 W HCPCS: Mod: JZ,TB | Performed by: INTERNAL MEDICINE

## 2025-06-20 PROCEDURE — 25000003 PHARM REV CODE 250: Performed by: INTERNAL MEDICINE

## 2025-06-20 PROCEDURE — 96360 HYDRATION IV INFUSION INIT: CPT

## 2025-06-20 PROCEDURE — 63600175 PHARM REV CODE 636 W HCPCS: Performed by: EMERGENCY MEDICINE

## 2025-06-20 PROCEDURE — 83735 ASSAY OF MAGNESIUM: CPT | Performed by: EMERGENCY MEDICINE

## 2025-06-20 PROCEDURE — 96361 HYDRATE IV INFUSION ADD-ON: CPT

## 2025-06-20 PROCEDURE — 80053 COMPREHEN METABOLIC PANEL: CPT | Performed by: EMERGENCY MEDICINE

## 2025-06-20 PROCEDURE — A4216 STERILE WATER/SALINE, 10 ML: HCPCS | Performed by: INTERNAL MEDICINE

## 2025-06-20 PROCEDURE — 25000003 PHARM REV CODE 250: Performed by: EMERGENCY MEDICINE

## 2025-06-20 PROCEDURE — 99284 EMERGENCY DEPT VISIT MOD MDM: CPT | Mod: 25

## 2025-06-20 PROCEDURE — 93005 ELECTROCARDIOGRAM TRACING: CPT

## 2025-06-20 PROCEDURE — 96365 THER/PROPH/DIAG IV INF INIT: CPT

## 2025-06-20 RX ORDER — SODIUM CHLORIDE AND POTASSIUM CHLORIDE 150; 900 MG/100ML; MG/100ML
INJECTION, SOLUTION INTRAVENOUS
Status: COMPLETED | OUTPATIENT
Start: 2025-06-20 | End: 2025-06-20

## 2025-06-20 RX ORDER — EPINEPHRINE 1 MG/ML
0.3 INJECTION INTRAMUSCULAR; INTRAVENOUS; SUBCUTANEOUS ONCE AS NEEDED
Status: DISCONTINUED | OUTPATIENT
Start: 2025-06-20 | End: 2025-06-20 | Stop reason: HOSPADM

## 2025-06-20 RX ORDER — SODIUM CHLORIDE 0.9 % (FLUSH) 0.9 %
10 SYRINGE (ML) INJECTION
OUTPATIENT
Start: 2025-06-20

## 2025-06-20 RX ORDER — EPINEPHRINE 0.3 MG/.3ML
0.3 INJECTION SUBCUTANEOUS ONCE AS NEEDED
OUTPATIENT
Start: 2025-06-20

## 2025-06-20 RX ORDER — SODIUM CHLORIDE 0.9 % (FLUSH) 0.9 %
10 SYRINGE (ML) INJECTION
Status: DISCONTINUED | OUTPATIENT
Start: 2025-06-20 | End: 2025-06-20 | Stop reason: HOSPADM

## 2025-06-20 RX ORDER — HEPARIN 100 UNIT/ML
500 SYRINGE INTRAVENOUS
OUTPATIENT
Start: 2025-06-20

## 2025-06-20 RX ADMIN — POTASSIUM BICARBONATE 50 MEQ: 977.5 TABLET, EFFERVESCENT ORAL at 10:06

## 2025-06-20 RX ADMIN — Medication 10 ML: at 09:06

## 2025-06-20 RX ADMIN — FERUMOXYTOL 510 MG: 510 INJECTION INTRAVENOUS at 09:06

## 2025-06-20 RX ADMIN — SODIUM CHLORIDE AND POTASSIUM CHLORIDE: .9; .15 SOLUTION INTRAVENOUS at 10:06

## 2025-06-20 NOTE — NURSING
Pt presented to for Feraheme # 2/2 & bmp, no complaints; pt informed she may need to get a potassium infusion also that can take 2 to 4 hours, pt verbalized  understanding; pt stated she bought OTC potassium & is taking 1 tablet daily, 99 mg per tablet.    Potassium 2.3 on 6/18/25, pt called on 6/18/25 and instructed to go to nearest ED for evaluation; pt did not go to the ED; BMP drawn today with Potassium 2.8; notified KEV Cr RN who responded that Fifi Romero NP wants pt to go to the ED today for evaluation; pt agreed.  Called report to RADHAMES Caballero in ED, saline lock remains in right hand.

## 2025-06-20 NOTE — ED PROVIDER NOTES
"Encounter Date: 2025       History     Chief Complaint   Patient presents with    Abnormal Lab     SENT FROM CLINIC FOR FOR K+ 2.8 DRAW THIS AM.  PT VOICES NO COMPLAINTS.  DENIES CP/SOB.      39-year-old female presents with hypokalemia, in the setting of diarrhea, inflammatory bowel disease.  48 hours ago potassium measured 2.3, for which patient referred toward the ED emphasis did not visit the ED, started taking p.o. potassium).  Seen in clinic this morning, repeat value 2.8 patient arrives in the ED reporting the feels "totally fine".  Patient mildly tachycardic on arrival patient is alert, calm, cooperative; appropriate.        Review of patient's allergies indicates:  No Known Allergies  Past Medical History:   Diagnosis Date    Chronic constipation     Crohn's disease     History of Crohn's disease     unclear how diagnosis was made    Inflammatory bowel disease     Iron deficiency anemia, unspecified      Past Surgical History:   Procedure Laterality Date    BONE MARROW ASPIRATION N/A 2023    Procedure: ASPIRATION, BONE MARROW;  Surgeon: Gayatri Howard MD;  Location: [facility] ENDOSCOPY;  Service: General;  Laterality: N/A;     SECTION  2012, 19    COLONOSCOPY      Luzmaria had several of these procedures    COLONOSCOPY N/A 2025    Procedure: COLON;  Surgeon: Lio Pulido MD;  Location: [facility] ENDOSCOPY;  Service: Gastroenterology;  Laterality: N/A;    COLONOSCOPY, WITH 1 OR MORE BIOPSIES N/A 2023    Procedure: COLONOSCOPY, WITH 1 OR MORE BIOPSIES;  Surgeon: Bisi Holliday MD;  Location: [facility] ENDOSCOPY;  Service: Gastroenterology;  Laterality: N/A;    COLONOSCOPY, WITH 1 OR MORE BIOPSIES  2025    Procedure: COLONOSCOPY, WITH 1 OR MORE BIOPSIES;  Surgeon: Lio Pulido MD;  Location: [facility] ENDOSCOPY;  Service: Gastroenterology;;    EGD, WITH CLOSED BIOPSY N/A 2023    Procedure: EGD, WITH CLOSED BIOPSY;  Surgeon: Bisi Holliday MD;  " Location: [facility] ENDOSCOPY;  Service: Gastroenterology;  Laterality: N/A;    INTRALUMINAL GASTROINTESTINAL TRACT IMAGING VIA CAPSULE N/A 02/11/2025    Procedure: IMAGING PROCEDURE, GI TRACT, INTRALUMINAL, VIA CAPSULE;  Surgeon: Bisi Holliday MD;  Location: [facility] ENDOSCOPY;  Service: Gastroenterology;  Laterality: N/A;    TONSILLECTOMY      TUBAL LIGATION  02/14/2019    UPPER GASTROINTESTINAL ENDOSCOPY  1999    I've had several of these     Family History   Problem Relation Name Age of Onset    Hypertension Mother Halie Saavedra     Diabetes Mother Halie Saavedra     Drug abuse Mother Halie Saavedra     No Known Problems Father      Kidney disease Maternal Grandmother Halina James     Diabetes Maternal Grandmother Halina James     Stroke Paternal Grandmother [place]      Social History[1]  Review of Systems    Physical Exam     Initial Vitals [06/20/25 1013]   BP Pulse Resp Temp SpO2   (!) 139/90 102 18 98.2 °F (36.8 °C) 100 %      MAP       --         Physical Exam    Nursing note and vitals reviewed.  Constitutional: She appears well-developed and well-nourished. She is not diaphoretic. No distress.   HENT:   Head: Normocephalic and atraumatic.   Right Ear: External ear normal.   Left Ear: External ear normal.   Eyes: EOM are normal. Pupils are equal, round, and reactive to light. Right eye exhibits no discharge. Left eye exhibits no discharge.   Neck: Neck supple. No thyromegaly present. No tracheal deviation present. No JVD present.   Normal range of motion.  Cardiovascular:  Normal rate, regular rhythm, normal heart sounds and intact distal pulses.     Exam reveals no gallop and no friction rub.       No murmur heard.  Pulmonary/Chest: Breath sounds normal. No stridor. No respiratory distress. She has no wheezes. She has no rhonchi. She has no rales.   Abdominal: Abdomen is soft. Bowel sounds are normal. She exhibits no distension. There is no abdominal tenderness. There is no rebound and no  guarding.   Musculoskeletal:         General: No tenderness or edema. Normal range of motion.      Cervical back: Normal range of motion and neck supple.     Neurological: She is alert and oriented to person, place, and time. She has normal strength. No cranial nerve deficit or sensory deficit. GCS score is 15. GCS eye subscore is 4. GCS verbal subscore is 5. GCS motor subscore is 6.   Skin: Skin is warm and dry. Capillary refill takes less than 2 seconds. No rash and no abscess noted. No erythema. No pallor.   Psychiatric: She has a normal mood and affect. Her behavior is normal. Judgment and thought content normal.         ED Course   Procedures  Labs Reviewed   COMPREHENSIVE METABOLIC PANEL - Abnormal       Result Value    Sodium 140      Potassium 2.7 (*)     Chloride 108 (*)     CO2 25      Glucose 108 (*)     Blood Urea Nitrogen 5.8 (*)     Creatinine 0.61      Calcium 8.7      Protein Total 7.5      Albumin 3.4 (*)     Globulin 4.1 (*)     Albumin/Globulin Ratio 0.8 (*)     Bilirubin Total 0.2      ALP 64      ALT 6      AST 10 (*)     eGFR >60      Anion Gap 7.0      BUN/Creatinine Ratio 10     MAGNESIUM - Normal    Magnesium Level 1.80     EXTRA TUBES    Narrative:     The following orders were created for panel order EXTRA TUBES.  Procedure                               Abnormality         Status                     ---------                               -----------         ------                     Light Blue Top Hold[[id number]]                             Final result               Light Green Top Hold[[id number]]                            Final result               Lavender Top Hold[[id number]]                               Final result               Gold Top Hold[[id number]]                                   Final result               Pink Top Hold[[id number]]                                   Final result                 Please view results for these tests on the individual orders.   LIGHT BLUE  TOP HOLD    Extra Tube Hold for add-ons.     LIGHT GREEN TOP HOLD    Extra Tube Hold for add-ons.     LAVENDER TOP HOLD    Extra Tube Hold for add-ons.     GOLD TOP HOLD    Extra Tube Hold for add-ons.     PINK TOP HOLD    Extra Tube Hold for add-ons.          ECG Results              EKG 12-lead (In process)        Collection Time Result Time QRS Duration OHS QTC Calculation    06/20/25 10:37:10 06/20/25 10:41:41 88 435                     In process by Interface, Lab In [facility] (06/20/25 10:41:45)                   Narrative:    Test Reason : Z13.9,    Vent. Rate :  93 BPM     Atrial Rate :  93 BPM     P-R Int : 108 ms          QRS Dur :  88 ms      QT Int : 350 ms       P-R-T Axes :  55  85  16 degrees    QTcB Int : 435 ms    Sinus rhythm with short KS  Nonspecific T wave abnormality  Abnormal ECG  When compared with ECG of 21-May-2025 06:59,  KS interval has decreased    Referred By: AAAREFERRAL SELF           Confirmed By:                                   Imaging Results    None          Medications   0.9 % NaCl with KCl 20 mEq infusion ( Intravenous New Bag 6/20/25 1042)   potassium bicarbonate disintegrating tablet 50 mEq (50 mEq Oral Given 6/20/25 1057)     Medical Decision Making  Amount and/or Complexity of Data Reviewed  External Data Reviewed: notes.  Labs: ordered. Decision-making details documented in ED Course.  ECG/medicine tests: ordered and independent interpretation performed. Decision-making details documented in ED Course.    Risk  Prescription drug management.               ED Course as of 06/20/25 1525   Fri Jun 20, 2025   1125 Hypokalemia, as anticipated; [CT]   1125 Normal magnesium; [CT]   1420 Sinus rhythm at 93, nonacute and nonischemic appearing; [CT]      ED Course User Index  [CT] Francisco Tate MD                           Clinical Impression:  Final diagnoses:  [Z13.9] Screening due  [E87.6] Hypokalemia (Primary)          ED Disposition Condition    Discharge Stable           ED Prescriptions    None       Follow-up Information       Follow up With Specialties Details Why Contact Info    [facility] Dept Emergency Medicine  As needed, If symptoms worsen [address] [address] [phone]                   [1]   Social History  Tobacco Use    Smoking status: Never     Passive exposure: Never    Smokeless tobacco: Never   Vaping Use    Vaping status: Never Used   Substance Use Topics    Alcohol use: Yes     Alcohol/week: 1.0 standard drink of alcohol     Types: 1 Glasses of wine per week     Comment: socially    Drug use: Never        TheodFrancisco mcnair MD  06/20/25 1529

## 2025-06-22 ENCOUNTER — PATIENT MESSAGE (OUTPATIENT)
Dept: GASTROENTEROLOGY | Facility: CLINIC | Age: 40
End: 2025-06-22
Payer: MEDICAID

## 2025-06-22 ENCOUNTER — PATIENT MESSAGE (OUTPATIENT)
Dept: ENDOSCOPY | Facility: HOSPITAL | Age: 40
End: 2025-06-22
Payer: MEDICAID

## 2025-06-23 ENCOUNTER — PATIENT MESSAGE (OUTPATIENT)
Dept: ENDOSCOPY | Facility: HOSPITAL | Age: 40
End: 2025-06-23
Payer: MEDICAID

## 2025-06-23 ENCOUNTER — TELEPHONE (OUTPATIENT)
Dept: ENDOSCOPY | Facility: HOSPITAL | Age: 40
End: 2025-06-23
Payer: MEDICAID

## 2025-06-23 LAB
OHS QRS DURATION: 88 MS
OHS QTC CALCULATION: 435 MS

## 2025-06-23 NOTE — TELEPHONE ENCOUNTER
Spoke with patient - gave instructions for flex sigmoid  prep- states understanding-explained arrival time is 1300

## 2025-06-24 ENCOUNTER — ANESTHESIA EVENT (OUTPATIENT)
Dept: ENDOSCOPY | Facility: HOSPITAL | Age: 40
End: 2025-06-24
Payer: MEDICAID

## 2025-06-24 ENCOUNTER — PATIENT MESSAGE (OUTPATIENT)
Dept: ENDOSCOPY | Facility: HOSPITAL | Age: 40
End: 2025-06-24
Payer: MEDICAID

## 2025-06-24 NOTE — ANESTHESIA PREPROCEDURE EVALUATION
"                                                                                                             06/24/2025  Elaina Pierce is a 39 y.o., female. For    SIGMOIDOSCOPY, FLEXIBLE (Anus)     Vitals:    06/25/25 1308   BP: 126/80   BP Location: Left arm   Patient Position: Lying   Pulse: (!) 128   Resp: 16   Temp: 37.1 °C (98.7 °F)   TempSrc: Oral   SpO2: 99%   Weight: 49.6 kg (109 lb 6.4 oz)   Height: 5' 1" (1.549 m)        B HCG Neg DOS     Active Ambulatory Problems     Diagnosis Date Noted    Abnormal urine finding 09/27/2022    B12 deficiency 11/27/2022    Folate deficiency 11/27/2022    Abnormal bone marrow examination 11/27/2022    Iron deficiency anemia 01/07/2023    History of blood transfusion 02/18/2023    MGUS (monoclonal gammopathy of unknown significance) 05/04/2023    Constipation 11/21/2023    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    Free monoclonal light chain 01/30/2024    Encounter for medication refill 06/11/2024    Annual physical exam 09/10/2024    Acute cystitis with hematuria 09/10/2024    Urinary urgency 09/10/2024    Occult blood positive stool 01/14/2025    Anemia due to chronic blood loss 01/14/2025    Menorrhagia with regular cycle 01/14/2025     Resolved Ambulatory Problems     Diagnosis Date Noted    Profound anemia 08/10/2022    Thrombocytopenia 05/04/2023    Wellness examination 07/13/2023    Sepsis due to gram-negative UTI 06/04/2024    Pyelonephritis 06/04/2024    Hospital discharge follow-up 06/11/2024     Past Medical History:   Diagnosis Date    Chronic constipation     Crohn's disease     History of Crohn's disease     Inflammatory bowel disease     Iron deficiency anemia, unspecified        ECHO MAY 2025    Left Ventricle: The left ventricle is normal in size. Normal wall thickness. There is normal systolic function with a visually estimated ejection fraction of 60 - 65%.    Right Ventricle: The right ventricle is normal in size Systolic function is normal. TAPSE is " 2.8 cm.    Left Atrium: Agitated saline study of the atrial septum is positive, consistent with an intracardiac shunt at the atrial level.    Mitral Valve: Mildly thickened leaflets.    IVC/SVC: Normal venous pressure at 3 mmHg.      Vent. Rate :  93 BPM     Atrial Rate :  93 BPM     P-R Int : 108 ms          QRS Dur :  88 ms      QT Int : 350 ms       P-R-T Axes :  55  85  16 degrees    QTcB Int : 435 ms    Sinus rhythm with short VT  Nonspecific T wave abnormality  Abnormal ECG  When compared with ECG of 21-May-2025 06:59,  VT interval has decreased  Confirmed by Abbi Drew ([id number]) on 2025 11:43:46 PM    Referred By: AAAREFERRAL SELF           Confirmed By: Abbi Drew     Specimen Collected: 25 10:37 CDT Last Resulted: 25 23:43 CDT           Past Surgical History:   Procedure Laterality Date    BONE MARROW ASPIRATION N/A 2023    Procedure: ASPIRATION, BONE MARROW;  Surgeon: Gayatri Howard MD;  Location: [facility] ENDOSCOPY;  Service: General;  Laterality: N/A;     SECTION  2012, 19    COLONOSCOPY      Luzmaria had several of these procedures    COLONOSCOPY N/A 2025    Procedure: COLON;  Surgeon: Lio Pulido MD;  Location: [facility] ENDOSCOPY;  Service: Gastroenterology;  Laterality: N/A;    COLONOSCOPY, WITH 1 OR MORE BIOPSIES N/A 2023    Procedure: COLONOSCOPY, WITH 1 OR MORE BIOPSIES;  Surgeon: Bisi Holliday MD;  Location: [facility] ENDOSCOPY;  Service: Gastroenterology;  Laterality: N/A;    COLONOSCOPY, WITH 1 OR MORE BIOPSIES  2025    Procedure: COLONOSCOPY, WITH 1 OR MORE BIOPSIES;  Surgeon: Lio Pulido MD;  Location: [facility] ENDOSCOPY;  Service: Gastroenterology;;    EGD, WITH CLOSED BIOPSY N/A 2023    Procedure: EGD, WITH CLOSED BIOPSY;  Surgeon: Bisi Holliday MD;  Location: [facility] ENDOSCOPY;  Service: Gastroenterology;  Laterality: N/A;    INTRALUMINAL GASTROINTESTINAL TRACT IMAGING VIA CAPSULE N/A 2025    Procedure:  IMAGING PROCEDURE, GI TRACT, INTRALUMINAL, VIA CAPSULE;  Surgeon: Bisi Holliday MD;  Location: [facility] ENDOSCOPY;  Service: Gastroenterology;  Laterality: N/A;    TONSILLECTOMY      TUBAL LIGATION  02/14/2019    UPPER GASTROINTESTINAL ENDOSCOPY  1999    I've had several of these         Lab Results   Component Value Date    WBC 7.97 06/18/2025    HGB 10.5 (L) 06/18/2025    HCT 35.7 (L) 06/18/2025     06/18/2025    CHOL 200 07/13/2023    TRIG 177 (H) 07/13/2023    HDL 54 07/13/2023    ALT 6 06/20/2025    AST 10 (L) 06/20/2025     06/20/2025    K 2.7 (LL) 06/20/2025     (H) 06/20/2025    CREATININE 0.61 06/20/2025    BUN 5.8 (L) 06/20/2025    CO2 25 06/20/2025    TSH 1.232 09/10/2024    INR 1.1 05/20/2025       Pre-op Assessment    I have reviewed the Patient Summary Reports.     I have reviewed the Nursing Notes. I have reviewed the NPO Status.   I have reviewed the Medications.     Review of Systems  Anesthesia Hx:  No problems with previous Anesthesia   History of prior surgery of interest to airway management or planning:          Denies Family Hx of Anesthesia complications.    Denies Personal Hx of Anesthesia complications.                    Hematology/Oncology:  Hematology Normal   Oncology Normal                                   EENT/Dental:  EENT/Dental Normal           Cardiovascular:  Cardiovascular Normal                                              Pulmonary:  Pulmonary Normal                       Renal/:  Renal/ Normal                 Hepatic/GI:  Hepatic/GI Normal                    Musculoskeletal:  Musculoskeletal Normal                Neurological:  Neurology Normal                                      Endocrine:  Endocrine Normal            Dermatological:  Skin Normal    Psych:  Psychiatric Normal                    Physical Exam  General: Well nourished, Cooperative, Alert and Oriented    Airway:  Mallampati: I / I  Mouth Opening: Normal  TM Distance: Normal  Tongue:  Normal  Neck ROM: Normal ROM    Dental:  Intact        Anesthesia Plan  Type of Anesthesia, risks & benefits discussed:    Anesthesia Type: Gen Natural Airway  Intra-op Monitoring Plan: Standard ASA Monitors  Post Op Pain Control Plan: IV/PO Opioids PRN  (medical reason for not using multimodal pain management)  Induction:  IV  Informed Consent: Informed consent signed with the Patient and all parties understand the risks and agree with anesthesia plan.  All questions answered. Patient consented to blood products? No  ASA Score: 2  Day of Surgery Review of History & Physical: H&P Update referred to the surgeon/provider.    Ready For Surgery From Anesthesia Perspective.     .

## 2025-06-25 ENCOUNTER — HOSPITAL ENCOUNTER (OUTPATIENT)
Facility: HOSPITAL | Age: 40
Discharge: HOME | End: 2025-06-25
Attending: INTERNAL MEDICINE | Admitting: INTERNAL MEDICINE
Payer: MEDICAID

## 2025-06-25 ENCOUNTER — ANESTHESIA (OUTPATIENT)
Dept: ENDOSCOPY | Facility: HOSPITAL | Age: 40
End: 2025-06-25
Payer: MEDICAID

## 2025-06-25 DIAGNOSIS — K52.3 INDETERMINATE COLITIS: ICD-10-CM

## 2025-06-25 LAB — B-HCG SERPL QL: NEGATIVE

## 2025-06-25 PROCEDURE — 37000008 HC ANESTHESIA 1ST 15 MINUTES: Performed by: INTERNAL MEDICINE

## 2025-06-25 PROCEDURE — 88305 TISSUE EXAM BY PATHOLOGIST: CPT | Mod: TC | Performed by: INTERNAL MEDICINE

## 2025-06-25 PROCEDURE — 63600175 PHARM REV CODE 636 W HCPCS: Performed by: SPECIALIST

## 2025-06-25 PROCEDURE — 45331 SIGMOIDOSCOPY AND BIOPSY: CPT | Performed by: INTERNAL MEDICINE

## 2025-06-25 PROCEDURE — 27201423 OPTIME MED/SURG SUP & DEVICES STERILE SUPPLY: Performed by: INTERNAL MEDICINE

## 2025-06-25 PROCEDURE — 63600175 PHARM REV CODE 636 W HCPCS: Performed by: NURSE ANESTHETIST, CERTIFIED REGISTERED

## 2025-06-25 PROCEDURE — 84703 CHORIONIC GONADOTROPIN ASSAY: CPT | Performed by: SPECIALIST

## 2025-06-25 RX ORDER — LIDOCAINE HYDROCHLORIDE 20 MG/ML
INJECTION, SOLUTION EPIDURAL; INFILTRATION; INTRACAUDAL; PERINEURAL
Status: DISCONTINUED | OUTPATIENT
Start: 2025-06-25 | End: 2025-06-25

## 2025-06-25 RX ORDER — BUDESONIDE 9 MG/1
9 TABLET, FILM COATED, EXTENDED RELEASE ORAL DAILY
Qty: 30 EACH | Refills: 2 | Status: SHIPPED | OUTPATIENT
Start: 2025-06-25 | End: 2025-06-27 | Stop reason: SDUPTHER

## 2025-06-25 RX ORDER — DICYCLOMINE HYDROCHLORIDE 20 MG/1
20 TABLET ORAL
Qty: 120 TABLET | Refills: 0 | Status: SHIPPED | OUTPATIENT
Start: 2025-06-25 | End: 2025-07-25

## 2025-06-25 RX ORDER — SODIUM CHLORIDE, SODIUM LACTATE, POTASSIUM CHLORIDE, CALCIUM CHLORIDE 600; 310; 30; 20 MG/100ML; MG/100ML; MG/100ML; MG/100ML
INJECTION, SOLUTION INTRAVENOUS CONTINUOUS
Status: DISCONTINUED | OUTPATIENT
Start: 2025-06-25 | End: 2025-06-25 | Stop reason: HOSPADM

## 2025-06-25 RX ORDER — PROPOFOL 10 MG/ML
VIAL (ML) INTRAVENOUS
Status: DISCONTINUED | OUTPATIENT
Start: 2025-06-25 | End: 2025-06-25

## 2025-06-25 RX ADMIN — LIDOCAINE HYDROCHLORIDE 50 MG: 20 INJECTION, SOLUTION EPIDURAL; INFILTRATION; INTRACAUDAL; PERINEURAL at 03:06

## 2025-06-25 RX ADMIN — PROPOFOL 30 MG: 10 INJECTION, EMULSION INTRAVENOUS at 03:06

## 2025-06-25 RX ADMIN — PROPOFOL 100 MG: 10 INJECTION, EMULSION INTRAVENOUS at 03:06

## 2025-06-25 RX ADMIN — SODIUM CHLORIDE, POTASSIUM CHLORIDE, SODIUM LACTATE AND CALCIUM CHLORIDE: 600; 310; 30; 20 INJECTION, SOLUTION INTRAVENOUS at 03:06

## 2025-06-25 RX ADMIN — PROPOFOL 50 MG: 10 INJECTION, EMULSION INTRAVENOUS at 03:06

## 2025-06-25 RX ADMIN — PROPOFOL 70 MG: 10 INJECTION, EMULSION INTRAVENOUS at 03:06

## 2025-06-25 RX ADMIN — SODIUM CHLORIDE, POTASSIUM CHLORIDE, SODIUM LACTATE AND CALCIUM CHLORIDE: 600; 310; 30; 20 INJECTION, SOLUTION INTRAVENOUS at 01:06

## 2025-06-25 NOTE — [employer] PATIENT INSTRUCTIONS
Discharge Summary/Instructions after an Endoscopic Procedure  Patient Name: Elaina Pierce  Patient MRN: [id number]  Patient YOB: 1985 Wednesday, June 25, 2025  Bisi Holliday MD  Dear patient,  As a result of recent federal legislation (The Federal Cures Act), you may   receive lab or pathology results from your procedure in your [facility]   account before your physician is able to contact you. Your physician or   their representative will relay the results to you with their   recommendations at their soonest availability.  Thank you,  RESTRICTIONS:  During your procedure today, you received medications for sedation.  These   medications may affect your judgment, balance and coordination.  Therefore,   for 24 hours, you have the following restrictions:   - DO NOT drive a car, operate machinery, make legal/financial decisions,   sign important papers or drink alcohol.    ACTIVITY:  Today: no heavy lifting, straining or running due to procedural   sedation/anesthesia.  The following day: return to full activity including work.  DIET:  Eat and drink normally unless instructed otherwise.     TREATMENT FOR COMMON SIDE EFFECTS:  - Mild abdominal pain, nausea, belching, bloating or excessive gas:  rest,   eat lightly and use a heating pad.  - Sore Throat: treat with throat lozenges and/or gargle with warm salt   water.  - Because air was used during the procedure, expelling large amounts of air   from your rectum or belching is normal.  - If a bowel prep was taken, you may not have a bowel movement for 1-3 days.    This is normal.  SYMPTOMS TO WATCH FOR AND REPORT TO YOUR PHYSICIAN:  1. Abdominal pain or bloating, other than gas cramps.  2. Chest pain.  3. Back pain.  4. Signs of infection such as: chills or fever occurring within 24 hours   after the procedure.  5. Rectal bleeding, which would show as bright red, maroon, or black stools.   (A tablespoon of blood from the rectum is not serious,  especially if   hemorrhoids are present.)  6. Vomiting.  7. Weakness or dizziness.  GO DIRECTLY TO THE NEAREST EMERGENCY ROOM IF YOU HAVE ANY OF THE FOLLOWING:      Difficulty breathing              Chills and/or fever over 101 F   Persistent vomiting and/or vomiting blood   Severe abdominal pain   Severe chest pain   Black, tarry stools   Bleeding- more than one tablespoon   Any other symptom or condition that you feel may need urgent attention  Your doctor recommends these additional instructions:  If any biopsies were taken, your doctors clinic will contact you in 1 to 2   weeks with any results.  Recommendations:  - Discharge patient to home.   - Patient has a contact number available for emergencies.  The signs and   symptoms of potential delayed complications were discussed with the   patient.  Return to normal activities tomorrow.  Written discharge   instructions were provided to the patient.   - Advance diet as tolerated.   - Use Uceris (budesonide) 9 mg PO daily.   - Use hydrocortisone suppository 25 mg 1 per rectum once a day.   - Start advanced therapy  Impressions:  - Internal hemorrhoids that prolapse with straining, but spontaneously   regress to the resting position (Grade II) found on perianal exam.   - Diffuse inflammation was found in the rectum and in the sigmoid colon   secondary to inflammatory bowel disease, favor Crohn's disease.  Biopsied.     - The descending colon and transverse colon are normal.  For questions, problems or results please call your physician - Bisi Holliday MD at Work:  [phone], Work:  [phone].  [facility] , EMERGENCY ROOM PHONE NUMBER: [phone]  IF A COMPLICATION OR EMERGENCY SITUATION ARISES AND YOU ARE UNABLE TO REACH   YOUR PHYSICIAN - GO DIRECTLY TO THE EMERGENCY ROOM.  Bisi Holliday MD  6/25/2025 3:53:41 PM  This report has been verified and signed electronically.  Dear patient,  As a result of recent federal  legislation (The Federal Cures Act), you may   receive lab or pathology results from your procedure in your [facility]   account before your physician is able to contact you. Your physician or   their representative will relay the results to you with their   recommendations at their soonest availability.  Thank you,  PROVATION

## 2025-06-25 NOTE — TRANSFER OF CARE
"Anesthesia Transfer of Care Note    Patient: Elaina Pierce    Procedure(s) Performed: Procedure(s) (LRB):  SIGMOIDOSCOPY, WITH BIOPSY (N/A)    Patient location: GI    Anesthesia Type: general    Post pain: adequate analgesia    Post assessment: no apparent anesthetic complications    Post vital signs: stable    Level of consciousness: sedated    Nausea/Vomiting: no nausea/vomiting    Complications: none    Transfer of care protocol was followed      Last vitals: Visit Vitals  /80 (BP Location: Left arm, Patient Position: Lying)   Pulse (!) 128   Temp 37.1 °C (98.7 °F) (Oral)   Resp 16   Ht 5' 1" (1.549 m)   Wt 49.6 kg (109 lb 6.4 oz)   LMP 06/24/2025 (Approximate)   SpO2 99%   Breastfeeding No   BMI 20.67 kg/m²     "

## 2025-06-25 NOTE — ANESTHESIA POSTPROCEDURE EVALUATION
Anesthesia Post Evaluation    Patient: Elaina Pierce    Procedure(s) Performed: Procedure(s) (LRB):  SIGMOIDOSCOPY, WITH BIOPSY (N/A)    Final Anesthesia Type: general      Patient location during evaluation: GI PACU  Patient participation: Yes- Able to Participate  Level of consciousness: awake and alert  Post-procedure vital signs: reviewed and stable  Pain management: adequate  Airway patency: patent    PONV status at discharge: No PONV  Anesthetic complications: no      Cardiovascular status: hemodynamically stable  Respiratory status: spontaneous ventilation  Hydration status: euvolemic  Follow-up not needed.              Vitals Value Taken Time   /80 06/25/25 13:08   Temp 37.1 °C (98.7 °F) 06/25/25 13:08   Pulse 128 06/25/25 13:08   Resp 16 06/25/25 13:08   SpO2 99 % 06/25/25 13:08         No case tracking events are documented in the log.      Pain/Joel Score: No data recorded

## 2025-06-25 NOTE — H&P
Flexible Sigmoidoscopy History and Physical    Patient Name: Elaina Pierce  MRN: [id number]  : 1985  Date of Procedure:  2025  Referring Physician: Bisi Holliday MD  Primary Physician: No primary care provider on file.  Procedure Physician: Bisi Holliday MD, MPH     Procedure - Flexible sigmoidoscopy  ASA - per anesthesia  Mallampati - per anesthesia  History of Anesthesia problems - no  Family history Anesthesia problems -  no   Plan of anesthesia - General    Diagnosis: ibd  Chief Complaint: Same as above    HPI: Patient is an 39 y.o. female is here for the above.     Elaina is a 39 year old AAF with iron deficiency anemia, indeterminate colitis favor Crohn's disease, chronic constipation here for a flex sig.      She was diagnosed by Dr. Loyola around the age of 15.  She recalls being hospitalized for an extended period of time for severe anemia and rectal bleeding.  She recalls rectal bleeding was intermittent, associated with bright red blood without diarrhea.  She was then seen by Dr. Rivera for a period time followed by Dr. Andrade at [facility] around .  Her last colonoscopy with Dr. Jalili was around the age of 17 (20 years ago).  Prior notes in the chart mention small bowel disease and previously treated with prednisone, pentasa, azathioprine, and 3-5 doses of Remicade which was stopped due to developing hives.  She has been off therapy for over 20 years.  Her rectal bleeding has since resolved.  She does have documentation of severe anemia for over 20 years requiring multiple blood  transfusions.  She has been on and off iron over the years.  She was off iron prior to her hospitalization at [facility] in  when she presented with severe anemia Hgb 1.7 g/dL and pancytopenia. Labs during that admission showed B12: 201, folate 6.4, ferritin 27.5.  She was treated with B12, folic acid and given empiric prednisone in case immune mediated.  Bone marrow biopsy was completed which  "was remarkable for acute red cell aplasia.  Intrinsic factor Ab negative.      At her initial evaluation with me in November 2023 she reported chronic constipation, 2 bowel movements weekly with associated abdominal pain.    CRP <1.0     December 6, 2023: EGD: 4 mm submucosal nodule at 35 cm. HP negative erosive gastritis. Normal duodenum.      December 6, 2023: Colonoscopy: Three scattered erosions in the TI. Four small patchy areas of mild inflammation in the rectum, sigmoid, and hepatic flexure.  Scarring and altered vascularity in the rectum to the descending colon.  Internal and external hemorrhoids. Path: TI: mild chronic inflammation. Focal active colitis in right and left colon.  Mild chronic in rectum.      She continued with constipation issues and was given Amitiza 24 mcg BID but this was not helpful with constipation.  She never turned in a fecal calprotectin.  CRP 1.4.     Hospitalized in June 2024 for pyelonephritis.  Hgb 5.8 g/dL then and given 2 uPRBCs.   Venofer x 2 June/July 2024 and again in Jan/Feb 2025  Feraheme x 2 in April 2025  On ferrous sulfate 325 mg once daily     February 11, 2025: VCE: normal     When seen last she had a few self limiting episodes of diarrhea.  I request a fecal calprotectin but she never turned one in.     Presented to [facility] on May 20, 2025 with acute onset of rectal bleeding and syncope - reported 5-6 episodes of BRB with clots.        May 22, 2025: Colonoscopy (Dr. Pulido): "moderate inflammation" from rectosigmoid to splenic flexure with multiple linear ulcerations.  Grade 2-3 internal hemorrhoids.   Images reviewed personally - patchy erythema, mild edema seen mainly in left colon with scattered punctate superficial ulcerations.    Pathology: random colon biopsies (location not specified) normal colon tissue and mildly active IBD and mild crypt architectural distortion without acute inflammation c/w quiescent IBD.  She was given prednisone on discharge.   Hemoglobin " 9.2 g/dL on discharge.  CRP 10    I saw her in clinic 2 weeks ago and reported at that time 4 loose stools a day with blood in toilet with most bowel movements.  I asked her to complete prednisone taper, gave her hydrocortisone suppositories to help both with inflammation and hemorrhoids and planned to start biologics - Skyrizi.     She has continued to have rectal bleeding.  She has two loose stools a day but has to go to the bathroom at least 5 times a day and most of these times she just sees blood in the toilet.  She completed prednisone last week and has been using hydrocortisone suppositories.     She did have repeat CBC with improvement of Hgb to 10.5 g/dL drom 8.8 g/dL.  CRP continues to down trend - now 6.5.  Last fecal calprotectin 902     Denies any nocturnal stools.  No urgency but some tenesmus.  She has some abdominal cramping with bowel movements.  Weight is stable and appetite is good.      She is on provera for menorrhagia.        She has had 3 healthy pregnancies.  She did require blood transfusions due to anemia during her pregnancies.  She has monthly menstrual cycles with every other month she has heavy cycles with 5-6 tampons a day for 4-5 days with associated fatigue.      She denies any NSAID use.  She denies any FH of IBD.  She does not smoke, drink alcohol or use recreational drugs.         IBD History:   IBD disease: Indeterminate colitis, favor Crohn's disease   Disease location: colon (mainly left?, TI?)   Disease behavior: non penetrating and non stricturing        Current IBD Therapy:  none     Therapeutic Drug Monitoring Labs:  none     Prior IBD Therapies:  Prednisone  Pentasa  Azathioprine  Remicade        Pertinent Endoscopy/Imaging:     December 6, 2023: EGD: 4 mm submucosal nodule at 35 cm. HP negative erosive gastritis. Normal duodenum.      December 6, 2023: Colonoscopy: Three scattered erosions in the TI. Four small patchy areas of mild inflammation in the rectum, sigmoid, and  "hepatic flexure.  Scarring and altered vascularity in the rectum to the descending colon.  Internal and external hemorrhoids. Path: TI: mild chronic inflammation. Focal active colitis in right and left colon.  Mild chronic in rectum.      2025: VCE: normal     May 2025: Fecal calprotectin: 2277     May 22, 2025: Colonoscopy (Dr. Pulido): "moderate inflammation" from rectosigmoid to splenic flexure with multiple linear ulcerations.  Grade 2-3 internal hemorrhoids.   Images reviewed personally - patchy erythema, mild edema seen mainly in left colon with scattered punctate superficial ulcerations.    Pathology: random colon biopsies (location not specified) normal colon tissue and mildly active IBD and mild crypt architectural distortion without acute inflammation c/w quiescent IBD.           Prior Pertinent Surgeries:   none     Complications:   none     Extraintestinal Manifestations:  None    ROS:  Constitutional: No fevers, chills, No weight loss  CV: No chest pain  Pulm: No cough, No shortness of breath  GI: see HPI    Medical History:   Past Medical History:   Diagnosis Date    Chronic constipation     Crohn's disease     History of Crohn's disease     unclear how diagnosis was made    Inflammatory bowel disease     Iron deficiency anemia, unspecified          Surgical History:   Past Surgical History:   Procedure Laterality Date    BONE MARROW ASPIRATION N/A 2023    Procedure: ASPIRATION, BONE MARROW;  Surgeon: Gayatri Howard MD;  Location: [facility] ENDOSCOPY;  Service: General;  Laterality: N/A;     SECTION  2012, 19    COLONOSCOPY      Luzmaria had several of these procedures    COLONOSCOPY N/A 2025    Procedure: COLON;  Surgeon: Lio Pulido MD;  Location: [facility] ENDOSCOPY;  Service: Gastroenterology;  Laterality: N/A;    COLONOSCOPY, WITH 1 OR MORE BIOPSIES N/A 2023    Procedure: COLONOSCOPY, WITH 1 OR MORE BIOPSIES;  Surgeon: Bisi Holliday MD;  " Location: [facility] ENDOSCOPY;  Service: Gastroenterology;  Laterality: N/A;    COLONOSCOPY, WITH 1 OR MORE BIOPSIES  5/22/2025    Procedure: COLONOSCOPY, WITH 1 OR MORE BIOPSIES;  Surgeon: Lio Pulido MD;  Location: [facility] ENDOSCOPY;  Service: Gastroenterology;;    EGD, WITH CLOSED BIOPSY N/A 12/06/2023    Procedure: EGD, WITH CLOSED BIOPSY;  Surgeon: Bisi Holliday MD;  Location: [facility] ENDOSCOPY;  Service: Gastroenterology;  Laterality: N/A;    INTRALUMINAL GASTROINTESTINAL TRACT IMAGING VIA CAPSULE N/A 02/11/2025    Procedure: IMAGING PROCEDURE, GI TRACT, INTRALUMINAL, VIA CAPSULE;  Surgeon: Bisi Holliday MD;  Location: [facility] ENDOSCOPY;  Service: Gastroenterology;  Laterality: N/A;    TONSILLECTOMY      TUBAL LIGATION  02/14/2019    UPPER GASTROINTESTINAL ENDOSCOPY  1999    I've had several of these       Family History:   Family History   Problem Relation Name Age of Onset    Hypertension Mother Halie Saavedra     Diabetes Mother Halie Saavedra     Drug abuse Mother Halie Saavedra     No Known Problems Father      Kidney disease Maternal Grandmother Halina James     Diabetes Maternal Grandmother Halina James     Stroke Paternal Grandmother Jade [place]    .    Social History:   Social History     Socioeconomic History    Marital status: Other    Number of children: 3    Highest education level: 12th grade   Occupational History    Occupation: self employed/   Tobacco Use    Smoking status: Never     Passive exposure: Never    Smokeless tobacco: Never   Vaping Use    Vaping status: Never Used   Substance and Sexual Activity    Alcohol use: Not Currently     Alcohol/week: 1.0 standard drink of alcohol     Types: 1 Glasses of wine per week    Drug use: Never    Sexual activity: Yes     Partners: Male     Birth control/protection: Other-see comments     Comment: Tubes tied     Social Drivers of Health     Financial Resource Strain: Low Risk  (5/20/2025)    Overall Financial Resource Strain  "(CARDIA)     Difficulty of Paying Living Expenses: Not hard at all   Food Insecurity: No Food Insecurity (5/20/2025)    Hunger Vital Sign     Worried About Running Out of Food in the Last Year: Never true     Ran Out of Food in the Last Year: Never true   Transportation Needs: No Transportation Needs (5/20/2025)    PRAPARE - Transportation     Lack of Transportation (Medical): No     Lack of Transportation (Non-Medical): No   Physical Activity: Insufficiently Active (6/3/2024)    Exercise Vital Sign     Days of Exercise per Week: 1 day     Minutes of Exercise per Session: 10 min   Stress: No Stress Concern Present (5/20/2025)    [employer] [place] [employer] - Occupational Stress Questionnaire     Feeling of Stress : Not at all   Housing Stability: Low Risk  (5/20/2025)    Housing Stability Vital Sign     Unable to Pay for Housing in the Last Year: No     Homeless in the Last Year: No       Review of patient's allergies indicates:  No Known Allergies    Medications:   Prescriptions Prior to Admission[1]      Physical Exam:    Vital Signs:   Vitals:    06/25/25 1308   BP: 126/80   Pulse: (!) 128   Resp: 16   Temp: 98.7 °F (37.1 °C)     /80 (BP Location: Left arm, Patient Position: Lying)   Pulse (!) 128   Temp 98.7 °F (37.1 °C) (Oral)   Resp 16   Ht 5' 1" (1.549 m)   Wt 49.6 kg (109 lb 6.4 oz)   LMP 06/24/2025 (Approximate)   SpO2 99%   Breastfeeding No   BMI 20.67 kg/m²     General:          Well appearing in no acute distress  Lungs: Clear to auscultation bilaterally, respirations unlabored  Heart: Regular rate and rhythm, S1 and S2 normal, no obvious murmurs  Abdomen:         Soft, non-tender, bowel sounds normal, no masses, no organomegaly        Labs:  Lab Results   Component Value Date    WBC 7.97 06/18/2025    HGB 10.5 (L) 06/18/2025    HCT 35.7 (L) 06/18/2025    MCV 87.7 06/18/2025     06/18/2025     Lab Results   Component Value Date    INR 1.1 05/20/2025    APTT 27.5 " 05/20/2025     Lab Results   Component Value Date     06/20/2025    K 2.7 (LL) 06/20/2025    CO2 25 06/20/2025    BUN 5.8 (L) 06/20/2025    CREATININE 0.61 06/20/2025    LABPROT 15.1 08/11/2022    ALBUMIN 3.4 (L) 06/20/2025    BILITOT 0.2 06/20/2025    ALKPHOS 64 06/20/2025    ALT 6 06/20/2025    AST 10 (L) 06/20/2025         Assessment and Plan:    History reviewed, vital signs satisfactory, cardiopulmonary status satisfactory.  I have explained the sedation options, risks, benefits, and alternatives of this endoscopic procedure to the patient including but not limited to bleeding, inflammation, infection, perforation, and death.  All questions were answered and the patient consented to proceed with procedure as planned.   The patient is deemed an appropriate candidate for the sedation as planned.      Bisi Holliday MD, MPH   of Clinical Medicine  Gastroenterology and Hepatology  [facility]    6/25/2025  1:29 PM          [1]   Medications Prior to Admission   Medication Sig Dispense Refill Last Dose/Taking    cyanocobalamin, vitamin B-12, 1,000 mcg Lozg Place 1 tablet under the tongue every morning. 30 lozenge 6 6/24/2025    ergocalciferol (ERGOCALCIFEROL) 50,000 unit Cap Take 1 capsule (50,000 Units total) by mouth every Monday and Friday. for 24 doses 24 capsule 0 6/24/2025    FEROSUL 325 mg (65 mg iron) Tab tablet Take 1 tablet (325 mg total) by mouth once daily. 90 tablet 3 Past Month    folic acid (FOLVITE) 1 MG tablet Take 1 tablet (1,000 mcg total) by mouth once daily. 90 tablet 3 6/24/2025    hydrocortisone (ANUSOL-HC) 25 mg suppository Place 1 suppository (25 mg total) rectally 2 (two) times daily. 28 suppository 1 6/24/2025    medroxyPROGESTERone (PROVERA) 10 MG tablet Take 1 tablet (10 mg total) by mouth once daily. 30 tablet 12 6/24/2025    zinc citrate, zinc oxide 50 mg Tab Take 50 mg by mouth once daily. 30 tablet 5

## 2025-06-25 NOTE — PLAN OF CARE
Pt to room after procedure. Awake alert oriented. Vitals at baseline. Tolerating liquids. Able to dress self. Spoke with MD. Discharge instructions discussed. Verbalized understanding. Ready for discharge.

## 2025-06-26 ENCOUNTER — PATIENT MESSAGE (OUTPATIENT)
Dept: ENDOSCOPY | Facility: HOSPITAL | Age: 40
End: 2025-06-26
Payer: MEDICAID

## 2025-06-26 VITALS
TEMPERATURE: 99 F | OXYGEN SATURATION: 96 % | RESPIRATION RATE: 20 BRPM | SYSTOLIC BLOOD PRESSURE: 122 MMHG | WEIGHT: 109.38 LBS | DIASTOLIC BLOOD PRESSURE: 86 MMHG | BODY MASS INDEX: 20.65 KG/M2 | HEART RATE: 116 BPM | HEIGHT: 61 IN

## 2025-06-27 ENCOUNTER — TELEPHONE (OUTPATIENT)
Dept: ENDOSCOPY | Facility: HOSPITAL | Age: 40
End: 2025-06-27
Payer: MEDICAID

## 2025-06-27 ENCOUNTER — PATIENT MESSAGE (OUTPATIENT)
Dept: ENDOSCOPY | Facility: HOSPITAL | Age: 40
End: 2025-06-27
Payer: MEDICAID

## 2025-06-27 LAB
DHEA SERPL-MCNC: NORMAL
ESTROGEN SERPL-MCNC: NORMAL PG/ML
INSULIN SERPL-ACNC: NORMAL U[IU]/ML
LAB AP CLINICAL INFORMATION: NORMAL
LAB AP GROSS DESCRIPTION: NORMAL
LAB AP REPORT FOOTNOTES: NORMAL

## 2025-06-27 RX ORDER — BUDESONIDE 9 MG/1
9 TABLET, FILM COATED, EXTENDED RELEASE ORAL DAILY
Qty: 30 EACH | Refills: 2 | Status: SHIPPED | OUTPATIENT
Start: 2025-06-27

## 2025-06-27 NOTE — TELEPHONE ENCOUNTER
Called patient with response to care companion message. Verified patient's date of birth. Patient states, she has crohn's and was answering post procedure questions on pt's portal. Patient states no acute distress and is familiar with her condition. Instructed patient to call with any questions or concerns with ED precautions. Patient verbalized understanding.MS

## 2025-06-30 ENCOUNTER — PATIENT MESSAGE (OUTPATIENT)
Dept: GASTROENTEROLOGY | Facility: CLINIC | Age: 40
End: 2025-06-30
Payer: MEDICAID

## 2025-07-01 ENCOUNTER — RESULTS FOLLOW-UP (OUTPATIENT)
Dept: GASTROENTEROLOGY | Facility: CLINIC | Age: 40
End: 2025-07-01

## 2025-07-10 ENCOUNTER — PATIENT MESSAGE (OUTPATIENT)
Dept: GASTROENTEROLOGY | Facility: CLINIC | Age: 40
End: 2025-07-10
Payer: COMMERCIAL

## 2025-07-10 RX ORDER — SODIUM CHLORIDE 0.9 % (FLUSH) 0.9 %
10 SYRINGE (ML) INJECTION
OUTPATIENT
Start: 2025-07-10

## 2025-07-10 RX ORDER — HEPARIN 100 UNIT/ML
500 SYRINGE INTRAVENOUS
OUTPATIENT
Start: 2025-07-10

## 2025-07-10 RX ORDER — EPINEPHRINE 0.3 MG/.3ML
0.3 INJECTION SUBCUTANEOUS ONCE AS NEEDED
OUTPATIENT
Start: 2025-07-10

## 2025-07-10 RX ORDER — DIPHENHYDRAMINE HYDROCHLORIDE 50 MG/ML
50 INJECTION, SOLUTION INTRAMUSCULAR; INTRAVENOUS ONCE AS NEEDED
OUTPATIENT
Start: 2025-07-10

## 2025-07-10 RX ORDER — METHYLPREDNISOLONE SOD SUCC 125 MG
125 VIAL (EA) INJECTION ONCE AS NEEDED
OUTPATIENT
Start: 2025-07-10

## 2025-07-15 RX ORDER — DICYCLOMINE HYDROCHLORIDE 20 MG/1
20 TABLET ORAL
Qty: 120 TABLET | Refills: 0 | Status: SHIPPED | OUTPATIENT
Start: 2025-07-15 | End: 2025-08-14

## 2025-07-21 ENCOUNTER — INFUSION (OUTPATIENT)
Dept: INFUSION THERAPY | Facility: HOSPITAL | Age: 40
End: 2025-07-21
Payer: MEDICAID

## 2025-07-21 VITALS
OXYGEN SATURATION: 99 % | HEART RATE: 100 BPM | HEIGHT: 61 IN | BODY MASS INDEX: 21.06 KG/M2 | DIASTOLIC BLOOD PRESSURE: 93 MMHG | WEIGHT: 111.56 LBS | TEMPERATURE: 98 F | RESPIRATION RATE: 20 BRPM | SYSTOLIC BLOOD PRESSURE: 114 MMHG

## 2025-07-21 DIAGNOSIS — D50.0 IRON DEFICIENCY ANEMIA DUE TO CHRONIC BLOOD LOSS: Primary | ICD-10-CM

## 2025-07-21 DIAGNOSIS — K52.3 INDETERMINATE COLITIS: ICD-10-CM

## 2025-07-21 PROCEDURE — 25000003 PHARM REV CODE 250: Performed by: STUDENT IN AN ORGANIZED HEALTH CARE EDUCATION/TRAINING PROGRAM

## 2025-07-21 PROCEDURE — 25000003 PHARM REV CODE 250: Performed by: INTERNAL MEDICINE

## 2025-07-21 PROCEDURE — 96365 THER/PROPH/DIAG IV INF INIT: CPT

## 2025-07-21 PROCEDURE — 63600175 PHARM REV CODE 636 W HCPCS: Mod: JZ,TB | Performed by: STUDENT IN AN ORGANIZED HEALTH CARE EDUCATION/TRAINING PROGRAM

## 2025-07-21 PROCEDURE — 96366 THER/PROPH/DIAG IV INF ADDON: CPT

## 2025-07-21 RX ORDER — DIPHENHYDRAMINE HYDROCHLORIDE 50 MG/ML
50 INJECTION, SOLUTION INTRAMUSCULAR; INTRAVENOUS ONCE AS NEEDED
Status: DISCONTINUED | OUTPATIENT
Start: 2025-07-21 | End: 2025-07-21 | Stop reason: HOSPADM

## 2025-07-21 RX ORDER — HEPARIN 100 UNIT/ML
500 SYRINGE INTRAVENOUS
Status: DISCONTINUED | OUTPATIENT
Start: 2025-07-21 | End: 2025-07-21 | Stop reason: HOSPADM

## 2025-07-21 RX ORDER — HEPARIN 100 UNIT/ML
500 SYRINGE INTRAVENOUS
OUTPATIENT
Start: 2025-07-21

## 2025-07-21 RX ORDER — EPINEPHRINE 1 MG/ML
0.3 INJECTION INTRAMUSCULAR; INTRAVENOUS; SUBCUTANEOUS ONCE AS NEEDED
Status: DISCONTINUED | OUTPATIENT
Start: 2025-07-21 | End: 2025-07-21 | Stop reason: HOSPADM

## 2025-07-21 RX ORDER — METHYLPREDNISOLONE SOD SUCC 125 MG
125 VIAL (EA) INJECTION ONCE AS NEEDED
Status: DISCONTINUED | OUTPATIENT
Start: 2025-07-21 | End: 2025-07-21 | Stop reason: HOSPADM

## 2025-07-21 RX ORDER — SODIUM CHLORIDE 0.9 % (FLUSH) 0.9 %
10 SYRINGE (ML) INJECTION
OUTPATIENT
Start: 2025-07-21

## 2025-07-21 RX ORDER — METHYLPREDNISOLONE SOD SUCC 125 MG
125 VIAL (EA) INJECTION ONCE AS NEEDED
OUTPATIENT
Start: 2025-07-21

## 2025-07-21 RX ORDER — DIPHENHYDRAMINE HYDROCHLORIDE 50 MG/ML
50 INJECTION, SOLUTION INTRAMUSCULAR; INTRAVENOUS ONCE AS NEEDED
OUTPATIENT
Start: 2025-07-21

## 2025-07-21 RX ORDER — EPINEPHRINE 0.3 MG/.3ML
0.3 INJECTION SUBCUTANEOUS ONCE AS NEEDED
OUTPATIENT
Start: 2025-07-21

## 2025-07-21 RX ORDER — SODIUM CHLORIDE 0.9 % (FLUSH) 0.9 %
10 SYRINGE (ML) INJECTION
Status: DISCONTINUED | OUTPATIENT
Start: 2025-07-21 | End: 2025-07-21 | Stop reason: HOSPADM

## 2025-07-21 RX ADMIN — SODIUM CHLORIDE: 9 INJECTION, SOLUTION INTRAVENOUS at 11:07

## 2025-07-21 RX ADMIN — DEXTROSE MONOHYDRATE 1200 MG: 50 INJECTION, SOLUTION INTRAVENOUS at 11:07

## 2025-08-11 ENCOUNTER — LAB VISIT (OUTPATIENT)
Dept: HEMATOLOGY/ONCOLOGY | Facility: CLINIC | Age: 40
End: 2025-08-11
Attending: INTERNAL MEDICINE
Payer: MEDICAID

## 2025-08-11 DIAGNOSIS — R82.90 ABNORMAL URINE FINDING: ICD-10-CM

## 2025-08-11 DIAGNOSIS — N92.0 MENORRHAGIA WITH REGULAR CYCLE: ICD-10-CM

## 2025-08-11 DIAGNOSIS — R89.8 ABNORMAL BONE MARROW EXAMINATION: ICD-10-CM

## 2025-08-11 LAB
IGA SERPL-MCNC: 256 MG/DL (ref 65–421)
IGG SERPL-MCNC: 1503 MG/DL (ref 522–1631)
IGM SERPL-MCNC: 158 MG/DL (ref 33–293)

## 2025-08-11 PROCEDURE — 84165 PROTEIN E-PHORESIS SERUM: CPT

## 2025-08-11 PROCEDURE — 82784 ASSAY IGA/IGD/IGG/IGM EACH: CPT

## 2025-08-11 PROCEDURE — 83521 IG LIGHT CHAINS FREE EACH: CPT | Mod: 91

## 2025-08-12 ENCOUNTER — OFFICE VISIT (OUTPATIENT)
Dept: GASTROENTEROLOGY | Facility: CLINIC | Age: 40
End: 2025-08-12
Payer: MEDICAID

## 2025-08-12 VITALS
RESPIRATION RATE: 12 BRPM | SYSTOLIC BLOOD PRESSURE: 110 MMHG | HEIGHT: 61 IN | BODY MASS INDEX: 21.49 KG/M2 | DIASTOLIC BLOOD PRESSURE: 86 MMHG | WEIGHT: 113.81 LBS | HEART RATE: 88 BPM | TEMPERATURE: 98 F

## 2025-08-12 DIAGNOSIS — K52.3 INDETERMINATE COLITIS: Primary | ICD-10-CM

## 2025-08-12 LAB
ALBUMIN % SPEP (OHS): 47.1 (ref 48.1–59.5)
ALBUMIN SERPL-MCNC: 3.7 G/DL (ref 3.5–5)
ALBUMIN SERPL-MCNC: 4.5 G/DL (ref 3.5–5)
ALBUMIN/GLOB SERPL: 0.9 RATIO (ref 1.1–2)
ALBUMIN/GLOB SERPL: 1 RATIO (ref 1.1–2)
ALP SERPL-CCNC: 75 UNIT/L (ref 40–150)
ALPHA 1 GLOB (OHS): 0.31 GM/DL (ref 0–0.4)
ALPHA 1 GLOB% (OHS): 4 (ref 2.3–4.9)
ALPHA 2 GLOB % (OHS): 10.6 (ref 6.9–13)
ALPHA 2 GLOB (OHS): 0.83 GM/DL (ref 0.4–1)
ALT SERPL-CCNC: 10 UNIT/L (ref 0–55)
ANION GAP SERPL CALC-SCNC: 9 MEQ/L
AST SERPL-CCNC: 15 UNIT/L (ref 11–45)
BASOPHILS # BLD AUTO: 0.04 X10(3)/MCL
BASOPHILS NFR BLD AUTO: 0.8 %
BETA GLOB (OHS): 1.26 GM/DL (ref 0.7–1.3)
BETA GLOB% (OHS): 16.2 (ref 13.8–19.7)
BILIRUB SERPL-MCNC: 0.2 MG/DL
BUN SERPL-MCNC: 9.3 MG/DL (ref 7–18.7)
CALCIUM SERPL-MCNC: 9.5 MG/DL (ref 8.4–10.2)
CHLORIDE SERPL-SCNC: 109 MMOL/L (ref 98–107)
CO2 SERPL-SCNC: 20 MMOL/L (ref 22–29)
CREAT SERPL-MCNC: 0.64 MG/DL (ref 0.55–1.02)
CREAT/UREA NIT SERPL: 15
CRP SERPL-MCNC: 1 MG/L
EOSINOPHIL # BLD AUTO: 0.14 X10(3)/MCL (ref 0–0.9)
EOSINOPHIL NFR BLD AUTO: 2.8 %
ERYTHROCYTE [DISTWIDTH] IN BLOOD BY AUTOMATED COUNT: 17.3 % (ref 11.5–17)
FERRITIN SERPL-MCNC: 12.1 NG/ML (ref 4.63–204)
GAMMA GLOBULIN % (OHS): 22.1 (ref 10.1–21.9)
GAMMA GLOBULIN (OHS): 1.72 GM/DL (ref 0.4–1.8)
GFR SERPLBLD CREATININE-BSD FMLA CKD-EPI: >60 ML/MIN/1.73/M2
GLOBULIN SER-MCNC: 4.1 GM/DL (ref 2.4–3.5)
GLOBULIN SER-MCNC: 4.7 GM/DL (ref 2.4–3.5)
GLUCOSE SERPL-MCNC: 82 MG/DL (ref 74–100)
HBV SURFACE AG SERPL QL IA: REACTIVE
HBV SURFACE AG SERPLBLD QL IA.RAPID: NORMAL
HCT VFR BLD AUTO: 36.6 % (ref 37–47)
HGB BLD-MCNC: 10.8 G/DL (ref 12–16)
IMM GRANULOCYTES # BLD AUTO: 0.01 X10(3)/MCL (ref 0–0.04)
IMM GRANULOCYTES NFR BLD AUTO: 0.2 %
IRON SATN MFR SERPL: 6 % (ref 20–50)
IRON SERPL-MCNC: 22 UG/DL (ref 50–170)
KAPPA LC FREE SER NEPH-MCNC: 2.81 MG/DL (ref 0.33–1.94)
KAPPA LC FREE/LAMBDA FREE SER NEPH: 1.99 {RATIO} (ref 0.26–1.65)
LAMBDA LC FREE SERPL-MCNC: 1.41 MG/DL (ref 0.57–2.63)
LYMPHOCYTES # BLD AUTO: 2.19 X10(3)/MCL (ref 0.6–4.6)
LYMPHOCYTES NFR BLD AUTO: 43.1 %
M SPIKE % (OHS): ABNORMAL
M SPIKE (OHS): ABNORMAL
MCH RBC QN AUTO: 22.8 PG (ref 27–31)
MCHC RBC AUTO-ENTMCNC: 29.5 G/DL (ref 33–36)
MCV RBC AUTO: 77.2 FL (ref 80–94)
MONOCYTES # BLD AUTO: 0.38 X10(3)/MCL (ref 0.1–1.3)
MONOCYTES NFR BLD AUTO: 7.5 %
NEUTROPHILS # BLD AUTO: 2.32 X10(3)/MCL (ref 2.1–9.2)
NEUTROPHILS NFR BLD AUTO: 45.6 %
NRBC BLD AUTO-RTO: 0 %
PATH REV: NORMAL
PLATELET # BLD AUTO: 428 X10(3)/MCL (ref 130–400)
PMV BLD AUTO: 11 FL (ref 7.4–10.4)
POTASSIUM SERPL-SCNC: 3.6 MMOL/L (ref 3.5–5.1)
PROT SERPL-MCNC: 7.8 GM/DL (ref 6.4–8.3)
PROT SERPL-MCNC: 9.2 GM/DL (ref 6.4–8.3)
RBC # BLD AUTO: 4.74 X10(6)/MCL (ref 4.2–5.4)
SODIUM SERPL-SCNC: 138 MMOL/L (ref 136–145)
TIBC SERPL-MCNC: 324 UG/DL (ref 70–310)
TIBC SERPL-MCNC: 346 UG/DL (ref 250–450)
TRANSFERRIN SERPL-MCNC: 310 MG/DL (ref 180–382)
WBC # BLD AUTO: 5.08 X10(3)/MCL (ref 4.5–11.5)

## 2025-08-12 PROCEDURE — 80053 COMPREHEN METABOLIC PANEL: CPT | Performed by: INTERNAL MEDICINE

## 2025-08-12 PROCEDURE — 85025 COMPLETE CBC W/AUTO DIFF WBC: CPT | Performed by: INTERNAL MEDICINE

## 2025-08-12 PROCEDURE — 83540 ASSAY OF IRON: CPT | Performed by: INTERNAL MEDICINE

## 2025-08-12 PROCEDURE — 87341 HEP B SURFACE AG NEUTRLZJ IA: CPT | Performed by: INTERNAL MEDICINE

## 2025-08-12 PROCEDURE — 87517 HEPATITIS B DNA QUANT: CPT | Performed by: INTERNAL MEDICINE

## 2025-08-12 PROCEDURE — 86140 C-REACTIVE PROTEIN: CPT | Performed by: INTERNAL MEDICINE

## 2025-08-12 PROCEDURE — 87340 HEPATITIS B SURFACE AG IA: CPT | Performed by: INTERNAL MEDICINE

## 2025-08-12 PROCEDURE — 82728 ASSAY OF FERRITIN: CPT | Performed by: INTERNAL MEDICINE

## 2025-08-14 LAB — HBV DNA SERPL NAA+PROBE-ACNC: NOT DETECTED [IU]/ML

## 2025-08-15 DIAGNOSIS — D50.9 IRON DEFICIENCY ANEMIA, UNSPECIFIED IRON DEFICIENCY ANEMIA TYPE: Primary | ICD-10-CM

## 2025-08-18 ENCOUNTER — OFFICE VISIT (OUTPATIENT)
Dept: HEMATOLOGY/ONCOLOGY | Facility: CLINIC | Age: 40
End: 2025-08-18
Attending: INTERNAL MEDICINE
Payer: MEDICAID

## 2025-08-18 VITALS
WEIGHT: 115.38 LBS | HEIGHT: 61 IN | RESPIRATION RATE: 16 BRPM | BODY MASS INDEX: 21.79 KG/M2 | HEART RATE: 108 BPM | SYSTOLIC BLOOD PRESSURE: 128 MMHG | OXYGEN SATURATION: 100 % | TEMPERATURE: 98 F | DIASTOLIC BLOOD PRESSURE: 95 MMHG

## 2025-08-18 DIAGNOSIS — D50.0 ANEMIA DUE TO CHRONIC BLOOD LOSS: ICD-10-CM

## 2025-08-18 DIAGNOSIS — R19.5 OCCULT BLOOD POSITIVE STOOL: ICD-10-CM

## 2025-08-18 DIAGNOSIS — R80.3 FREE MONOCLONAL LIGHT CHAIN: ICD-10-CM

## 2025-08-18 DIAGNOSIS — D50.9 IRON DEFICIENCY ANEMIA, UNSPECIFIED IRON DEFICIENCY ANEMIA TYPE: ICD-10-CM

## 2025-08-18 DIAGNOSIS — Z92.89 HISTORY OF BLOOD TRANSFUSION: ICD-10-CM

## 2025-08-18 DIAGNOSIS — D47.2 MGUS (MONOCLONAL GAMMOPATHY OF UNKNOWN SIGNIFICANCE): ICD-10-CM

## 2025-08-18 DIAGNOSIS — N92.0 MENORRHAGIA WITH REGULAR CYCLE: Primary | ICD-10-CM

## 2025-08-18 DIAGNOSIS — R89.8 ABNORMAL BONE MARROW EXAMINATION: ICD-10-CM

## 2025-08-18 DIAGNOSIS — D50.0 IRON DEFICIENCY ANEMIA DUE TO CHRONIC BLOOD LOSS: ICD-10-CM

## 2025-08-18 DIAGNOSIS — E53.8 B12 DEFICIENCY: ICD-10-CM

## 2025-08-18 DIAGNOSIS — E53.8 FOLATE DEFICIENCY: ICD-10-CM

## 2025-08-18 PROCEDURE — 99214 OFFICE O/P EST MOD 30 MIN: CPT | Mod: S$PBB,,, | Performed by: INTERNAL MEDICINE

## 2025-08-18 PROCEDURE — 3074F SYST BP LT 130 MM HG: CPT | Mod: CPTII,,, | Performed by: INTERNAL MEDICINE

## 2025-08-18 PROCEDURE — 3080F DIAST BP >= 90 MM HG: CPT | Mod: CPTII,,, | Performed by: INTERNAL MEDICINE

## 2025-08-18 PROCEDURE — 99214 OFFICE O/P EST MOD 30 MIN: CPT | Mod: PBBFAC | Performed by: INTERNAL MEDICINE

## 2025-08-18 PROCEDURE — 3008F BODY MASS INDEX DOCD: CPT | Mod: CPTII,,, | Performed by: INTERNAL MEDICINE

## 2025-08-18 RX ORDER — EPINEPHRINE 0.3 MG/.3ML
0.3 INJECTION SUBCUTANEOUS ONCE AS NEEDED
OUTPATIENT
Start: 2025-08-18 | End: 2037-01-14

## 2025-08-18 RX ORDER — HEPARIN 100 UNIT/ML
500 SYRINGE INTRAVENOUS
OUTPATIENT
Start: 2025-08-18

## 2025-08-18 RX ORDER — SODIUM CHLORIDE 0.9 % (FLUSH) 0.9 %
10 SYRINGE (ML) INJECTION
OUTPATIENT
Start: 2025-08-18

## 2025-08-19 ENCOUNTER — INFUSION (OUTPATIENT)
Dept: INFUSION THERAPY | Facility: HOSPITAL | Age: 40
End: 2025-08-19
Payer: MEDICAID

## 2025-08-19 VITALS
DIASTOLIC BLOOD PRESSURE: 97 MMHG | RESPIRATION RATE: 20 BRPM | BODY MASS INDEX: 21.55 KG/M2 | HEIGHT: 61 IN | TEMPERATURE: 98 F | HEART RATE: 107 BPM | SYSTOLIC BLOOD PRESSURE: 136 MMHG | OXYGEN SATURATION: 98 % | WEIGHT: 114.13 LBS

## 2025-08-19 DIAGNOSIS — K52.3 INDETERMINATE COLITIS: ICD-10-CM

## 2025-08-19 DIAGNOSIS — D50.0 IRON DEFICIENCY ANEMIA DUE TO CHRONIC BLOOD LOSS: Primary | ICD-10-CM

## 2025-08-19 PROCEDURE — 63600175 PHARM REV CODE 636 W HCPCS: Mod: JZ,TB | Performed by: INTERNAL MEDICINE

## 2025-08-19 PROCEDURE — 25000003 PHARM REV CODE 250: Performed by: INTERNAL MEDICINE

## 2025-08-19 PROCEDURE — 96365 THER/PROPH/DIAG IV INF INIT: CPT

## 2025-08-19 PROCEDURE — 96366 THER/PROPH/DIAG IV INF ADDON: CPT

## 2025-08-19 RX ORDER — METHYLPREDNISOLONE SOD SUCC 125 MG
125 VIAL (EA) INJECTION ONCE AS NEEDED
OUTPATIENT
Start: 2025-08-19 | End: 2037-01-15

## 2025-08-19 RX ORDER — SODIUM CHLORIDE 0.9 % (FLUSH) 0.9 %
10 SYRINGE (ML) INJECTION
OUTPATIENT
Start: 2025-08-19

## 2025-08-19 RX ORDER — DIPHENHYDRAMINE HYDROCHLORIDE 50 MG/ML
50 INJECTION, SOLUTION INTRAMUSCULAR; INTRAVENOUS ONCE AS NEEDED
OUTPATIENT
Start: 2025-08-19 | End: 2037-01-15

## 2025-08-19 RX ORDER — HEPARIN 100 UNIT/ML
500 SYRINGE INTRAVENOUS
OUTPATIENT
Start: 2025-08-19

## 2025-08-19 RX ORDER — EPINEPHRINE 0.3 MG/.3ML
0.3 INJECTION SUBCUTANEOUS ONCE AS NEEDED
OUTPATIENT
Start: 2025-08-19 | End: 2037-01-15

## 2025-08-19 RX ORDER — SODIUM CHLORIDE 0.9 % (FLUSH) 0.9 %
10 SYRINGE (ML) INJECTION
Status: DISCONTINUED | OUTPATIENT
Start: 2025-08-19 | End: 2025-08-19 | Stop reason: HOSPADM

## 2025-08-19 RX ADMIN — SODIUM CHLORIDE: 9 INJECTION, SOLUTION INTRAVENOUS at 12:08

## 2025-08-19 RX ADMIN — DEXTROSE MONOHYDRATE 1200 MG: 50 INJECTION, SOLUTION INTRAVENOUS at 12:08

## 2025-08-25 ENCOUNTER — INFUSION (OUTPATIENT)
Dept: INFUSION THERAPY | Facility: HOSPITAL | Age: 40
End: 2025-08-25
Payer: MEDICAID

## 2025-08-25 VITALS
BODY MASS INDEX: 21.62 KG/M2 | SYSTOLIC BLOOD PRESSURE: 117 MMHG | HEART RATE: 102 BPM | DIASTOLIC BLOOD PRESSURE: 96 MMHG | WEIGHT: 114.5 LBS | TEMPERATURE: 98 F | HEIGHT: 61 IN | RESPIRATION RATE: 18 BRPM | OXYGEN SATURATION: 100 %

## 2025-08-25 DIAGNOSIS — D50.0 IRON DEFICIENCY ANEMIA DUE TO CHRONIC BLOOD LOSS: Primary | ICD-10-CM

## 2025-08-25 PROCEDURE — 63600175 PHARM REV CODE 636 W HCPCS: Mod: JZ,TB | Performed by: INTERNAL MEDICINE

## 2025-08-25 PROCEDURE — 96374 THER/PROPH/DIAG INJ IV PUSH: CPT

## 2025-08-25 PROCEDURE — 25000003 PHARM REV CODE 250: Performed by: INTERNAL MEDICINE

## 2025-08-25 RX ORDER — EPINEPHRINE 1 MG/ML
0.3 INJECTION INTRAMUSCULAR; INTRAVENOUS; SUBCUTANEOUS ONCE AS NEEDED
Status: DISCONTINUED | OUTPATIENT
Start: 2025-08-25 | End: 2025-08-25 | Stop reason: HOSPADM

## 2025-08-25 RX ORDER — EPINEPHRINE 0.3 MG/.3ML
0.3 INJECTION SUBCUTANEOUS ONCE AS NEEDED
Status: CANCELLED | OUTPATIENT
Start: 2025-08-25 | End: 2037-01-21

## 2025-08-25 RX ORDER — EPINEPHRINE 0.3 MG/.3ML
0.3 INJECTION SUBCUTANEOUS ONCE AS NEEDED
OUTPATIENT
Start: 2025-08-25 | End: 2037-01-21

## 2025-08-25 RX ORDER — SODIUM CHLORIDE 0.9 % (FLUSH) 0.9 %
10 SYRINGE (ML) INJECTION
Status: DISCONTINUED | OUTPATIENT
Start: 2025-08-25 | End: 2025-08-25 | Stop reason: HOSPADM

## 2025-08-25 RX ORDER — SODIUM CHLORIDE 0.9 % (FLUSH) 0.9 %
10 SYRINGE (ML) INJECTION
OUTPATIENT
Start: 2025-08-25

## 2025-08-25 RX ORDER — HEPARIN 100 UNIT/ML
500 SYRINGE INTRAVENOUS
OUTPATIENT
Start: 2025-08-25

## 2025-08-25 RX ADMIN — SODIUM CHLORIDE: 9 INJECTION, SOLUTION INTRAVENOUS at 01:08

## 2025-08-25 RX ADMIN — FERUMOXYTOL 510 MG: 510 INJECTION INTRAVENOUS at 01:08

## 2025-09-03 ENCOUNTER — INFUSION (OUTPATIENT)
Dept: INFUSION THERAPY | Facility: HOSPITAL | Age: 40
End: 2025-09-03
Payer: MEDICAID

## 2025-09-03 VITALS
SYSTOLIC BLOOD PRESSURE: 126 MMHG | OXYGEN SATURATION: 100 % | DIASTOLIC BLOOD PRESSURE: 81 MMHG | TEMPERATURE: 98 F | RESPIRATION RATE: 18 BRPM | HEART RATE: 75 BPM

## 2025-09-03 DIAGNOSIS — D50.0 IRON DEFICIENCY ANEMIA DUE TO CHRONIC BLOOD LOSS: Primary | ICD-10-CM

## 2025-09-03 PROCEDURE — 63600175 PHARM REV CODE 636 W HCPCS: Mod: JZ,TB | Performed by: INTERNAL MEDICINE

## 2025-09-03 PROCEDURE — 25000003 PHARM REV CODE 250: Performed by: INTERNAL MEDICINE

## 2025-09-03 PROCEDURE — 96365 THER/PROPH/DIAG IV INF INIT: CPT

## 2025-09-03 RX ORDER — EPINEPHRINE 0.3 MG/.3ML
0.3 INJECTION SUBCUTANEOUS ONCE AS NEEDED
OUTPATIENT
Start: 2025-09-03 | End: 2037-01-30

## 2025-09-03 RX ORDER — HEPARIN 100 UNIT/ML
500 SYRINGE INTRAVENOUS
OUTPATIENT
Start: 2025-09-03

## 2025-09-03 RX ORDER — SODIUM CHLORIDE 0.9 % (FLUSH) 0.9 %
10 SYRINGE (ML) INJECTION
OUTPATIENT
Start: 2025-09-03

## 2025-09-03 RX ORDER — HEPARIN 100 UNIT/ML
500 SYRINGE INTRAVENOUS
Status: DISCONTINUED | OUTPATIENT
Start: 2025-09-03 | End: 2025-09-03 | Stop reason: HOSPADM

## 2025-09-03 RX ORDER — SODIUM CHLORIDE 0.9 % (FLUSH) 0.9 %
10 SYRINGE (ML) INJECTION
Status: DISCONTINUED | OUTPATIENT
Start: 2025-09-03 | End: 2025-09-03 | Stop reason: HOSPADM

## 2025-09-03 RX ADMIN — FERUMOXYTOL 510 MG: 510 INJECTION INTRAVENOUS at 01:09

## 2025-09-03 RX ADMIN — SODIUM CHLORIDE: 9 INJECTION, SOLUTION INTRAVENOUS at 01:09

## (undated) DEVICE — CABLE EKG DL RBBN 3 LEAD DISP

## (undated) DEVICE — MANIFOLD 4 PORT

## (undated) DEVICE — KIT SURGICAL COLON .25 1.1OZ

## (undated) DEVICE — FORCEP BX LG CAP 2.8MMX240CM

## (undated) DEVICE — CONTAINER SPEC STRL PATH 4OZ

## (undated) DEVICE — SOL IRRI STRL WATER 1000ML

## (undated) DEVICE — BAG LABGUARD BIOHAZARD 6X9IN

## (undated) DEVICE — TIP SUCTION YANKAUER

## (undated) DEVICE — FORCEP ALLIGATOR 2.8MM W/NDL

## (undated) DEVICE — PILLCAM SB3 EX EXTENDED

## (undated) DEVICE — LINER MEDI-VAC PPV FLTR 1500CC

## (undated) DEVICE — SYRINGE 0.9% NACL 10MIL PREFIL

## (undated) DEVICE — TRAY JAMSHIDI BONE MAR 11GX4